# Patient Record
Sex: FEMALE | Race: WHITE | Employment: OTHER | URBAN - METROPOLITAN AREA
[De-identification: names, ages, dates, MRNs, and addresses within clinical notes are randomized per-mention and may not be internally consistent; named-entity substitution may affect disease eponyms.]

---

## 2022-02-08 ENCOUNTER — HOSPITAL ENCOUNTER (INPATIENT)
Age: 42
LOS: 18 days | Discharge: HOME HEALTH | DRG: 853 | End: 2022-02-26
Attending: EMERGENCY MEDICINE | Admitting: SURGERY
Payer: COMMERCIAL

## 2022-02-08 ENCOUNTER — APPOINTMENT (OUTPATIENT)
Dept: GENERAL RADIOLOGY | Age: 42
DRG: 853 | End: 2022-02-08
Payer: COMMERCIAL

## 2022-02-08 ENCOUNTER — ANESTHESIA (OUTPATIENT)
Dept: OPERATING ROOM | Age: 42
DRG: 853 | End: 2022-02-08
Payer: COMMERCIAL

## 2022-02-08 ENCOUNTER — ANESTHESIA EVENT (OUTPATIENT)
Dept: OPERATING ROOM | Age: 42
DRG: 853 | End: 2022-02-08
Payer: COMMERCIAL

## 2022-02-08 DIAGNOSIS — N17.9 AKI (ACUTE KIDNEY INJURY) (HCC): ICD-10-CM

## 2022-02-08 DIAGNOSIS — N49.3 FOURNIER GANGRENE: Primary | ICD-10-CM

## 2022-02-08 LAB
ABSOLUTE EOS #: 0.12 K/UL (ref 0–0.44)
ABSOLUTE IMMATURE GRANULOCYTE: 0.31 K/UL (ref 0–0.3)
ABSOLUTE LYMPH #: 1.18 K/UL (ref 1.1–3.7)
ABSOLUTE MONO #: 0.76 K/UL (ref 0.1–1.2)
ALBUMIN SERPL-MCNC: 1.9 G/DL (ref 3.5–5.2)
ALBUMIN/GLOBULIN RATIO: 0.4 (ref 1–2.5)
ALP BLD-CCNC: 192 U/L (ref 35–104)
ALT SERPL-CCNC: 27 U/L (ref 5–33)
ANION GAP SERPL CALCULATED.3IONS-SCNC: 15 MMOL/L (ref 9–17)
AST SERPL-CCNC: 73 U/L
BASOPHILS # BLD: 0 % (ref 0–2)
BASOPHILS ABSOLUTE: 0.06 K/UL (ref 0–0.2)
BILIRUB SERPL-MCNC: 0.4 MG/DL (ref 0.3–1.2)
BUN BLDV-MCNC: 38 MG/DL (ref 6–20)
BUN/CREAT BLD: ABNORMAL (ref 9–20)
CALCIUM SERPL-MCNC: 7.7 MG/DL (ref 8.6–10.4)
CHLORIDE BLD-SCNC: 83 MMOL/L (ref 98–107)
CO2: 25 MMOL/L (ref 20–31)
CREAT SERPL-MCNC: 3.28 MG/DL (ref 0.5–0.9)
DIFFERENTIAL TYPE: ABNORMAL
EOSINOPHILS RELATIVE PERCENT: 1 % (ref 1–4)
GFR AFRICAN AMERICAN: 19 ML/MIN
GFR NON-AFRICAN AMERICAN: 15 ML/MIN
GFR SERPL CREATININE-BSD FRML MDRD: ABNORMAL ML/MIN/{1.73_M2}
GFR SERPL CREATININE-BSD FRML MDRD: ABNORMAL ML/MIN/{1.73_M2}
GLUCOSE BLD-MCNC: 388 MG/DL (ref 70–99)
HCG(URINE) PREGNANCY TEST: NEGATIVE
HCT VFR BLD CALC: 30.2 % (ref 36.3–47.1)
HEMOGLOBIN: 10.4 G/DL (ref 11.9–15.1)
IMMATURE GRANULOCYTES: 2 %
LACTIC ACID, SEPSIS WHOLE BLOOD: 1.6 MMOL/L (ref 0.5–1.9)
LACTIC ACID, SEPSIS: NORMAL MMOL/L (ref 0.5–1.9)
LYMPHOCYTES # BLD: 6 % (ref 24–43)
MCH RBC QN AUTO: 28.7 PG (ref 25.2–33.5)
MCHC RBC AUTO-ENTMCNC: 34.4 G/DL (ref 28.4–34.8)
MCV RBC AUTO: 83.2 FL (ref 82.6–102.9)
MONOCYTES # BLD: 4 % (ref 3–12)
NRBC AUTOMATED: 0 PER 100 WBC
PDW BLD-RTO: 13.5 % (ref 11.8–14.4)
PLATELET # BLD: 323 K/UL (ref 138–453)
PLATELET ESTIMATE: ABNORMAL
PMV BLD AUTO: 10.8 FL (ref 8.1–13.5)
POTASSIUM SERPL-SCNC: 4.1 MMOL/L (ref 3.7–5.3)
RBC # BLD: 3.63 M/UL (ref 3.95–5.11)
RBC # BLD: ABNORMAL 10*6/UL
SEG NEUTROPHILS: 87 % (ref 36–65)
SEGMENTED NEUTROPHILS ABSOLUTE COUNT: 17.51 K/UL (ref 1.5–8.1)
SODIUM BLD-SCNC: 123 MMOL/L (ref 135–144)
TOTAL PROTEIN: 6.3 G/DL (ref 6.4–8.3)
WBC # BLD: 19.9 K/UL (ref 3.5–11.3)
WBC # BLD: ABNORMAL 10*3/UL

## 2022-02-08 PROCEDURE — 71045 X-RAY EXAM CHEST 1 VIEW: CPT

## 2022-02-08 PROCEDURE — 81025 URINE PREGNANCY TEST: CPT

## 2022-02-08 PROCEDURE — 6360000002 HC RX W HCPCS: Performed by: STUDENT IN AN ORGANIZED HEALTH CARE EDUCATION/TRAINING PROGRAM

## 2022-02-08 PROCEDURE — 87070 CULTURE OTHR SPECIMN AEROBIC: CPT

## 2022-02-08 PROCEDURE — 96374 THER/PROPH/DIAG INJ IV PUSH: CPT

## 2022-02-08 PROCEDURE — 99285 EMERGENCY DEPT VISIT HI MDM: CPT

## 2022-02-08 PROCEDURE — 87075 CULTR BACTERIA EXCEPT BLOOD: CPT

## 2022-02-08 PROCEDURE — 2000000000 HC ICU R&B

## 2022-02-08 PROCEDURE — 83605 ASSAY OF LACTIC ACID: CPT

## 2022-02-08 PROCEDURE — 87205 SMEAR GRAM STAIN: CPT

## 2022-02-08 PROCEDURE — 2580000003 HC RX 258: Performed by: STUDENT IN AN ORGANIZED HEALTH CARE EDUCATION/TRAINING PROGRAM

## 2022-02-08 PROCEDURE — 86403 PARTICLE AGGLUT ANTBDY SCRN: CPT

## 2022-02-08 PROCEDURE — 85025 COMPLETE CBC W/AUTO DIFF WBC: CPT

## 2022-02-08 PROCEDURE — 96375 TX/PRO/DX INJ NEW DRUG ADDON: CPT

## 2022-02-08 PROCEDURE — 80053 COMPREHEN METABOLIC PANEL: CPT

## 2022-02-08 RX ORDER — 0.9 % SODIUM CHLORIDE 0.9 %
1000 INTRAVENOUS SOLUTION INTRAVENOUS ONCE
Status: DISCONTINUED | OUTPATIENT
Start: 2022-02-08 | End: 2022-02-09

## 2022-02-08 RX ORDER — 0.9 % SODIUM CHLORIDE 0.9 %
1000 INTRAVENOUS SOLUTION INTRAVENOUS ONCE
Status: COMPLETED | OUTPATIENT
Start: 2022-02-08 | End: 2022-02-08

## 2022-02-08 RX ORDER — CLINDAMYCIN PHOSPHATE 900 MG/50ML
900 INJECTION INTRAVENOUS EVERY 8 HOURS
Status: DISCONTINUED | OUTPATIENT
Start: 2022-02-08 | End: 2022-02-15

## 2022-02-08 RX ORDER — FENTANYL CITRATE 50 UG/ML
50 INJECTION, SOLUTION INTRAMUSCULAR; INTRAVENOUS ONCE
Status: COMPLETED | OUTPATIENT
Start: 2022-02-08 | End: 2022-02-08

## 2022-02-08 RX ORDER — ONDANSETRON 2 MG/ML
4 INJECTION INTRAMUSCULAR; INTRAVENOUS ONCE
Status: COMPLETED | OUTPATIENT
Start: 2022-02-08 | End: 2022-02-08

## 2022-02-08 RX ORDER — VANCOMYCIN HYDROCHLORIDE 1 G/200ML
1000 INJECTION, SOLUTION INTRAVENOUS EVERY 12 HOURS
Status: DISCONTINUED | OUTPATIENT
Start: 2022-02-08 | End: 2022-02-08

## 2022-02-08 RX ADMIN — SODIUM CHLORIDE 1000 ML: 9 INJECTION, SOLUTION INTRAVENOUS at 22:32

## 2022-02-08 RX ADMIN — PIPERACILLIN AND TAZOBACTAM 3375 MG: 3; .375 INJECTION, POWDER, LYOPHILIZED, FOR SOLUTION INTRAVENOUS at 23:32

## 2022-02-08 RX ADMIN — ONDANSETRON 4 MG: 2 INJECTION INTRAMUSCULAR; INTRAVENOUS at 22:32

## 2022-02-08 RX ADMIN — FENTANYL CITRATE 50 MCG: 50 INJECTION INTRAMUSCULAR; INTRAVENOUS at 22:32

## 2022-02-08 ASSESSMENT — PAIN DESCRIPTION - PAIN TYPE: TYPE: ACUTE PAIN

## 2022-02-08 ASSESSMENT — PAIN DESCRIPTION - LOCATION: LOCATION: PERINEUM

## 2022-02-08 ASSESSMENT — PAIN SCALES - GENERAL
PAINLEVEL_OUTOF10: 8
PAINLEVEL_OUTOF10: 8

## 2022-02-08 ASSESSMENT — PAIN DESCRIPTION - FREQUENCY: FREQUENCY: CONTINUOUS

## 2022-02-08 ASSESSMENT — PAIN DESCRIPTION - DESCRIPTORS: DESCRIPTORS: BURNING

## 2022-02-08 ASSESSMENT — ENCOUNTER SYMPTOMS: TACHYPNEA: 1

## 2022-02-09 ENCOUNTER — ANESTHESIA (OUTPATIENT)
Dept: OPERATING ROOM | Age: 42
DRG: 853 | End: 2022-02-09
Payer: COMMERCIAL

## 2022-02-09 ENCOUNTER — APPOINTMENT (OUTPATIENT)
Dept: GENERAL RADIOLOGY | Age: 42
DRG: 853 | End: 2022-02-09
Payer: COMMERCIAL

## 2022-02-09 ENCOUNTER — ANESTHESIA EVENT (OUTPATIENT)
Dept: OPERATING ROOM | Age: 42
DRG: 853 | End: 2022-02-09
Payer: COMMERCIAL

## 2022-02-09 VITALS — DIASTOLIC BLOOD PRESSURE: 106 MMHG | OXYGEN SATURATION: 100 % | TEMPERATURE: 94.9 F | SYSTOLIC BLOOD PRESSURE: 140 MMHG

## 2022-02-09 VITALS
OXYGEN SATURATION: 97 % | RESPIRATION RATE: 16 BRPM | SYSTOLIC BLOOD PRESSURE: 98 MMHG | TEMPERATURE: 98.2 F | DIASTOLIC BLOOD PRESSURE: 58 MMHG

## 2022-02-09 PROBLEM — E87.6 HYPOKALEMIA: Status: ACTIVE | Noted: 2022-02-09

## 2022-02-09 PROBLEM — N18.9 ACUTE KIDNEY INJURY SUPERIMPOSED ON CKD (HCC): Status: ACTIVE | Noted: 2022-02-09

## 2022-02-09 PROBLEM — E66.01 MORBID OBESITY WITH BMI OF 45.0-49.9, ADULT (HCC): Status: ACTIVE | Noted: 2022-02-09

## 2022-02-09 PROBLEM — D64.9 ANEMIA: Status: ACTIVE | Noted: 2022-02-09

## 2022-02-09 PROBLEM — E87.1 HYPONATREMIA: Status: ACTIVE | Noted: 2022-02-09

## 2022-02-09 PROBLEM — N17.9 ACUTE KIDNEY INJURY SUPERIMPOSED ON CKD (HCC): Status: ACTIVE | Noted: 2022-02-09

## 2022-02-09 PROBLEM — E11.65 UNCONTROLLED TYPE 2 DIABETES MELLITUS WITH HYPERGLYCEMIA (HCC): Status: ACTIVE | Noted: 2022-02-09

## 2022-02-09 LAB
ABSOLUTE EOS #: 0.13 K/UL (ref 0–0.44)
ABSOLUTE EOS #: 0.25 K/UL (ref 0–0.44)
ABSOLUTE IMMATURE GRANULOCYTE: 0.18 K/UL (ref 0–0.3)
ABSOLUTE IMMATURE GRANULOCYTE: 0.27 K/UL (ref 0–0.3)
ABSOLUTE LYMPH #: 0.93 K/UL (ref 1.1–3.7)
ABSOLUTE LYMPH #: 1.53 K/UL (ref 1.1–3.7)
ABSOLUTE MONO #: 0.72 K/UL (ref 0.1–1.2)
ABSOLUTE MONO #: 1.14 K/UL (ref 0.1–1.2)
ALBUMIN SERPL-MCNC: 1.9 G/DL (ref 3.5–5.2)
ALBUMIN/GLOBULIN RATIO: 0.5 (ref 1–2.5)
ALLEN TEST: ABNORMAL
ALLEN TEST: ABNORMAL
ALP BLD-CCNC: 196 U/L (ref 35–104)
ALT SERPL-CCNC: 25 U/L (ref 5–33)
ANION GAP SERPL CALCULATED.3IONS-SCNC: 15 MMOL/L (ref 9–17)
ANION GAP SERPL CALCULATED.3IONS-SCNC: 16 MMOL/L (ref 9–17)
ANION GAP SERPL CALCULATED.3IONS-SCNC: 17 MMOL/L (ref 9–17)
ANION GAP SERPL CALCULATED.3IONS-SCNC: 17 MMOL/L (ref 9–17)
AST SERPL-CCNC: 48 U/L
BASOPHILS # BLD: 0 % (ref 0–2)
BASOPHILS # BLD: 0 % (ref 0–2)
BASOPHILS ABSOLUTE: 0.05 K/UL (ref 0–0.2)
BASOPHILS ABSOLUTE: 0.07 K/UL (ref 0–0.2)
BILIRUB SERPL-MCNC: 0.29 MG/DL (ref 0.3–1.2)
BUN BLDV-MCNC: 40 MG/DL (ref 6–20)
BUN BLDV-MCNC: 41 MG/DL (ref 6–20)
BUN BLDV-MCNC: 42 MG/DL (ref 6–20)
BUN BLDV-MCNC: 42 MG/DL (ref 6–20)
BUN/CREAT BLD: ABNORMAL (ref 9–20)
CALCIUM IONIZED: 0.95 MMOL/L (ref 1.13–1.33)
CALCIUM SERPL-MCNC: 6.9 MG/DL (ref 8.6–10.4)
CALCIUM SERPL-MCNC: 6.9 MG/DL (ref 8.6–10.4)
CALCIUM SERPL-MCNC: 7.1 MG/DL (ref 8.6–10.4)
CALCIUM SERPL-MCNC: 7.1 MG/DL (ref 8.6–10.4)
CHLORIDE BLD-SCNC: 86 MMOL/L (ref 98–107)
CHLORIDE BLD-SCNC: 88 MMOL/L (ref 98–107)
CHLORIDE BLD-SCNC: 88 MMOL/L (ref 98–107)
CHLORIDE BLD-SCNC: 92 MMOL/L (ref 98–107)
CO2: 18 MMOL/L (ref 20–31)
CO2: 19 MMOL/L (ref 20–31)
CO2: 19 MMOL/L (ref 20–31)
CO2: 22 MMOL/L (ref 20–31)
COMPLEMENT C3: 143 MG/DL (ref 90–180)
COMPLEMENT C4: 19 MG/DL (ref 10–40)
CREAT SERPL-MCNC: 4.01 MG/DL (ref 0.5–0.9)
CREAT SERPL-MCNC: 4.05 MG/DL (ref 0.5–0.9)
CREAT SERPL-MCNC: 4.11 MG/DL (ref 0.5–0.9)
CREAT SERPL-MCNC: 4.82 MG/DL (ref 0.5–0.9)
CREATININE URINE: 142.3 MG/DL (ref 28–217)
DIFFERENTIAL TYPE: ABNORMAL
DIFFERENTIAL TYPE: ABNORMAL
EOSINOPHILS RELATIVE PERCENT: 1 % (ref 1–4)
EOSINOPHILS RELATIVE PERCENT: 1 % (ref 1–4)
FIO2: 40
FIO2: 40
GFR AFRICAN AMERICAN: 12 ML/MIN
GFR AFRICAN AMERICAN: 14 ML/MIN
GFR AFRICAN AMERICAN: 15 ML/MIN
GFR AFRICAN AMERICAN: 15 ML/MIN
GFR NON-AFRICAN AMERICAN: 10 ML/MIN
GFR NON-AFRICAN AMERICAN: 12 ML/MIN
GFR SERPL CREATININE-BSD FRML MDRD: ABNORMAL ML/MIN/{1.73_M2}
GLUCOSE BLD-MCNC: 121 MG/DL (ref 70–99)
GLUCOSE BLD-MCNC: 212 MG/DL (ref 70–99)
GLUCOSE BLD-MCNC: 254 MG/DL (ref 65–105)
GLUCOSE BLD-MCNC: 375 MG/DL (ref 65–105)
GLUCOSE BLD-MCNC: 389 MG/DL (ref 70–99)
GLUCOSE BLD-MCNC: 393 MG/DL (ref 65–105)
GLUCOSE BLD-MCNC: 421 MG/DL (ref 65–105)
GLUCOSE BLD-MCNC: 460 MG/DL (ref 70–99)
GLUCOSE BLD-MCNC: 495 MG/DL (ref 65–105)
GLUCOSE BLD-MCNC: 505 MG/DL (ref 65–105)
HCT VFR BLD CALC: 28.4 % (ref 36.3–47.1)
HCT VFR BLD CALC: 28.6 % (ref 36.3–47.1)
HEMOGLOBIN: 9.6 G/DL (ref 11.9–15.1)
HEMOGLOBIN: 9.6 G/DL (ref 11.9–15.1)
IMMATURE GRANULOCYTES: 1 %
IMMATURE GRANULOCYTES: 1 %
INR BLD: 1.1
LACTIC ACID, WHOLE BLOOD: 1.4 MMOL/L (ref 0.7–2.1)
LACTIC ACID, WHOLE BLOOD: 2.3 MMOL/L (ref 0.7–2.1)
LYMPHOCYTES # BLD: 4 % (ref 24–43)
LYMPHOCYTES # BLD: 8 % (ref 24–43)
MAGNESIUM: 1.6 MG/DL (ref 1.6–2.6)
MAGNESIUM: 1.6 MG/DL (ref 1.6–2.6)
MCH RBC QN AUTO: 28.2 PG (ref 25.2–33.5)
MCH RBC QN AUTO: 28.6 PG (ref 25.2–33.5)
MCHC RBC AUTO-ENTMCNC: 33.6 G/DL (ref 28.4–34.8)
MCHC RBC AUTO-ENTMCNC: 33.8 G/DL (ref 28.4–34.8)
MCV RBC AUTO: 84.1 FL (ref 82.6–102.9)
MCV RBC AUTO: 84.5 FL (ref 82.6–102.9)
MODE: ABNORMAL
MODE: ABNORMAL
MONOCYTES # BLD: 4 % (ref 3–12)
MONOCYTES # BLD: 5 % (ref 3–12)
MRSA, DNA, NASAL: NEGATIVE
NEGATIVE BASE EXCESS, ART: 6 (ref 0–2)
NEGATIVE BASE EXCESS, ART: 6 (ref 0–2)
NRBC AUTOMATED: 0 PER 100 WBC
NRBC AUTOMATED: 0 PER 100 WBC
O2 DEVICE/FLOW/%: ABNORMAL
O2 DEVICE/FLOW/%: ABNORMAL
PATIENT TEMP: ABNORMAL
PATIENT TEMP: ABNORMAL
PDW BLD-RTO: 13.6 % (ref 11.8–14.4)
PDW BLD-RTO: 14 % (ref 11.8–14.4)
PHOSPHORUS: 5.3 MG/DL (ref 2.6–4.5)
PHOSPHORUS: 5.8 MG/DL (ref 2.6–4.5)
PLATELET # BLD: 325 K/UL (ref 138–453)
PLATELET # BLD: 357 K/UL (ref 138–453)
PLATELET ESTIMATE: ABNORMAL
PLATELET ESTIMATE: ABNORMAL
PMV BLD AUTO: 10.7 FL (ref 8.1–13.5)
PMV BLD AUTO: 10.7 FL (ref 8.1–13.5)
POC HCO3: 19.9 MMOL/L (ref 21–28)
POC HCO3: 21 MMOL/L (ref 21–28)
POC LACTIC ACID: 1.26 MMOL/L (ref 0.56–1.39)
POC O2 SATURATION: 95 % (ref 94–98)
POC O2 SATURATION: 97 % (ref 94–98)
POC PCO2 TEMP: ABNORMAL MM HG
POC PCO2 TEMP: ABNORMAL MM HG
POC PCO2: 39.1 MM HG (ref 35–48)
POC PCO2: 45.6 MM HG (ref 35–48)
POC PH TEMP: ABNORMAL
POC PH TEMP: ABNORMAL
POC PH: 7.27 (ref 7.35–7.45)
POC PH: 7.32 (ref 7.35–7.45)
POC PO2 TEMP: ABNORMAL MM HG
POC PO2 TEMP: ABNORMAL MM HG
POC PO2: 102.8 MM HG (ref 83–108)
POC PO2: 84 MM HG (ref 83–108)
POSITIVE BASE EXCESS, ART: ABNORMAL (ref 0–3)
POSITIVE BASE EXCESS, ART: ABNORMAL (ref 0–3)
POTASSIUM SERPL-SCNC: 3.4 MMOL/L (ref 3.7–5.3)
POTASSIUM SERPL-SCNC: 3.6 MMOL/L (ref 3.7–5.3)
POTASSIUM SERPL-SCNC: 3.9 MMOL/L (ref 3.7–5.3)
POTASSIUM SERPL-SCNC: 4.8 MMOL/L (ref 3.7–5.3)
PROTHROMBIN TIME: 11.3 SEC (ref 9.1–12.3)
RBC # BLD: 3.36 M/UL (ref 3.95–5.11)
RBC # BLD: 3.4 M/UL (ref 3.95–5.11)
RBC # BLD: ABNORMAL 10*6/UL
RBC # BLD: ABNORMAL 10*6/UL
SAMPLE SITE: ABNORMAL
SAMPLE SITE: ABNORMAL
SARS-COV-2, RAPID: NOT DETECTED
SEG NEUTROPHILS: 86 % (ref 36–65)
SEG NEUTROPHILS: 89 % (ref 36–65)
SEGMENTED NEUTROPHILS ABSOLUTE COUNT: 15.92 K/UL (ref 1.5–8.1)
SEGMENTED NEUTROPHILS ABSOLUTE COUNT: 18.95 K/UL (ref 1.5–8.1)
SODIUM BLD-SCNC: 123 MMOL/L (ref 135–144)
SODIUM BLD-SCNC: 123 MMOL/L (ref 135–144)
SODIUM BLD-SCNC: 125 MMOL/L (ref 135–144)
SODIUM BLD-SCNC: 126 MMOL/L (ref 135–144)
SODIUM,UR: 26 MMOL/L
SPECIMEN DESCRIPTION: NORMAL
SPECIMEN DESCRIPTION: NORMAL
TCO2 (CALC), ART: ABNORMAL MMOL/L (ref 22–29)
TCO2 (CALC), ART: ABNORMAL MMOL/L (ref 22–29)
TOTAL PROTEIN: 5.7 G/DL (ref 6.4–8.3)
WBC # BLD: 18.7 K/UL (ref 3.5–11.3)
WBC # BLD: 21.5 K/UL (ref 3.5–11.3)
WBC # BLD: ABNORMAL 10*3/UL
WBC # BLD: ABNORMAL 10*3/UL

## 2022-02-09 PROCEDURE — 2580000003 HC RX 258: Performed by: SURGERY

## 2022-02-09 PROCEDURE — 2500000003 HC RX 250 WO HCPCS: Performed by: SURGERY

## 2022-02-09 PROCEDURE — 2500000003 HC RX 250 WO HCPCS: Performed by: NURSE PRACTITIONER

## 2022-02-09 PROCEDURE — 0JBC0ZZ EXCISION OF PELVIC REGION SUBCUTANEOUS TISSUE AND FASCIA, OPEN APPROACH: ICD-10-PCS | Performed by: SURGERY

## 2022-02-09 PROCEDURE — 0JBB0ZZ EXCISION OF PERINEUM SUBCUTANEOUS TISSUE AND FASCIA, OPEN APPROACH: ICD-10-PCS | Performed by: SURGERY

## 2022-02-09 PROCEDURE — 87040 BLOOD CULTURE FOR BACTERIA: CPT

## 2022-02-09 PROCEDURE — 2580000003 HC RX 258: Performed by: ANESTHESIOLOGY

## 2022-02-09 PROCEDURE — 6370000000 HC RX 637 (ALT 250 FOR IP): Performed by: STUDENT IN AN ORGANIZED HEALTH CARE EDUCATION/TRAINING PROGRAM

## 2022-02-09 PROCEDURE — 80048 BASIC METABOLIC PNL TOTAL CA: CPT

## 2022-02-09 PROCEDURE — 2580000003 HC RX 258: Performed by: NURSE PRACTITIONER

## 2022-02-09 PROCEDURE — 2500000003 HC RX 250 WO HCPCS: Performed by: STUDENT IN AN ORGANIZED HEALTH CARE EDUCATION/TRAINING PROGRAM

## 2022-02-09 PROCEDURE — 7100000000 HC PACU RECOVERY - FIRST 15 MIN: Performed by: SURGERY

## 2022-02-09 PROCEDURE — 2580000003 HC RX 258: Performed by: NURSE ANESTHETIST, CERTIFIED REGISTERED

## 2022-02-09 PROCEDURE — A4216 STERILE WATER/SALINE, 10 ML: HCPCS | Performed by: NURSE ANESTHETIST, CERTIFIED REGISTERED

## 2022-02-09 PROCEDURE — 83735 ASSAY OF MAGNESIUM: CPT

## 2022-02-09 PROCEDURE — 6360000002 HC RX W HCPCS: Performed by: NURSE ANESTHETIST, CERTIFIED REGISTERED

## 2022-02-09 PROCEDURE — 6370000000 HC RX 637 (ALT 250 FOR IP): Performed by: NURSE PRACTITIONER

## 2022-02-09 PROCEDURE — 99291 CRITICAL CARE FIRST HOUR: CPT | Performed by: INTERNAL MEDICINE

## 2022-02-09 PROCEDURE — 7100000001 HC PACU RECOVERY - ADDTL 15 MIN: Performed by: SURGERY

## 2022-02-09 PROCEDURE — 80053 COMPREHEN METABOLIC PANEL: CPT

## 2022-02-09 PROCEDURE — 0J990ZZ DRAINAGE OF BUTTOCK SUBCUTANEOUS TISSUE AND FASCIA, OPEN APPROACH: ICD-10-PCS | Performed by: SURGERY

## 2022-02-09 PROCEDURE — 84100 ASSAY OF PHOSPHORUS: CPT

## 2022-02-09 PROCEDURE — 0JB90ZZ EXCISION OF BUTTOCK SUBCUTANEOUS TISSUE AND FASCIA, OPEN APPROACH: ICD-10-PCS | Performed by: SURGERY

## 2022-02-09 PROCEDURE — 82947 ASSAY GLUCOSE BLOOD QUANT: CPT

## 2022-02-09 PROCEDURE — 5A1955Z RESPIRATORY VENTILATION, GREATER THAN 96 CONSECUTIVE HOURS: ICD-10-PCS | Performed by: SURGERY

## 2022-02-09 PROCEDURE — 2709999900 HC NON-CHARGEABLE SUPPLY: Performed by: SURGERY

## 2022-02-09 PROCEDURE — 02HV33Z INSERTION OF INFUSION DEVICE INTO SUPERIOR VENA CAVA, PERCUTANEOUS APPROACH: ICD-10-PCS | Performed by: SURGERY

## 2022-02-09 PROCEDURE — 82570 ASSAY OF URINE CREATININE: CPT

## 2022-02-09 PROCEDURE — 85025 COMPLETE CBC W/AUTO DIFF WBC: CPT

## 2022-02-09 PROCEDURE — 37799 UNLISTED PX VASCULAR SURGERY: CPT

## 2022-02-09 PROCEDURE — 94761 N-INVAS EAR/PLS OXIMETRY MLT: CPT

## 2022-02-09 PROCEDURE — 2700000000 HC OXYGEN THERAPY PER DAY

## 2022-02-09 PROCEDURE — 2580000003 HC RX 258: Performed by: INTERNAL MEDICINE

## 2022-02-09 PROCEDURE — 87641 MR-STAPH DNA AMP PROBE: CPT

## 2022-02-09 PROCEDURE — 6360000002 HC RX W HCPCS: Performed by: NURSE PRACTITIONER

## 2022-02-09 PROCEDURE — 86160 COMPLEMENT ANTIGEN: CPT

## 2022-02-09 PROCEDURE — 3600000004 HC SURGERY LEVEL 4 BASE: Performed by: SURGERY

## 2022-02-09 PROCEDURE — 84300 ASSAY OF URINE SODIUM: CPT

## 2022-02-09 PROCEDURE — 83605 ASSAY OF LACTIC ACID: CPT

## 2022-02-09 PROCEDURE — 3700000001 HC ADD 15 MINUTES (ANESTHESIA): Performed by: SURGERY

## 2022-02-09 PROCEDURE — 6370000000 HC RX 637 (ALT 250 FOR IP): Performed by: ANESTHESIOLOGY

## 2022-02-09 PROCEDURE — 0J9C0ZZ DRAINAGE OF PELVIC REGION SUBCUTANEOUS TISSUE AND FASCIA, OPEN APPROACH: ICD-10-PCS | Performed by: SURGERY

## 2022-02-09 PROCEDURE — 6360000002 HC RX W HCPCS: Performed by: ANESTHESIOLOGY

## 2022-02-09 PROCEDURE — 3600000005 HC SURGERY LEVEL 5 BASE: Performed by: SURGERY

## 2022-02-09 PROCEDURE — 3700000000 HC ANESTHESIA ATTENDED CARE: Performed by: SURGERY

## 2022-02-09 PROCEDURE — 3600000015 HC SURGERY LEVEL 5 ADDTL 15MIN: Performed by: SURGERY

## 2022-02-09 PROCEDURE — 82803 BLOOD GASES ANY COMBINATION: CPT

## 2022-02-09 PROCEDURE — A4217 STERILE WATER/SALINE, 500 ML: HCPCS | Performed by: SURGERY

## 2022-02-09 PROCEDURE — 2580000003 HC RX 258: Performed by: STUDENT IN AN ORGANIZED HEALTH CARE EDUCATION/TRAINING PROGRAM

## 2022-02-09 PROCEDURE — 6360000002 HC RX W HCPCS: Performed by: STUDENT IN AN ORGANIZED HEALTH CARE EDUCATION/TRAINING PROGRAM

## 2022-02-09 PROCEDURE — 2500000003 HC RX 250 WO HCPCS: Performed by: NURSE ANESTHETIST, CERTIFIED REGISTERED

## 2022-02-09 PROCEDURE — 87635 SARS-COV-2 COVID-19 AMP PRB: CPT

## 2022-02-09 PROCEDURE — 36415 COLL VENOUS BLD VENIPUNCTURE: CPT

## 2022-02-09 PROCEDURE — 82330 ASSAY OF CALCIUM: CPT

## 2022-02-09 PROCEDURE — 88304 TISSUE EXAM BY PATHOLOGIST: CPT

## 2022-02-09 PROCEDURE — 71045 X-RAY EXAM CHEST 1 VIEW: CPT

## 2022-02-09 PROCEDURE — 2000000000 HC ICU R&B

## 2022-02-09 PROCEDURE — 99223 1ST HOSP IP/OBS HIGH 75: CPT | Performed by: INTERNAL MEDICINE

## 2022-02-09 PROCEDURE — 94002 VENT MGMT INPAT INIT DAY: CPT

## 2022-02-09 PROCEDURE — 3600000014 HC SURGERY LEVEL 4 ADDTL 15MIN: Performed by: SURGERY

## 2022-02-09 PROCEDURE — 85610 PROTHROMBIN TIME: CPT

## 2022-02-09 RX ORDER — BUDESONIDE AND FORMOTEROL FUMARATE DIHYDRATE 80; 4.5 UG/1; UG/1
2 AEROSOL RESPIRATORY (INHALATION) 2 TIMES DAILY
Status: DISCONTINUED | OUTPATIENT
Start: 2022-02-09 | End: 2022-02-26 | Stop reason: HOSPADM

## 2022-02-09 RX ORDER — INSULIN GLARGINE 100 [IU]/ML
35 INJECTION, SOLUTION SUBCUTANEOUS NIGHTLY
Status: DISCONTINUED | OUTPATIENT
Start: 2022-02-09 | End: 2022-02-09

## 2022-02-09 RX ORDER — ROPINIROLE 1 MG/1
4 TABLET, FILM COATED ORAL DAILY
Status: DISCONTINUED | OUTPATIENT
Start: 2022-02-09 | End: 2022-02-26 | Stop reason: HOSPADM

## 2022-02-09 RX ORDER — SODIUM CHLORIDE 9 MG/ML
INJECTION INTRAVENOUS PRN
Status: DISCONTINUED | OUTPATIENT
Start: 2022-02-09 | End: 2022-02-09 | Stop reason: SDUPTHER

## 2022-02-09 RX ORDER — INSULIN GLARGINE 100 [IU]/ML
20 INJECTION, SOLUTION SUBCUTANEOUS NIGHTLY
Status: DISCONTINUED | OUTPATIENT
Start: 2022-02-09 | End: 2022-02-09

## 2022-02-09 RX ORDER — SUCCINYLCHOLINE CHLORIDE 20 MG/ML
INJECTION INTRAMUSCULAR; INTRAVENOUS PRN
Status: DISCONTINUED | OUTPATIENT
Start: 2022-02-09 | End: 2022-02-09 | Stop reason: SDUPTHER

## 2022-02-09 RX ORDER — MIDAZOLAM HYDROCHLORIDE 1 MG/ML
INJECTION INTRAMUSCULAR; INTRAVENOUS PRN
Status: DISCONTINUED | OUTPATIENT
Start: 2022-02-09 | End: 2022-02-09 | Stop reason: SDUPTHER

## 2022-02-09 RX ORDER — FENTANYL CITRATE 50 UG/ML
INJECTION, SOLUTION INTRAMUSCULAR; INTRAVENOUS PRN
Status: DISCONTINUED | OUTPATIENT
Start: 2022-02-09 | End: 2022-02-09 | Stop reason: SDUPTHER

## 2022-02-09 RX ORDER — PROPOFOL 10 MG/ML
INJECTION, EMULSION INTRAVENOUS PRN
Status: DISCONTINUED | OUTPATIENT
Start: 2022-02-09 | End: 2022-02-09 | Stop reason: SDUPTHER

## 2022-02-09 RX ORDER — FENOFIBRATE 160 MG/1
160 TABLET ORAL DAILY
COMMUNITY

## 2022-02-09 RX ORDER — HEPARIN SODIUM 5000 [USP'U]/ML
5000 INJECTION, SOLUTION INTRAVENOUS; SUBCUTANEOUS EVERY 8 HOURS SCHEDULED
Status: DISCONTINUED | OUTPATIENT
Start: 2022-02-09 | End: 2022-02-09

## 2022-02-09 RX ORDER — INSULIN GLARGINE 100 [IU]/ML
30 INJECTION, SOLUTION SUBCUTANEOUS NIGHTLY
Status: DISCONTINUED | OUTPATIENT
Start: 2022-02-09 | End: 2022-02-09

## 2022-02-09 RX ORDER — OMEPRAZOLE 20 MG/1
20 CAPSULE, DELAYED RELEASE ORAL DAILY
COMMUNITY

## 2022-02-09 RX ORDER — LISINOPRIL 40 MG/1
40 TABLET ORAL DAILY
Status: ON HOLD | COMMUNITY
End: 2022-07-28

## 2022-02-09 RX ORDER — CYANOCOBALAMIN 1000 UG/ML
1000 INJECTION INTRAMUSCULAR; SUBCUTANEOUS
COMMUNITY
Start: 2021-10-18

## 2022-02-09 RX ORDER — OXYCODONE HYDROCHLORIDE 5 MG/1
5 TABLET ORAL EVERY 4 HOURS PRN
Status: DISCONTINUED | OUTPATIENT
Start: 2022-02-09 | End: 2022-02-15

## 2022-02-09 RX ORDER — FLUTICASONE PROPIONATE 50 MCG
1 SPRAY, SUSPENSION (ML) NASAL DAILY
Status: DISCONTINUED | OUTPATIENT
Start: 2022-02-09 | End: 2022-02-26 | Stop reason: HOSPADM

## 2022-02-09 RX ORDER — ONDANSETRON 2 MG/ML
INJECTION INTRAMUSCULAR; INTRAVENOUS PRN
Status: DISCONTINUED | OUTPATIENT
Start: 2022-02-09 | End: 2022-02-09 | Stop reason: SDUPTHER

## 2022-02-09 RX ORDER — VITAMIN B COMPLEX
5000 TABLET ORAL DAILY
Status: DISCONTINUED | OUTPATIENT
Start: 2022-02-09 | End: 2022-02-09

## 2022-02-09 RX ORDER — FLUTICASONE PROPIONATE 50 MCG
1 SPRAY, SUSPENSION (ML) NASAL
COMMUNITY

## 2022-02-09 RX ORDER — HEPARIN SODIUM 5000 [USP'U]/ML
5000 INJECTION, SOLUTION INTRAVENOUS; SUBCUTANEOUS EVERY 8 HOURS SCHEDULED
Status: DISCONTINUED | OUTPATIENT
Start: 2022-02-09 | End: 2022-02-26 | Stop reason: HOSPADM

## 2022-02-09 RX ORDER — NEOSTIGMINE METHYLSULFATE 5 MG/5 ML
SYRINGE (ML) INTRAVENOUS PRN
Status: DISCONTINUED | OUTPATIENT
Start: 2022-02-09 | End: 2022-02-09 | Stop reason: SDUPTHER

## 2022-02-09 RX ORDER — MONTELUKAST SODIUM 10 MG/1
10 TABLET ORAL DAILY
Status: DISCONTINUED | OUTPATIENT
Start: 2022-02-09 | End: 2022-02-26 | Stop reason: HOSPADM

## 2022-02-09 RX ORDER — DEXTROSE MONOHYDRATE 50 MG/ML
100 INJECTION, SOLUTION INTRAVENOUS PRN
Status: DISCONTINUED | OUTPATIENT
Start: 2022-02-09 | End: 2022-02-09

## 2022-02-09 RX ORDER — GABAPENTIN 300 MG/1
300 CAPSULE ORAL EVERY 8 HOURS
Status: DISCONTINUED | OUTPATIENT
Start: 2022-02-09 | End: 2022-02-09

## 2022-02-09 RX ORDER — GABAPENTIN 100 MG/1
100 CAPSULE ORAL EVERY 8 HOURS
Status: DISCONTINUED | OUTPATIENT
Start: 2022-02-09 | End: 2022-02-25

## 2022-02-09 RX ORDER — DEXMEDETOMIDINE HYDROCHLORIDE 4 UG/ML
0.2 INJECTION, SOLUTION INTRAVENOUS CONTINUOUS
Status: DISCONTINUED | OUTPATIENT
Start: 2022-02-09 | End: 2022-02-17

## 2022-02-09 RX ORDER — NOREPINEPHRINE BIT/0.9 % NACL 16MG/250ML
2-100 INFUSION BOTTLE (ML) INTRAVENOUS CONTINUOUS
Status: DISCONTINUED | OUTPATIENT
Start: 2022-02-09 | End: 2022-02-15

## 2022-02-09 RX ORDER — METHOCARBAMOL 750 MG/1
750 TABLET, FILM COATED ORAL EVERY 6 HOURS
Status: DISCONTINUED | OUTPATIENT
Start: 2022-02-09 | End: 2022-02-09

## 2022-02-09 RX ORDER — LIDOCAINE HYDROCHLORIDE 10 MG/ML
INJECTION, SOLUTION EPIDURAL; INFILTRATION; INTRACAUDAL; PERINEURAL PRN
Status: DISCONTINUED | OUTPATIENT
Start: 2022-02-09 | End: 2022-02-09 | Stop reason: SDUPTHER

## 2022-02-09 RX ORDER — ACETAMINOPHEN 325 MG/1
650 TABLET ORAL EVERY 6 HOURS PRN
Status: DISCONTINUED | OUTPATIENT
Start: 2022-02-09 | End: 2022-02-09

## 2022-02-09 RX ORDER — CARVEDILOL 25 MG/1
25 TABLET ORAL DAILY
Status: ON HOLD | COMMUNITY
End: 2022-02-25 | Stop reason: HOSPADM

## 2022-02-09 RX ORDER — POTASSIUM CHLORIDE 20 MEQ/1
40 TABLET, EXTENDED RELEASE ORAL ONCE
Status: COMPLETED | OUTPATIENT
Start: 2022-02-09 | End: 2022-02-09

## 2022-02-09 RX ORDER — MAGNESIUM HYDROXIDE 1200 MG/15ML
LIQUID ORAL CONTINUOUS PRN
Status: COMPLETED | OUTPATIENT
Start: 2022-02-09 | End: 2022-02-09

## 2022-02-09 RX ORDER — SODIUM CHLORIDE 9 MG/ML
INJECTION, SOLUTION INTRAVENOUS CONTINUOUS PRN
Status: DISCONTINUED | OUTPATIENT
Start: 2022-02-09 | End: 2022-02-09 | Stop reason: SDUPTHER

## 2022-02-09 RX ORDER — 0.9 % SODIUM CHLORIDE 0.9 %
500 INTRAVENOUS SOLUTION INTRAVENOUS ONCE
Status: DISCONTINUED | OUTPATIENT
Start: 2022-02-09 | End: 2022-02-09

## 2022-02-09 RX ORDER — SODIUM CHLORIDE 0.9 % (FLUSH) 0.9 %
5-40 SYRINGE (ML) INJECTION PRN
Status: DISCONTINUED | OUTPATIENT
Start: 2022-02-09 | End: 2022-02-09

## 2022-02-09 RX ORDER — ROPINIROLE 4 MG/1
4 TABLET, FILM COATED ORAL DAILY
COMMUNITY
End: 2022-07-21

## 2022-02-09 RX ORDER — ERGOCALCIFEROL 1.25 MG/1
50000 CAPSULE ORAL WEEKLY
COMMUNITY

## 2022-02-09 RX ORDER — SODIUM CHLORIDE, SODIUM LACTATE, POTASSIUM CHLORIDE, AND CALCIUM CHLORIDE .6; .31; .03; .02 G/100ML; G/100ML; G/100ML; G/100ML
1000 INJECTION, SOLUTION INTRAVENOUS ONCE
Status: DISCONTINUED | OUTPATIENT
Start: 2022-02-09 | End: 2022-02-09

## 2022-02-09 RX ORDER — ROCURONIUM BROMIDE 10 MG/ML
INJECTION, SOLUTION INTRAVENOUS PRN
Status: DISCONTINUED | OUTPATIENT
Start: 2022-02-09 | End: 2022-02-09 | Stop reason: SDUPTHER

## 2022-02-09 RX ORDER — NICOTINE POLACRILEX 4 MG
15 LOZENGE BUCCAL PRN
Status: DISCONTINUED | OUTPATIENT
Start: 2022-02-09 | End: 2022-02-09

## 2022-02-09 RX ORDER — ACETAMINOPHEN 650 MG/1
650 SUPPOSITORY RECTAL EVERY 6 HOURS PRN
Status: DISCONTINUED | OUTPATIENT
Start: 2022-02-09 | End: 2022-02-09

## 2022-02-09 RX ORDER — SODIUM CHLORIDE 0.9 % (FLUSH) 0.9 %
5-40 SYRINGE (ML) INJECTION EVERY 12 HOURS SCHEDULED
Status: DISCONTINUED | OUTPATIENT
Start: 2022-02-09 | End: 2022-02-09

## 2022-02-09 RX ORDER — FENOFIBRATE 160 MG/1
160 TABLET ORAL DAILY
Status: DISCONTINUED | OUTPATIENT
Start: 2022-02-09 | End: 2022-02-09

## 2022-02-09 RX ORDER — MAGNESIUM SULFATE HEPTAHYDRATE 40 MG/ML
4000 INJECTION, SOLUTION INTRAVENOUS ONCE
Status: COMPLETED | OUTPATIENT
Start: 2022-02-09 | End: 2022-02-09

## 2022-02-09 RX ORDER — ALBUTEROL SULFATE 90 UG/1
1 AEROSOL, METERED RESPIRATORY (INHALATION) EVERY 4 HOURS PRN
Status: DISCONTINUED | OUTPATIENT
Start: 2022-02-09 | End: 2022-02-26 | Stop reason: HOSPADM

## 2022-02-09 RX ORDER — SODIUM CHLORIDE 9 MG/ML
INJECTION, SOLUTION INTRAVENOUS CONTINUOUS
Status: DISCONTINUED | OUTPATIENT
Start: 2022-02-09 | End: 2022-02-09

## 2022-02-09 RX ORDER — MONTELUKAST SODIUM 10 MG/1
10 TABLET ORAL DAILY
COMMUNITY

## 2022-02-09 RX ORDER — DULOXETIN HYDROCHLORIDE 60 MG/1
60 CAPSULE, DELAYED RELEASE ORAL DAILY
COMMUNITY

## 2022-02-09 RX ORDER — DEXTROSE MONOHYDRATE 25 G/50ML
12.5 INJECTION, SOLUTION INTRAVENOUS PRN
Status: DISCONTINUED | OUTPATIENT
Start: 2022-02-09 | End: 2022-02-09

## 2022-02-09 RX ORDER — OMEPRAZOLE 20 MG/1
20 CAPSULE, DELAYED RELEASE ORAL DAILY
Status: DISCONTINUED | OUTPATIENT
Start: 2022-02-09 | End: 2022-02-09

## 2022-02-09 RX ORDER — ACETAMINOPHEN 500 MG
1000 TABLET ORAL EVERY 8 HOURS SCHEDULED
Status: DISCONTINUED | OUTPATIENT
Start: 2022-02-09 | End: 2022-02-26 | Stop reason: HOSPADM

## 2022-02-09 RX ORDER — SODIUM CHLORIDE 9 MG/ML
25 INJECTION, SOLUTION INTRAVENOUS PRN
Status: DISCONTINUED | OUTPATIENT
Start: 2022-02-09 | End: 2022-02-09

## 2022-02-09 RX ORDER — GLYCOPYRROLATE 1 MG/5 ML
SYRINGE (ML) INTRAVENOUS PRN
Status: DISCONTINUED | OUTPATIENT
Start: 2022-02-09 | End: 2022-02-09 | Stop reason: SDUPTHER

## 2022-02-09 RX ORDER — ALBUTEROL SULFATE 90 UG/1
1 AEROSOL, METERED RESPIRATORY (INHALATION) PRN
COMMUNITY

## 2022-02-09 RX ORDER — 0.9 % SODIUM CHLORIDE 0.9 %
1000 INTRAVENOUS SOLUTION INTRAVENOUS ONCE
Status: COMPLETED | OUTPATIENT
Start: 2022-02-09 | End: 2022-02-09

## 2022-02-09 RX ORDER — SODIUM CHLORIDE, SODIUM LACTATE, POTASSIUM CHLORIDE, CALCIUM CHLORIDE 600; 310; 30; 20 MG/100ML; MG/100ML; MG/100ML; MG/100ML
INJECTION, SOLUTION INTRAVENOUS CONTINUOUS
Status: DISCONTINUED | OUTPATIENT
Start: 2022-02-09 | End: 2022-02-11

## 2022-02-09 RX ADMIN — PHENYLEPHRINE HYDROCHLORIDE 200 MCG: 10 INJECTION INTRAVENOUS at 01:35

## 2022-02-09 RX ADMIN — Medication 4 MG: at 01:08

## 2022-02-09 RX ADMIN — PHENYLEPHRINE HYDROCHLORIDE 50 MCG/MIN: 10 INJECTION INTRAVENOUS at 01:33

## 2022-02-09 RX ADMIN — PHENYLEPHRINE HYDROCHLORIDE 200 MCG: 10 INJECTION INTRAVENOUS at 01:05

## 2022-02-09 RX ADMIN — PROPOFOL 200 MG: 10 INJECTION, EMULSION INTRAVENOUS at 00:14

## 2022-02-09 RX ADMIN — POTASSIUM CHLORIDE 40 MEQ: 20 TABLET, EXTENDED RELEASE ORAL at 05:59

## 2022-02-09 RX ADMIN — POTASSIUM BICARBONATE 40 MEQ: 782 TABLET, EFFERVESCENT ORAL at 17:04

## 2022-02-09 RX ADMIN — SODIUM CHLORIDE 1000 ML: 9 INJECTION, SOLUTION INTRAVENOUS at 10:45

## 2022-02-09 RX ADMIN — PHENYLEPHRINE HYDROCHLORIDE 200 MCG: 10 INJECTION INTRAVENOUS at 00:38

## 2022-02-09 RX ADMIN — PHENYLEPHRINE HYDROCHLORIDE 200 MCG: 10 INJECTION INTRAVENOUS at 00:26

## 2022-02-09 RX ADMIN — OMEPRAZOLE 20 MG: 20 CAPSULE, DELAYED RELEASE ORAL at 10:47

## 2022-02-09 RX ADMIN — FENTANYL CITRATE 100 MCG: 50 INJECTION, SOLUTION INTRAMUSCULAR; INTRAVENOUS at 13:28

## 2022-02-09 RX ADMIN — PHENYLEPHRINE HYDROCHLORIDE 200 MCG: 10 INJECTION INTRAVENOUS at 00:57

## 2022-02-09 RX ADMIN — LIDOCAINE HYDROCHLORIDE 50 MG: 10 INJECTION, SOLUTION EPIDURAL; INFILTRATION; INTRACAUDAL; PERINEURAL at 13:28

## 2022-02-09 RX ADMIN — DEXMEDETOMIDINE HYDROCHLORIDE 0.2 MCG/KG/HR: 4 INJECTION, SOLUTION INTRAVENOUS at 16:14

## 2022-02-09 RX ADMIN — MAGNESIUM SULFATE IN WATER 4000 MG: 40 INJECTION, SOLUTION INTRAVENOUS at 19:23

## 2022-02-09 RX ADMIN — PHENYLEPHRINE HYDROCHLORIDE 200 MCG: 10 INJECTION INTRAVENOUS at 00:47

## 2022-02-09 RX ADMIN — VASOPRESSIN 0.02 UNITS/MIN: 20 INJECTION INTRAVENOUS at 14:08

## 2022-02-09 RX ADMIN — FENTANYL CITRATE 50 MCG: 50 INJECTION, SOLUTION INTRAMUSCULAR; INTRAVENOUS at 01:00

## 2022-02-09 RX ADMIN — SODIUM CHLORIDE 9.9 UNITS/HR: 9 INJECTION, SOLUTION INTRAVENOUS at 11:29

## 2022-02-09 RX ADMIN — SODIUM CHLORIDE, PRESERVATIVE FREE 10 ML: 5 INJECTION INTRAVENOUS at 08:29

## 2022-02-09 RX ADMIN — SODIUM CHLORIDE: 9 INJECTION, SOLUTION INTRAVENOUS at 00:05

## 2022-02-09 RX ADMIN — CLINDAMYCIN IN 5 PERCENT DEXTROSE 900 MG: 18 INJECTION, SOLUTION INTRAVENOUS at 00:36

## 2022-02-09 RX ADMIN — KETAMINE HYDROCHLORIDE 0.2 MG/KG/HR: 50 INJECTION, SOLUTION INTRAMUSCULAR; INTRAVENOUS at 17:15

## 2022-02-09 RX ADMIN — OXYCODONE 5 MG: 5 TABLET ORAL at 04:20

## 2022-02-09 RX ADMIN — ROCURONIUM BROMIDE 10 MG: 10 INJECTION INTRAVENOUS at 13:28

## 2022-02-09 RX ADMIN — MONTELUKAST SODIUM 10 MG: 10 TABLET, FILM COATED ORAL at 10:47

## 2022-02-09 RX ADMIN — SODIUM CHLORIDE, POTASSIUM CHLORIDE, SODIUM LACTATE AND CALCIUM CHLORIDE: 600; 310; 30; 20 INJECTION, SOLUTION INTRAVENOUS at 23:55

## 2022-02-09 RX ADMIN — ROCURONIUM BROMIDE 50 MG: 10 INJECTION INTRAVENOUS at 00:14

## 2022-02-09 RX ADMIN — VASOPRESSIN 0.4 UNITS/MIN: 20 INJECTION INTRAVENOUS at 21:17

## 2022-02-09 RX ADMIN — PHENYLEPHRINE HYDROCHLORIDE 100 MCG: 10 INJECTION INTRAVENOUS at 14:01

## 2022-02-09 RX ADMIN — INSULIN GLARGINE 20 UNITS: 100 INJECTION, SOLUTION SUBCUTANEOUS at 05:59

## 2022-02-09 RX ADMIN — FENTANYL CITRATE 50 MCG: 50 INJECTION, SOLUTION INTRAMUSCULAR; INTRAVENOUS at 00:14

## 2022-02-09 RX ADMIN — METHOCARBAMOL TABLETS 750 MG: 750 TABLET, COATED ORAL at 10:49

## 2022-02-09 RX ADMIN — SODIUM CHLORIDE 10 ML: 9 INJECTION, SOLUTION INTRAMUSCULAR; INTRAVENOUS; SUBCUTANEOUS at 00:35

## 2022-02-09 RX ADMIN — HEPARIN SODIUM 5000 UNITS: 5000 INJECTION INTRAVENOUS; SUBCUTANEOUS at 05:59

## 2022-02-09 RX ADMIN — PROPOFOL 150 MG: 10 INJECTION, EMULSION INTRAVENOUS at 13:28

## 2022-02-09 RX ADMIN — ROPINIROLE HYDROCHLORIDE 4 MG: 1 TABLET, FILM COATED ORAL at 10:47

## 2022-02-09 RX ADMIN — FENTANYL CITRATE 100 MCG: 50 INJECTION, SOLUTION INTRAMUSCULAR; INTRAVENOUS at 14:25

## 2022-02-09 RX ADMIN — INSULIN LISPRO 18 UNITS: 100 INJECTION, SOLUTION INTRAVENOUS; SUBCUTANEOUS at 08:26

## 2022-02-09 RX ADMIN — ACETAMINOPHEN 1000 MG: 500 TABLET ORAL at 17:03

## 2022-02-09 RX ADMIN — PHENYLEPHRINE HYDROCHLORIDE 50 MCG/MIN: 10 INJECTION INTRAVENOUS at 01:35

## 2022-02-09 RX ADMIN — ONDANSETRON 4 MG: 2 INJECTION INTRAMUSCULAR; INTRAVENOUS at 00:58

## 2022-02-09 RX ADMIN — PHENYLEPHRINE HYDROCHLORIDE 300 MCG: 10 INJECTION INTRAVENOUS at 00:25

## 2022-02-09 RX ADMIN — CLINDAMYCIN IN 5 PERCENT DEXTROSE 900 MG: 18 INJECTION, SOLUTION INTRAVENOUS at 09:00

## 2022-02-09 RX ADMIN — Medication 50 MCG/HR: at 16:30

## 2022-02-09 RX ADMIN — SODIUM CHLORIDE 9.92 UNITS/HR: 9 INJECTION, SOLUTION INTRAVENOUS at 18:30

## 2022-02-09 RX ADMIN — CLINDAMYCIN IN 5 PERCENT DEXTROSE 900 MG: 18 INJECTION, SOLUTION INTRAVENOUS at 16:59

## 2022-02-09 RX ADMIN — FAMOTIDINE 20 MG: 10 INJECTION INTRAVENOUS at 17:09

## 2022-02-09 RX ADMIN — METHOCARBAMOL TABLETS 750 MG: 750 TABLET, COATED ORAL at 05:59

## 2022-02-09 RX ADMIN — VANCOMYCIN HYDROCHLORIDE 2500 MG: 1 INJECTION, POWDER, LYOPHILIZED, FOR SOLUTION INTRAVENOUS at 04:51

## 2022-02-09 RX ADMIN — ROCURONIUM BROMIDE 40 MG: 10 INJECTION INTRAVENOUS at 13:32

## 2022-02-09 RX ADMIN — LIDOCAINE HYDROCHLORIDE 50 MG: 10 INJECTION, SOLUTION EPIDURAL; INFILTRATION; INTRACAUDAL; PERINEURAL at 00:14

## 2022-02-09 RX ADMIN — HEPARIN SODIUM 5000 UNITS: 5000 INJECTION INTRAVENOUS; SUBCUTANEOUS at 16:59

## 2022-02-09 RX ADMIN — PHENYLEPHRINE HYDROCHLORIDE 100 MCG: 10 INJECTION INTRAVENOUS at 14:14

## 2022-02-09 RX ADMIN — Medication 0.6 MG: at 01:08

## 2022-02-09 RX ADMIN — FLUTICASONE PROPIONATE 1 SPRAY: 50 SPRAY, METERED NASAL at 08:30

## 2022-02-09 RX ADMIN — PHENYLEPHRINE HYDROCHLORIDE 100 MCG: 10 INJECTION INTRAVENOUS at 14:05

## 2022-02-09 RX ADMIN — MIDAZOLAM HYDROCHLORIDE 2 MG: 1 INJECTION, SOLUTION INTRAMUSCULAR; INTRAVENOUS at 14:25

## 2022-02-09 RX ADMIN — SODIUM CHLORIDE: 9 INJECTION, SOLUTION INTRAVENOUS at 01:17

## 2022-02-09 RX ADMIN — MIDAZOLAM HYDROCHLORIDE 2 MG: 1 INJECTION, SOLUTION INTRAMUSCULAR; INTRAVENOUS at 00:07

## 2022-02-09 RX ADMIN — HEPARIN SODIUM 5000 UNITS: 5000 INJECTION INTRAVENOUS; SUBCUTANEOUS at 22:35

## 2022-02-09 RX ADMIN — SODIUM CHLORIDE 10 ML: 9 INJECTION, SOLUTION INTRAMUSCULAR; INTRAVENOUS; SUBCUTANEOUS at 14:01

## 2022-02-09 RX ADMIN — GABAPENTIN 100 MG: 300 CAPSULE ORAL at 20:20

## 2022-02-09 RX ADMIN — ROCURONIUM BROMIDE 50 MG: 10 INJECTION INTRAVENOUS at 14:30

## 2022-02-09 RX ADMIN — GABAPENTIN 300 MG: 300 CAPSULE ORAL at 05:59

## 2022-02-09 RX ADMIN — INSULIN HUMAN 5 UNITS: 100 INJECTION, SOLUTION PARENTERAL at 02:17

## 2022-02-09 RX ADMIN — MIDAZOLAM HYDROCHLORIDE 2 MG: 1 INJECTION, SOLUTION INTRAMUSCULAR; INTRAVENOUS at 13:28

## 2022-02-09 RX ADMIN — Medication 5000 UNITS: at 10:47

## 2022-02-09 RX ADMIN — ACETAMINOPHEN 1000 MG: 500 TABLET ORAL at 22:35

## 2022-02-09 RX ADMIN — Medication 100 MG: at 13:28

## 2022-02-09 RX ADMIN — CLINDAMYCIN IN 5 PERCENT DEXTROSE 900 MG: 18 INJECTION, SOLUTION INTRAVENOUS at 22:35

## 2022-02-09 RX ADMIN — Medication 2 MCG/MIN: at 08:14

## 2022-02-09 RX ADMIN — SODIUM CHLORIDE, POTASSIUM CHLORIDE, SODIUM LACTATE AND CALCIUM CHLORIDE: 600; 310; 30; 20 INJECTION, SOLUTION INTRAVENOUS at 16:13

## 2022-02-09 ASSESSMENT — PULMONARY FUNCTION TESTS
PIF_VALUE: 36
PIF_VALUE: 28
PIF_VALUE: 27
PIF_VALUE: 26
PIF_VALUE: 27
PIF_VALUE: 1
PIF_VALUE: 27
PIF_VALUE: 15
PIF_VALUE: 34
PIF_VALUE: 36
PIF_VALUE: 27
PIF_VALUE: 33
PIF_VALUE: 36
PIF_VALUE: 21
PIF_VALUE: 26
PIF_VALUE: 2
PIF_VALUE: 18
PIF_VALUE: 27
PIF_VALUE: 24
PIF_VALUE: 36
PIF_VALUE: 37
PIF_VALUE: 36
PIF_VALUE: 1
PIF_VALUE: 37
PIF_VALUE: 1
PIF_VALUE: 36
PIF_VALUE: 27
PIF_VALUE: 0
PIF_VALUE: 17
PIF_VALUE: 37
PIF_VALUE: 27
PIF_VALUE: 28
PIF_VALUE: 16
PIF_VALUE: 27
PIF_VALUE: 35
PIF_VALUE: 27
PIF_VALUE: 0
PIF_VALUE: 34
PIF_VALUE: 36
PIF_VALUE: 35
PIF_VALUE: 25
PIF_VALUE: 27
PIF_VALUE: 35
PIF_VALUE: 4
PIF_VALUE: 28
PIF_VALUE: 22
PIF_VALUE: 27
PIF_VALUE: 34
PIF_VALUE: 29
PIF_VALUE: 35
PIF_VALUE: 24
PIF_VALUE: 1
PIF_VALUE: 36
PIF_VALUE: 35
PIF_VALUE: 27
PIF_VALUE: 36
PIF_VALUE: 0
PIF_VALUE: 27
PIF_VALUE: 34
PIF_VALUE: 29
PIF_VALUE: 0
PIF_VALUE: 35
PIF_VALUE: 1
PIF_VALUE: 34
PIF_VALUE: 33
PIF_VALUE: 23
PIF_VALUE: 40
PIF_VALUE: 35
PIF_VALUE: 28
PIF_VALUE: 27
PIF_VALUE: 19
PIF_VALUE: 27
PIF_VALUE: 0
PIF_VALUE: 34
PIF_VALUE: 28
PIF_VALUE: 27
PIF_VALUE: 1
PIF_VALUE: 4
PIF_VALUE: 45
PIF_VALUE: 1
PIF_VALUE: 21
PIF_VALUE: 36
PIF_VALUE: 34
PIF_VALUE: 1
PIF_VALUE: 27
PIF_VALUE: 36
PIF_VALUE: 34
PIF_VALUE: 27
PIF_VALUE: 27
PIF_VALUE: 26
PIF_VALUE: 8
PIF_VALUE: 27
PIF_VALUE: 27
PIF_VALUE: 26
PIF_VALUE: 25
PIF_VALUE: 36
PIF_VALUE: 36
PIF_VALUE: 26
PIF_VALUE: 14
PIF_VALUE: 27
PIF_VALUE: 14
PIF_VALUE: 24
PIF_VALUE: 27
PIF_VALUE: 29
PIF_VALUE: 36
PIF_VALUE: 27
PIF_VALUE: 2
PIF_VALUE: 27
PIF_VALUE: 0
PIF_VALUE: 26
PIF_VALUE: 27
PIF_VALUE: 28
PIF_VALUE: 24
PIF_VALUE: 13
PIF_VALUE: 0
PIF_VALUE: 26
PIF_VALUE: 37
PIF_VALUE: 28
PIF_VALUE: 26
PIF_VALUE: 35
PIF_VALUE: 27
PIF_VALUE: 26
PIF_VALUE: 27
PIF_VALUE: 1
PIF_VALUE: 0
PIF_VALUE: 27
PIF_VALUE: 35
PIF_VALUE: 27
PIF_VALUE: 25
PIF_VALUE: 26
PIF_VALUE: 27
PIF_VALUE: 34
PIF_VALUE: 27
PIF_VALUE: 34
PIF_VALUE: 27
PIF_VALUE: 26
PIF_VALUE: 26
PIF_VALUE: 25
PIF_VALUE: 27
PIF_VALUE: 35
PIF_VALUE: 10
PIF_VALUE: 27
PIF_VALUE: 33
PIF_VALUE: 35
PIF_VALUE: 27
PIF_VALUE: 1
PIF_VALUE: 0
PIF_VALUE: 28
PIF_VALUE: 13
PIF_VALUE: 0
PIF_VALUE: 0
PIF_VALUE: 35
PIF_VALUE: 27
PIF_VALUE: 35
PIF_VALUE: 26
PIF_VALUE: 6
PIF_VALUE: 35
PIF_VALUE: 27
PIF_VALUE: 34
PIF_VALUE: 22
PIF_VALUE: 26
PIF_VALUE: 1
PIF_VALUE: 36
PIF_VALUE: 30
PIF_VALUE: 27
PIF_VALUE: 21
PIF_VALUE: 27
PIF_VALUE: 35
PIF_VALUE: 28
PIF_VALUE: 37
PIF_VALUE: 35
PIF_VALUE: 28
PIF_VALUE: 36
PIF_VALUE: 27
PIF_VALUE: 27
PIF_VALUE: 33
PIF_VALUE: 29
PIF_VALUE: 0
PIF_VALUE: 27
PIF_VALUE: 28
PIF_VALUE: 3
PIF_VALUE: 35
PIF_VALUE: 25

## 2022-02-09 ASSESSMENT — ENCOUNTER SYMPTOMS
CONSTIPATION: 0
ABDOMINAL PAIN: 0
SORE THROAT: 0
CHOKING: 0
RHINORRHEA: 0
COUGH: 0
VOMITING: 0
CHEST TIGHTNESS: 0
NAUSEA: 1
ABDOMINAL DISTENTION: 0
SHORTNESS OF BREATH: 0
DIARRHEA: 0
BACK PAIN: 0
WHEEZING: 0

## 2022-02-09 ASSESSMENT — PAIN SCALES - GENERAL
PAINLEVEL_OUTOF10: 6
PAINLEVEL_OUTOF10: 0
PAINLEVEL_OUTOF10: 5
PAINLEVEL_OUTOF10: 6
PAINLEVEL_OUTOF10: 0

## 2022-02-09 ASSESSMENT — PAIN - FUNCTIONAL ASSESSMENT: PAIN_FUNCTIONAL_ASSESSMENT: 0-10

## 2022-02-09 ASSESSMENT — PAIN DESCRIPTION - FREQUENCY: FREQUENCY: CONTINUOUS

## 2022-02-09 ASSESSMENT — PAIN DESCRIPTION - DESCRIPTORS: DESCRIPTORS: BURNING

## 2022-02-09 ASSESSMENT — PAIN DESCRIPTION - LOCATION
LOCATION: PERINEUM
LOCATION: PERINEUM

## 2022-02-09 ASSESSMENT — PAIN DESCRIPTION - PAIN TYPE
TYPE: SURGICAL PAIN
TYPE: SURGICAL PAIN;ACUTE PAIN

## 2022-02-09 NOTE — OP NOTE
Operative Note      Patient: Naresh Lobato  YOB: 1980  MRN: [id number]    Date of Procedure: 2/9/2022    Pre-Op Diagnosis: NECROTIZING FASCITIS    Post-Op Diagnosis: Same       Procedure(s):  incision and drainage of bilateral necrotizing soft tissue infection of groin and buttocks     Surgeon(s):  Alyn Lesches, MD    Assistant:   Avel Atkins CNP    Anesthesia: General    Estimated Blood Loss (mL): less than 865     Complications: None    Specimens:   * No specimens in log *    Implants:  * No implants in log *      Drains:   NG/OG/NJ/NE Tube Orogastric 18 fr Center mouth (Active)       Urethral Catheter Temperature probe 16 fr (Active)   Catheter Indications Need for fluid volume management of the critically ill patient in a critical care setting 02/09/22 1200   Site Assessment Swelling 02/09/22 1200   Urine Color Yellow 02/09/22 1302   Urine Appearance Cloudy 02/09/22 1302   Urine Odor Malodorous 02/09/22 1200   Output (mL) 10 mL 02/09/22 0800       Findings: Bilateral groin and buttocks necrotizing infections    Detailed Description of Procedure:   Patient was brought to the Operating Room after consenting patient for anincision and drainage of bilateral necrotizing soft tissue infection of groin and buttocks for necrotizing soft tissue infection. Risks benefits and alteranatives were explained all questions answered and she agreed to proceed. Patient was brought to the OR placed in supine position on the OR table after general anesthesia administered salcedo, ngtube and judy were placed and she was placed in lithotomy. Groin buttocks and abdomen were prepped and draped in usual sterile procedure. At the previous site of incision on the L buttock there was significant amount of purulence. I continued an incision cephalad in to the groin apporx 10 cm to incorporate all of the loculations and areas of drainage.  All areas of necrosis were removed with a combination of sharp and blunt technique with scalpel, cautery and tonsil. Once L side was debrided, A 7 cm vertical incision was made from the buttock into the groin and  incorporate all of the loculations and areas of drainage. All areas of necrosis were removed with a combination of sharp and blunt technique with scalpel, cautery and tonsil. Wound was copiously irrigated out and kerlix tied together was placed in both groins skin was closed with staples over top with a plan for re-op in the next 12-24 hours. All counts correct times 2 and patient was transported intubated to TICU.   Electronically signed by Ab Abraham MD on 2/9/2022 at 2:44 PM

## 2022-02-09 NOTE — OP NOTE
Operative Note      Patient: Timothy Lopes  YOB: 1980  MRN: [id number]    Date of Procedure: 2/8/2022    Pre-Op Diagnosis: Leonel Gangrene    Post-Op Diagnosis: Same       Procedure(s):  EXAM UNDER ANESTHESIA, INCISION AND DEBRIDEMENT OF PERINEUM     Surgeon(s):  Nika Lewis MD    Assistant:   Susi Beck DO PGY3    Anesthesia: General    Estimated Blood Loss (mL): Minimal    Complications: None    Specimens:   ID Type Source Tests Collected by Time Destination   1 : PERINEUM ABCESS CULTURE  Swab Perineum CULTURE, ANAEROBIC AND AEROBIC Nika Lewis MD 2/9/2022 0036    A : RIGHT BUTTOCKS PERINEAL ABCESS  Tissue Perineum SURGICAL PATHOLOGY Nika Lewis MD 2/9/2022 0050        Implants:  * No implants in log *      Drains:   Urethral Catheter Temperature probe 16 fr (Active)   Catheter Indications Prolonged immobilization (e.g. unstable thoracic or lumbar spine, multiple traumatic injuries such as pelvic fractures) 02/08/22 2329   Site Assessment Swelling 02/08/22 2329   Urine Color Yellow 02/08/22 2329   Urine Appearance Cloudy 02/08/22 2329   Urine Odor Malodorous 02/08/22 2329   Output (mL) 100 mL 02/08/22 2329       Findings: NECROTIC FAT AND SOME PURULENT MATERIAL    Detailed Description of Procedure: Indication:  Patient is a 40-year-old female who has been being treated as an outpatient for skin infection for 10 days with p.o. antibiotics. Patient failed outpatient management presented to an outside facility who checked a CT scan which showed a likely necrotizing soft tissue infection with significant subcutaneous air. Patient was transferred to [facility] for further management. Decision was made to take patient to the OR for incision and debridement. Risks, benefits, and alternatives were discussed with the patient, patient wished to proceed, informed consent was obtained.     Procedure:  Patient was taken to the operative suite and the anesthesia team induced general anesthesia while patient was on the stretcher. The patient was then positioned in the prone position on the operative table. Patient was then prepped and draped in typical standard fashion. No antibiotics were given as patient had received antibiotics in the emergency department. Timeout was called to ensure proper patient, position, and procedure being performed. The procedure began by using a 10 blade scalpel to make a linear incision just lateral to the gluteal cleft on the left buttock. This incision was carried down through the skin, subcutaneous tissue, and muscle using Bovie electrocautery. Necrotic fat and purulence was encountered. The necrotic fat was debrided using combination of Bovie electrocautery and sharp debridement with Metzenbaum scissors. Cultures were obtained from the purulence pockets. The necrotic fat was sent to pathology. Final debridement area was 12 cm in length, 4 cm wide, 20 cm deep. Hemostasis was ensured using Bovie electrocautery. The wound was thoroughly irrigated with normal saline. Once satisfied the wound was packed with Betadine soaked Kerlix. Dressing was completed with fluffs and ABDs as well as mesh underwear. This concluded the procedure. The patient was extubated and taken to PACU in critical yet stable  Condition. Plan for acute care surgery team to take patient back to the OR on Thursday of this week for further debridement if needed. Dr. Amelia Shabazz was present and scrubbed for the duration of the case    Electronically signed by Lissa Toledo DO on 2/9/2022 at 1:24 AM  Present throughout case.

## 2022-02-09 NOTE — PROGRESS NOTES
PROGRESS NOTE    PATIENT NAME: Rachael Dallas  MEDICAL RECORD NO. [id number]  DATE: 2022  PRIMARY CARE PHYSICIAN: Ena Galvin MD    HD: # 1    ASSESSMENT    Patient Active Problem List   Diagnosis    Leonel gangrene    Uncontrolled type 2 diabetes mellitus with hyperglycemia ([facility] [address]    Acute kidney injury superimposed on CKD ([facility] [address]    Hypokalemia    Hyponatremia    Anemia    Morbid obesity with BMI of 45.0-49.9, adult [facility])       MEDICAL DECISION MAKING AND PLAN  Necrotizing soft tissue infection of left buttock/perineal region   -s/p incision and drainage with debridement in OR     -Continue IV abx: Zosyn, Vanco, Clindamycin    -Plan for dressing change by surgery team later today     -May require further debridement in OR 2/10 depending on how wound looks during dressing change    -Recommend levophed if pressor support is needed   -Strict glucose control <180 to allow for wound healing     SUBJECTIVE    Unknown Burt is seen and examined. Afebrile, regular rate, requiring 50 of char this morning. 225cc urine output overnight. Pain is controlled this morning. Plan for dressing change later today. OBJECTIVE  VITALS: Temp: Temp: 98.3 °F (36.8 °C)Temp  Av.5 °F (36.4 °C)  Min: 94.9 °F (34.9 °C)  Max: 99.7 °F (66.2 °C) BP Systolic (46SXL), KSQ:51 , Min:52 , XKM:871   Diastolic (95ELV), MATA:93, Min:25, Max:122   Pulse Pulse  Av.4  Min: 68  Max: 82 Resp Resp  Av.3  Min: 0  Max: 36 Pulse ox SpO2  Av.9 %  Min: 86 %  Max: 100 %  GENERAL: awake, alert, not distressed  NEURO: CNII-XII grossly intact, no focal neurological deficits    HEENT: atraumatic, normocephalic, sclera sclear, nose without deformity, trachea midline   LUNGS: normal effort on NC, no respiratory distress, no accessory muscle use   HEART: regular rate and rhythm   ABDOMEN: soft, nontender, nondistended, R buttock and perineal dressing intact   EXTREMITY: no cyanosis, clubbing or edema    I/O last 3 completed shifts:   In: 1196 [P.O.:240; I.V.:1514]  Out: 245 [Urine:225; Blood:20]    Drain/tube output: In: 5686 [P.O.:240; I.V.:1514]  Out: 245 [Urine:225]    LAB:  CBC:   Recent Labs     02/08/22 2228 02/09/22 0412   WBC 19.9* 21.5*   HGB 10.4* 9.6*   HCT 30.2* 28.6*   MCV 83.2 84.1    325     BMP:   Recent Labs     02/08/22 2228 02/09/22 0412   * 125*   K 4.1 3.6*   CL 83* 86*   CO2 25 22   BUN 38* 41*   CREATININE 3.28* 4.11*   GLUCOSE 388* 389*     COAGS:   Recent Labs     02/08/22 2228 02/09/22 0408 02/09/22 0412   PROT 6.3*  --  5.7*   INR  --  1.1  --        RADIOLOGY:  No new imaging       Abigail Chavez DO       Attending Note      I have reviewed the above [facility] note(s) and I either performed the key elements of the medical history and physical exam or was present with the surgery resident when the key elements of the medical history and physical exam were performed. I have discussed the findings, established the care plan and recommendations with the surgical team.  Tentative return to OR tomorrow, states feels better. Cultures pending. May require insulin drip to control glucose. Creat increase noted.     Leighann Saucedo MD  2/9/2022  8:24 AM

## 2022-02-09 NOTE — PLAN OF CARE
Problem: Skin Integrity:  Goal: Will show no infection signs and symptoms  Description: Will show no infection signs and symptoms  2/9/2022 0854 by Alpesh Oquendo RN  Outcome: Ongoing  2/9/2022 0523 by Milka Inman RN  Outcome: Ongoing  Goal: Absence of new skin breakdown  Description: Absence of new skin breakdown  2/9/2022 0854 by Alpesh Oquendo RN  Outcome: Ongoing  2/9/2022 0523 by Milka Inman RN  Outcome: Ongoing     Problem: Fluid Volume - Imbalance:  Goal: Absence of imbalanced fluid volume signs and symptoms  Description: Absence of imbalanced fluid volume signs and symptoms  2/9/2022 0854 by Alpesh Oquendo RN  Outcome: Ongoing  2/9/2022 0523 by Milka Inman RN  Outcome: Ongoing     Problem: Pain:  Goal: Pain level will decrease  Description: Pain level will decrease  2/9/2022 0523 by Milka Inman RN  Outcome: Ongoing  Goal: Recognizes and communicates pain  Description: Recognizes and communicates pain  2/9/2022 0523 by Milka Inman RN  Outcome: Ongoing  Goal: Control of acute pain  Description: Control of acute pain  2/9/2022 0523 by Milka Inman RN  Outcome: Ongoing  Goal: Control of chronic pain  Description: Control of chronic pain  2/9/2022 0854 by Alpesh Oquendo RN  Outcome: Ongoing  2/9/2022 0523 by Milka Inman RN  Outcome: Ongoing     Problem: Serum Glucose Level - Abnormal:  Goal: Ability to maintain appropriate glucose levels will improve to within specified parameters  Description: Ability to maintain appropriate glucose levels will improve to within specified parameters  2/9/2022 0523 by Milka Inman RN  Outcome: Ongoing     Problem: Skin Integrity - Impaired:  Goal: Will show no infection signs and symptoms  Description: Will show no infection signs and symptoms  2/9/2022 0523 by Milka Inman RN  Outcome: Ongoing  Goal: Absence of new skin breakdown  Description: Absence of new skin breakdown  2/9/2022 0523 by Ravi Inman, RN  Outcome: Ongoing

## 2022-02-09 NOTE — ED PROVIDER NOTES
[facility] ED  Emergency Department Encounter  Emergency Medicine Resident     Pt Name: Lissa De Guzman  MRN: [id number]  [place] 1980  Date of evaluation: 2/8/22  PCP:  Steven Moreno MD    CHIEF COMPLAINT       Chief Complaint   Patient presents with    Abscess       HISTORY Nir  (Location/Symptom, Timing/Onset, Context/Setting, Quality, Duration, Modifying Factors,Severity.)      Lissa De Guzman is a 42 y.o. 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 Patient reports that she has had 10 days of vaginal pain and abscess. She states symptoms initially started on her right labia but are now on the left labia and are worse. She was seen at [facility] 8 days ago and was diagnosed with an abscess. The area was apparently open and draining at that time. She was discharged home on doxycycline which patient states she has been taking. She states she has not missed any doses. Patient states that she returned to [facility] today because her symptoms worsened. She also states that she developed fever, chills, lightheadedness and nausea. Patient states the smell of the infection has also worsened. She states her blood sugars have been running high, in the 300s since this infection started. She has been taking her insulin as prescribed. Patient denies a prior history of labial abscess or pilonidal abscess. At [facility], patient had labs that showed WBC 17.5, glucose 560, sodium 124, chloride 83, anion gap 17, BUN 37, creatinine 3.26, CRP 39.1. Remainder of labs were unremarkable. Covid was negative. Lactic is 1.7.   She had a CT scan of the abdomen and pelvis which showed a fairly extensive regions of subcutaneous gas demonstrated in the left perianal and perineal lesions with anterior and superior continuation to the level of the left obturator muscle bundles. Posterior subcutaneous extension deep to the buttocks to the level of the sacrum. 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 6.5 x 5.2 cm fluid collection deep to the right rectus muscle bundles which is consistent with an atypical adnexal cyst but focal abscess formation cannot be excluded. She was treated with 1 L of normal saline, Tylenol, Zosyn, vancomycin, clindamycin, 10 units of subcutaneous insulin and 8 mg of morphine    PAST MEDICAL / SURGICAL / SOCIAL / FAMILY HISTORY     Patient has a past medical history of allergic rhinitis, atherosclerosis of aorta, obesity, depression, anxiety, genital warts, GERD, obstructive sleep apnea, osteoarthritis, restless leg syndrome, urinary incontinence, vitamin D deficiency    Patient has a past surgical history of  delivery, tubal ligation, cholecystectomy, shoulder surgery, appendectomy, bariatric surgery    Social:  Patient denies smoking    Family Hx: Heart attack, brain aneurysm, colitis    Allergies:  Aspirin and Ibuprofen    Home Medications:  Prior to Admission medications    Medication Sig Start Date End Date Taking?  Authorizing Provider   PRENATAL VIT-DOCUSATE-IRON-FA PO Take 1 tablet by mouth daily 22  Yes Historical Provider, MD   cyanocobalamin 1000 MCG/ML injection Inject 1,000 mcg into the muscle every 30 days 10/18/21  Yes Historical Provider, MD   fluticasone-salmeterol (ADVAIR HFA) 45-21 MCG/ACT inhaler Inhale 2 puffs into the lungs 2 times daily    Historical Provider, MD   Multiple Vitamins-Minerals (MULTIVITAMIN ADULT EXTRA C PO) Take 1 tablet by mouth daily    Historical Provider, MD   vitamin D (ERGOCALCIFEROL) 1.25 MG (23823 UT) CAPS capsule Take 50,000 Units by mouth once a week    Historical Provider, MD   albuterol sulfate HFA (PROAIR HFA) 108 (90 Base) MCG/ACT inhaler Inhale 1 puff into the lungs as needed    Historical Provider, MD   carvedilol (COREG) 25 MG tablet Take 25 mg by mouth daily Historical Provider, MD   vitamin D (CHOLECALCIFEROL) 125 MCG (5000 UT) CAPS capsule Take 5,000 Units by mouth daily    Historical Provider, MD   DULoxetine (CYMBALTA) 60 MG extended release capsule Take 60 mg by mouth daily    Historical Provider, MD   fenofibrate (TRIGLIDE) 160 MG tablet Take 160 mg by mouth daily    Historical Provider, MD   fluticasone (FLONASE) 50 MCG/ACT nasal spray 1 spray by Each Nostril route    Historical Provider, MD   glucagon 1 MG injection Inject 1 mg into the muscle as needed    Historical Provider, MD   lisinopril (PRINIVIL;ZESTRIL) 40 MG tablet Take 40 mg by mouth daily    Historical Provider, MD   montelukast (SINGULAIR) 10 MG tablet Take 10 mg by mouth daily    Historical Provider, MD   omeprazole (PRILOSEC) 20 MG delayed release capsule Take 20 mg by mouth daily    Historical Provider, MD   rOPINIRole (REQUIP) 4 MG tablet Take 4 mg by mouth daily    Historical Provider, MD       REVIEW OFSYSTEMS    (2-9 systems for level 4, 10 or more for level 5)      Review of Systems   Constitutional: Positive for fever. Negative for chills. HENT: Negative for congestion, rhinorrhea and sore throat. Eyes: Negative for visual disturbance. Respiratory: Negative for cough and shortness of breath. Cardiovascular: Negative for chest pain and leg swelling. Gastrointestinal: Positive for nausea. Negative for abdominal pain, constipation, diarrhea and vomiting. Genitourinary: Negative for dysuria, frequency and hematuria. Musculoskeletal: Negative for arthralgias, back pain and neck pain. Skin: Positive for wound. Negative for rash. Neurological: Negative for weakness, light-headedness, numbness and headaches. Psychiatric/Behavioral: Negative for confusion. All other systems reviewed and are negative.       PHYSICAL EXAM   (up to 7 for level 4, 8 or more forlevel 5)      INITIAL VITALS:   Vitals:    02/09/22 0600 02/09/22 0615 02/09/22 0630 02/09/22 0645   BP: (!) 95/44 (!) 94/36 (!) 110/55 (!) 116/57   Pulse: 78 76 77 74   Resp: 17 29 20 25   Temp:       TempSrc:       SpO2:       Weight:       Height:            Physical Exam  Vitals and nursing note reviewed. Constitutional:       General: She is not in acute distress. Appearance: She is obese. She is not toxic-appearing. Comments: Adult female sitting in stretcher no acute distress. Speaks in full sentences and answers questions appropriately   HENT:      Head: Normocephalic and atraumatic. Nose: Nose normal.      Mouth/Throat:      Mouth: Mucous membranes are moist.      Pharynx: Oropharynx is clear. Eyes:      Conjunctiva/sclera: Conjunctivae normal.      Pupils: Pupils are equal, round, and reactive to light. Cardiovascular:      Rate and Rhythm: Normal rate and regular rhythm. Pulses: Normal pulses. Heart sounds: No murmur heard. No friction rub. No gallop. Pulmonary:      Effort: Pulmonary effort is normal. No respiratory distress. Breath sounds: Normal breath sounds. No wheezing, rhonchi or rales. Abdominal:      General: There is no distension. Palpations: Abdomen is soft. Tenderness: There is no abdominal tenderness. Comments: No tenderness to palpation of abdomen diffusely   Genitourinary:     Comments: There is a large area of erythema and induration to the bilateral labia and perineum, worse on the left. The skin overlying the left labia and perineum  is mostly intact but tense. There is a small area that is draining foul-smelling purulent bloody discharge. There is crepitus over this area. There is moderate to severe tenderness to palpation. Musculoskeletal:      Cervical back: Neck supple. No rigidity. Right lower leg: No edema. Left lower leg: No edema. Skin:     General: Skin is warm and dry. Capillary Refill: Capillary refill takes less than 2 seconds. Findings: No rash. Neurological:      General: No focal deficit present. Mental Status: She is alert. Psychiatric:         Mood and Affect: Mood normal.         Behavior: Behavior normal.         DIFFERENTIAL  DIAGNOSIS       DDX: Leonel's gangrene, insulin-dependent diabetes    Initial MDM/Plan: 42 y.o. 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 On physical exam, patient is nontoxic-appearing. She is afebrile. Blood pressure is slightly on the soft side of 106/40. Patient has extensive erythema and edema to the bilateral labia and perineal region, worse on the left with some active drainage on the left. Patient had labs and CT scan at [facility]. She arrives with the disc of images from [facility] which we will upload into PACS. Will repeat lab work. Patient had blood cultures also drawn at [facility] so do not think we need to redraw these here. Patient is not meeting sepsis criteria at this time, however she was meeting sepsis criteria at [facility] and they have initiated sepsis labs and treated her with Tylenol, vancomycin, Zosyn and clindamycin. Patient will need continuation of these antibiotics. Will continue to treat her pain and nausea and give additional fluids. Will consult to general surgery will follow any additional recommendations by them. Patient will require admission. DIAGNOSTIC RESULTS / EMERGENCYDEPARTMENT COURSE / MDM     LABS:  Results for orders placed or performed during the hospital encounter of 02/08/22   COVID-19, Rapid    Specimen: Nasopharyngeal Swab   Result Value Ref Range    Specimen Description . NASOPHARYNGEAL SWAB     SARS-CoV-2, Rapid Not Detected Not Detected   Culture, Blood 1    Specimen: Blood   Result Value Ref Range    Specimen Description . BLOOD     Special Requests RT HAND 10ML     Culture NO GROWTH <24 HRS    Culture, Blood 1    Specimen: Blood   Result Value Ref Range    Specimen Description Marquis Subramanian BLOOD     Special Requests RT AC 20ML     Culture NO GROWTH <24 HRS    Comprehensive Metabolic Panel w/ Reflex to MG   Result Value Ref Range    Glucose 388 (H) 70 - 99 mg/dL    BUN 38 (H) 6 - 20 mg/dL    CREATININE 3.28 (H) 0.50 - 0.90 mg/dL    Bun/Cre Ratio NOT REPORTED 9 - 20    Calcium 7.7 (L) 8.6 - 10.4 mg/dL    Sodium 123 (L) 135 - 144 mmol/L    Potassium 4.1 3.7 - 5.3 mmol/L    Chloride 83 (L) 98 - 107 mmol/L    CO2 25 20 - 31 mmol/L    Anion Gap 15 9 - 17 mmol/L    Alkaline Phosphatase 192 (H) 35 - 104 U/L    ALT 27 5 - 33 U/L    AST 73 (H) <32 U/L    Total Bilirubin 0.40 0.3 - 1.2 mg/dL    Total Protein 6.3 (L) 6.4 - 8.3 g/dL    Albumin 1.9 (L) 3.5 - 5.2 g/dL    Albumin/Globulin Ratio 0.4 (L) 1.0 - 2.5    GFR Non-African American 15 (L) >60 mL/min    GFR  19 (L) >60 mL/min    GFR Comment          GFR Staging NOT REPORTED    CBC Auto Differential   Result Value Ref Range    WBC 19.9 (H) 3.5 - 11.3 k/uL    RBC 3.63 (L) 3.95 - 5.11 m/uL    Hemoglobin 10.4 (L) 11.9 - 15.1 g/dL    Hematocrit 30.2 (L) 36.3 - 47.1 %    MCV 83.2 82.6 - 102.9 fL    MCH 28.7 25.2 - 33.5 pg    MCHC 34.4 28.4 - 34.8 g/dL    RDW 13.5 11.8 - 14.4 %    Platelets 370 723 - 158 k/uL    MPV 10.8 8.1 - 13.5 fL    NRBC Automated 0.0 0.0 per 100 WBC    Differential Type NOT REPORTED     Seg Neutrophils 87 (H) 36 - 65 %    Lymphocytes 6 (L) 24 - 43 %    Monocytes 4 3 - 12 %    Eosinophils % 1 1 - 4 %    Basophils 0 0 - 2 %    Immature Granulocytes 2 (H) 0 %    Segs Absolute 17.51 (H) 1.50 - 8.10 k/uL    Absolute Lymph # 1.18 1.10 - 3.70 k/uL    Absolute Mono # 0.76 0.10 - 1.20 k/uL    Absolute Eos # 0.12 0.00 - 0.44 k/uL    Basophils Absolute 0.06 0.00 - 0.20 k/uL    Absolute Immature Granulocyte 0.31 (H) 0.00 - 0.30 k/uL    WBC Morphology NOT REPORTED     RBC Morphology NOT REPORTED     Platelet Estimate NOT REPORTED    Lactate, Sepsis   Result Value Ref Range    Lactic Acid, Sepsis NOT REPORTED 0.5 - 1.9 mmol/L    Lactic Acid, Sepsis, Whole Blood 1.6 0.5 - 1.9 mmol/L   Pregnancy, Urine   Result Value Ref Range    HCG(Urine) Pregnancy Test NEGATIVE NEGATIVE   Protime-INR   Result Value Ref Range    Protime 11.3 9.1 - 12.3 sec    INR 1.1    LACTIC ACID, WHOLE BLOOD   Result Value Ref Range    Lactic Acid, Whole Blood 1.4 0.7 - 2.1 mmol/L   CBC WITH AUTO DIFFERENTIAL   Result Value Ref Range    WBC 21.5 (H) 3.5 - 11.3 k/uL    RBC 3.40 (L) 3.95 - 5.11 m/uL    Hemoglobin 9.6 (L) 11.9 - 15.1 g/dL    Hematocrit 28.6 (L) 36.3 - 47.1 %    MCV 84.1 82.6 - 102.9 fL    MCH 28.2 25.2 - 33.5 pg    MCHC 33.6 28.4 - 34.8 g/dL    RDW 13.6 11.8 - 14.4 %    Platelets 962 090 - 721 k/uL    MPV 10.7 8.1 - 13.5 fL    NRBC Automated 0.0 0.0 per 100 WBC    Differential Type NOT REPORTED     Seg Neutrophils 89 (H) 36 - 65 %    Lymphocytes 4 (L) 24 - 43 %    Monocytes 5 3 - 12 %    Eosinophils % 1 1 - 4 %    Basophils 0 0 - 2 %    Immature Granulocytes 1 (H) 0 %    Segs Absolute 18.95 (H) 1.50 - 8.10 k/uL    Absolute Lymph # 0.93 (L) 1.10 - 3.70 k/uL    Absolute Mono # 1.14 0.10 - 1.20 k/uL    Absolute Eos # 0.13 0.00 - 0.44 k/uL    Basophils Absolute 0.05 0.00 - 0.20 k/uL    Absolute Immature Granulocyte 0.27 0.00 - 0.30 k/uL    WBC Morphology NOT REPORTED     RBC Morphology NOT REPORTED     Platelet Estimate NOT REPORTED    Comprehensive Metabolic Panel w/ Reflex to MG   Result Value Ref Range    Glucose 389 (H) 70 - 99 mg/dL    BUN 41 (H) 6 - 20 mg/dL    CREATININE 4.11 (H) 0.50 - 0.90 mg/dL    Bun/Cre Ratio NOT REPORTED 9 - 20    Calcium 7.1 (L) 8.6 - 10.4 mg/dL    Sodium 125 (L) 135 - 144 mmol/L    Potassium 3.6 (L) 3.7 - 5.3 mmol/L    Chloride 86 (L) 98 - 107 mmol/L    CO2 22 20 - 31 mmol/L    Anion Gap 17 9 - 17 mmol/L    Alkaline Phosphatase 196 (H) 35 - 104 U/L    ALT 25 5 - 33 U/L    AST 48 (H) <32 U/L    Total Bilirubin 0.29 (L) 0.3 - 1.2 mg/dL    Total Protein 5.7 (L) 6.4 - 8.3 g/dL    Albumin 1.9 (L) 3.5 - 5.2 g/dL    Albumin/Globulin Ratio 0.5 (L) 1.0 - 2.5    GFR Non- 12 (L) >60 mL/min    GFR  14 (L) >60 mL/min    GFR Comment          GFR Staging NOT REPORTED    POC Glucose Fingerstick   Result Value Ref Range    POC Glucose 375 (H) 65 - 105 mg/dL         RADIOLOGY:  XR CHEST PORTABLE    Result Date: 2/8/2022  EXAMINATION: ONE XRAY VIEW OF THE CHEST 2/8/2022 11:23 pm COMPARISON: None. HISTORY: ORDERING SYSTEM PROVIDED HISTORY: pre op, SOB TECHNOLOGIST PROVIDED HISTORY: pre op, SOB Reason for Exam: pre-op   upright port FINDINGS: Cardiomediastinal silhouette is normal in size. Suspected small bilateral pleural effusions. No pneumothorax. There is faint interstitial prominence bilaterally. No pulmonary consolidation. No acute osseous abnormality. 1.  Faint interstitial prominence, which could be seen with interstitial edema. 2.  Suspected small bilateral pleural effusions.          EKG  None      MEDICATIONS ORDERED:  Orders Placed This Encounter   Medications    0.9 % sodium chloride bolus    fentaNYL (SUBLIMAZE) injection 50 mcg    ondansetron (ZOFRAN) injection 4 mg    DISCONTD: piperacillin-tazobactam (ZOSYN) 4,500 mg in dextrose 5 % 100 mL IVPB (mini-bag)     Order Specific Question:   Antimicrobial Indications     Answer:   Skin and Soft Tissue Infection    clindamycin (CLEOCIN) 900 mg in dextrose 5 % 50 mL IVPB     Order Specific Question:   Antimicrobial Indications     Answer:   Skin and Soft Tissue Infection    DISCONTD: vancomycin (VANCOCIN) 1000 mg in dextrose 5% 200 mL IVPB     Order Specific Question:   Antimicrobial Indications     Answer:   Skin and Soft Tissue Infection    vancomycin (VANCOCIN) 2,500 mg in dextrose 5 % 500 mL IVPB     Order Specific Question:   Antimicrobial Indications     Answer:   Skin and Soft Tissue Infection    vancomycin (VANCOCIN) intermittent dosing (placeholder)     Order Specific Question:   Antimicrobial Indications     Answer:   Skin and Soft Tissue Infection PROCEDURES:  None      CONSULTS:  IP CONSULT TO GENERAL SURGERY  PHARMACY TO DOSE VANCOMYCIN  IP CONSULT TO HOSPITALIST  PHARMACY TO DOSE MEDICATION  IP CONSULT TO CRITICAL CARE      EMERGENCY DEPARTMENT COURSE:  ED Course as of 02/09/22 0714   Tue Feb 08, 2022 2249 WBC(!): 19.9  Increased from [address]     2250 Lactic Acid, Sepsis, Whole Blood: 1.6 [KA]     2250 BUN(!): 38 Creatinine(!): 3.28  Increased from labs at [facility]. Likely element of MEREDITH and CKD [KA]       2300 Discussed the patient with general surgery who has evaluated her in the emergency department and plans to take her to the OR for washout tonight. She has been informed of this and is NPO. Booker catheter was placed. Additional labs placed by general surgery. Patient had a  negative Covid test at [facility]. [KA]     2328 Discussed the patient with [facility] who accepts her for admission at this time. She is currently receiving additional IV antibiotics. [KA]      ED Course User Index  [KA] Larry Leon DO         FINAL IMPRESSION      1. Leonel gangrene         DISPOSITION / PLAN     DISPOSITION Admitted 02/08/2022 11:12:45 PM      PATIENT REFERRED TO:  No follow-up provider specified.     DISCHARGE MEDICATIONS:  Current Discharge Medication List          Larry Leon DO  Emergency Medicine Resident    (Please note that portions of this note were completed with a voice recognition program.Efforts were made to edit the dictations but occasionally words are mis-transcribed.)        Larry Leon DO  Resident  02/09/22 3393

## 2022-02-09 NOTE — ANESTHESIA POSTPROCEDURE EVALUATION
Department of Anesthesiology  Postprocedure Note    Patient: Brayan Meza  MRN: [id number]  YOB: 1980  Date of evaluation: 2/9/2022  Time:  12:26 PM     Procedure Summary     Date: 02/09/22 Room / Location: [address]    Anesthesia Start: 0005 Anesthesia Stop: 0140    Procedure: *ADD ON, ASAP* INCISION AND DRAINAGE OF PERINEUM, BRIAN KNIFE PRONE (N/A ) Diagnosis: (Leonel Gangrene)    Surgeons: Saúl Augustine MD Responsible Provider: Pedro Pickering MD    Anesthesia Type: general ASA Status: 4 - Emergent          Anesthesia Type: general    Cristhian Phase I: Cristhian Score: 9    Cristhian Phase II:      Last vitals: Reviewed and per EMR flowsheets.      POST-OP ANESTHESIA NOTE       /66   Pulse 71   Temp 98.3 °F (36.8 °C)   Resp 21   Ht 5' 3\" (1.6 m)   Wt 276 lb 6.4 oz (125.4 kg)   SpO2 96%   BMI 48.96 kg/m²    Pain Assessment: 0-10  Pain Level: 5       Anesthesia Post Evaluation    Patient location during evaluation: ICU  Patient participation: complete - patient participated  Level of consciousness: awake  Pain score: 5  Airway patency: patent  Nausea & Vomiting: no vomiting and no nausea  Complications: no  Cardiovascular status: hemodynamically stable  Respiratory status: acceptable  Hydration status: stable

## 2022-02-09 NOTE — ANESTHESIA PRE PROCEDURE
Department of Anesthesiology  Preprocedure Note       Name:  Faith Diaz   Age:  42 y.o.  :  1980                                          MRN:  [id number]         Date:  2022      Surgeon: Shira Hernandez):  Merrill Stokes MD    Procedure: Procedure(s):  GROIN  INCISION AND DRAINAGE, WASHOUT    Medications prior to admission:   Prior to Admission medications    Medication Sig Start Date End Date Taking?  Authorizing Provider   PRENATAL VIT-DOCUSATE-IRON-FA PO Take 1 tablet by mouth daily 22  Yes Historical Provider, MD   cyanocobalamin 1000 MCG/ML injection Inject 1,000 mcg into the muscle every 30 days 10/18/21  Yes Historical Provider, MD   fluticasone-salmeterol (ADVAIR HFA) 45-21 MCG/ACT inhaler Inhale 2 puffs into the lungs 2 times daily    Historical Provider, MD   Multiple Vitamins-Minerals (MULTIVITAMIN ADULT EXTRA C PO) Take 1 tablet by mouth daily    Historical Provider, MD   vitamin D (ERGOCALCIFEROL) 1.25 MG (66350 UT) CAPS capsule Take 50,000 Units by mouth once a week    Historical Provider, MD   albuterol sulfate HFA (PROAIR HFA) 108 (90 Base) MCG/ACT inhaler Inhale 1 puff into the lungs as needed    Historical Provider, MD   carvedilol (COREG) 25 MG tablet Take 25 mg by mouth daily    Historical Provider, MD   vitamin D (CHOLECALCIFEROL) 125 MCG (5000 UT) CAPS capsule Take 5,000 Units by mouth daily    Historical Provider, MD   DULoxetine (CYMBALTA) 60 MG extended release capsule Take 60 mg by mouth daily    Historical Provider, MD   fenofibrate (TRIGLIDE) 160 MG tablet Take 160 mg by mouth daily    Historical Provider, MD   fluticasone (FLONASE) 50 MCG/ACT nasal spray 1 spray by Each Nostril route    Historical Provider, MD   glucagon 1 MG injection Inject 1 mg into the muscle as needed    Historical Provider, MD   lisinopril (PRINIVIL;ZESTRIL) 40 MG tablet Take 40 mg by mouth daily    Historical Provider, MD   montelukast (SINGULAIR) 10 MG tablet Take 10 mg by mouth daily    Historical Provider, MD   omeprazole (PRILOSEC) 20 MG delayed release capsule Take 20 mg by mouth daily    Historical Provider, MD   rOPINIRole (REQUIP) 4 MG tablet Take 4 mg by mouth daily    Historical Provider, MD       Current medications:    Current Facility-Administered Medications   Medication Dose Route Frequency Provider Last Rate Last Admin    sodium chloride flush 0.9 % injection 5-40 mL  5-40 mL IntraVENous 2 times per day Darek Pen, APRN - CNP   10 mL at 02/09/22 0829    sodium chloride flush 0.9 % injection 5-40 mL  5-40 mL IntraVENous PRN Darek Pen, APRN - CNP        0.9 % sodium chloride infusion  25 mL IntraVENous PRN Darek Pen, APRN - CNP        acetaminophen (TYLENOL) tablet 650 mg  650 mg Oral Q6H PRN Darek Pen, APRN - CNP        Or    acetaminophen (TYLENOL) suppository 650 mg  650 mg Rectal Q6H PRN Darek Pen, APRN - CNP        heparin (porcine) injection 5,000 Units  5,000 Units SubCUTAneous 3 times per day Darek Pen, APRN - CNP   5,000 Units at 02/09/22 0559    oxyCODONE (ROXICODONE) immediate release tablet 5 mg  5 mg Oral Q4H PRN Juanda Tracy, DO   5 mg at 02/09/22 0420    methocarbamol (ROBAXIN) tablet 750 mg  750 mg Oral Q6H anda Tracy, DO   750 mg at 02/09/22 1049    glucose (GLUTOSE) 40 % oral gel 15 g  15 g Oral PRN Yanick Catherine MD        dextrose 50 % IV solution  12.5 g IntraVENous PRN Yanick Catherine MD        glucagon (rDNA) injection 1 mg  1 mg IntraMUSCular PRN Yanick Catherine MD        dextrose 5 % solution  100 mL/hr IntraVENous PRN Yanick Catherine MD        fluticasone (FLONASE) 50 MCG/ACT nasal spray 1 spray  1 spray Each Nostril Daily Yanick Catherine MD   1 spray at 02/09/22 0830    budesonide-formoterol (SYMBICORT) 80-4.5 MCG/ACT inhaler 2 puff  2 puff Inhalation BID Yanick Catherine MD        albuterol sulfate  (90 Base) MCG/ACT inhaler 1 puff  1 puff Inhalation Q4H PRN Yanick Catherine MD        0.9 % sodium chloride infusion IntraVENous Continuous Juwan Schumacher  mL/hr at 02/09/22 1045 Rate Change at 02/09/22 1045    norepinephrine (LEVOPHED) 16 mg in sodium chloride 0.9 % 250 mL infusion  2-100 mcg/min IntraVENous Continuous Lul Elise MD 5.6 mL/hr at 02/09/22 1056 6 mcg/min at 02/09/22 1056    montelukast (SINGULAIR) tablet 10 mg  10 mg Oral Daily La Jason MD   10 mg at 02/09/22 1047    omeprazole (PRILOSEC) delayed release capsule 20 mg  20 mg Oral Daily La Jason MD   20 mg at 02/09/22 1047    Vitamin D (CHOLECALCIFEROL) tablet 5,000 Units  5,000 Units Oral Daily La Jason MD   5,000 Units at 02/09/22 1047    rOPINIRole (REQUIP) tablet 4 mg  4 mg Oral Daily La Jason MD   4 mg at 02/09/22 1047    insulin regular (HUMULIN R;NOVOLIN R) 100 Units in sodium chloride 0.9 % 100 mL infusion  1-50 Units/hr IntraVENous Continuous Serg Hart MD 14.4 mL/hr at 02/09/22 1223 14.4 Units/hr at 02/09/22 1223    gabapentin (NEURONTIN) capsule 100 mg  100 mg Oral Q8H Lul Elise MD        lactated ringers bolus  1,000 mL IntraVENous Once Bere I Marzena, DO        clindamycin (CLEOCIN) 900 mg in dextrose 5 % 50 mL IVPB  900 mg IntraVENous Q8H Lissa Pilling, DO   Stopped at 02/09/22 1008    vancomycin (VANCOCIN) intermittent dosing (placeholder)   Other RX Placeholder Lissa Pilling, DO        piperacillin-tazobactam (ZOSYN) 3,375 mg in dextrose 5 % 50 mL IVPB (mini-bag)  3,375 mg IntraVENous Q12H Lissa Pilling, DO   Stopped at 02/09/22 1045       Allergies:     Allergies   Allergen Reactions    Aspirin      Other reaction(s): due to kidney function    Ibuprofen      CHRONIC KIDNEY DISEASE STAGE 3   Other reaction(s): due to kidney function         Problem List:    Patient Active Problem List   Diagnosis Code    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 Past Surgical History:  No past surgical history on file. Social History:    Social History     Tobacco Use    Smoking status: Not on file    Smokeless tobacco: Not on file   Substance Use Topics    Alcohol use: Not on file                                Counseling given: Not Answered      Vital Signs (Current):   Vitals:    02/09/22 1130 02/09/22 1145 02/09/22 1200 02/09/22 1215   BP: 108/64 127/75 (!) 180/74 123/66   Pulse: 71 72  71   Resp:    21   Temp:       TempSrc:       SpO2: 98% 98% 99% 96%   Weight:       Height:                                                  BP Readings from Last 3 Encounters:   02/09/22 123/66   02/09/22 (!) 140/106       NPO Status:                                                                                 BMI:   Wt Readings from Last 3 Encounters:   02/09/22 276 lb 6.4 oz (125.4 kg)     Body mass index is 48.96 kg/m².     CBC:   Lab Results   Component Value Date    WBC 21.5 02/09/2022    RBC 3.40 02/09/2022    HGB 9.6 02/09/2022    HCT 28.6 02/09/2022    MCV 84.1 02/09/2022    RDW 13.6 02/09/2022     02/09/2022       CMP:   Lab Results   Component Value Date     02/09/2022    K 3.9 02/09/2022    CL 88 02/09/2022    CO2 18 02/09/2022    BUN 42 02/09/2022    CREATININE 4.01 02/09/2022    GFRAA 15 02/09/2022    LABGLOM 12 02/09/2022    GLUCOSE 460 02/09/2022    PROT 5.7 02/09/2022    CALCIUM 6.9 02/09/2022    BILITOT 0.29 02/09/2022    ALKPHOS 196 02/09/2022    AST 48 02/09/2022    ALT 25 02/09/2022       POC Tests:   Recent Labs     02/09/22  1123   POCGLU 495*       Coags:   Lab Results   Component Value Date    PROTIME 11.3 02/09/2022    INR 1.1 02/09/2022       HCG (If Applicable): Lab Results   Component Value Date    PREGTESTUR NEGATIVE 02/08/2022        ABGs: No results found for: PHART, PO2ART, VCK1PXQ, IXE2WDU, BEART, R9TBKMJF     Type & Screen (If Applicable):  No results found for: LABABO, LABRH    Drug/Infectious Status (If Applicable):  No results found for: HIV, HEPCAB    COVID-19 Screening (If Applicable):   Lab Results   Component Value Date    COVID19 Not Detected 02/09/2022           Anesthesia Evaluation  Patient summary reviewed no history of anesthetic complications:   Airway: Mallampati: III        Dental:    (+) other      Pulmonary:Negative Pulmonary ROS and normal exam  breath sounds clear to auscultation                             Cardiovascular:  Exercise tolerance: good (>4 METS),   (+) hypertension:,         Rhythm: regular  Rate: normal                    Neuro/Psych:   Negative Neuro/Psych ROS              GI/Hepatic/Renal:   (+) renal disease:, morbid obesity          Endo/Other:    (+) DiabetesType II DM, , .                 Abdominal:   (+) obese,           Vascular: negative vascular ROS. Other Findings:               Anesthesia Plan      general     ASA 4 - emergent     (Patient had solid food at 0800 today. Per Dr. Felicita Greenfield emergent case, needs to proceed. Will do MRSI and endotracheal intubation. Also per Dr. Randall Chacon request will keep patient intubated and on mechanical ventilatory support.)  Induction: intravenous. MIPS: Postoperative opioids intended. Anesthetic plan and risks discussed with patient. Plan discussed with CRNA.         0894  Diastolic function is indeterminate due to patient's heartrate.      Left ventricle systolic function is normal.      The Ejection Fraction is 60-65%.      No significant valvulopathies.      No regional wall motion abnormalities noted.      2021 stress  Stress ECG Conclusion      No ischemic ECG changes      Normal perfusion scan        hcg neg    Karissa Garvin MD   2/9/2022

## 2022-02-09 NOTE — PROGRESS NOTES
Pharmacy Note     Renal Dose Adjustment    Unknown Burt is a 42 y.o. female. Pharmacist assessment of renally cleared medications. Recent Labs     02/08/22 2228   BUN 38*       Recent Labs     02/08/22 2228   CREATININE 3.28*       Estimated Creatinine Clearance: 29 mL/min (A) (based on SCr of 3.28 mg/dL (H)). Estimated CrCl using Ideal Body Weight: 18.5 mL/min (based on IBW 52.4 kg)    Height:   Ht Readings from Last 1 Encounters:   02/08/22 5' 3\" (1.6 m)     Weight:  Wt Readings from Last 1 Encounters:   02/08/22 275 lb (124.7 kg)       The following medication dose has been adjusted based upon renal function per P&T Guidelines:             Zosyn dose adjusted from 4.5 gm IV every 8 hours to 4.5 mg IV every 12 hours. Romina Kenny Pharm. D.    2/8/2022  11:27 PM

## 2022-02-09 NOTE — BRIEF OP NOTE
Brief Postoperative Note      Patient: Darvin Mobley  YOB: 1980  MRN: [id number]    Date of Procedure: 2/8/2022    Pre-Op Diagnosis: Leonel Gangrene    Post-Op Diagnosis: Same       Procedure(s):  EXAM UNDER ANESTHESIA, INCISION AND DEBRIDEMENT OF PERINEUM     Surgeon(s):  Winter Sylvester MD    Assistant:  León Schulz DO PGY3    Anesthesia: General    Estimated Blood Loss (mL): less than 50     Complications: None    Specimens:   ID Type Source Tests Collected by Time Destination   1 : PERINEUM ABCESS CULTURE  Swab Perineum CULTURE, ANAEROBIC AND AEROBIC Winter Sylvester MD 2/9/2022 0036    A : RIGHT BUTTOCKS PERINEAL ABCESS  Tissue Perineum SURGICAL PATHOLOGY Winter Sylvester MD 2/9/2022 0050        Implants:  * No implants in log *      Drains:   Urethral Catheter Temperature probe 16 fr (Active)   Catheter Indications Prolonged immobilization (e.g. unstable thoracic or lumbar spine, multiple traumatic injuries such as pelvic fractures) 02/08/22 2329   Site Assessment Swelling 02/08/22 2329   Urine Color Yellow 02/08/22 2329   Urine Appearance Cloudy 02/08/22 2329   Urine Odor Malodorous 02/08/22 2329   Output (mL) 100 mL 02/08/22 2329       Findings: NECROTIC FAT AND SOME PURULENT MATERIAL    Electronically signed by León Schulz DO on 2/9/2022 at 1:14 AM  Await cultures, present throughout case.

## 2022-02-09 NOTE — CONSULTS
Consult - General Surgery    PATIENT NAME: Hayden Brown  AGE: 42 y.o. MEDICAL RECORD NO. [id number]  DATE: 2/8/2022  SURGEON: Jaz Osorio  PRIMARY CARE PHYSICIAN: Will Del Cid MD    Patient evaluated at the request of  Dr. 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 History/Exam limitations: none. 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 female who presents as a transfer from an outside facility for necrotizing soft tissue infection. She states that about 10 days ago she presented to an outside ED due to pain in her right buttocks at that time they saw some inflammation and sent her home with a prescription for doxycycline. But over the next several days the infection seemed to spread to the left side and so she decided to represent to the ED since the infection was worsening instead of getting better. As medical history of diabetes for which she takes insulin, he states that she has been checking her blood sugars recently however they have been quite elevated since the infection started. She has been having fevers and chills. He has been having pain in her left buttock and perineum. Continues to have regular bowel movements, continues to urinate as she did prior. Past Medical History   has no past medical history on file.   Past Surgical History   has no past surgical history on file.  Medications  Prior to Admission medications    Not on File    Scheduled Meds:   sodium chloride  1,000 mL IntraVENous Once    piperacillin-tazobactam  4,500 mg IntraVENous Q8H    clindamycin (CLEOCIN) IV  900 mg IntraVENous Q8H    vancomycin  1,000 mg IntraVENous Q12H     Continuous Infusions:   PRN Meds:. Allergies  has No Known Allergies. Family History  family history is not on file. Social History   has no history on file for tobacco use.   has no history on file for alcohol use.   has no history on file for drug use. Review of Systems  General Positive for fever or chills  HEENT Denies any diplopia, tinnitus or vertigo  Resp complains of some shortness of breath, denies cough  Cardiac Denies any chest pain, palpitations, claudication or edema  GI positive for nausea and emesis, denies melena or hematochezia   denies dysuria or increase in frequency  Heme Denies bruising or bleeding easily  Endocrine positive for diabetes, requiring insulin  Neuro Denies any focal motor or sensory deficits    PHYSICAL:   VITALS:  height is 5' 3\" (1.6 m) and weight is 275 lb (124.7 kg). Her temperature is 98.5 °F (36.9 °C). Her blood pressure is 108/67 and her pulse is 79. Her respiration is 24 and oxygen saturation is 95%. CONSTITUTIONAL: Alert and oriented times 3, no acute distress and cooperative to examination. HEENT: Head is normocephalic, atraumatic. EOMI, PERRLA  NECK: Soft, trachea midline and straight  LUNGS: Chest expands equally bilaterally upon respiration, no accessory muscle used. CARDIOVASCULAR: Heart regular rate and rhythm  ABDOMEN: soft, nontender, nondistended, no masses or organomegaly  soft, nontender, nondistended, no masses or organomegaly, no hernias palpable, no guarding or peritoneal signs. PERINEUM: Large amount of indurated and erythematous tissue in the left buttock and perineum, subcutaneous crepitus felt  NEUROLOGIC: CN II-XII are grossly intact.  There are no focalizing motor or sensory deficits  EXTREMITIES: no cyanosis, clubbing or edema    LABS:     Recent Labs     02/08/22 2228   WBC 19.9*   HGB 10.4*   HCT 30.2*        No results for input(s): ALKPHOS, ALT, AST, BILITOT, BILIDIR, LABALBU, AMYLASE, LIPASE in the last 72 hours. CBC with Differential:    Lab Results   Component Value Date    WBC 19.9 02/08/2022    RBC 3.63 02/08/2022    HGB 10.4 02/08/2022    HCT 30.2 02/08/2022     02/08/2022    MCV 83.2 02/08/2022    MCH 28.7 02/08/2022    MCHC 34.4 02/08/2022    RDW 13.5 02/08/2022    LYMPHOPCT 6 02/08/2022    MONOPCT 4 02/08/2022    BASOPCT 0 02/08/2022    MONOSABS 0.76 02/08/2022    LYMPHSABS 1.18 02/08/2022    EOSABS 0.12 02/08/2022    BASOSABS 0.06 02/08/2022    DIFFTYPE NOT REPORTED 02/08/2022     CMP:    Lab Results   Component Value Date     02/08/2022    K 4.1 02/08/2022    CL 83 02/08/2022    CO2 25 02/08/2022    BUN 38 02/08/2022    CREATININE 3.28 02/08/2022    GFRAA 19 02/08/2022    LABGLOM 15 02/08/2022    GLUCOSE 388 02/08/2022    PROT 6.3 02/08/2022    LABALBU 1.9 02/08/2022    CALCIUM 7.7 02/08/2022    BILITOT 0.40 02/08/2022    ALKPHOS 192 02/08/2022    AST 73 02/08/2022    ALT 27 02/08/2022        RADIOLOGY:   CT scan abd/pelvis: CT scan at outside facility shows Leonel's gangrene worse on the left side of the perineum with subcutaneous gas tracking up to the low back    Thank you for the interesting evaluation. Further recommendations to follow. Flaca Chatman DO  2/8/22, 10:49 PM         Attending Note      I have reviewed the above [facility] note(s) and I either performed the key elements of the medical history and physical exam or was present with the surgery resident when the key elements of the medical history and physical exam were performed. I have discussed the findings, established the care plan and recommendations with the surgical team.  Chronic abscess, treated with atb past 10+ days. CT scan reviewed. Poor diabetic control. Advised to undergo operative debridement. Informed of probable recurrent need for debridements and possible colostomy.     Sky Darnell MD  2/9/2022  7:15 AM

## 2022-02-09 NOTE — H&P
Critical Care - History and Physical Examination    Patient's name:  Fer Patel  Medical Record Number: [id number]  Patient's account/billing number: [de-identified]  Patient's YOB: 1980  Age: 42 y.o. Date of Admission: 2/8/2022  9:45 PM  Date of History and Physical Examination: 2/9/2022      Primary Care Physician: Ivana Herrera MD  Attending Physician: Dr. Christel Dimas    Code Status: Full Code    Chief complaint:   Chief Complaint   Patient presents with    Abscess         HISTORY OF PRESENT ILLNESS:      50-year-old female past medical history of uncontrolled type 2 diabetes mellitus on insulin pump, CKD, asthma, hypertension presented to the ER with complaints of soft tissue infection. She states on 1/31/2022 she developed soft tissue infection on the right buttock which was associated with fevers and chills. Patient presented to outside facility ER and was prescribed doxycycline. Patient states that symptoms did not subside after he developed another infection on the left side presented to the ER today. General surgery was consulted from the ER the patient for incision and debridement of Leonel's gangrene. Post incision drainage patient's blood pressure was running low and needed pressor support with urine Rocephin and was transferred to the ICU for further management. Patient hemodynamically stable blood pressure 103/55 map of 70, HR 77 on phenylephrine for pressor support  Saturating well on 4 L nasal cannula    Labs showed  Sodium 123, potassium 4.1  BUN 39, creatinine 3.28  Glucose 388  WBC 19.9, hemoglobin 10.4  Lactic acid 2.0    PAST MEDICAL HISTORY:     No past medical history on file. PAST SURGICAL HISTORY:     No past surgical history on file.     ALLERGIES:      Allergies   Allergen Reactions    Aspirin      Other reaction(s): due to kidney function    Ibuprofen      CHRONIC KIDNEY DISEASE STAGE 3   Other reaction(s): due to kidney function           HOME MEDS: [facility] Prior to Admission medications    Medication Sig Start Date End Date Taking? Authorizing Provider   PRENATAL VIT-DOCUSATE-IRON-FA PO Take 1 tablet by mouth daily 1/17/22  Yes Historical Provider, MD   cyanocobalamin 1000 MCG/ML injection Inject 1,000 mcg into the muscle every 30 days 10/18/21  Yes Historical Provider, MD   fluticasone-salmeterol (ADVAIR HFA) 45-21 MCG/ACT inhaler Inhale 2 puffs into the lungs 2 times daily    Historical Provider, MD   Multiple Vitamins-Minerals (MULTIVITAMIN ADULT EXTRA C PO) Take 1 tablet by mouth daily    Historical Provider, MD   vitamin D (ERGOCALCIFEROL) 1.25 MG (45683 UT) CAPS capsule Take 50,000 Units by mouth once a week    Historical Provider, MD   albuterol sulfate HFA (PROAIR HFA) 108 (90 Base) MCG/ACT inhaler Inhale 1 puff into the lungs as needed    Historical Provider, MD   carvedilol (COREG) 25 MG tablet Take 25 mg by mouth daily    Historical Provider, MD   vitamin D (CHOLECALCIFEROL) 125 MCG (5000 UT) CAPS capsule Take 5,000 Units by mouth daily    Historical Provider, MD   DULoxetine (CYMBALTA) 60 MG extended release capsule Take 60 mg by mouth daily    Historical Provider, MD   fenofibrate (TRIGLIDE) 160 MG tablet Take 160 mg by mouth daily    Historical Provider, MD   fluticasone (FLONASE) 50 MCG/ACT nasal spray 1 spray by Each Nostril route    Historical Provider, MD   glucagon 1 MG injection Inject 1 mg into the muscle as needed    Historical Provider, MD   lisinopril (PRINIVIL;ZESTRIL) 40 MG tablet Take 40 mg by mouth daily    Historical Provider, MD   montelukast (SINGULAIR) 10 MG tablet Take 10 mg by mouth daily    Historical Provider, MD   omeprazole (PRILOSEC) 20 MG delayed release capsule Take 20 mg by mouth daily    Historical Provider, MD   rOPINIRole (REQUIP) 4 MG tablet Take 4 mg by mouth daily    Historical Provider, MD       SOCIAL HISTORY:       TOBACCO:   has no history on file for tobacco use.   ETOH:   has no history on file for alcohol use.  DRUGS:  has no history on file for drug use. FAMILY HISTORY:      No family history on file. REVIEW OF SYSTEMS (ROS):     Review of Systems   Constitutional: Positive for chills, fatigue and fever. Negative for activity change and appetite change. HENT: Negative for congestion. Respiratory: Negative for cough, choking, chest tightness, shortness of breath and wheezing. Cardiovascular: Negative for chest pain, palpitations and leg swelling. Gastrointestinal: Negative for abdominal distention, abdominal pain, constipation and diarrhea. Genitourinary: Negative for difficulty urinating, dysuria and frequency. Neurological: Positive for dizziness. Negative for light-headedness and headaches. Psychiatric/Behavioral: Negative for agitation, behavioral problems and confusion.          OBJECTIVE:     VITAL SIGNS:  BP (!) 92/48   Pulse 75   Temp 98.3 °F (36.8 °C)   Resp 29   Ht 5' 3\" (1.6 m)   Wt 276 lb 6.4 oz (125.4 kg)   SpO2 97%   BMI 48.96 kg/m²   Tmax over 24 hours:  Temp (24hrs), Av.5 °F (36.4 °C), Min:94.9 °F (34.9 °C), Max:99.7 °F (37.6 °C)      Patient Vitals for the past 8 hrs:   BP Temp Temp src Pulse Resp SpO2 Weight   22 0500 (!) 92/48 -- -- 75 29 97 % --   22 0400 (!) 107/53 -- -- 75 20 97 % 276 lb 6.4 oz (125.4 kg)   22 0330 (!) 103/57 -- -- 78 (!) 31 97 % --   22 0300 (!) 103/55 -- -- 77 28 97 % --   22 0245 (!) 107/56 -- -- 77 20 98 % --   22 0230 109/60 98.3 °F (36.8 °C) -- 80 25 98 % --   22 0220 (!) 102/45 -- -- 80 (!) 31 97 % --   22 0215 (!) 107/48 -- -- 82 18 95 % --   22 0210 102/64 -- -- 82 18 99 % --   22 0200 (!) 78/40 -- -- 80 (!) 36 97 % --   22 015 (!) 91/45 -- -- 82 (!) 33 97 % --   22 015 (!) 77/39 -- -- 81 27 100 % --   22 0145 (!) 105/53 -- -- 81 28 100 % --   22 0135 92/63 -- -- 81 -- 100 % --   22 013 (!) 81/45 -- -- 81 30 100 % --   22 012 (!) 84/43 -- -- 73 -- -- --   02/09/22 0125 (!) 85/38 97.2 °F (36.2 °C) Temporal 82 24 100 % --         Intake/Output Summary (Last 24 hours) at 2/9/2022 0552  Last data filed at 2/9/2022 0500  Gross per 24 hour   Intake 1754 ml   Output 235 ml   Net 1519 ml     Date 02/09/22 0000 - 02/09/22 2359   Shift 2150-6935 2417-3467 7561-3082 24 Hour Total   INTAKE   P.O.(mL/kg/hr) 240   240   I. V.(mL/kg) 1514(12.1)   6626(12.6)   Shift Total(mL/kg) 1754(14)   1754(14)   OUTPUT   Urine(mL/kg/hr) 115   115   Blood(mL/kg) 20(0.2)   20(0.2)   Shift Total(mL/kg) 135(1.1)   135(1.1)   Weight (kg) 125.4 125.4 125.4 125.4     Wt Readings from Last 3 Encounters:   02/09/22 276 lb 6.4 oz (125.4 kg)     Body mass index is 48.96 kg/m². PHYSICAL EXAM:  Physical Exam  Constitutional:       General: She is not in acute distress. Appearance: She is obese. She is not ill-appearing. HENT:      Head: Normocephalic and atraumatic. Cardiovascular:      Rate and Rhythm: Normal rate and regular rhythm. Pulses: Normal pulses. Heart sounds: Normal heart sounds. No murmur heard. Pulmonary:      Effort: Pulmonary effort is normal. No respiratory distress. Breath sounds: Normal breath sounds. No wheezing. Chest:      Chest wall: No tenderness. Abdominal:      General: Abdomen is flat. There is no distension. Palpations: Abdomen is soft. There is no mass. Tenderness: There is no abdominal tenderness. Musculoskeletal:      Right lower leg: No edema. Left lower leg: No edema. Neurological:      Mental Status: She is alert and oriented to person, place, and time.    Psychiatric:         Behavior: Behavior normal.           MEDICATIONS:  Scheduled Meds:   sodium chloride flush  5-40 mL IntraVENous 2 times per day    heparin (porcine)  5,000 Units SubCUTAneous 3 times per day    gabapentin  300 mg Oral Q8H    methocarbamol  750 mg Oral Q6H    fluticasone  1 spray Each Nostril Daily    budesonide-formoterol 2 puff Inhalation BID    insulin glargine  20 Units SubCUTAneous Nightly    insulin lispro  0-18 Units SubCUTAneous TID WC    insulin lispro  0-9 Units SubCUTAneous Nightly    potassium chloride  40 mEq Oral Once    clindamycin (CLEOCIN) IV  900 mg IntraVENous Q8H    vancomycin  2,500 mg IntraVENous Once    vancomycin (VANCOCIN) intermittent dosing (placeholder)   Other RX Placeholder    sodium chloride  1,000 mL IntraVENous Once    piperacillin-tazobactam  3,375 mg IntraVENous Q12H     Continuous Infusions:   sodium chloride      dextrose      phenylephrine (ERICH-SYNEPHRINE) 50mg/250mL infusion 50 mcg/min (02/09/22 0345)     PRN Meds:   sodium chloride flush, 5-40 mL, PRN  sodium chloride, 25 mL, PRN  acetaminophen, 650 mg, Q6H PRN   Or  acetaminophen, 650 mg, Q6H PRN  oxyCODONE, 5 mg, Q4H PRN  glucose, 15 g, PRN  dextrose, 12.5 g, PRN  glucagon (rDNA), 1 mg, PRN  dextrose, 100 mL/hr, PRN  albuterol sulfate HFA, 1 puff, Q4H PRN          ABGs:   No results found for: PHART, PH, JMY7ZAT, PCO2, PO2ART, PO2, YSA8TUY, HCO3, BEART, BE, THGBART, THB, TSC7EDO, I2HOBUDX, O2SAT, FIO2    DATA:  Complete Blood Count:   Recent Labs     02/08/22 2228 02/09/22 0412   WBC 19.9* 21.5*   RBC 3.63* 3.40*   HGB 10.4* 9.6*   HCT 30.2* 28.6*   MCV 83.2 84.1   MCH 28.7 28.2   MCHC 34.4 33.6   RDW 13.5 13.6    325   MPV 10.8 10.7        Last 3 Blood Glucose:   Recent Labs     02/08/22 2228 02/09/22 0412   GLUCOSE 388* 389*        PT/INR:    Lab Results   Component Value Date    PROTIME 11.3 02/09/2022    INR 1.1 02/09/2022     PTT:  No results found for: APTT, PTT    Comprehensive Metabolic Profile:   Recent Labs     02/08/22 2228 02/09/22 0412   * 125*   K 4.1 3.6*   CL 83* 86*   CO2 25 22   BUN 38* 41*   CREATININE 3.28* 4.11*   GLUCOSE 388* 389*   CALCIUM 7.7* 7.1*   PROT 6.3* 5.7*   LABALBU 1.9* 1.9*   BILITOT 0.40 0.29*   ALKPHOS 192* 196*   AST 73* 48*   ALT 27 25      Magnesium: No results found for: 2/8/2022  -Continued on vancomycin, clindamycin and Zosyn  -Plan for repeat OR later this week by general surgery    Uncontrolled type 2 diabetes mellitus  -Started on Lantus 20 units daily. Will titrate up as needed  -Started on high-dose insulin sliding scale  -Hypoglycemia protocol in place    MEREDITH on CKD  -Likely secondary from hypotension  -Continued on normal saline at 75 mL's per hour    Hypokalemia  -Potassium replaced    Asthma  -Resumed home inhalers    DVT prophylaxis: Heparin porcine 5000 units  GI prophylaxis: None    Nicolás Martinez MD  Internal Medicine Resident, PGY-2  [address], [place]  7/2/8218,0:42 AM      The critical care team assigned to the patient will be following up the patient in the intensive care unit. I have discussed the current plan with the critical care attending. The above mentioned assessment and plan will be reviewed again in detail by the critical care attending at bedside, and can be further changed or modified accordingly by the attending physician. Attending Physician Statement  I have discussed the care of Michelle Gill, including pertinent history and exam findings with the resident. I have reviewed the key elements of all parts of the encounter with the resident. I have seen and examined the patient with the resident. I agree with the assessment and plan and status of the problem list as documented. I seen the patient during my round today, chart reviewed, labs and medications reviewed overnight events noted.   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 She is MEREDITH with creatinine initially was 3.28 creatinine this morning was 4.11 bicarbonate is 22 her sodium was 123 this morning sodium is 125 corrected sodium is 130. She is hyper glycemic with blood sugars in 380s to 500. Chest x-ray was reviewed and was negative for infiltrate. She is maintaining saturation on 2 L nasal cannula. She is currently on Levophed drip. We will give her fluid bolus again now 1-1/2 to 2 L. We will follow-up urine output and renal function. We will repeat lactic acid in if elevated will repeat lactic acid after fluid bolus. Patient is on clindamycin vancomycin and Zosyn. ID has been consulted. Patient was seen by nephrology this morning. Discussed with Dr. Candelario Dave  Follow-up creatinine urine output renal function and electrolytes. Start insulin drip to keep blood sugar between 140 and 180. Will recheck BMP later today. Continue Levophed drip to keep map of 65 or systolic of 929. Follow-up cultures from tissue/I&D and adjust antibiotic      Discussed with nursing staff, treatment and plan discussed. Discussed with respiratory therapist.    Total critical care time caring for this patient with life threatening, unstable organ failure, including direct patient contact, management of life support systems, review of data including imaging and labs, discussions with other team members and physicians at least 50 min so far today, excluding procedures. Please note that this chart was generated using voice recognition Dragon dictation software. Although every effort was made to ensure the accuracy of this automated transcription, some errors in transcription may have occurred.      Kathren Brunner, MD  2/9/2022 9:56 AM

## 2022-02-09 NOTE — ED PROVIDER NOTES
Faculty Sign-Out Attestation  Handoff taken on the following patient from prior Attending Physician: Eliane Macario    I was available and discussed any additional care issues that arose and coordinated the management plans with the resident(s) caring for the patient during my duty period. Any areas of disagreement with residents documentation of care or procedures are noted on the chart. I was personally present for the key portions of any/all procedures during my duty period. I have documented in the chart those procedures where I was not present during the key portions.     Abscess / amaris's gangrene, had full antibiotics, gen surgery taking pt to OR,     Sheree Rea DO  Attending Physician     Sheree Rea DO  02/08/22 2162

## 2022-02-09 NOTE — ED NOTES
Bed: 22  Expected date:   Expected time:   Means of arrival:   Comments:  Kingsley Mota RN  02/08/22 4813

## 2022-02-09 NOTE — [facility] REMOTE MONITORING
Attempted to contact primary RN regarding the sepsis bundle.     Darwin Bishop MSN, RN, [address] [place] [address] RN   Call back # [phone]

## 2022-02-09 NOTE — BRIEF OP NOTE
Brief Postoperative Note      Patient: Alva García  YOB: 1980  MRN: [id number]    Date of Procedure: 2/9/2022    Pre-Op Diagnosis: NECROTIZING FASCITIS    Post-Op Diagnosis: Same       Procedure(s):  incision and drainage of bilateral necrotizing soft tissue infection of groin and buttocks     Surgeon(s):  Alexy Weaver MD    Assistant:  * No surgical staff found *    Anesthesia: General    Estimated Blood Loss (mL): less than 387     Complications: None    Specimens:   * No specimens in log *    Implants:  * No implants in log *      Drains:   NG/OG/NJ/NE Tube Orogastric 18 fr Center mouth (Active)       Urethral Catheter Temperature probe 16 fr (Active)   Catheter Indications Need for fluid volume management of the critically ill patient in a critical care setting 02/09/22 1200   Site Assessment Swelling 02/09/22 1200   Urine Color Yellow 02/09/22 1302   Urine Appearance Cloudy 02/09/22 1302   Urine Odor Malodorous 02/09/22 1200   Output (mL) 10 mL 02/09/22 0800       Findings:   Purulence noted superior and inferior to prior incision. Right sided lesion opened with purulence and evidence of infection. She will require return to OR.   Wound packed with kerlix and skin temporarily stapled      Electronically signed by ROMEO Hartman CNP on 2/9/2022 at 2:36 PM 5

## 2022-02-09 NOTE — ED NOTES
Report given to RAMONITA Ramon on Car 2. All Questions answered.       Zuleyka Carlos RN  02/09/22 9554

## 2022-02-09 NOTE — PROGRESS NOTES
Mom at bedside -will take clothes/piercings/purse home/patient has phone/phone   And glasses to SICU with patient

## 2022-02-09 NOTE — PROGRESS NOTES
Arrived [facility] with patient in pacu-obtained report from [address] [place] [address] crna and OR RN -Evelyn Rivas RN

## 2022-02-09 NOTE — ED PROVIDER NOTES
[facility] ED     Emergency Department     Faculty Attestation        I performed a history and physical examination of the patient and discussed management with the resident. I reviewed the residents note and agree with the documented findings and plan of care. Any areas of disagreement are noted on the chart. I was personally present for the key portions of any procedures. I have documented in the chart those procedures where I was not present during the key portions. I have reviewed the emergency nurses triage note. I agree with the chief complaint, past medical history, past surgical history, allergies, medications, social and family history as documented unless otherwise noted below. For mid-level providers such as nurse practitioners as well as physicians assistants:    I have personally seen and evaluated the patient. I find the patient's history and physical exam are consistent with NP/PA documentation. I agree with the care provided, treatment rendered, disposition, & follow-up plan. Additional findings are as noted. Vital Signs: /67   Pulse 79   Temp 98.5 °F (36.9 °C)   Resp 24   Ht 5' 3\" (1.6 m)   Wt 275 lb (124.7 kg)   SpO2 95%   BMI 48.71 kg/m²   PCP:  Guerda Maxwell MD    Pertinent Comments:     Patient presents from from outlying facility CT imaging shows evidence of foreign years gangrene she is presently stable and afebrile at this time. Case was discussed with surgery. Patient given antibiotics.   ICU versus stepdown admission      Critical Care  None          Marcia Seo MD    Attending Emergency Medicine Physician              Yaniv Michel MD  02/08/22 7420

## 2022-02-09 NOTE — PLAN OF CARE
Problem: Skin Integrity:  Goal: Will show no infection signs and symptoms  Description: Will show no infection signs and symptoms  Outcome: Ongoing  Goal: Absence of new skin breakdown  Description: Absence of new skin breakdown  Outcome: Ongoing     Problem: Fluid Volume - Imbalance:  Goal: Absence of imbalanced fluid volume signs and symptoms  Description: Absence of imbalanced fluid volume signs and symptoms  Outcome: Ongoing     Problem: Pain:  Goal: Pain level will decrease  Description: Pain level will decrease  Outcome: Ongoing  Goal: Recognizes and communicates pain  Description: Recognizes and communicates pain  Outcome: Ongoing  Goal: Control of acute pain  Description: Control of acute pain  Outcome: Ongoing  Goal: Control of chronic pain  Description: Control of chronic pain  Outcome: Ongoing     Problem: Serum Glucose Level - Abnormal:  Goal: Ability to maintain appropriate glucose levels will improve to within specified parameters  Description: Ability to maintain appropriate glucose levels will improve to within specified parameters  Outcome: Ongoing     Problem: Skin Integrity - Impaired:  Goal: Will show no infection signs and symptoms  Description: Will show no infection signs and symptoms  Outcome: Ongoing  Goal: Absence of new skin breakdown  Description: Absence of new skin breakdown  Outcome: Ongoing

## 2022-02-09 NOTE — CONSULTS
Nephrology Consult Note    Reason for Consult: Acute on chronic renal injury  Requesting Physician: Dr. Bailee Lindquist    Chief Complaint: Soft tissue infection    History Obtained From:  patient, electronic medical record    History of Present Illness: This is a 42 y.o. female who presented to the hospital for evaluation of soft tissue infection involving the perineal /gluteal area. Patient tells me that about a week or so ago she started to notice a little boil/cystic lesions involving the lower portion of her gluteal area and the right perineal area. She seek medical attention from urgent care and was started on oral antibiotics, she was taking the antibiotics and she started noticing that the infection was spreading down onto the left side as well. She finally came to the emergency room at [facility] and subsequently was transferred over to us. She was evaluated in the ER by general surgery and was taken to the ER for incision and Debridement of the perineal area yesterday. She was hypotensive requiring fluids as well as pressors. Her serum creatinine yesterday was noted to be 3.2, creatinine is up to 4.1 this morning. Patient has a longstanding history of diabetes type 1 diagnosed at the age of 5. She takes insulin. She does have history of chronic kidney disease stage III and is followed by Dr. Miguel Fields out of [facility]. She recalls her creatinine to be around 1.5 or 1.6 although I do not have any hard copy of her records at the time of my dictation. Over the last few days her blood sugars have been running quite high, she did notice weakness, fatigue and lightheadedness for a day or 2 prior to her hospitalization and it is pertinent to mention that she was taking thiazide diuretics ACE inhibitors and Coreg as her home meds. She currently has a Booker. She denies shortness of breath, still feels weak and tired, no fever this morning.   Was hypotensive in the 76s Continuous  norepinephrine (LEVOPHED) 16 mg in sodium chloride 0.9 % 250 mL infusion, Continuous  fenofibrate (TRIGLIDE) tablet 160 mg, Daily  montelukast (SINGULAIR) tablet 10 mg, Daily  omeprazole (PRILOSEC) delayed release capsule 20 mg, Daily  Vitamin D (CHOLECALCIFEROL) tablet 5,000 Units, Daily  rOPINIRole (REQUIP) tablet 4 mg, Daily  insulin glargine (LANTUS) injection vial 35 Units, Nightly  clindamycin (CLEOCIN) 900 mg in dextrose 5 % 50 mL IVPB, Q8H  vancomycin (VANCOCIN) intermittent dosing (placeholder), RX Placeholder  0.9 % sodium chloride bolus, Once  piperacillin-tazobactam (ZOSYN) 3,375 mg in dextrose 5 % 50 mL IVPB (mini-bag), Q12H        Allergies:  Aspirin and Ibuprofen    Social History:   Social History     Socioeconomic History    Marital status:      Spouse name: Not on file    Number of children: Not on file    Years of education: Not on file    Highest education level: Not on file   Occupational History    Not on file   Tobacco Use    Smoking status: Not on file    Smokeless tobacco: Not on file   Substance and Sexual Activity    Alcohol use: Not on file    Drug use: Not on file    Sexual activity: Not on file   Other Topics Concern    Not on file   Social History Narrative    Not on file     Social Determinants of Health     Financial Resource Strain:     Difficulty of Paying Living Expenses: Not on file   Food Insecurity:     Worried About Running Out of Food in the Last Year: Not on file    Kenzie of Food in the Last Year: Not on file   Transportation Needs:     Lack of Transportation (Medical): Not on file    Lack of Transportation (Non-Medical):  Not on file   Physical Activity:     Days of Exercise per Week: Not on file    Minutes of Exercise per Session: Not on file   Stress:     Feeling of Stress : Not on file   Social Connections:     Frequency of Communication with Friends and Family: Not on file    Frequency of Social Gatherings with Friends and Family: Not on file    Attends Gnosticist Services: Not on file    Active Member of Clubs or Organizations: Not on file    Attends Club or Organization Meetings: Not on file    Marital Status: Not on file   Intimate Partner Violence:     Fear of Current or Ex-Partner: Not on file    Emotionally Abused: Not on file    Physically Abused: Not on file    Sexually Abused: Not on file   Housing Stability:     Unable to Pay for Housing in the Last Year: Not on file    Number of [place] in the Last Year: Not on file    Unstable Housing in the Last Year: Not on file       Family History:   No family history on file. Review of Systems:    Constitutional: + fever, + chills, + weakness, fatigue, hyperglycemia  HEENT:  No headache, otalgia, itchy eyes, nasal discharge or sore throat. Cardiac:  No chest pain, dyspnea, orthopnea or PND. Chest:              No cough, phlegm or wheezing. Abdomen:  No abdominal pain, nausea or vomiting. Neuro:  No focal weakness, abnormal movements orseizure like activity. Skin:   No rashes, no itching. :   No hematuria, no pyuria, no dysuria, no flank pain. Extremities:  No swelling or joint pains.       Objective:  CURRENT TEMPERATURE:  Temp: 98.3 °F (36.8 °C)  MAXIMUM TEMPERATURE OVER 24HRS:  Temp (24hrs), Av.5 °F (36.4 °C), Min:94.9 °F (34.9 °C), Max:99.7 °F (37.6 °C)    CURRENT RESPIRATORY RATE:  Resp: 23  CURRENT PULSE:  Pulse: 81  CURRENT BLOOD PRESSURE:  BP: (!) 105/53  24HR BLOOD PRESSURE RANGE:  Systolic (58AEB), RMA:48 , Min:52 , OLX:407   ; Diastolic (73QMF), VCS:37, Min:25, Max:122    24HR INTAKE/OUTPUT:      Intake/Output Summary (Last 24 hours) at 2022 0951  Last data filed at 2022 0800  Gross per 24 hour   Intake 1754 ml   Output 255 ml   Net 1499 ml     Patient Vitals for the past 96 hrs (Last 3 readings):   Weight   22 0400 276 lb 6.4 oz (125.4 kg)   22 2148 275 lb (124.7 kg)     Physical Exam:  General appearance:Awake, alert, in no acute distress  Skin: warm and dry, no rash or erythema  Eyes: conjunctivae normal and sclera anicteric  ENT: :no thrush no pharyngeal congestion    Neck: no carotid bruit ,no  JVD,no carotid Lymphadenopathy, noThyromegaly Pulmonary: no wheezing or rhonchi. No rales heard. Cardiovascular: Normal S1 & S2,  No S3 or  S4, no Pericardial Rub no Murmur   Abdomen: soft nontender, bowel sounds present, no organomegaly,  no ascites  Extremities:no cyanosis, clubbing or edema    Labs:   CBC:  Recent Labs     02/08/22 2228 02/09/22  0412   WBC 19.9* 21.5*   RBC 3.63* 3.40*   HGB 10.4* 9.6*   HCT 30.2* 28.6*   MCV 83.2 84.1   MCH 28.7 28.2   MCHC 34.4 33.6   RDW 13.5 13.6    325   MPV 10.8 10.7      BMP:   Recent Labs     02/08/22 2228 02/09/22 0412   * 125*   K 4.1 3.6*   CL 83* 86*   CO2 25 22   BUN 38* 41*   CREATININE 3.28* 4.11*   GLUCOSE 388* 389*   CALCIUM 7.7* 7.1*        Albumin:   Recent Labs     02/08/22 2228 02/09/22 0412   LABALBU 1.9* 1.9*           Assessment:  1. MEREDITH chronic kidney disease stage IIIa secondary to ischemic ATN stemming from underlying infection, causing hypotension and shock. Will rule out occult obstruction although seems unlikely  2. Diabetes mellitus type 1  3. History of hypertension although currently blood pressures are only in the 80s. 4.  Hyponatremia secondary to significant hyperglycemia. 5.  Status post incision and debridement of the perineal wound       Plan:  1. Will Check Renal Ultrasound to r/o element of obstruction and to assess the kidney size/echotexture. 2. Urine testing including Urinalysis, Urine sodium, , Urine protein and creatinine . Will check urinary eosinophils as well. 3.  Normal saline 1 L bolus right now over 1 hour, increase normal saline baseline fluids to 125 an hour. 4. Will order Complement levels. 5.  I had a long discussion with patient at bedside.   She understands and knows that her serum creatinine level is pretty high but I am

## 2022-02-09 NOTE — ED NOTES
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 Pt states about 10 days ago she started to feel pain in her vagina and went to the ER where they prescribed her doxycycline. Pt states it continued to get worse even after finishing her full prescription. Pt received abx at [facility] and was transferred here to ED for surgery consult. Pt has hx of DM, CKD without need for dialysis and hx of gastric sleeve. Pt is alert and oriented x4, VSS. Pt has visible drainage coming from the site and rates it 8/10 pain.         Yarely Stallings, RN  02/08/22 3585

## 2022-02-09 NOTE — PROGRESS NOTES
Writer called mother to provide update. Mom pleased with update, informed her of plan for OR tomorrow.

## 2022-02-09 NOTE — VCC REMOTE MONITORING
Attempted to contact primary RN regarding the sepsis bundle.     Janny Green MSN, RN, [address] [place] [address] RN   Call back # [phone]

## 2022-02-09 NOTE — CARE COORDINATION
Case Management Initial Discharge Plan  Prudence Gravely,             Met with:patient to discuss discharge plans. Information verified: address, contacts, phone number, , insurance Yes  Insurance Provider: [facility]    Emergency Contact/Next of Kin name & number: GAYE [phone]  Who are involved in patient's support system? Patients mother    PCP: Dr. Calderon Giang  Date of last visit: 2021      Discharge Planning    Living Arrangements:    Lives with children, ages 15 and 15. Kids are with patients mother while she is in the hospital    Home has 1 stories  0 stairs to climb to get into front door, 0 stairs to climb to reach second floor  Location of bedroom/bathroom in home-main floor    Patient able to perform ADL's:Independent    Current Services (outpatient & in home) None  DME equipment: None  DME provider:     Is patient receiving oral anticoagulation therapy? No    If indicated:   Physician managing anticoagulation treatment:   Where does patient obtain lab work for ATC treatment? Potential Assistance Needed:    Possible home care for dressing changes  Patient agreeable to home care: Yes  Freedom of choice provided:  yes,  No preference of home care agency    Prior SNF/Rehab Placement and Facility: No  Agreeable to SNF/Rehab: No  [place] of choice provided: n/a     Evaluation: no    Expected Discharge date:       Patient expects to be discharged to:   Home independently vs. Home care    If home: is the family and/or caregiver wiling & able to provide support at home?yes  Who will be providing this support?  Patients mother    Follow Up Appointment: Best Day/ Time:      Transportation provider: Patients mother  Transportation arrangements needed for discharge: No    Readmission Risk              Risk of Unplanned Readmission:  18             Does patient have a readmission risk score greater than 14?: Yes  If yes, follow-up appointment must be made within 7 days of discharge.      Goals of Care:       Educated pt  on transitional options, provided freedom of choice and are agreeable with plan      Discharge Plan: Home independently at discharge, agreeable to home care if needed          Electronically signed by KIP Borrero on 2/9/22 at 8:59 AM EST

## 2022-02-09 NOTE — RESEARCH
Clinical Research Services  February 9, 2022  1:23 PM    Asked by Dr. Hedy Tucker to evaluate the patient for protocol [employer] [id number]: A Multicenter, Prospective, Ktypnywva-ipfmbk-dgzqktc Observational Study Evaluating Immunoassay Measurements of Pancreatic Stone Protein Performed on [employer]'s abioSCOPE Device with the PSP Assay on ICU Patients at Risk of Sepsis as an Aid in Identifying Sepsis. IRB #  [id number]                     NCT # [id number]  [x] Patient met inclusion/exclusion criteria. [x] Patient given consent for review. [x] Patient read study consent. Questions answered. Consent signed by patient. [x] A copy of the signed consents given to the patient. [x] Patient number: [id number]  [x] Baseline labs obtained  [x] Patient educated on purpose of the abioSCOPE device and the PSP assay for use as an aid in identifying sepsis in those patients at risk for sepsis. Patient verbalizes understanding.           Darcy Polo RN  Clinical Research Nurse  [phone]    Wendie Clinton MD

## 2022-02-09 NOTE — ANESTHESIA PRE PROCEDURE
Department of Anesthesiology  Preprocedure Note       Name:  Mina Pascual   Age:  42 y.o.  :  1980                                          MRN:  [id number]         Date:  2022      Surgeon: Corinthia Goodpasture):  Eren Amor MD    Procedure: Procedure(s):  *ADD ON, ASAP* INCISION AND DRAINAGE OF PERINEUM, BRIAN KNIFE PRONE    Medications prior to admission:   Prior to Admission medications    Not on File       Current medications:    Current Facility-Administered Medications   Medication Dose Route Frequency Provider Last Rate Last Admin    clindamycin (CLEOCIN) 900 mg in dextrose 5 % 50 mL IVPB  900 mg IntraVENous Q8H Stephani Schaefer MD        vancomycin (VANCOCIN) 2,500 mg in dextrose 5 % 500 mL IVPB  2,500 mg IntraVENous Once Stephani Schaefer MD        vancomycin (VANCOCIN) intermittent dosing (placeholder)   Other RX Placeholder Stephani Schaefer MD        0.9 % sodium chloride bolus  1,000 mL IntraVENous Once Akiko LIZA Jono, DO        piperacillin-tazobactam (ZOSYN) 3,375 mg in dextrose 5 % 50 mL IVPB (mini-bag)  3,375 mg IntraVENous Q12H Selam Prerna, DO 12.5 mL/hr at 22 2332 3,375 mg at 22 2332     No current outpatient medications on file. 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 Past Surgical History:  No past surgical history on file.     Social History:    Social History     Tobacco Use    Smoking status: Not on file    Smokeless tobacco: Not on file   Substance Use Topics    Alcohol use: Not on file                                Counseling given: Not Answered      Vital Signs (Current):   Vitals:    22 2148   BP: 108/67   Pulse: 79   Resp: 24   Temp: 98.5 °F (36.9 °C)   SpO2: 95%   Weight: 275 lb (124.7 kg)   Height: 5' 3\" (1.6 m)                                              BP Readings from Last 3 Encounters:   22 108/67       NPO Status:                                                                                 BMI:   Wt Readings from Last 3 Encounters:   02/08/22 275 lb (124.7 kg)     Body mass index is 48.71 kg/m². CBC:   Lab Results   Component Value Date    WBC 19.9 02/08/2022    RBC 3.63 02/08/2022    HGB 10.4 02/08/2022    HCT 30.2 02/08/2022    MCV 83.2 02/08/2022    RDW 13.5 02/08/2022     02/08/2022       CMP:   Lab Results   Component Value Date     02/08/2022    K 4.1 02/08/2022    CL 83 02/08/2022    CO2 25 02/08/2022    BUN 38 02/08/2022    CREATININE 3.28 02/08/2022    GFRAA 19 02/08/2022    LABGLOM 15 02/08/2022    GLUCOSE 388 02/08/2022    PROT 6.3 02/08/2022    CALCIUM 7.7 02/08/2022    BILITOT 0.40 02/08/2022    ALKPHOS 192 02/08/2022    AST 73 02/08/2022    ALT 27 02/08/2022       POC Tests: No results for input(s): POCGLU, POCNA, POCK, POCCL, POCBUN, POCHEMO, POCHCT in the last 72 hours.     Coags: No results found for: PROTIME, INR, APTT    HCG (If Applicable):   Lab Results   Component Value Date    PREGTESTUR NEGATIVE 02/08/2022        ABGs: No results found for: PHART, PO2ART, NIJ0ZAJ, DDD8GXJ, BEART, Q4SNBBDP     Type & Screen (If Applicable):  No results found for: LABABO, LABRH    Drug/Infectious Status (If Applicable):  No results found for: HIV, HEPCAB    COVID-19 Screening (If Applicable): No results found for: COVID19        Anesthesia Evaluation  Patient summary reviewed no history of anesthetic complications:   Airway: Mallampati: III        Dental:          Pulmonary:Negative Pulmonary ROS and normal exam  breath sounds clear to auscultation                             Cardiovascular:  Exercise tolerance: good (>4 METS),   (+) hypertension:,         Rhythm: regular  Rate: normal                    Neuro/Psych:   Negative Neuro/Psych ROS              GI/Hepatic/Renal:   (+) renal disease:, morbid obesity          Endo/Other:    (+) DiabetesType II DM, , . Abdominal:             Vascular: negative vascular ROS. Other Findings:             Anesthesia Plan      general     ASA 4 - emergent       Induction: intravenous. Anesthetic plan and risks discussed with patient. Plan discussed with CRNA.           7487  Diastolic function is indeterminate due to patient's heartrate.      Left ventricle systolic function is normal.      The Ejection Fraction is 60-65%.      No significant valvulopathies.      No regional wall motion abnormalities noted.      2021 stress  Stress ECG Conclusion      No ischemic ECG changes      Normal perfusion scan        hcg neg    Ethan Treviño MD   2/8/2022

## 2022-02-09 NOTE — PROCEDURES
PROCEDURE NOTE - CENTRAL VENOUS LINE PLACEMENT    PATIENT NAME: Estefani [facility] RECORD NO. [id number]  DATE: 2/9/2022  ATTENDING PHYSICIAN: Margarito Dean MD    PREOPERATIVE DIAGNOSIS:  centrally administered medications  POSTOPERATIVE DIAGNOSIS:  Same  PROCEDURE PERFORMED:  Right Internal Jugular Vein Central Line Insertion  PERFORMING PHYSICIAN: Porsha Prescott MD  ESTIMATED BLOOD LOSS:  Less than 25 ml  COMPLICATIONS:  None immediately appreciated. DISCUSSION:  Macrina Tipton is a 42y.o.-year-old female who requires central IV access centrally administered medications. The history and physical examination were reviewed and confirmed. CONSENT: Unable to be obtained due to patient's condition. PROCEDURE:  A timeout was initiated by the bedside nurse and was confirmed by those present. The patient was placed in a supine position. The skin overlying the Right Internal Jugular Vein was prepped with chlorhexadine and draped in sterile fashion. The vessel and surrounding anatomy was visualized using ultrasound. The introducer needle was inserted into the internal jugular vein returning dark red non pulsatile blood. A guidewire was placed through the center of the needle with no resistance. Ultrasound confirmed presence of wire in the vein. A small incision made in the skin with a #11 scalpel blade. The dilator was inserted into the skin and vein over guidewire using Seldinger technique. The dilator was then removed and the 7F 16cm catheter was placed in the vein over the guidewire using Seldinger technique. The guidewire was then removed and all ports aspirated and flushed appropriately. The catheter then secured using silk suture and a temporary sterile dressing was applied. No immediate complication was evident. All sponge, instrument and needle counts were correct at the completion of the procedure.     Postprocedural chest x-ray showed good position of the catheter with no evidence of hemothorax or pneumothorax. The patient tolerated the procedure well with no immediate complication evident.      Cornelius Sandoval MD  3:43 PM, 2/9/22

## 2022-02-09 NOTE — PROGRESS NOTES
Pt was admitted in ICU under critical care . Plan from General surgery to take over as primary for repeat intervention. Primary transferred to General Surgery and transfer out of ICU. Victorina Patricio MD  Internal Medicine Resident, PGY- [address], [place]  2/9/2022, 2:09 PM

## 2022-02-10 ENCOUNTER — ANESTHESIA (OUTPATIENT)
Dept: OPERATING ROOM | Age: 42
DRG: 853 | End: 2022-02-10
Payer: COMMERCIAL

## 2022-02-10 ENCOUNTER — APPOINTMENT (OUTPATIENT)
Dept: GENERAL RADIOLOGY | Age: 42
DRG: 853 | End: 2022-02-10
Payer: COMMERCIAL

## 2022-02-10 ENCOUNTER — ANESTHESIA EVENT (OUTPATIENT)
Dept: OPERATING ROOM | Age: 42
DRG: 853 | End: 2022-02-10
Payer: COMMERCIAL

## 2022-02-10 VITALS
OXYGEN SATURATION: 100 % | SYSTOLIC BLOOD PRESSURE: 124 MMHG | RESPIRATION RATE: 20 BRPM | TEMPERATURE: 99.1 F | DIASTOLIC BLOOD PRESSURE: 102 MMHG

## 2022-02-10 LAB
ABSOLUTE EOS #: 0.14 K/UL (ref 0–0.44)
ABSOLUTE IMMATURE GRANULOCYTE: 0.11 K/UL (ref 0–0.3)
ABSOLUTE LYMPH #: 1.19 K/UL (ref 1.1–3.7)
ABSOLUTE MONO #: 0.71 K/UL (ref 0.1–1.2)
ACTION: NORMAL
ALLEN TEST: ABNORMAL
ANION GAP SERPL CALCULATED.3IONS-SCNC: 16 MMOL/L (ref 9–17)
ANION GAP SERPL CALCULATED.3IONS-SCNC: 17 MMOL/L (ref 9–17)
BASOPHILS # BLD: 0 % (ref 0–2)
BASOPHILS ABSOLUTE: 0.03 K/UL (ref 0–0.2)
BUN BLDV-MCNC: 40 MG/DL (ref 6–20)
BUN/CREAT BLD: ABNORMAL (ref 9–20)
CALCIUM IONIZED: 0.98 MMOL/L (ref 1.13–1.33)
CALCIUM IONIZED: 0.98 MMOL/L (ref 1.13–1.33)
CALCIUM SERPL-MCNC: 7.2 MG/DL (ref 8.6–10.4)
CHLORIDE BLD-SCNC: 95 MMOL/L (ref 98–107)
CHLORIDE BLD-SCNC: 97 MMOL/L (ref 98–107)
CO2: 15 MMOL/L (ref 20–31)
CO2: 16 MMOL/L (ref 20–31)
CREAT SERPL-MCNC: 4.35 MG/DL (ref 0.5–0.9)
DATE AND TIME: NORMAL
DIFFERENTIAL TYPE: ABNORMAL
EOSINOPHILS RELATIVE PERCENT: 1 % (ref 1–4)
FIO2: 40
GFR AFRICAN AMERICAN: 14 ML/MIN
GFR NON-AFRICAN AMERICAN: 11 ML/MIN
GFR SERPL CREATININE-BSD FRML MDRD: ABNORMAL ML/MIN/{1.73_M2}
GFR SERPL CREATININE-BSD FRML MDRD: ABNORMAL ML/MIN/{1.73_M2}
GLUCOSE BLD-MCNC: 111 MG/DL (ref 65–105)
GLUCOSE BLD-MCNC: 135 MG/DL (ref 70–99)
GLUCOSE BLD-MCNC: 139 MG/DL (ref 65–105)
GLUCOSE BLD-MCNC: 144 MG/DL (ref 65–105)
GLUCOSE BLD-MCNC: 145 MG/DL (ref 65–105)
GLUCOSE BLD-MCNC: 156 MG/DL (ref 65–105)
GLUCOSE BLD-MCNC: 158 MG/DL (ref 65–105)
GLUCOSE BLD-MCNC: 168 MG/DL (ref 65–105)
GLUCOSE BLD-MCNC: 184 MG/DL (ref 65–105)
GLUCOSE BLD-MCNC: 207 MG/DL (ref 65–105)
HCT VFR BLD CALC: 25.8 % (ref 36.3–47.1)
HEMOGLOBIN: 8.5 G/DL (ref 11.9–15.1)
IMMATURE GRANULOCYTES: 1 %
LYMPHOCYTES # BLD: 11 % (ref 24–43)
MAGNESIUM: 2.2 MG/DL (ref 1.6–2.6)
MCH RBC QN AUTO: 28.3 PG (ref 25.2–33.5)
MCHC RBC AUTO-ENTMCNC: 32.9 G/DL (ref 28.4–34.8)
MCV RBC AUTO: 86 FL (ref 82.6–102.9)
MODE: ABNORMAL
MONOCYTES # BLD: 7 % (ref 3–12)
NEGATIVE BASE EXCESS, ART: 10 (ref 0–2)
NEGATIVE BASE EXCESS, ART: 6 (ref 0–2)
NEGATIVE BASE EXCESS, ART: 8 (ref 0–2)
NOTIFY: NORMAL
NRBC AUTOMATED: 0 PER 100 WBC
O2 DEVICE/FLOW/%: ABNORMAL
PATIENT TEMP: ABNORMAL
PDW BLD-RTO: 14.3 % (ref 11.8–14.4)
PHOSPHORUS: 5.5 MG/DL (ref 2.6–4.5)
PHOSPHORUS: 7 MG/DL (ref 2.6–4.5)
PLATELET # BLD: 251 K/UL (ref 138–453)
PLATELET ESTIMATE: ABNORMAL
PMV BLD AUTO: 10.9 FL (ref 8.1–13.5)
POC HCO3: 16.4 MMOL/L (ref 21–28)
POC HCO3: 18.9 MMOL/L (ref 21–28)
POC HCO3: 20.1 MMOL/L (ref 21–28)
POC LACTIC ACID: 0.63 MMOL/L (ref 0.56–1.39)
POC LACTIC ACID: 0.64 MMOL/L (ref 0.56–1.39)
POC LACTIC ACID: 0.82 MMOL/L (ref 0.56–1.39)
POC O2 SATURATION: 96 % (ref 94–98)
POC O2 SATURATION: 97 % (ref 94–98)
POC O2 SATURATION: 99 % (ref 94–98)
POC PCO2 TEMP: ABNORMAL MM HG
POC PCO2: 34.5 MM HG (ref 35–48)
POC PCO2: 35.5 MM HG (ref 35–48)
POC PCO2: 56 MM HG (ref 35–48)
POC PH TEMP: ABNORMAL
POC PH: 7.16 (ref 7.35–7.45)
POC PH: 7.27 (ref 7.35–7.45)
POC PH: 7.35 (ref 7.35–7.45)
POC PO2 TEMP: ABNORMAL MM HG
POC PO2: 103.1 MM HG (ref 83–108)
POC PO2: 104.6 MM HG (ref 83–108)
POC PO2: 144.5 MM HG (ref 83–108)
POSITIVE BASE EXCESS, ART: ABNORMAL (ref 0–3)
POTASSIUM SERPL-SCNC: 4.4 MMOL/L (ref 3.7–5.3)
POTASSIUM SERPL-SCNC: 4.6 MMOL/L (ref 3.7–5.3)
RBC # BLD: 3 M/UL (ref 3.95–5.11)
RBC # BLD: ABNORMAL 10*6/UL
READ BACK: YES
SAMPLE SITE: ABNORMAL
SEG NEUTROPHILS: 79 % (ref 36–65)
SEGMENTED NEUTROPHILS ABSOLUTE COUNT: 8.22 K/UL (ref 1.5–8.1)
SODIUM BLD-SCNC: 126 MMOL/L (ref 135–144)
SODIUM BLD-SCNC: 130 MMOL/L (ref 135–144)
SURGICAL PATHOLOGY REPORT: NORMAL
TCO2 (CALC), ART: ABNORMAL MMOL/L (ref 22–29)
VANCOMYCIN RANDOM DATE LAST DOSE: NORMAL
VANCOMYCIN RANDOM DOSE AMOUNT: NORMAL
VANCOMYCIN RANDOM TIME LAST DOSE: NORMAL
VANCOMYCIN RANDOM: 39.1 UG/ML
WBC # BLD: 10.4 K/UL (ref 3.5–11.3)
WBC # BLD: ABNORMAL 10*3/UL

## 2022-02-10 PROCEDURE — 99233 SBSQ HOSP IP/OBS HIGH 50: CPT | Performed by: INTERNAL MEDICINE

## 2022-02-10 PROCEDURE — 2500000003 HC RX 250 WO HCPCS: Performed by: STUDENT IN AN ORGANIZED HEALTH CARE EDUCATION/TRAINING PROGRAM

## 2022-02-10 PROCEDURE — 2580000003 HC RX 258: Performed by: STUDENT IN AN ORGANIZED HEALTH CARE EDUCATION/TRAINING PROGRAM

## 2022-02-10 PROCEDURE — 6360000002 HC RX W HCPCS: Performed by: STUDENT IN AN ORGANIZED HEALTH CARE EDUCATION/TRAINING PROGRAM

## 2022-02-10 PROCEDURE — 6360000002 HC RX W HCPCS: Performed by: INTERNAL MEDICINE

## 2022-02-10 PROCEDURE — 36620 INSERTION CATHETER ARTERY: CPT

## 2022-02-10 PROCEDURE — 80048 BASIC METABOLIC PNL TOTAL CA: CPT

## 2022-02-10 PROCEDURE — 84100 ASSAY OF PHOSPHORUS: CPT

## 2022-02-10 PROCEDURE — 2580000003 HC RX 258: Performed by: SURGERY

## 2022-02-10 PROCEDURE — 6360000002 HC RX W HCPCS: Performed by: NURSE PRACTITIONER

## 2022-02-10 PROCEDURE — 06HY33Z INSERTION OF INFUSION DEVICE INTO LOWER VEIN, PERCUTANEOUS APPROACH: ICD-10-PCS | Performed by: SURGERY

## 2022-02-10 PROCEDURE — 2500000003 HC RX 250 WO HCPCS: Performed by: NURSE PRACTITIONER

## 2022-02-10 PROCEDURE — 6370000000 HC RX 637 (ALT 250 FOR IP): Performed by: NURSE PRACTITIONER

## 2022-02-10 PROCEDURE — 3600000014 HC SURGERY LEVEL 4 ADDTL 15MIN: Performed by: SURGERY

## 2022-02-10 PROCEDURE — 94003 VENT MGMT INPAT SUBQ DAY: CPT

## 2022-02-10 PROCEDURE — 82803 BLOOD GASES ANY COMBINATION: CPT

## 2022-02-10 PROCEDURE — 3700000000 HC ANESTHESIA ATTENDED CARE: Performed by: SURGERY

## 2022-02-10 PROCEDURE — 83735 ASSAY OF MAGNESIUM: CPT

## 2022-02-10 PROCEDURE — 2580000003 HC RX 258: Performed by: NURSE PRACTITIONER

## 2022-02-10 PROCEDURE — 3700000001 HC ADD 15 MINUTES (ANESTHESIA): Performed by: SURGERY

## 2022-02-10 PROCEDURE — 2709999900 HC NON-CHARGEABLE SUPPLY: Performed by: SURGERY

## 2022-02-10 PROCEDURE — 82947 ASSAY GLUCOSE BLOOD QUANT: CPT

## 2022-02-10 PROCEDURE — 2000000000 HC ICU R&B

## 2022-02-10 PROCEDURE — 85025 COMPLETE CBC W/AUTO DIFF WBC: CPT

## 2022-02-10 PROCEDURE — 83605 ASSAY OF LACTIC ACID: CPT

## 2022-02-10 PROCEDURE — 3600000004 HC SURGERY LEVEL 4 BASE: Performed by: SURGERY

## 2022-02-10 PROCEDURE — 2580000003 HC RX 258: Performed by: INTERNAL MEDICINE

## 2022-02-10 PROCEDURE — 5A1D90Z PERFORMANCE OF URINARY FILTRATION, CONTINUOUS, GREATER THAN 18 HOURS PER DAY: ICD-10-PCS | Performed by: INTERNAL MEDICINE

## 2022-02-10 PROCEDURE — 94761 N-INVAS EAR/PLS OXIMETRY MLT: CPT

## 2022-02-10 PROCEDURE — A4217 STERILE WATER/SALINE, 500 ML: HCPCS | Performed by: SURGERY

## 2022-02-10 PROCEDURE — 71045 X-RAY EXAM CHEST 1 VIEW: CPT

## 2022-02-10 PROCEDURE — 80051 ELECTROLYTE PANEL: CPT

## 2022-02-10 PROCEDURE — 0JJT0ZZ INSPECTION OF TRUNK SUBCUTANEOUS TISSUE AND FASCIA, OPEN APPROACH: ICD-10-PCS | Performed by: SURGERY

## 2022-02-10 PROCEDURE — 2500000003 HC RX 250 WO HCPCS: Performed by: ANESTHESIOLOGY

## 2022-02-10 PROCEDURE — 37799 UNLISTED PX VASCULAR SURGERY: CPT

## 2022-02-10 PROCEDURE — 2500000003 HC RX 250 WO HCPCS: Performed by: INTERNAL MEDICINE

## 2022-02-10 PROCEDURE — 80202 ASSAY OF VANCOMYCIN: CPT

## 2022-02-10 PROCEDURE — 2700000000 HC OXYGEN THERAPY PER DAY

## 2022-02-10 PROCEDURE — 36556 INSERT NON-TUNNEL CV CATH: CPT

## 2022-02-10 PROCEDURE — 82330 ASSAY OF CALCIUM: CPT

## 2022-02-10 RX ORDER — HEPARIN SODIUM 1000 [USP'U]/ML
1000 INJECTION, SOLUTION INTRAVENOUS; SUBCUTANEOUS ONCE
Status: DISCONTINUED | OUTPATIENT
Start: 2022-02-10 | End: 2022-02-15

## 2022-02-10 RX ORDER — 0.9 % SODIUM CHLORIDE 0.9 %
2500 INTRAVENOUS SOLUTION INTRAVENOUS ONCE
Status: COMPLETED | OUTPATIENT
Start: 2022-02-10 | End: 2022-02-10

## 2022-02-10 RX ORDER — ROCURONIUM BROMIDE 10 MG/ML
INJECTION, SOLUTION INTRAVENOUS PRN
Status: DISCONTINUED | OUTPATIENT
Start: 2022-02-10 | End: 2022-02-10 | Stop reason: SDUPTHER

## 2022-02-10 RX ORDER — CALCIUM GLUCONATE 20 MG/ML
2000 INJECTION, SOLUTION INTRAVENOUS PRN
Status: DISCONTINUED | OUTPATIENT
Start: 2022-02-10 | End: 2022-02-15

## 2022-02-10 RX ORDER — CALCIUM GLUCONATE 20 MG/ML
2000 INJECTION, SOLUTION INTRAVENOUS ONCE
Status: COMPLETED | OUTPATIENT
Start: 2022-02-10 | End: 2022-02-10

## 2022-02-10 RX ORDER — HEPARIN SODIUM 1000 [USP'U]/ML
1600 INJECTION, SOLUTION INTRAVENOUS; SUBCUTANEOUS PRN
Status: DISCONTINUED | OUTPATIENT
Start: 2022-02-10 | End: 2022-02-22

## 2022-02-10 RX ORDER — SODIUM CHLORIDE, SODIUM LACTATE, POTASSIUM CHLORIDE, AND CALCIUM CHLORIDE .6; .31; .03; .02 G/100ML; G/100ML; G/100ML; G/100ML
1000 INJECTION, SOLUTION INTRAVENOUS ONCE
Status: DISCONTINUED | OUTPATIENT
Start: 2022-02-10 | End: 2022-02-15

## 2022-02-10 RX ORDER — MAGNESIUM SULFATE 1 G/100ML
1000 INJECTION INTRAVENOUS PRN
Status: DISCONTINUED | OUTPATIENT
Start: 2022-02-10 | End: 2022-02-15

## 2022-02-10 RX ORDER — 0.9 % SODIUM CHLORIDE 0.9 %
1000 INTRAVENOUS SOLUTION INTRAVENOUS ONCE
Status: COMPLETED | OUTPATIENT
Start: 2022-02-10 | End: 2022-02-10

## 2022-02-10 RX ORDER — HEPARIN SODIUM 1000 [USP'U]/ML
1500 INJECTION, SOLUTION INTRAVENOUS; SUBCUTANEOUS PRN
Status: DISCONTINUED | OUTPATIENT
Start: 2022-02-10 | End: 2022-02-11

## 2022-02-10 RX ORDER — POTASSIUM CHLORIDE 29.8 MG/ML
20 INJECTION INTRAVENOUS PRN
Status: DISCONTINUED | OUTPATIENT
Start: 2022-02-10 | End: 2022-02-16

## 2022-02-10 RX ORDER — CALCIUM GLUCONATE 20 MG/ML
1000 INJECTION, SOLUTION INTRAVENOUS PRN
Status: DISCONTINUED | OUTPATIENT
Start: 2022-02-10 | End: 2022-02-15

## 2022-02-10 RX ORDER — SODIUM CHLORIDE, SODIUM LACTATE, POTASSIUM CHLORIDE, AND CALCIUM CHLORIDE .6; .31; .03; .02 G/100ML; G/100ML; G/100ML; G/100ML
1000 INJECTION, SOLUTION INTRAVENOUS ONCE
Status: COMPLETED | OUTPATIENT
Start: 2022-02-10 | End: 2022-02-10

## 2022-02-10 RX ORDER — MAGNESIUM HYDROXIDE 1200 MG/15ML
LIQUID ORAL CONTINUOUS PRN
Status: COMPLETED | OUTPATIENT
Start: 2022-02-10 | End: 2022-02-10

## 2022-02-10 RX ADMIN — CALCIUM GLUCONATE 1000 MG: 20 INJECTION, SOLUTION INTRAVENOUS at 23:04

## 2022-02-10 RX ADMIN — GABAPENTIN 100 MG: 300 CAPSULE ORAL at 03:30

## 2022-02-10 RX ADMIN — ALTEPLASE 2 MG: 2.2 INJECTION, POWDER, LYOPHILIZED, FOR SOLUTION INTRAVENOUS at 18:38

## 2022-02-10 RX ADMIN — OXYCODONE 5 MG: 5 TABLET ORAL at 12:07

## 2022-02-10 RX ADMIN — HEPARIN SODIUM 5000 UNITS: 5000 INJECTION INTRAVENOUS; SUBCUTANEOUS at 22:24

## 2022-02-10 RX ADMIN — DEXMEDETOMIDINE HYDROCHLORIDE 0.8 MCG/KG/HR: 4 INJECTION, SOLUTION INTRAVENOUS at 23:00

## 2022-02-10 RX ADMIN — SODIUM CHLORIDE, POTASSIUM CHLORIDE, SODIUM LACTATE AND CALCIUM CHLORIDE: 600; 310; 30; 20 INJECTION, SOLUTION INTRAVENOUS at 09:04

## 2022-02-10 RX ADMIN — KETAMINE HYDROCHLORIDE 0.2 MG/KG/HR: 50 INJECTION, SOLUTION INTRAMUSCULAR; INTRAVENOUS at 11:58

## 2022-02-10 RX ADMIN — DEXMEDETOMIDINE HYDROCHLORIDE 0.6 MCG/KG/HR: 4 INJECTION, SOLUTION INTRAVENOUS at 13:53

## 2022-02-10 RX ADMIN — MONTELUKAST SODIUM 10 MG: 10 TABLET, FILM COATED ORAL at 09:08

## 2022-02-10 RX ADMIN — PIPERACILLIN SODIUM AND TAZOBACTAM SODIUM 2250 MG: 2; .25 INJECTION, POWDER, LYOPHILIZED, FOR SOLUTION INTRAVENOUS at 18:39

## 2022-02-10 RX ADMIN — SODIUM CHLORIDE, POTASSIUM CHLORIDE, SODIUM LACTATE AND CALCIUM CHLORIDE: 600; 310; 30; 20 INJECTION, SOLUTION INTRAVENOUS at 16:52

## 2022-02-10 RX ADMIN — VASOPRESSIN 0.04 UNITS/MIN: 20 INJECTION INTRAVENOUS at 12:54

## 2022-02-10 RX ADMIN — FLUTICASONE PROPIONATE 1 SPRAY: 50 SPRAY, METERED NASAL at 09:08

## 2022-02-10 RX ADMIN — HEPARIN SODIUM 1500 UNITS: 1000 INJECTION INTRAVENOUS; SUBCUTANEOUS at 16:35

## 2022-02-10 RX ADMIN — SODIUM CHLORIDE, POTASSIUM CHLORIDE, SODIUM LACTATE AND CALCIUM CHLORIDE 1000 ML: 600; 310; 30; 20 INJECTION, SOLUTION INTRAVENOUS at 11:55

## 2022-02-10 RX ADMIN — FAMOTIDINE 20 MG: 10 INJECTION INTRAVENOUS at 09:07

## 2022-02-10 RX ADMIN — ROCURONIUM BROMIDE 50 MG: 10 INJECTION INTRAVENOUS at 07:28

## 2022-02-10 RX ADMIN — Medication 100 MCG/HR: at 16:52

## 2022-02-10 RX ADMIN — CLINDAMYCIN IN 5 PERCENT DEXTROSE 900 MG: 18 INJECTION, SOLUTION INTRAVENOUS at 15:00

## 2022-02-10 RX ADMIN — ACETAMINOPHEN 1000 MG: 500 TABLET ORAL at 06:18

## 2022-02-10 RX ADMIN — Medication 2 MCG/MIN: at 06:00

## 2022-02-10 RX ADMIN — CLINDAMYCIN IN 5 PERCENT DEXTROSE 900 MG: 18 INJECTION, SOLUTION INTRAVENOUS at 23:10

## 2022-02-10 RX ADMIN — Medication 2000 ML/HR: at 22:59

## 2022-02-10 RX ADMIN — HEPARIN SODIUM 1600 UNITS: 1000 INJECTION INTRAVENOUS; SUBCUTANEOUS at 16:35

## 2022-02-10 RX ADMIN — PIPERACILLIN AND TAZOBACTAM 3375 MG: 3; .375 INJECTION, POWDER, LYOPHILIZED, FOR SOLUTION INTRAVENOUS at 01:40

## 2022-02-10 RX ADMIN — SODIUM CHLORIDE 1000 ML: 9 INJECTION, SOLUTION INTRAVENOUS at 01:17

## 2022-02-10 RX ADMIN — DEXMEDETOMIDINE HYDROCHLORIDE 0.4 MCG/KG/HR: 4 INJECTION, SOLUTION INTRAVENOUS at 01:30

## 2022-02-10 RX ADMIN — DEXMEDETOMIDINE HYDROCHLORIDE 0.7 MCG/KG/HR: 4 INJECTION, SOLUTION INTRAVENOUS at 18:42

## 2022-02-10 RX ADMIN — PIPERACILLIN AND TAZOBACTAM 3375 MG: 3; .375 INJECTION, POWDER, LYOPHILIZED, FOR SOLUTION INTRAVENOUS at 12:16

## 2022-02-10 RX ADMIN — ACETAMINOPHEN 1000 MG: 500 TABLET ORAL at 14:59

## 2022-02-10 RX ADMIN — GABAPENTIN 100 MG: 300 CAPSULE ORAL at 12:07

## 2022-02-10 RX ADMIN — SODIUM CHLORIDE 2500 ML: 9 INJECTION, SOLUTION INTRAVENOUS at 03:05

## 2022-02-10 RX ADMIN — DEXMEDETOMIDINE HYDROCHLORIDE 0.5 MCG/KG/HR: 4 INJECTION, SOLUTION INTRAVENOUS at 07:15

## 2022-02-10 RX ADMIN — CALCIUM GLUCONATE 2000 MG: 20 INJECTION, SOLUTION INTRAVENOUS at 03:35

## 2022-02-10 RX ADMIN — Medication 2000 ML/HR: at 22:57

## 2022-02-10 RX ADMIN — HEPARIN SODIUM 5000 UNITS: 5000 INJECTION INTRAVENOUS; SUBCUTANEOUS at 14:59

## 2022-02-10 RX ADMIN — GABAPENTIN 100 MG: 300 CAPSULE ORAL at 20:46

## 2022-02-10 RX ADMIN — CLINDAMYCIN IN 5 PERCENT DEXTROSE 900 MG: 18 INJECTION, SOLUTION INTRAVENOUS at 09:05

## 2022-02-10 RX ADMIN — ACETAMINOPHEN 1000 MG: 500 TABLET ORAL at 22:23

## 2022-02-10 RX ADMIN — Medication 2000 ML/HR: at 22:58

## 2022-02-10 ASSESSMENT — PULMONARY FUNCTION TESTS
PIF_VALUE: 25
PIF_VALUE: 27
PIF_VALUE: 18
PIF_VALUE: 28
PIF_VALUE: 14
PIF_VALUE: 34
PIF_VALUE: 14
PIF_VALUE: 28
PIF_VALUE: 22
PIF_VALUE: 27
PIF_VALUE: 28
PIF_VALUE: 30
PIF_VALUE: 26
PIF_VALUE: 28
PIF_VALUE: 25
PIF_VALUE: 28
PIF_VALUE: 28
PIF_VALUE: 27
PIF_VALUE: 28
PIF_VALUE: 24
PIF_VALUE: 27
PIF_VALUE: 25
PIF_VALUE: 25
PIF_VALUE: 29
PIF_VALUE: 27
PIF_VALUE: 27
PIF_VALUE: 1
PIF_VALUE: 29
PIF_VALUE: 28
PIF_VALUE: 25
PIF_VALUE: 26
PIF_VALUE: 13
PIF_VALUE: 27
PIF_VALUE: 25
PIF_VALUE: 25
PIF_VALUE: 27
PIF_VALUE: 27
PIF_VALUE: 26
PIF_VALUE: 28
PIF_VALUE: 25
PIF_VALUE: 29
PIF_VALUE: 27
PIF_VALUE: 4
PIF_VALUE: 28
PIF_VALUE: 27
PIF_VALUE: 2
PIF_VALUE: 27
PIF_VALUE: 27
PIF_VALUE: 28
PIF_VALUE: 27
PIF_VALUE: 27

## 2022-02-10 NOTE — PROGRESS NOTES
Renal Progress Note    Patient :  Abdon Sawyer; 42 y.o. MRN# [id number]  Location:  [facility]/3590-31  Attending:  Ulices Walker MD  Admit Date:  2/8/2022   Hospital Day: 2    Subjective:     Continues on ventilator support, FiO2 40%, also on 2 pressors including 30 mcg a minute of Levophed and 0.04 units an hour of vasopressin. Continues on ketamine fentanyl and Precedex for sedation. Urine output minimal 5 to 10 mL an hour. Clearances continue to decline creatinine now up to 4.4. Potassium stable at 4.4 bicarb 16 anion gap 17  Developing anion gap metabolic acidosis and respiratory acidosis. Chest x-ray shows worsening worsening infiltrates leading to alveolar underventilation. Antibiotics have been switched to clindamycin and Zosyn. Lactated Ringer continues for maintenance of circulating volume. History reviewed known history of chronic kidney disease stage III baseline 1.5-1.6 follows up with Dr. Zay Carlos, nonnephrotic proteinuria in the 1 to 2 g range from diabetic nephrosclerosis. Also has known history of morbid obesity. Presented to [facility] ER with discomfort in the gluteal area, over time progressed to significant discomfort and gangrene. 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 Has required multiple debridements in the OR. Was initially on vancomycin vancomycin level was 39.6. On presentation she also had acute kidney injury creatinine was 3.3 which is now up to 4.4 urine output has declined significantly as written above. Nephrology consulted for acute kidney injury.     Outpatient Medications:     Medications Prior to Admission: PRENATAL VIT-DOCUSATE-IRON-FA PO, Take 1 tablet by mouth daily  cyanocobalamin 1000 MCG/ML injection, Inject 1,000 mcg into the muscle every 30 days  fluticasone-salmeterol (ADVAIR HFA) 45-21 MCG/ACT inhaler, Inhale 2 puffs into the lungs 2 times daily  Multiple Vitamins-Minerals (MULTIVITAMIN ADULT EXTRA C PO), Take 1 tablet by mouth daily  vitamin D (ERGOCALCIFEROL) 1.25 MG (70006 UT) CAPS capsule, Take 50,000 Units by mouth once a week  albuterol sulfate HFA (PROAIR HFA) 108 (90 Base) MCG/ACT inhaler, Inhale 1 puff into the lungs as needed  carvedilol (COREG) 25 MG tablet, Take 25 mg by mouth daily  vitamin D (CHOLECALCIFEROL) 125 MCG (5000 UT) CAPS capsule, Take 5,000 Units by mouth daily  DULoxetine (CYMBALTA) 60 MG extended release capsule, Take 60 mg by mouth daily  fenofibrate (TRIGLIDE) 160 MG tablet, Take 160 mg by mouth daily  fluticasone (FLONASE) 50 MCG/ACT nasal spray, 1 spray by Each Nostril route  glucagon 1 MG injection, Inject 1 mg into the muscle as needed  lisinopril (PRINIVIL;ZESTRIL) 40 MG tablet, Take 40 mg by mouth daily  montelukast (SINGULAIR) 10 MG tablet, Take 10 mg by mouth daily  omeprazole (PRILOSEC) 20 MG delayed release capsule, Take 20 mg by mouth daily  rOPINIRole (REQUIP) 4 MG tablet, Take 4 mg by mouth daily    Current Medications:     Scheduled Meds:    lactated ringers bolus  1,000 mL IntraVENous Once    fluticasone  1 spray Each Nostril Daily    budesonide-formoterol  2 puff Inhalation BID    montelukast  10 mg Oral Daily    [Held by provider] rOPINIRole  4 mg Oral Daily    gabapentin  100 mg Oral Q8H    heparin (porcine)  5,000 Units SubCUTAneous 3 times per day    famotidine (PEPCID) injection  20 mg IntraVENous Daily    acetaminophen  1,000 mg Oral 3 times per day    clindamycin (CLEOCIN) IV  900 mg IntraVENous Q8H    piperacillin-tazobactam  3,375 mg IntraVENous Q12H     Continuous Infusions:    CRRT dialysis builder      norepinephrine 40 mcg/min (02/10/22 1125)    insulin (HUMAN R) non-weight based infusion 2.48 Units/hr (02/10/22 1156)    vasopressin (Septic Shock) infusion 0.04 Units/min (02/10/22 1254)    fentaNYL 25 mcg/hr (02/10/22 1130)    dexmedetomidine 0.6 mcg/kg/hr (02/10/22 1148)    ketamine (KETALAR) infusion for analgosedation 0.2 mg/kg/hr (02/10/22 1158)    lactated ringers 125 mL/hr at 02/10/22 0904     PRN Meds:  oxyCODONE, albuterol sulfate HFA    Input/Output:       I/O last 3 completed shifts: In: 6108 [P.O.:360; I.V.:5748]  Out: 292 [Urine:272; Blood:20]    Vital Signs:   Temperature:  Temp: 98.6 °F (37 °C)  TMax:   Temp (24hrs), Av.5 °F (36.9 °C), Min:93.7 °F (34.3 °C), Max:99.3 °F (37.4 °C)    Respirations:  Resp: 19  Pulse:   Pulse: 71  BP:    BP: (!) 106/48  BP Range: Systolic (23CTZ), PWL:456 , Min:83 , KLZ:715       Diastolic (88OYD), DGE:36, Min:46, Max:102      Physical Examination:     General:  Sedated on ventilator. FiO2 40% with a PEEP of 10  HEENT:  Atraumatic, normocephalic, no throat congestion, moist mucosa. Eyes:   Pupils equal, round and reactive to light, pallor, no icterus. Neck:   No JVD, no thyromegaly, no lymphadenopathy. Chest:  Air entry fair bilaterally, basal crackles hard to auscultate given body habitus  Cardiac: S1 S2 RR no murmurs  Abdomen:  Mild distention, BS sluggish. :   No suprapubic or flank tenderness. Neuro:  Sedated on ventilator. Skin:   No rashes, good skin turgor. Extremities:  2+ edema edema, palpable peripheral pulses, no cyanosis, no calf tenderness.       Labs:       Recent Labs     22  0412 22  1553 02/10/22  0606   WBC 21.5* 18.7* 10.4   RBC 3.40* 3.36* 3.00*   HGB 9.6* 9.6* 8.5*   HCT 28.6* 28.4* 25.8*   MCV 84.1 84.5 86.0   MCH 28.2 28.6 28.3   MCHC 33.6 33.8 32.9   RDW 13.6 14.0 14.3    357 251   MPV 10.7 10.7 10.9      BMP:   Recent Labs     22  1553 22  2259 02/10/22  0606   * 126* 130*   K 3.4* 4.8 4.4   CL 88* 92* 97*   CO2 19* 19* 16*   BUN 40* 42* 40*   CREATININE 4.05* 4.82* 4.35*   GLUCOSE 212* 121* 135*   CALCIUM 6.9* 7.1* 7.2*      Phosphorus:     Recent Labs     22  1553 22  2259 02/10/22  0606   PHOS 5.3* 7.0* 5.5*     Magnesium:    Recent Labs     22  1143 22  1553 02/10/22  0606   MG 1.6 1.6 2.2     Albumin:    Recent Labs     22 02/09/22  0412   LABALBU 1.9* 1.9*     BNP:    No results found for: BNP  MARIANA:    No results found for: MARIANA  SPEP:  Lab Results   Component Value Date    PROT 5.7 02/09/2022     UPEP:   No results found for: LABPE  C3:     Lab Results   Component Value Date    C3 143 02/09/2022     C4:     Lab Results   Component Value Date    C4 19 02/09/2022     MPO ANCA:   No results found for: MPO  PR3 ANCA:   No results found for: PR3  Anti-GBM:   No results found for: GBMABIGG  Hep BsAg:       No results found for: HEPBSAG  Hep C AB:        No results found for: HEPCAB    Urinalysis/Chemistries:    No results found for: Lonza Juan, PHUR, LABCAST, WBCUA, RBCUA, MUCUS, TRICHOMONAS, YEAST, BACTERIA, CLARITYU, SPECGRAV, LEUKOCYTESUR, UROBILINOGEN, BILIRUBINUR, BLOODU, GLUCOSEU, KETUA, AMORPHOUS  Urine Sodium:     Lab Results   Component Value Date    JORGE 26 02/09/2022     Urine Potassium:  No results found for: KUR  Urine Chloride:  No results found for: CLUR  Urine Osmolarity: No results found for: OSMOU  Urine Protein:   No results found for: TPU  Urine Creatinine:     Lab Results   Component Value Date    LABCREA 142.3 02/09/2022     Urine Eosinophils:  No components found for: UEOS    Radiology:     CXR:     Assessment:     1. Acute Kidney Injury: Secondary to ischemic ATN from shock sepsis systemic intermittent response syndrome possible vancomycin toxicity is Vanco level was 39.6. Oligoanuric creatinine up to 4.4 clearances continue to decline now showing signs of volume overload and respiratory acidosis  2. Chronic kidney disease stage III baseline 1.5-1.6 proteinuria about 1 to 2 g from diabetic nephrosclerosis follows up with Dr. Magdalen Krabbe  3. Type 2 diabetes with poor control hemoglobin A1c 7-9 range  4. Super morbid obesity  5. Leonel's gangrene requiring multiple debridements leading to systemic intermittent response syndrome on antibiotics including clindamycin and Zosyn    Plan:   1.   Given the fact renal functions continue to decline patient is oligoanuric significant volume overload and continued to need heavy doses of pressors and IV fluids to maintain circulating volume will initiate CRRT for clearances and for ultrafiltration to achieve an even fluid balance and avoid further worsening in respiratory status. 2.  Antibiotics per ID  3. We will keep her net fluid balance even  4. Temporary catheter to be placed by general surgery service  5. May need prefilter bicarb if blood flows become a problem  6. Family updated about above plan discussed with mother and consent obtained  7./We will follow with you. Nutrition   Please ensure that patient is on a renal diet/TF. Avoid nephrotoxic drugs/contrast exposure. We will continue to follow along with you.

## 2022-02-10 NOTE — PROGRESS NOTES
Physician Progress Note      Talia Dugan  CSN #:                  [id number]  :                       1980  ADMIT DATE:       2022 9:45 PM  DISCH DATE:  RESPONDING  PROVIDER #:        Lena Robertson CNP          QUERY TEXT:    Pt admitted with Leonel's gangrene. Pt noted to have hypotension. If   possible, please document in the progress notes and discharge summary if you   are evaluating and/or treating any of the following: The medical record reflects the following:  Risk Factors: Leonel's gangrene  Clinical Indicators: SBP 70's, per H&P \"BP was running low and needed pressor   support and was transferred to the ICU for further management\", WBC 21.5,   tachypneic RR 20's-33, lactic acid 1.6 but has hypotension and MEREDITH, BC   pending, per nephrology consultant \"MEREDITH secondary to ischemic ATN stemming   from underlying infection, causing hypotension and shock\"  Treatment: IV fluid bolus, Levophed and Darryl-synephrine gtts, vancomycin,   clindamycin and Zosyn, s/p OR for debridement of perineum, labs, cultures, ICU   monitoring    Thank you, Sheyla Arana, CDI  email - [email or web address]  cell- [phone]  office hours M-F-7A-3P  Options provided:  -- Severe sepsis with septic shock  -- Hypovolemic Shock  -- Hypovolemia without Shock  -- Hypotension without Shock  -- Other - I will add my own diagnosis  -- Disagree - Not applicable / Not valid  -- Disagree - Clinically unable to determine / Unknown  -- Refer to Clinical Documentation Reviewer    PROVIDER RESPONSE TEXT:    This patient has severe sepsis with septic shock.     Query created by: Shadia Beck on 2022 8:29 AM      Electronically signed by:  Lena Robertson CNP 2/10/2022 11:07 AM

## 2022-02-10 NOTE — PROCEDURES
PROCEDURE NOTE - TEMPORARY DIALYSIS CATHETER PLACEMENT     PATIENT NAME: Estefani [facility] RECORD NO. [id number]  DATE: 2/10/2022  SURGEON: Dr. Gunner Otero: Albina Tidwell MD    PREOPERATIVE DIAGNOSIS:  Need for CRRT   POSTOPERATIVE DIAGNOSIS:  Same  PROCEDURE PERFORMED:  Right Femoral Vein Temporary Dialysis Catheter placement   SURGEON: Dr. Torsten Mendoza DO PGY-2  ANESTHESIA:  Local utilizing 1% lidocaine  ESTIMATED BLOOD LOSS:  Less than 25 ml  COMPLICATIONS:  None immediately appreciated. OPERATIVE NOTE PREPARED BY: Bharat Castorena DO     DISCUSSION:  Karine Rubi is a 42y.o.-year-old female who requires CRRT for MEREDITH and anuria. The history and physical examination were reviewed and confirmed. The diagnoses, proposed procedure, risks, possible complications, benefits and alternatives were discussed with the patient or family. Family was given the opportunity to ask questions, and once answered, informed consent was obtained. The patient was then prepared for the procedure. PROCEDURE:  A timeout was initiated by the bedside nurse and was confirmed by those present. The patient was placed in a supine position. The skin overlying the Right Femoral Vein was prepped with chlorhexadine and draped in sterile fashion. The skin was infiltrated with local anesthetic. Under ultrasound guidance, through the anesthetized region, the introducer needle was inserted into the femoral vein returning dark red non pulsatile blood. A guidewire was placed through the center of the needle with no resistance. Confirmation of wire within the femoral vein was then visualized with ultrasound. A small incision made in the skin with a #11 scalpel blade. The dilator was inserted into the skin and vein over guidewire using Seldinger technique, the second, larger dilator was then inserted into the skin in the same fashion.  The dilator was then removed and a 14 Fr, 20 cm temporary dialysis catheter was placed in the vein over the guidewire using Seldinger technique. The guidewire was then removed and all ports aspirated and flushed appropriately. The lines were then primed with heparin. The catheter then secured using silk suture and a temporary sterile dressing was applied. No immediate complication was evident. All sponge, instrument and needle counts were correct at the completion of the procedure. The patient tolerated the procedure well with no immediate complication evident. Tootie Crouch DO  2/10/22, 2:42 PM              Trauma Attending Attestation      I have reviewed the above [facility]CS note(s) and confirmed the key elements of the medical history and physical exam. I have seen and examined the pt. I have discussed the findings, established the care plan and recommendations with Resident, GCS RN, bedside nurse.         Moe Russo DO  2/11/2022  6:06 PM

## 2022-02-10 NOTE — PROGRESS NOTES
Comprehensive Nutrition Assessment    Type and Reason for Visit:  Initial (vent)    Nutrition Recommendations/Plan: Start nutrition as able. If nutrition support needed, suggest Immune Enhancing formula with goal rate of 50 mL/hr (1800 kcal, 113 g pro/day). Will follow/monitor care plans. Nutrition Assessment:  Chart reviewed d/t vent. Pt admitted with necrotizing soft tissue infection of groin and buttocks. S/p I&D yesterday and this morning. Pt is currently intubated. No nutrition support at present. Labs reviewed/include: Na 130 mmol/L, BUN 40 mg/dL, CR 4.35 mg/dL, Phos 5.5 mg/dL, Glu 135 mg/dL. Meds reviewed/include: Insulin drip. Malnutrition Assessment:  Malnutrition Status:   At risk for malnutrition   Context:  Acute Illness     Findings of the 6 clinical characteristics of malnutrition:  Energy Intake:  Mild decrease in energy intake   Weight Loss:  Unable to assess     Body Fat Loss:  No significant body fat loss     Muscle Mass Loss:  No significant muscle mass loss    Fluid Accumulation:  1 - Mild Extremities,Generalized   Strength:  Not Performed    Estimated Daily Nutrient Needs:  Energy (kcal):  6660-2725 kcal/day; Weight Used for Energy Requirements:  Current     Protein (g):  105 g pro/day; Weight Used for Protein Requirements:  Ideal (2)        Fluid (ml/day):  per MD     Nutrition Related Findings:  Obesity      Wounds:  Surgical Incision (necrotizing soft tissue infection of groin and buttocks- s/p I&D)       Current Nutrition Therapies:    Diet NPO  Additional Calorie Sources:   None    Anthropometric Measures:  · Height: 5' 3\" (160 cm)  · Current Body Weight: 276 lb 6.4 oz (125.4 kg)     · Ideal Body Weight: 115 lbs; % Ideal Body Weight 240.3 %   · BMI: 49  · BMI Categories: Obese Class 3 (BMI 40.0 or greater)       Nutrition Diagnosis:   · Inadequate oral intake,Increased nutrient needs related to healing as evidenced by necrotizing soft tissue infection of groin and buttocks    · Inadequate oral intake related to impaired respiratory function as evidenced by NPO or clear liquid status due to medical condition    Nutrition Interventions:   Food and/or Nutrient Delivery: Start nutrition as able. If nutrition support needed, suggest Immune Enhancing formula with goal rate of 50 mL/hr (1800 kcal, 113 g pro/day)  Nutrition Education/Counseling:  No recommendation at this time   Coordination of Nutrition Care:  Continue to monitor while inpatient    Goals:  meet % of estimated nutrient needs       Nutrition Monitoring and Evaluation:   Behavioral-Environmental Outcomes:  None Identified   Food/Nutrient Intake Outcomes:  Diet Advancement/Tolerance  Physical Signs/Symptoms Outcomes:  Biochemical Data,Fluid Status or Edema,Nutrition Focused Physical Findings,Skin,Weight     Discharge Planning:     Too soon to determine     Electronically signed by Gerri Carlson RD, SOL on 2/10/22 at 11:23 AM EST    Contact: [phone]

## 2022-02-10 NOTE — OP NOTE
Operative Note      Patient: Sena Friedman  YOB: 1980  MRN: [id number]    Date of Procedure: 2/10/2022    Pre-Op Diagnosis: NECROTIZING SOFT TISSUE INFECTION    Post-Op Diagnosis: Same       Procedure(s):  INCISION AND DRAINAGE BUTTOCK, GROIN  (LITHOTOMY POSITION)    Surgeon(s):  Raymundo Keene MD    Assistant:   Resident: Sadaf Patten DO    Anesthesia: General    Estimated Blood Loss (mL): 5cc    Complications: None    Specimens:   * No specimens in log *    Implants:  * No implants in log *      Drains:   NG/OG/NJ/NE Tube Orogastric 18 fr Center mouth (Active)   Surrounding Skin Dry; Intact 02/10/22 0400   Securement device Yes 02/10/22 0400   Status Clamped 02/10/22 0400   Placement Verified by External Catheter Length 02/10/22 0400   NG/OG/NJ/NE External Measurement (cm) 55 cm 02/10/22 0400       Urethral Catheter Temperature probe 16 fr (Active)   Catheter Indications Need for fluid volume management of the critically ill patient in a critical care setting 02/10/22 0400   Site Assessment Swelling 02/10/22 0400   Urine Color Yellow 02/10/22 0400   Urine Appearance Clear 02/10/22 0400   Urine Odor Malodorous 02/09/22 1500   Output (mL) 5 mL 02/10/22 0600       Findings: clean wound bed from previous debridement    Detailed Description of Procedure:   Procedure, benefits, risks when to the patient/POA who understood and consent was obtained. Patient was taken to the operating room and placed in supine position. Preprocedure timeout was performed. Anesthesia was induced, ET tube was already placed prior to the procedure as well as Booker catheter. Patient was then placed in lithotomy position. Area was prepped and draped in usual sterile fashion. Patient was then placed in lithotomy position. Previous staples and packing was removed. The wound bed appeared clean and healthy appearing. There was no further purulence or necrotic tissue. No further tracking of the wound when it was probed.   It was irrigated thoroughly with sterile saline. The wounds were then repacked with Kerlix and a dressing was applied. Patient remained intubated postoperatively and was taken back to the ICU in stable condition. She tolerated the procedure well.     Electronically signed by Adam Connolly DO on 2/10/2022 at 8:19 AM

## 2022-02-10 NOTE — ANESTHESIA POSTPROCEDURE EVALUATION
Department of Anesthesiology  Postprocedure Note    Patient: Antoine Waters  MRN: [id number]  [place]: 1980  Date of evaluation: 2/10/2022  Time:  9:42 AM     Procedure Summary     Date: 02/09/22 Room / Location: [address]    Anesthesia Start: 7484 Anesthesia Stop: 1500    Procedure: incision and drainage of bilateral necrotizing soft tissue infection of groin and buttocks  (Bilateral Perineum) Diagnosis: (NECROTIZING FASCITIS)    Surgeons: Walden Harada, MD Responsible Provider: Cuca Peterson MD    Anesthesia Type: general ASA Status: 4 - Emergent          Anesthesia Type: general    Cristhian Phase I: Cristhian Score: 9    Cristhian Phase II:      Last vitals: Reviewed and per EMR flowsheets. POST-OP ANESTHESIA NOTE       BP (!) 117/54   Pulse 69   Temp 98.4 °F (36.9 °C) (Oral)   Resp 20   Ht 5' 3\" (1.6 m)   Wt 276 lb 6.4 oz (125.4 kg)   SpO2 95%   BMI 48.96 kg/m²    Pain Assessment: CPOT  Pain Level:  (scheduled)         Anesthesia Post Evaluation    Patient location during evaluation: ICU  Patient participation: complete - patient cannot participate  Level of consciousness: sedated and ventilated  Pain scale: CPOT.   Airway patency: patent  Nausea & Vomiting: no vomiting and no nausea  Complications: no  Cardiovascular status: hemodynamically stable  Respiratory status: ventilator and intubated  Hydration status: stable    Per surgeon's request patient was kept intubated in anticipation of repeat procedure within a few hours

## 2022-02-10 NOTE — PROGRESS NOTES
ICU PROGRESS NOTE          PATIENT NAME: Estefani [facility] RECORD NO. [id number]  DATE: 2/10/2022    PRIMARY CARE PHYSICIAN: Julia Martínez MD    HD: # 2    ASSESSMENT    Patient Active Problem List   Diagnosis    Leonel gangrene    Uncontrolled type 2 diabetes mellitus with hyperglycemia ([facility]    Acute kidney injury superimposed on CKD ([facility]    Hypokalemia    Hyponatremia    Anemia    Morbid obesity with BMI of 45.0-49.9, adult ([facility]    Type 1 diabetes mellitus with stage 3a chronic kidney disease ([facility]       MEDICAL DECISION MAKING AND PLAN    1. Neuro:  1. Fentanyl @ 25 mcg/hr   2. Precedex @ 0.5 mcg/kg/hr  3. Ketamine drip   4. MMPT  2. CV:  1. HR: 68 - 81  2. MAP: 51 - 117  3. Lactate: 0.63 (1.26)  4. Levophed @ 1 mcg/min  3. Heme:  1. Hgb: 8.5 (9.6)  2. Plt: 251 (357)  3. DVT ppx: heparin   4. Pulm:  1. PRVC: 320/16/10/40%  2. AB.34/34.5/144.5/18.9  1. Post op ABG pending   3. CXR: pending   5. Renal/lytes:  1. BUN/Cr: 40 (42) / 4.35 (4.8)  2. Na/K+: 130(126) / 4.4 (4.8)  3. UOP: < 0.5 cc/kg/hr overnight; several hours anuric   4. IVF: LR @ 125 cc/hr   5. Nephrology consulted. Will follow up their recommendations. 6. GI:  1. Diet: NPO  2. Pepcid 20 mg BID   7. ID:  1. Tmax: 98.9 (37.2)  2. WBC: 10.4 (18.7)  3. Clindamycin, zosyn, vancomycin (pharmacy to dose)  8. MSK:  1. Necrotizing soft tissue infection of the buttocks and groin   2. S/p incision and debridement , and further debridement later   3. S/p irrigation and debridement of wound 2/10  9. Endo:  1. B  2.  Insulin drip: received 112 units since started 2/9  10. Lines:  1. PIV, Booker, ETT, OGT, right radial arterial line, right IJ CVC     CHECKLIST    RASS: -2  RESTRAINTS: bilateral soft wrist   IVF: LR @ 125 cc/hr   NUTRITION: NPO  ANTIBIOTICS: clindamycin, zosyn, vancomycin   GI: pepcid   DVT: heparin   GLYCEMIC CONTROL: insulin gtt   HOB >45: yes   WOUND CARE: packing changes daily   SUBJECTIVE    Reji Carmona was seen and examined at bedside. Levophed requirements decreasing. Anuric for a few hours overnight. Received 3.5 L in boluses overnight. Plan to return to OR this morning for another look, irrigation, and debridement of perineal wound. OBJECTIVE  VITALS: Temp: Temp: 98.6 °F (37 °C)Temp  Av.1 °F (36.7 °C)  Min: 93.7 °F (34.3 °C)  Max: 98.9 °F (36.2 °C) BP Systolic (89UEZ), CFE:744 , Min:83 , DFQ:228   Diastolic (24KUW), JUO:67, Min:38, Max:89   Pulse Pulse  Av.5  Min: 68  Max: 81 Resp Resp  Avg: 15.1  Min: 0  Max: 27 Pulse ox SpO2  Av.2 %  Min: 88 %  Max: 100 %    GENERAL: intubated, sedated, no apparent distress   NEURO: intubated, sedated   HEENT: NCAT   : perineal dressing intact; take down in OR   LUNGS: no acute respiratory distress or accessory muscle use   MECHANICAL VENTILATION: tolerating mechanical ventilation   HEART: regular rate and rhythm   ABDOMEN: soft, non-distended   EXTERMITY: no cyanosis, clubbing or edema    LAB:  CBC:   Recent Labs     22  2228 22  0412 22  1553   WBC 19.9* 21.5* 18.7*   HGB 10.4* 9.6* 9.6*   HCT 30.2* 28.6* 28.4*   MCV 83.2 84.1 84.5    325 357     BMP:   Recent Labs     22  1143 22  1553 22  2259   * 123* 126*   K 3.9 3.4* 4.8   CL 88* 88* 92*   CO2 18* 19* 19*   BUN 42* 40* 42*   CREATININE 4.01* 4.05* 4.82*   GLUCOSE 460* 212* 121*         RADIOLOGY:  XR CHEST PORTABLE    Result Date: 2022  EXAMINATION: ONE XRAY VIEW OF THE CHEST 2022 4:06 pm COMPARISON: AP chest from 2022 HISTORY: ORDERING SYSTEM PROVIDED HISTORY: post-op TECHNOLOGIST PROVIDED HISTORY: post-op Reason for Exam: post op History of rectal surgery 2022 FINDINGS: Right IJ central line tip position stable in the upper SVC. Enteric tube tip and side hole project below the left hemidiaphragm. ETT tip position satisfactory the sternoclavicular joint level, 2 cm above the henrietta.  Mildly enlarged but stable appearing cardiac silhouette. Enlarged mediastinal shadow, likely due to supine hypoventilatory technique. Low lung volumes with possible mild central vascular congestion and bibasilar atelectasis. Developing infiltrate or aspiration also a consideration, but no consolidation or sizable pleural effusion. No pneumothorax. Bones stable. Support tubes right IJ line appears satisfactory. Low lung volumes accentuating findings; mild central vascular congestion with scattered mostly basilar atelectasis suspected. Minimal infiltrate/aspiration also a consideration. No pneumothorax. XR CHEST PORTABLE    Result Date: 2/8/2022  EXAMINATION: ONE XRAY VIEW OF THE CHEST 2/8/2022 11:23 pm COMPARISON: None. HISTORY: ORDERING SYSTEM PROVIDED HISTORY: pre op, SOB TECHNOLOGIST PROVIDED HISTORY: pre op, SOB Reason for Exam: pre-op   upright port FINDINGS: Cardiomediastinal silhouette is normal in size. Suspected small bilateral pleural effusions. No pneumothorax. There is faint interstitial prominence bilaterally. No pulmonary consolidation. No acute osseous abnormality. 1.  Faint interstitial prominence, which could be seen with interstitial edema. 2.  Suspected small bilateral pleural effusions. Raoul Montoya,   2/10/22, 6:43 AM           Trauma Attending Vasyl Jean-Baptiste      I have reviewed the above GCS note(s) and confirmed the key elements of the medical history and physical exam. I have seen and examined the pt. I have discussed the findings, established the care plan and recommendations with Resident, GCS RN, bedside nurse.   S/p debridement   Titrate sedation   Maintain MAP >65 on levo and vaso   ABGs- will change MV by increasing Vt to 7ml/kg   Nephrology following   Operative planning   Critical care min: Aurora Concepcion 171, DO  2/10/2022  11:04 AM

## 2022-02-10 NOTE — ANESTHESIA PRE PROCEDURE
Department of Anesthesiology  Preprocedure Note       Name:  Hayden Brown   Age:  42 y.o.  :  1980                                          MRN:  [id number]         Date:  2/10/2022      Surgeon: Vance Martin):  Melody Soto MD    Procedure: Procedure(s):  INCISION AND DRAINAGE BUTTOCK, GROIN  (LITHOTOMY POSITION)    Medications prior to admission:   Prior to Admission medications    Medication Sig Start Date End Date Taking?  Authorizing Provider   fluticasone-salmeterol (ADVAIR HFA) 21-03 MCG/ACT inhaler Inhale 2 puffs into the lungs 2 times daily    Historical Provider, MD   Multiple Vitamins-Minerals (MULTIVITAMIN ADULT EXTRA C PO) Take 1 tablet by mouth daily    Historical Provider, MD   PRENATAL VIT-DOCUSATE-IRON-FA PO Take 1 tablet by mouth daily 22   Historical Provider, MD   vitamin D (ERGOCALCIFEROL) 1.25 MG (25588 UT) CAPS capsule Take 50,000 Units by mouth once a week    Historical Provider, MD   albuterol sulfate HFA (PROAIR HFA) 108 (90 Base) MCG/ACT inhaler Inhale 1 puff into the lungs as needed    Historical Provider, MD   carvedilol (COREG) 25 MG tablet Take 25 mg by mouth daily    Historical Provider, MD   vitamin D (CHOLECALCIFEROL) 125 MCG (5000 UT) CAPS capsule Take 5,000 Units by mouth daily    Historical Provider, MD   DULoxetine (CYMBALTA) 60 MG extended release capsule Take 60 mg by mouth daily    Historical Provider, MD   cyanocobalamin 1000 MCG/ML injection Inject 1,000 mcg into the muscle every 30 days 10/18/21   Historical Provider, MD   fenofibrate (TRIGLIDE) 160 MG tablet Take 160 mg by mouth daily    Historical Provider, MD   fluticasone (FLONASE) 50 MCG/ACT nasal spray 1 spray by Each Nostril route    Historical Provider, MD   glucagon 1 MG injection Inject 1 mg into the muscle as needed    Historical Provider, MD   lisinopril (PRINIVIL;ZESTRIL) 40 MG tablet Take 40 mg by mouth daily    Historical Provider, MD   montelukast (SINGULAIR) 10 MG tablet Take 10 mg by mouth daily Historical Provider, MD   omeprazole (PRILOSEC) 20 MG delayed release capsule Take 20 mg by mouth daily    Historical Provider, MD   rOPINIRole (REQUIP) 4 MG tablet Take 4 mg by mouth daily    Historical Provider, MD       Current medications:    No current facility-administered medications for this visit. No current outpatient medications on file.      Facility-Administered Medications Ordered in Other Visits   Medication Dose Route Frequency Provider Last Rate Last Admin    oxyCODONE (ROXICODONE) immediate release tablet 5 mg  5 mg Oral Q4H PRN ROMEO Neil - CNP   5 mg at 02/09/22 0420    fluticasone (FLONASE) 50 MCG/ACT nasal spray 1 spray  1 spray Each Nostril Daily ROMEO Neil CNP   1 spray at 02/09/22 0830    budesonide-formoterol (SYMBICORT) 80-4.5 MCG/ACT inhaler 2 puff  2 puff Inhalation BID ROMEO Neil CNP        albuterol sulfate  (90 Base) MCG/ACT inhaler 1 puff  1 puff Inhalation Q4H PRN ROMEO Neil CNP        norepinephrine (LEVOPHED) 16 mg in sodium chloride 0.9 % 250 mL infusion  2-100 mcg/min IntraVENous Continuous ROMEO Neil CNP 0.9 mL/hr at 02/10/22 0655 1 mcg/min at 02/10/22 0655    montelukast (SINGULAIR) tablet 10 mg  10 mg Oral Daily ROMEO Neil - CNP   10 mg at 02/09/22 1047    [Held by provider] rOPINIRole (REQUIP) tablet 4 mg  4 mg Oral Daily ROMEO Neil - CNP   4 mg at 02/09/22 1047    insulin regular (HUMULIN R;NOVOLIN R) 100 Units in sodium chloride 0.9 % 100 mL infusion  1-50 Units/hr IntraVENous Continuous ROMEO Neil CNP 0.7 mL/hr at 02/10/22 0655 0.71 Units/hr at 02/10/22 0655    gabapentin (NEURONTIN) capsule 100 mg  100 mg Oral Q8H ROMEO Neil - CNP   100 mg at 02/10/22 0330    vasopressin 20 Units in dextrose 5 % 100 mL infusion  0.01-0.03 Units/min IntraVENous Continuous ROMEO Neil CNP   Stopped at 02/10/22 0300    fentaNYL 20 mcg/mL Infusion  25 mcg/hr IntraVENous Continuous Jodie Stands, APRN - CNP 1.3 mL/hr at 02/10/22 0655 25 mcg/hr at 02/10/22 0655    dexmedetomidine (PRECEDEX) 400 mcg in sodium chloride 0.9 % 100 mL infusion  0.2 mcg/kg/hr IntraVENous Continuous Jodie Stands, APRN - CNP 15.7 mL/hr at 02/10/22 0655 0.5 mcg/kg/hr at 02/10/22 0655    heparin (porcine) injection 5,000 Units  5,000 Units SubCUTAneous 3 times per day Jodie Stands, APRN - CNP   5,000 Units at 02/09/22 2235    ketamine (KETALAR) 500 mg in sodium chloride 0.9 % 250 mL infusion  0.2 mg/kg/hr (Ideal) IntraVENous Continuous Jodie Stands, APRN - CNP 5.2 mL/hr at 02/10/22 0655 0.2 mg/kg/hr at 02/10/22 0655    famotidine (PEPCID) injection 20 mg  20 mg IntraVENous Daily Jodie Stands, APRN - CNP   20 mg at 02/09/22 1709    acetaminophen (TYLENOL) tablet 1,000 mg  1,000 mg Oral 3 times per day Jodie Stands, APRN - CNP   1,000 mg at 02/10/22 3089    lactated ringers infusion   IntraVENous Continuous Jodie Stands, APRN -  mL/hr at 02/10/22 0655 NoRateChange at 02/10/22 0655    clindamycin (CLEOCIN) 900 mg in dextrose 5 % 50 mL IVPB  900 mg IntraVENous Q8H Jodie Stands, APRN - CNP   Stopped at 02/09/22 2335    piperacillin-tazobactam (ZOSYN) 3,375 mg in dextrose 5 % 50 mL IVPB (mini-bag)  3,375 mg IntraVENous Q12H Jodie Stands, APRN - CNP   Stopped at 02/10/22 0540       Allergies:     Allergies   Allergen Reactions    Aspirin      Other reaction(s): due to kidney function    Ibuprofen      CHRONIC KIDNEY DISEASE STAGE 3   Other reaction(s): due to kidney function         Problem List:    Patient Active Problem List   Diagnosis Code    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    Uncontrolled type 2 diabetes mellitus with hyperglycemia ([facility] E11.65    Acute kidney injury superimposed on CKD ([facility] N17.9, N18.9    Hypokalemia E87.6    Hyponatremia E87.1    Anemia D64.9    Morbid obesity with BMI of 45.0-49.9, adult ([facility] E66.01, Z68.42    Type 1 diabetes mellitus with stage 3a chronic kidney disease ([facility] E10.21, N18.31       Past Medical History:        Diagnosis Date    Clinical trial participant 02/09/2022    Protocol [id number] Anticipated study completion 11/FEB/2022       Past Surgical History:        Procedure Laterality Date    RECTAL SURGERY N/A 2/8/2022    *ADD ON, ASAP* INCISION AND DRAINAGE OF PERINEUM, BRIAN KNIFE PRONE performed by Lupe Mclaughlin MD at [address] History:    Social History     Tobacco Use    Smoking status: Not on file    Smokeless tobacco: Not on file   Substance Use Topics    Alcohol use: Not on file                                Counseling given: Not Answered      Vital Signs (Current): There were no vitals filed for this visit.                                            BP Readings from Last 3 Encounters:   02/10/22 (!) 113/51   02/09/22 (!) 98/58   02/09/22 (!) 140/106       NPO Status:                                                                                 BMI:   Wt Readings from Last 3 Encounters:   02/09/22 276 lb 6.4 oz (125.4 kg)     There is no height or weight on file to calculate BMI.    CBC:   Lab Results   Component Value Date    WBC 18.7 02/09/2022    RBC 3.36 02/09/2022    HGB 9.6 02/09/2022    HCT 28.4 02/09/2022    MCV 84.5 02/09/2022    RDW 14.0 02/09/2022     02/09/2022       CMP:   Lab Results   Component Value Date     02/10/2022    K 4.4 02/10/2022    CL 97 02/10/2022    CO2 16 02/10/2022    BUN 40 02/10/2022    CREATININE 4.35 02/10/2022    GFRAA 14 02/10/2022    LABGLOM 11 02/10/2022    GLUCOSE 135 02/10/2022    PROT 5.7 02/09/2022    CALCIUM 7.2 02/10/2022    BILITOT 0.29 02/09/2022    ALKPHOS 196 02/09/2022    AST 48 02/09/2022    ALT 25 02/09/2022       POC Tests:   Recent Labs     02/09/22  1502   POCGLU 254*       Coags:   Lab Results   Component Value Date    PROTIME 11.3 02/09/2022    INR 1.1 02/09/2022       HCG (If Applicable):   Lab Results   Component Value Date    PREGTESTUR NEGATIVE 02/08/2022 ABGs: No results found for: PHART, PO2ART, ZQQ1HZK, JQM9DHW, BEART, A5ZZIOVC     Type & Screen (If Applicable):  No results found for: LABABO, LABRH    Drug/Infectious Status (If Applicable):  No results found for: HIV, HEPCAB    COVID-19 Screening (If Applicable):   Lab Results   Component Value Date    COVID19 Not Detected 02/09/2022           Anesthesia Evaluation  Patient summary reviewed no history of anesthetic complications:   Airway: Mallampati: Unable to assess / NA        Dental:    (+) other      Pulmonary:Negative Pulmonary ROS and normal exam  breath sounds clear to auscultation                             Cardiovascular:  Exercise tolerance: good (>4 METS),   (+) hypertension:,         Rhythm: regular  Rate: normal                    Neuro/Psych:   Negative Neuro/Psych ROS              GI/Hepatic/Renal:   (+) renal disease:, morbid obesity          Endo/Other:    (+) DiabetesType II DM, , .                 Abdominal:   (+) obese,           Vascular: negative vascular ROS. Other Findings:               Anesthesia Plan      general     ASA 4       Induction: intravenous and inhalational.  arterial line  MIPS: Postoperative opioids intended. Anesthetic plan and risks discussed with patient. Plan discussed with CRNA.           3414  Diastolic function is indeterminate due to patient's heartrate.      Left ventricle systolic function is normal.      The Ejection Fraction is 60-65%.      No significant valvulopathies.      No regional wall motion abnormalities noted.      2021 stress  Stress ECG Conclusion      No ischemic ECG changes      Normal perfusion scan        hcg neg    Irene Corona MD   2/10/2022

## 2022-02-10 NOTE — ANESTHESIA POSTPROCEDURE EVALUATION
Department of Anesthesiology  Postprocedure Note    Patient: Abdon Sawyer  MRN: [id number]  [place]: 1980  Date of evaluation: 2/10/2022  Time:  11:43 AM     Procedure Summary     Date: 02/10/22 Room / Location: [address]    Anesthesia Start: [place] Anesthesia Stop: 4183    Procedure: INCISION AND DRAINAGE BUTTOCK, GROIN  (LITHOTOMY POSITION) (Bilateral ) Diagnosis: (NECROTIZING SOFT TISSUE INFECTION)    Surgeons: Ulices Walker MD Responsible Provider: Raulito Ross MD    Anesthesia Type: general ASA Status: 4          Anesthesia Type: general    Cristhian Phase I: Cristhian Score: 9    Cristhian Phase II:      Last vitals: Reviewed and per EMR flowsheets.        Anesthesia Post Evaluation    Patient location during evaluation: ICU  Patient participation: complete - patient cannot participate  Level of consciousness: sedated and ventilated  Pain score: 1  Airway patency: patent  Nausea & Vomiting: no nausea and no vomiting  Complications: no  Cardiovascular status: hemodynamically stable  Respiratory status: acceptable  Hydration status: euvolemic

## 2022-02-10 NOTE — PLAN OF CARE
Nutrition Problem #1: Inadequate oral intake,Increased nutrient needs  Intervention: Food and/or Nutrient Delivery:  (Start nutrition as able.  If nutrition support needed, suggest Immune Enhancing formula with goal rate of 50 mL/hr (1800 kcal, 113 g pro/day))  Nutritional Goals: meet % of estimated nutrient needs

## 2022-02-10 NOTE — PLAN OF CARE
Problem: Skin Integrity:  Goal: Will show no infection signs and symptoms  Description: Will show no infection signs and symptoms  Outcome: Ongoing  Goal: Absence of new skin breakdown  Description: Absence of new skin breakdown  Outcome: Ongoing     Problem: Fluid Volume - Imbalance:  Goal: Absence of imbalanced fluid volume signs and symptoms  Description: Absence of imbalanced fluid volume signs and symptoms  Outcome: Ongoing     Problem: Pain:  Goal: Pain level will decrease  Description: Pain level will decrease  Outcome: Ongoing  Goal: Recognizes and communicates pain  Description: Recognizes and communicates pain  Outcome: Ongoing  Goal: Control of acute pain  Description: Control of acute pain  Outcome: Ongoing  Goal: Control of chronic pain  Description: Control of chronic pain  Outcome: Ongoing     Problem: Serum Glucose Level - Abnormal:  Goal: Ability to maintain appropriate glucose levels will improve to within specified parameters  Description: Ability to maintain appropriate glucose levels will improve to within specified parameters  Outcome: Ongoing     Problem: Skin Integrity - Impaired:  Goal: Will show no infection signs and symptoms  Description: Will show no infection signs and symptoms  Outcome: Ongoing  Goal: Absence of new skin breakdown  Description: Absence of new skin breakdown  Outcome: Ongoing     Problem: Non-Violent Restraints  Goal: Removal from restraints as soon as assessed to be safe  Outcome: Ongoing  Goal: No harm/injury to patient while restraints in use  Outcome: Ongoing  Goal: Patient's dignity will be maintained  Outcome: Ongoing

## 2022-02-10 NOTE — PROCEDURES
PROCEDURE NOTE - ARTERIAL LINE INSERTION    PATIENT NAME: Estefani [facility] RECORD #: [id number]  DATE: 2/10/2022  SURGEON: Dr. Mónica Eckert / Camilo Duane, MD  PREOPERATIVE DIAGNOSIS:  Need for invasive BP monitoring  POSTOPERATIVE DIAGNOSIS:  Same  PROCEDURE PERFORMED: Right radial arterial line placement  ANESTHESIA:  Local utilizing 1% lidocaine  ESTIMATED BLOOD LOSS:  Less than 10 ml  COMPLICATIONS:  None immediately appreciated. OPERATIVE NOTE PREPARED BY: Camilo Duane, MD    DISCUSSION:  Ramya Ladd, who requires  invasive arterial BP monitoring. The history and physical examination were reviewed and confirmed. Consent was implied as the patient required the procedure emergently. The patient was then prepared for the procedure. PROCEDURE:  A timeout was initiated by the bedside nurse and was confirmed by those present. The patient was placed in a supine position. An Rozena Muniz test was performed and adequate collateral circulation was noted. The skin overlying the right radial artery was prepped with chlorhexadine and draped in sterile fashion. The skin was infiltrated with local anesthetic. Through the anesthetized region, the introducer needle was inserted into the right radial artery returning pulsatile blood. A guidewire was placed through the center of the needle with no resistance. The catheter was inserted over the guide wire. The guidewire was then removed. The line was connected to the transducer. The catheter then secured using silk suture and a temporary sterile dressing was applied. No immediate complication was evident. All sponge, instrument and needle counts were correct at the completion of the procedure.      Camilo Duane, MD  2/10/2022  4:45 AM

## 2022-02-11 ENCOUNTER — APPOINTMENT (OUTPATIENT)
Dept: GENERAL RADIOLOGY | Age: 42
DRG: 853 | End: 2022-02-11
Payer: COMMERCIAL

## 2022-02-11 ENCOUNTER — ANESTHESIA (OUTPATIENT)
Dept: OPERATING ROOM | Age: 42
DRG: 853 | End: 2022-02-11
Payer: COMMERCIAL

## 2022-02-11 VITALS — RESPIRATION RATE: 18 BRPM | TEMPERATURE: 97.4 F | OXYGEN SATURATION: 100 %

## 2022-02-11 LAB
ABSOLUTE EOS #: 0.12 K/UL (ref 0–0.44)
ABSOLUTE IMMATURE GRANULOCYTE: 0.1 K/UL (ref 0–0.3)
ABSOLUTE LYMPH #: 1.21 K/UL (ref 1.1–3.7)
ABSOLUTE MONO #: 0.74 K/UL (ref 0.1–1.2)
ALLEN TEST: ABNORMAL
ALLEN TEST: ABNORMAL
ANION GAP SERPL CALCULATED.3IONS-SCNC: 13 MMOL/L (ref 9–17)
ANION GAP SERPL CALCULATED.3IONS-SCNC: 15 MMOL/L (ref 9–17)
ANION GAP SERPL CALCULATED.3IONS-SCNC: 15 MMOL/L (ref 9–17)
ANION GAP SERPL CALCULATED.3IONS-SCNC: 16 MMOL/L (ref 9–17)
BASOPHILS # BLD: 0 % (ref 0–2)
BASOPHILS ABSOLUTE: 0.03 K/UL (ref 0–0.2)
BUN BLDV-MCNC: 35 MG/DL (ref 6–20)
BUN/CREAT BLD: ABNORMAL (ref 9–20)
CALCIUM IONIZED: 1.05 MMOL/L (ref 1.13–1.33)
CALCIUM IONIZED: 1.08 MMOL/L (ref 1.13–1.33)
CALCIUM IONIZED: 1.1 MMOL/L (ref 1.13–1.33)
CALCIUM SERPL-MCNC: 7.3 MG/DL (ref 8.6–10.4)
CHLORIDE BLD-SCNC: 100 MMOL/L (ref 98–107)
CHLORIDE BLD-SCNC: 100 MMOL/L (ref 98–107)
CHLORIDE BLD-SCNC: 95 MMOL/L (ref 98–107)
CHLORIDE BLD-SCNC: 96 MMOL/L (ref 98–107)
CO2: 14 MMOL/L (ref 20–31)
CO2: 16 MMOL/L (ref 20–31)
CO2: 17 MMOL/L (ref 20–31)
CO2: 17 MMOL/L (ref 20–31)
CREAT SERPL-MCNC: 3.98 MG/DL (ref 0.5–0.9)
DIFFERENTIAL TYPE: ABNORMAL
EOSINOPHILS RELATIVE PERCENT: 1 % (ref 1–4)
FIO2: 40
FIO2: 40
GFR AFRICAN AMERICAN: 15 ML/MIN
GFR NON-AFRICAN AMERICAN: 12 ML/MIN
GFR SERPL CREATININE-BSD FRML MDRD: ABNORMAL ML/MIN/{1.73_M2}
GFR SERPL CREATININE-BSD FRML MDRD: ABNORMAL ML/MIN/{1.73_M2}
GLUCOSE BLD-MCNC: 105 MG/DL (ref 65–105)
GLUCOSE BLD-MCNC: 106 MG/DL (ref 65–105)
GLUCOSE BLD-MCNC: 107 MG/DL (ref 65–105)
GLUCOSE BLD-MCNC: 109 MG/DL (ref 65–105)
GLUCOSE BLD-MCNC: 111 MG/DL (ref 65–105)
GLUCOSE BLD-MCNC: 114 MG/DL (ref 65–105)
GLUCOSE BLD-MCNC: 116 MG/DL (ref 65–105)
GLUCOSE BLD-MCNC: 119 MG/DL (ref 65–105)
GLUCOSE BLD-MCNC: 120 MG/DL (ref 65–105)
GLUCOSE BLD-MCNC: 121 MG/DL (ref 65–105)
GLUCOSE BLD-MCNC: 122 MG/DL (ref 70–99)
GLUCOSE BLD-MCNC: 123 MG/DL (ref 65–105)
GLUCOSE BLD-MCNC: 125 MG/DL (ref 65–105)
GLUCOSE BLD-MCNC: 127 MG/DL (ref 65–105)
GLUCOSE BLD-MCNC: 129 MG/DL (ref 65–105)
GLUCOSE BLD-MCNC: 129 MG/DL (ref 65–105)
GLUCOSE BLD-MCNC: 130 MG/DL (ref 65–105)
GLUCOSE BLD-MCNC: 131 MG/DL (ref 65–105)
GLUCOSE BLD-MCNC: 137 MG/DL (ref 65–105)
GLUCOSE BLD-MCNC: 140 MG/DL (ref 65–105)
GLUCOSE BLD-MCNC: 145 MG/DL (ref 74–100)
GLUCOSE BLD-MCNC: 155 MG/DL (ref 65–105)
GLUCOSE BLD-MCNC: 160 MG/DL (ref 65–105)
GLUCOSE BLD-MCNC: 184 MG/DL (ref 65–105)
HCT VFR BLD CALC: 22.8 % (ref 36.3–47.1)
HEMOGLOBIN: 7.4 G/DL (ref 11.9–15.1)
IMMATURE GRANULOCYTES: 1 %
LYMPHOCYTES # BLD: 12 % (ref 24–43)
MAGNESIUM: 2.1 MG/DL (ref 1.6–2.6)
MCH RBC QN AUTO: 27.9 PG (ref 25.2–33.5)
MCHC RBC AUTO-ENTMCNC: 32.5 G/DL (ref 28.4–34.8)
MCV RBC AUTO: 86 FL (ref 82.6–102.9)
MODE: ABNORMAL
MODE: ABNORMAL
MONOCYTES # BLD: 7 % (ref 3–12)
NEGATIVE BASE EXCESS, ART: 5 (ref 0–2)
NEGATIVE BASE EXCESS, ART: 9 (ref 0–2)
NRBC AUTOMATED: 0 PER 100 WBC
O2 DEVICE/FLOW/%: ABNORMAL
O2 DEVICE/FLOW/%: ABNORMAL
PATIENT TEMP: ABNORMAL
PATIENT TEMP: ABNORMAL
PDW BLD-RTO: 14.5 % (ref 11.8–14.4)
PHOSPHORUS: 5.6 MG/DL (ref 2.6–4.5)
PLATELET # BLD: 237 K/UL (ref 138–453)
PLATELET ESTIMATE: ABNORMAL
PMV BLD AUTO: 10.8 FL (ref 8.1–13.5)
POC HCO3: 18 MMOL/L (ref 21–28)
POC HCO3: 21.4 MMOL/L (ref 21–28)
POC LACTIC ACID: 0.54 MMOL/L (ref 0.56–1.39)
POC LACTIC ACID: 0.64 MMOL/L (ref 0.56–1.39)
POC O2 SATURATION: 95 % (ref 94–98)
POC O2 SATURATION: 99 % (ref 94–98)
POC PCO2 TEMP: ABNORMAL MM HG
POC PCO2 TEMP: ABNORMAL MM HG
POC PCO2: 45 MM HG (ref 35–48)
POC PCO2: 46 MM HG (ref 35–48)
POC PH TEMP: ABNORMAL
POC PH TEMP: ABNORMAL
POC PH: 7.2 (ref 7.35–7.45)
POC PH: 7.29 (ref 7.35–7.45)
POC PO2 TEMP: ABNORMAL MM HG
POC PO2 TEMP: ABNORMAL MM HG
POC PO2: 166.9 MM HG (ref 83–108)
POC PO2: 93.3 MM HG (ref 83–108)
POSITIVE BASE EXCESS, ART: ABNORMAL (ref 0–3)
POSITIVE BASE EXCESS, ART: ABNORMAL (ref 0–3)
POTASSIUM SERPL-SCNC: 3.7 MMOL/L (ref 3.7–5.3)
POTASSIUM SERPL-SCNC: 3.9 MMOL/L (ref 3.7–5.3)
RBC # BLD: 2.65 M/UL (ref 3.95–5.11)
RBC # BLD: ABNORMAL 10*6/UL
SAMPLE SITE: ABNORMAL
SAMPLE SITE: ABNORMAL
SEG NEUTROPHILS: 79 % (ref 36–65)
SEGMENTED NEUTROPHILS ABSOLUTE COUNT: 8.07 K/UL (ref 1.5–8.1)
SODIUM BLD-SCNC: 122 MMOL/L (ref 135–144)
SODIUM BLD-SCNC: 128 MMOL/L (ref 135–144)
SODIUM BLD-SCNC: 132 MMOL/L (ref 135–144)
SODIUM BLD-SCNC: 132 MMOL/L (ref 135–144)
TCO2 (CALC), ART: ABNORMAL MMOL/L (ref 22–29)
TCO2 (CALC), ART: ABNORMAL MMOL/L (ref 22–29)
WBC # BLD: 10.3 K/UL (ref 3.5–11.3)
WBC # BLD: ABNORMAL 10*3/UL

## 2022-02-11 PROCEDURE — 83735 ASSAY OF MAGNESIUM: CPT

## 2022-02-11 PROCEDURE — 2500000003 HC RX 250 WO HCPCS: Performed by: INTERNAL MEDICINE

## 2022-02-11 PROCEDURE — 94761 N-INVAS EAR/PLS OXIMETRY MLT: CPT

## 2022-02-11 PROCEDURE — 82947 ASSAY GLUCOSE BLOOD QUANT: CPT

## 2022-02-11 PROCEDURE — 3600000014 HC SURGERY LEVEL 4 ADDTL 15MIN: Performed by: SURGERY

## 2022-02-11 PROCEDURE — 85025 COMPLETE CBC W/AUTO DIFF WBC: CPT

## 2022-02-11 PROCEDURE — 2500000003 HC RX 250 WO HCPCS: Performed by: STUDENT IN AN ORGANIZED HEALTH CARE EDUCATION/TRAINING PROGRAM

## 2022-02-11 PROCEDURE — 84100 ASSAY OF PHOSPHORUS: CPT

## 2022-02-11 PROCEDURE — A4217 STERILE WATER/SALINE, 500 ML: HCPCS | Performed by: SURGERY

## 2022-02-11 PROCEDURE — 6370000000 HC RX 637 (ALT 250 FOR IP): Performed by: NURSE PRACTITIONER

## 2022-02-11 PROCEDURE — 2709999900 HC NON-CHARGEABLE SUPPLY: Performed by: SURGERY

## 2022-02-11 PROCEDURE — 2580000003 HC RX 258: Performed by: NURSE PRACTITIONER

## 2022-02-11 PROCEDURE — 6360000002 HC RX W HCPCS: Performed by: NURSE ANESTHETIST, CERTIFIED REGISTERED

## 2022-02-11 PROCEDURE — 2W16X6Z COMPRESSION OF RIGHT INGUINAL REGION USING PRESSURE DRESSING: ICD-10-PCS | Performed by: SURGERY

## 2022-02-11 PROCEDURE — 83605 ASSAY OF LACTIC ACID: CPT

## 2022-02-11 PROCEDURE — 3600000004 HC SURGERY LEVEL 4 BASE: Performed by: SURGERY

## 2022-02-11 PROCEDURE — 6360000002 HC RX W HCPCS: Performed by: STUDENT IN AN ORGANIZED HEALTH CARE EDUCATION/TRAINING PROGRAM

## 2022-02-11 PROCEDURE — 2700000000 HC OXYGEN THERAPY PER DAY

## 2022-02-11 PROCEDURE — 2580000003 HC RX 258: Performed by: STUDENT IN AN ORGANIZED HEALTH CARE EDUCATION/TRAINING PROGRAM

## 2022-02-11 PROCEDURE — 2580000003 HC RX 258: Performed by: SURGERY

## 2022-02-11 PROCEDURE — 3700000001 HC ADD 15 MINUTES (ANESTHESIA): Performed by: SURGERY

## 2022-02-11 PROCEDURE — 2500000003 HC RX 250 WO HCPCS: Performed by: NURSE ANESTHETIST, CERTIFIED REGISTERED

## 2022-02-11 PROCEDURE — 82803 BLOOD GASES ANY COMBINATION: CPT

## 2022-02-11 PROCEDURE — 71045 X-RAY EXAM CHEST 1 VIEW: CPT

## 2022-02-11 PROCEDURE — 90945 DIALYSIS ONE EVALUATION: CPT | Performed by: INTERNAL MEDICINE

## 2022-02-11 PROCEDURE — 37799 UNLISTED PX VASCULAR SURGERY: CPT

## 2022-02-11 PROCEDURE — 6360000002 HC RX W HCPCS: Performed by: NURSE PRACTITIONER

## 2022-02-11 PROCEDURE — 0JD90ZZ EXTRACTION OF BUTTOCK SUBCUTANEOUS TISSUE AND FASCIA, OPEN APPROACH: ICD-10-PCS | Performed by: SURGERY

## 2022-02-11 PROCEDURE — 80048 BASIC METABOLIC PNL TOTAL CA: CPT

## 2022-02-11 PROCEDURE — 82330 ASSAY OF CALCIUM: CPT

## 2022-02-11 PROCEDURE — 6370000000 HC RX 637 (ALT 250 FOR IP): Performed by: STUDENT IN AN ORGANIZED HEALTH CARE EDUCATION/TRAINING PROGRAM

## 2022-02-11 PROCEDURE — 94003 VENT MGMT INPAT SUBQ DAY: CPT

## 2022-02-11 PROCEDURE — 2000000000 HC ICU R&B

## 2022-02-11 PROCEDURE — 2500000003 HC RX 250 WO HCPCS: Performed by: NURSE PRACTITIONER

## 2022-02-11 PROCEDURE — 80051 ELECTROLYTE PANEL: CPT

## 2022-02-11 PROCEDURE — 6360000002 HC RX W HCPCS: Performed by: INTERNAL MEDICINE

## 2022-02-11 PROCEDURE — 3700000000 HC ANESTHESIA ATTENDED CARE: Performed by: SURGERY

## 2022-02-11 RX ORDER — MIDAZOLAM HYDROCHLORIDE 1 MG/ML
INJECTION INTRAMUSCULAR; INTRAVENOUS PRN
Status: DISCONTINUED | OUTPATIENT
Start: 2022-02-11 | End: 2022-02-11 | Stop reason: SDUPTHER

## 2022-02-11 RX ORDER — FAMOTIDINE 40 MG/5ML
20 POWDER, FOR SUSPENSION ORAL DAILY
Status: DISCONTINUED | OUTPATIENT
Start: 2022-02-12 | End: 2022-02-24

## 2022-02-11 RX ORDER — ROCURONIUM BROMIDE 10 MG/ML
INJECTION, SOLUTION INTRAVENOUS PRN
Status: DISCONTINUED | OUTPATIENT
Start: 2022-02-11 | End: 2022-02-11 | Stop reason: SDUPTHER

## 2022-02-11 RX ORDER — HEPARIN SODIUM 1000 [USP'U]/ML
1900 INJECTION, SOLUTION INTRAVENOUS; SUBCUTANEOUS PRN
Status: DISCONTINUED | OUTPATIENT
Start: 2022-02-11 | End: 2022-02-22

## 2022-02-11 RX ORDER — MAGNESIUM HYDROXIDE 1200 MG/15ML
LIQUID ORAL CONTINUOUS PRN
Status: COMPLETED | OUTPATIENT
Start: 2022-02-11 | End: 2022-02-11

## 2022-02-11 RX ADMIN — PIPERACILLIN SODIUM AND TAZOBACTAM SODIUM 2250 MG: 2; .25 INJECTION, POWDER, LYOPHILIZED, FOR SOLUTION INTRAVENOUS at 18:21

## 2022-02-11 RX ADMIN — OXYCODONE 5 MG: 5 TABLET ORAL at 08:09

## 2022-02-11 RX ADMIN — PIPERACILLIN SODIUM AND TAZOBACTAM SODIUM 2250 MG: 2; .25 INJECTION, POWDER, LYOPHILIZED, FOR SOLUTION INTRAVENOUS at 01:11

## 2022-02-11 RX ADMIN — HEPARIN SODIUM 1600 UNITS: 1000 INJECTION INTRAVENOUS; SUBCUTANEOUS at 23:17

## 2022-02-11 RX ADMIN — ROCURONIUM BROMIDE 50 MG: 10 INJECTION INTRAVENOUS at 15:05

## 2022-02-11 RX ADMIN — DEXMEDETOMIDINE HYDROCHLORIDE 0.8 MCG/KG/HR: 4 INJECTION, SOLUTION INTRAVENOUS at 17:05

## 2022-02-11 RX ADMIN — FLUTICASONE PROPIONATE 1 SPRAY: 50 SPRAY, METERED NASAL at 09:15

## 2022-02-11 RX ADMIN — GABAPENTIN 100 MG: 300 CAPSULE ORAL at 20:21

## 2022-02-11 RX ADMIN — HEPARIN SODIUM 1900 UNITS: 1000 INJECTION INTRAVENOUS; SUBCUTANEOUS at 23:17

## 2022-02-11 RX ADMIN — SODIUM CHLORIDE, POTASSIUM CHLORIDE, SODIUM LACTATE AND CALCIUM CHLORIDE: 600; 310; 30; 20 INJECTION, SOLUTION INTRAVENOUS at 17:06

## 2022-02-11 RX ADMIN — OXYCODONE 5 MG: 5 TABLET ORAL at 03:48

## 2022-02-11 RX ADMIN — GABAPENTIN 100 MG: 300 CAPSULE ORAL at 03:49

## 2022-02-11 RX ADMIN — DEXMEDETOMIDINE HYDROCHLORIDE 0.8 MCG/KG/HR: 4 INJECTION, SOLUTION INTRAVENOUS at 11:16

## 2022-02-11 RX ADMIN — Medication 125 MCG/HR: at 10:45

## 2022-02-11 RX ADMIN — Medication 2000 ML/HR: at 19:33

## 2022-02-11 RX ADMIN — DEXMEDETOMIDINE HYDROCHLORIDE 0.8 MCG/KG/HR: 4 INJECTION, SOLUTION INTRAVENOUS at 03:00

## 2022-02-11 RX ADMIN — DEXMEDETOMIDINE HYDROCHLORIDE 0.8 MCG/KG/HR: 4 INJECTION, SOLUTION INTRAVENOUS at 07:02

## 2022-02-11 RX ADMIN — ACETAMINOPHEN 1000 MG: 500 TABLET ORAL at 23:53

## 2022-02-11 RX ADMIN — Medication 8 MCG/MIN: at 18:38

## 2022-02-11 RX ADMIN — MIDAZOLAM HYDROCHLORIDE 2 MG: 1 INJECTION, SOLUTION INTRAMUSCULAR; INTRAVENOUS at 16:08

## 2022-02-11 RX ADMIN — OXYCODONE 5 MG: 5 TABLET ORAL at 20:33

## 2022-02-11 RX ADMIN — HEPARIN SODIUM 1600 UNITS: 1000 INJECTION INTRAVENOUS; SUBCUTANEOUS at 07:21

## 2022-02-11 RX ADMIN — Medication 100 MCG/HR: at 02:59

## 2022-02-11 RX ADMIN — KETAMINE HYDROCHLORIDE 0.2 MG/KG/HR: 50 INJECTION, SOLUTION INTRAMUSCULAR; INTRAVENOUS at 11:26

## 2022-02-11 RX ADMIN — SODIUM CHLORIDE, POTASSIUM CHLORIDE, SODIUM LACTATE AND CALCIUM CHLORIDE: 600; 310; 30; 20 INJECTION, SOLUTION INTRAVENOUS at 02:07

## 2022-02-11 RX ADMIN — Medication 125 MCG/HR: at 18:45

## 2022-02-11 RX ADMIN — HEPARIN SODIUM 1500 UNITS: 1000 INJECTION INTRAVENOUS; SUBCUTANEOUS at 07:21

## 2022-02-11 RX ADMIN — CALCIUM GLUCONATE 1000 MG: 20 INJECTION, SOLUTION INTRAVENOUS at 03:38

## 2022-02-11 RX ADMIN — FAMOTIDINE 20 MG: 10 INJECTION INTRAVENOUS at 08:09

## 2022-02-11 RX ADMIN — PIPERACILLIN SODIUM AND TAZOBACTAM SODIUM 2250 MG: 2; .25 INJECTION, POWDER, LYOPHILIZED, FOR SOLUTION INTRAVENOUS at 10:22

## 2022-02-11 RX ADMIN — DEXMEDETOMIDINE HYDROCHLORIDE 0.8 MCG/KG/HR: 4 INJECTION, SOLUTION INTRAVENOUS at 20:21

## 2022-02-11 RX ADMIN — CALCIUM GLUCONATE 1000 MG: 20 INJECTION, SOLUTION INTRAVENOUS at 20:27

## 2022-02-11 RX ADMIN — Medication 2000 ML/HR: at 19:32

## 2022-02-11 RX ADMIN — HEPARIN SODIUM 5000 UNITS: 5000 INJECTION INTRAVENOUS; SUBCUTANEOUS at 22:45

## 2022-02-11 RX ADMIN — CLINDAMYCIN IN 5 PERCENT DEXTROSE 900 MG: 18 INJECTION, SOLUTION INTRAVENOUS at 09:10

## 2022-02-11 RX ADMIN — MONTELUKAST SODIUM 10 MG: 10 TABLET, FILM COATED ORAL at 08:09

## 2022-02-11 RX ADMIN — ROCURONIUM BROMIDE 50 MG: 10 INJECTION INTRAVENOUS at 13:37

## 2022-02-11 RX ADMIN — Medication 2000 ML/HR: at 19:31

## 2022-02-11 RX ADMIN — CLINDAMYCIN IN 5 PERCENT DEXTROSE 900 MG: 18 INJECTION, SOLUTION INTRAVENOUS at 17:07

## 2022-02-11 ASSESSMENT — PULMONARY FUNCTION TESTS
PIF_VALUE: 27
PIF_VALUE: 30
PIF_VALUE: 27
PIF_VALUE: 25
PIF_VALUE: 30
PIF_VALUE: 29
PIF_VALUE: 26
PIF_VALUE: 27
PIF_VALUE: 30
PIF_VALUE: 27
PIF_VALUE: 28
PIF_VALUE: 27
PIF_VALUE: 27
PIF_VALUE: 30
PIF_VALUE: 28
PIF_VALUE: 30
PIF_VALUE: 27
PIF_VALUE: 27
PIF_VALUE: 26
PIF_VALUE: 27
PIF_VALUE: 30
PIF_VALUE: 28
PIF_VALUE: 28
PIF_VALUE: 29
PIF_VALUE: 28
PIF_VALUE: 29
PIF_VALUE: 29
PIF_VALUE: 30
PIF_VALUE: 29
PIF_VALUE: 28
PIF_VALUE: 30
PIF_VALUE: 25
PIF_VALUE: 31
PIF_VALUE: 30
PIF_VALUE: 27
PIF_VALUE: 29
PIF_VALUE: 30
PIF_VALUE: 27
PIF_VALUE: 31
PIF_VALUE: 29
PIF_VALUE: 28
PIF_VALUE: 29
PIF_VALUE: 30
PIF_VALUE: 29
PIF_VALUE: 30
PIF_VALUE: 27
PIF_VALUE: 30
PIF_VALUE: 4
PIF_VALUE: 29
PIF_VALUE: 27
PIF_VALUE: 30
PIF_VALUE: 12
PIF_VALUE: 27
PIF_VALUE: 30
PIF_VALUE: 28
PIF_VALUE: 27
PIF_VALUE: 11
PIF_VALUE: 32
PIF_VALUE: 27
PIF_VALUE: 20
PIF_VALUE: 30
PIF_VALUE: 29
PIF_VALUE: 28
PIF_VALUE: 31
PIF_VALUE: 32
PIF_VALUE: 30
PIF_VALUE: 30
PIF_VALUE: 27
PIF_VALUE: 29
PIF_VALUE: 20
PIF_VALUE: 30
PIF_VALUE: 28
PIF_VALUE: 30
PIF_VALUE: 28
PIF_VALUE: 30
PIF_VALUE: 20
PIF_VALUE: 30
PIF_VALUE: 27
PIF_VALUE: 31
PIF_VALUE: 27
PIF_VALUE: 30
PIF_VALUE: 28
PIF_VALUE: 29
PIF_VALUE: 30
PIF_VALUE: 30
PIF_VALUE: 27
PIF_VALUE: 31
PIF_VALUE: 30
PIF_VALUE: 30
PIF_VALUE: 28
PIF_VALUE: 30
PIF_VALUE: 26
PIF_VALUE: 30
PIF_VALUE: 27
PIF_VALUE: 28
PIF_VALUE: 26
PIF_VALUE: 29
PIF_VALUE: 30
PIF_VALUE: 31
PIF_VALUE: 27
PIF_VALUE: 28
PIF_VALUE: 30
PIF_VALUE: 30
PIF_VALUE: 25
PIF_VALUE: 27
PIF_VALUE: 30
PIF_VALUE: 30
PIF_VALUE: 27
PIF_VALUE: 29
PIF_VALUE: 27
PIF_VALUE: 28
PIF_VALUE: 30
PIF_VALUE: 27
PIF_VALUE: 27
PIF_VALUE: 30
PIF_VALUE: 19
PIF_VALUE: 28
PIF_VALUE: 27
PIF_VALUE: 29
PIF_VALUE: 20
PIF_VALUE: 32
PIF_VALUE: 28
PIF_VALUE: 27
PIF_VALUE: 29
PIF_VALUE: 27
PIF_VALUE: 31
PIF_VALUE: 28
PIF_VALUE: 29
PIF_VALUE: 31
PIF_VALUE: 31
PIF_VALUE: 30
PIF_VALUE: 20
PIF_VALUE: 28
PIF_VALUE: 31
PIF_VALUE: 17
PIF_VALUE: 30
PIF_VALUE: 27
PIF_VALUE: 28
PIF_VALUE: 30
PIF_VALUE: 28
PIF_VALUE: 30
PIF_VALUE: 1
PIF_VALUE: 30
PIF_VALUE: 33
PIF_VALUE: 27
PIF_VALUE: 30
PIF_VALUE: 27
PIF_VALUE: 30
PIF_VALUE: 30
PIF_VALUE: 26
PIF_VALUE: 27
PIF_VALUE: 30
PIF_VALUE: 30
PIF_VALUE: 27
PIF_VALUE: 29
PIF_VALUE: 30
PIF_VALUE: 32
PIF_VALUE: 30
PIF_VALUE: 27
PIF_VALUE: 28
PIF_VALUE: 29
PIF_VALUE: 30
PIF_VALUE: 31
PIF_VALUE: 27
PIF_VALUE: 29
PIF_VALUE: 28

## 2022-02-11 NOTE — PROGRESS NOTES
ICU PROGRESS NOTE          PATIENT NAME: [employer] RECORD NO. [id number]  DATE: 2022    PRIMARY CARE PHYSICIAN: Jenaro Majano MD    HD: # 3    ASSESSMENT    Patient Active Problem List   Diagnosis    Leonel gangrene    Uncontrolled type 2 diabetes mellitus with hyperglycemia ([facility]    Acute kidney injury superimposed on CKD ([facility]    Hypokalemia    Hyponatremia    Anemia    Morbid obesity with BMI of 45.0-49.9, adult ([facility]    Type 1 diabetes mellitus with stage 3a chronic kidney disease ([facility]       MEDICAL DECISION MAKING AND PLAN    1. Neuro:  1. Fentanyl @ 125 mcg/hr   2. Precedex @ 0.8 mcg/kg/hr  3. Ketamine drip   4. Tylenol 1000 mg q8H, gabapentin 100 mg q8, roxicodone 5mg q 4H PRN   2. CV:  1. HR: 59 - 73  2. MAP: 52 - 89  3. Lactate: 0.54 (0.64)  3. Heme:  1. Hgb: 7.4 (8.5)  2. Plt: 237 (251)  3. DVT ppx: heparin   4. Pulm:  1. PRVC: TV: 7 cc/kg/10/15/40%  2. AB.28/45/166.9/21.4  1. PF: 417  3. CXR: appears improved - awaiting official read   5. Renal/lytes:  1. BUN/Cr: 35 (40) / 3.98(4.35)  2. Na/K+: 132 (126) / 3.9 (4.6)  3. UOP: 0.2-0.4 cc/kg/hr overnight   4. IVF: LR @ 125 cc/hr   5. Nephrology following: Managing CRRT and electrolytes   6. GI:  1. Diet: NPO  2. Pepcid 20 mg BID   7. ID:  1. Tmax: 99.2 (37.3)  2. WBC: 10.3 (10.4)  3. Antibiotics: clindamycin, zosyn   8. MSK:  1. Necrotizing soft tissue infection of the buttocks and groin   2. S/p incision and debridement , and further debridement later   3. S/p irrigation and debridement of wound 2/10  4. Plan for return to OR today for further debridement and possible vac placement   9. Endo:  1. B  2. Insulin drip: 13.9 units over past 24 hours   10.  Lines:  1. PIV, Booker, ETT, OGT, right radial arterial line, right IJ CVC, right femoral polina catheter    CHECKLIST    RASS: -2  RESTRAINTS: bilateral soft wrist   IVF: LR @ 125 cc/hr   NUTRITION: NPO  ANTIBIOTICS: clindamycin, zosyn   GI: pepcid   DVT: heparin GLYCEMIC CONTROL: insulin gtt   HOB >45: yes   WOUND CARE: packing intact - OR debridement today     SUBJECTIVE    Reji Carmoan was seen and examined at bedside. No acute events overnight. Hemodynamically stable and afebrile. No longer requiring pressors. Tolerating CRRT with improvement in urine output. OBJECTIVE  VITALS: Temp: Temp: 98.6 °F (37 °C)Temp  Av.1 °F (37.3 °C)  Min: 98.4 °F (36.9 °C)  Max: 99.3 °F (66.2 °C) BP Systolic (94FXH), IIS:864 , Min:95 , ZUJ:706   Diastolic (77GPX), IEI:66, Min:46, Max:102   Pulse Pulse  Av.9  Min: 59  Max: 78 Resp Resp  Av.7  Min: 0  Max: 43 Pulse ox SpO2  Av.2 %  Min: 95 %  Max: 100 %    GENERAL: intubated, sedated, no apparent distress   NEURO: intubated, sedated   HEENT: NCAT   : perineal dressing intact; take down in OR today   LUNGS: no acute respiratory distress or accessory muscle use   MECHANICAL VENTILATION: tolerating mechanical ventilation   HEART: regular rate and rhythm   ABDOMEN: soft, non-distended   EXTERMITY: no cyanosis, clubbing or edema    LAB:  CBC:   Recent Labs     22  1553 02/10/22  0606 22  0542   WBC 18.7* 10.4 10.3   HGB 9.6* 8.5* 7.4*   HCT 28.4* 25.8* 22.8*   MCV 84.5 86.0 86.0    251 237     BMP:   Recent Labs     22  2259 22  2259 02/10/22  0606 02/10/22  0606 02/10/22  2012 02/11/22  0207 22  0542   *   < > 130*   < > 126* 128* 132*   K 4.8   < > 4.4   < > 4.6 3.9 3.9   CL 92*   < > 97*   < > 95* 96* 100   CO2 19*   < > 16*   < > 15* 17* 17*   BUN 42*  --  40*  --   --   --  35*   CREATININE 4.82*  --  4.35*  --   --   --  3.98*   GLUCOSE 121*  --  135*  --   --   --  122*    < > = values in this interval not displayed.          RADIOLOGY:  XR CHEST PORTABLE    Result Date: 2/10/2022  EXAMINATION: ONE XRAY VIEW OF THE CHEST 2/10/2022 10:41 am COMPARISON: 2022 HISTORY: ORDERING SYSTEM PROVIDED HISTORY: post op, remained intubated TECHNOLOGIST PROVIDED HISTORY: post op, remained intubated FINDINGS: Portable chest reveals heart to be borderline enlarged. Patchy airspace disease seen diffusely throughout the lung fields. The lines and tubes are in satisfactory position. Findings are stable. No new focal parenchymal opacification present. Possible small bilateral pleural effusions stable and unchanged. Stable chest as above. Increased markings again seen diffusely throughout the lung fields bilaterally. Probable tiny bilateral pleural effusions. XR CHEST PORTABLE    Result Date: 2/9/2022  EXAMINATION: ONE XRAY VIEW OF THE CHEST 2/9/2022 4:06 pm COMPARISON: AP chest from 02/08/2022 HISTORY: ORDERING SYSTEM PROVIDED HISTORY: post-op TECHNOLOGIST PROVIDED HISTORY: post-op Reason for Exam: post op History of rectal surgery 02/08/2022 FINDINGS: Right IJ central line tip position stable in the upper SVC. Enteric tube tip and side hole project below the left hemidiaphragm. ETT tip position satisfactory the sternoclavicular joint level, 2 cm above the henrietta. Mildly enlarged but stable appearing cardiac silhouette. Enlarged mediastinal shadow, likely due to supine hypoventilatory technique. Low lung volumes with possible mild central vascular congestion and bibasilar atelectasis. Developing infiltrate or aspiration also a consideration, but no consolidation or sizable pleural effusion. No pneumothorax. Bones stable. Support tubes right IJ line appears satisfactory. Low lung volumes accentuating findings; mild central vascular congestion with scattered mostly basilar atelectasis suspected. Minimal infiltrate/aspiration also a consideration. No pneumothorax. Tootie Crouch DO  2/11/2022  7:09 AM              Trauma Attending Attestation      I have reviewed the above GCS note(s) and confirmed the key elements of the medical history and physical exam. I have seen and examined the pt.   I have discussed the findings, established the care plan and recommendations with Resident, GCS RN, bedside nurse.   Will get repeat gas after OR today   Septic shock improving   MEREDITH secondary to sepsis- improving -   Start nutrition post op   Will try to wean vent as tolerated   Blood glucose controlled   Critical care min: [place] [address], DO  2/11/2022  10:26 AM

## 2022-02-11 NOTE — PROGRESS NOTES
Physician Progress Note      Flaquito Sutton  CSN #:                  [id number]  :                       1980  ADMIT DATE:       2022 9:45 PM  DISCH DATE:  RESPONDING  PROVIDER #:        Vessie Dakin APRN - CNP          QUERY TEXT:    Patient admitted with necrotizing fasciitis. Per Op note dated    documentation of debridement. To accurately reflect the procedure performed   please document if debridement was excisional or nonexcisional and the deepest   depth of tissue removed as down to and including: The medical record reflects the following:  Risk Factors: necrotizing fasciitis  Clinical Indicators: per op note  \"At the previous site of incision on the   L buttock there was significant amount of purulence. I continued an incision   cephalad in to the groin apporx 10 cm to incorporate all of the loculations   and areas of drainage. All areas of necrosis were removed with a combination   of sharp and blunt technique with scalpel, cautery and tonsil. Once L side   was debrided, A 7 cm vertical incision was made from the buttock into the   groin and  incorporate all of the loculations and areas of drainage. All areas   of necrosis were removed with a combination   Treatment: OR I&D and debridement, vancomycin, clindamycin and Zosyn, IV   fluids and pressors, mechanical ventilation, cultures    Thank you, Brody Zheng, ANSHUL  email - [email or web address]  cell- [phone]  office hours [id number]  Options provided:  -- Nonexcisional debridement of skin  -- Excisional debridement of skin  -- Nonexcisional debridement of subcutaneous tissue  -- Excisional debridement of subcutaneous tissue  -- Nonexcisional debridement of fascia  -- Excisional debridement of fascia  -- Nonexcisional debridement of muscle  -- Excisional debridement of muscle  -- Other - I will add my own diagnosis  -- Disagree - Not applicable / Not valid  -- Disagree - Clinically unable to determine /

## 2022-02-11 NOTE — OP NOTE
Operative Note    Patient: Rosalinda Lundberg  YOB: 1980  MRN: [id number]    Date of Procedure: 2/11/2022    Pre-Op Diagnosis: NECROTIZING FASCIITIS    Post-Op Diagnosis: Same       Procedure(s):  INCISION AND DRAINAGE AND DEBRIDEMENT BUTTOCK AND LABIAL ABSCESS , PLACEMENT OF WOUND VAC    Surgeon(s):  Little Orozco DO    Assistant:   Resident: Randall Greene DO; Serene Trejo DO    Anesthesia: General    Estimated Blood Loss (mL): 52RF    Complications: None    Specimens:   * No specimens in log *    Implants:  * No implants in log *      Drains:   Negative Pressure Wound Therapy Hip Anterior;Right (Active)       Negative Pressure Wound Therapy Hip Anterior; Left (Active)       NG/OG/NJ/NE Tube Orogastric 18 fr Center mouth (Active)   Surrounding Skin Dry; Intact 02/11/22 1200   Securement device Yes 02/11/22 1200   Status Clamped 02/11/22 1200   Placement Verified by External Catheter Length 02/11/22 1200   NG/OG/NJ/NE External Measurement (cm) 55 cm 02/11/22 1200   Free Water Flush (mL) 30 mL 02/10/22 2000       Urethral Catheter Temperature probe 16 fr (Active)   Catheter Indications Need for fluid volume management of the critically ill patient in a critical care setting 02/11/22 1200   Site Assessment Swelling 02/11/22 1200   Urine Color Yellow 02/11/22 1200   Urine Appearance Clear 02/11/22 1200   Urine Odor Malodorous 02/09/22 1500   Output (mL) 55 mL 02/11/22 1200     Findings: Debridement of tissue left posterior buttock. Placement of black sponge left buttock tunneled anteriorly toward groin and left groin tunneled posteriorly toward buttock. Placement of black sponge right groin tunneled posteriorly toward buttock. Detailed Description of Procedure:     HISTORY: The patient is a 42 y.o. female with history of necrotizing soft tissue infection involving the left buttock and bilateral groins. She required multiple prior operating room debridements.  The decision was made to bring the patient back to the operating room for additional debridement and possible wound vac placement. Risks, benefits, expected outcome, and alternatives to the procedure were explained to the patient's POA and all questions answered. Informed consent was obtained. PROCEDURE: The patient was brought to the operating room, general anesthesia administered, and placed in the prone position. A time out was performed to confirm patient, procedure, location, and allergies. She had been receiving appropriate antibiotic therapy in the ICU. The dressings and skin staples were removed from the patient's wounds. The patient was then prepped in sterile fashion and draped. Attention was first turned to the left buttock incision. The wound bed had healthy granulation tissue. This measured 12cm in length by 5cm in width. Several small areas of exudative tissue was debrided and tissue removed with electrocautery down to healthy bleeding subcutaneous tissue. The remaining tissue was healthy in appearance and hemostasis was confirmed. The wound was then examined coursing anteriorly toward the groin. The tissues in the area of the groin appeared healthy and there was no need for debridement. The decision was made to begin to close the incision in the area of the groin to allow for wound vac placement. 2-0 prolene was used to approximate the tissues in the area of the groin. 2-0 prolene sutures were also used to approximate a small portion of the posterior aspect of the right groin incision. The right groin incision tissues all appeared healthy and did not require debridement. Once the tissues in the area of the left groin were approximated we placed a black foam vac on the buttock portion of the incision. A piece of black sponge was tunneled anteriorly toward the groin under our closed tissue with prolene suture. A second black foam was tunneled superiorly and a third black foam placed in the buttock wound.  Duoderm was placed medially to assist with achieving a seal and the foam was secured with wound vac tape. The suction pad was placed and connected to the wound vac system. Good seal was achieved. This concluded the first portion of the case. All sponge, instrument, lap counts were reported as correct. The patient was then placed in supine position for the second part of the case. The patient was re-prepped and draped in sterile fashion. Everyone involved rescrubbed. The left buttock incision extended to the left groin measured approximately 22cm in length. The tissue on the anterior aspect of the wound was healthy and did not require debridement. It was irrigated with normal saline. Our black sponge that was tunneled anteriorly with the patient proned could been seen. An additional sponge was placed in the anterior groin incision and tunneled posteriorly toward the buttock. Wound vac tape was applied over th black sponge, the suction pad applied, and connected to the wound vac system using a Y connector with the posteriorly placed vac. Good seal was achieved. Attention was then turned to the right groin with the previous incision measuring 8cm in length x 2cm in width. These tissues were healthy. A black sponge was placed into the wound bed. Coloplast and duoderm were used around the wound bed to assist with creating a seal. Vac tape was used to secure the black foam and the suction pad attached. This was connected to its own wound vac system and adequate seal achieved. This concluded the procedure. All sponge, needle, and instrument counts were reported as correct. The patient was taken back to the ICU in critical, but stable condition. Dr. Isha Hollis was present for all critical portions of the case.       Yamil Godinez,   General Surgery PGY-3                 Trauma Attending Attestation      I have reviewed the above GCS note(s) and confirmed the key elements of the medical history and physical exam. I have seen and examined the pt. I have discussed the findings, established the care plan and recommendations with Resident, GCS RN, bedside nurse.   I was present for the entire case       Jamel Duffy DO  2/13/2022  10:20 AM

## 2022-02-11 NOTE — ANESTHESIA POSTPROCEDURE EVALUATION
Department of Anesthesiology  Postprocedure Note    Patient: Max Jordan  MRN: [id number]  YOB: 1980  Date of evaluation: 2/11/2022  Time:  5:02 PM     Procedure Summary     Date: 02/11/22 Room / Location: [address]    Anesthesia Start: 1330 Anesthesia Stop: 2268    Procedure: INCISION AND DRAINAGE AND DEBRIDEMENT BUTTOCK AND LABIAL ABSCESS , PLACEMENT OF WOUND VAC (N/A ) Diagnosis: (NECROTIZING FASCIITIS)    Surgeons: Kendell Sanabria DO Responsible Provider: Carlos Eduardo Mcdonald MD    Anesthesia Type: general ASA Status: 4          Anesthesia Type: general    Cristhian Phase I: Cristhian Score: 9    Cristhian Phase II:      Last vitals: Reviewed and per EMR flowsheets.        Anesthesia Post Evaluation    Patient location during evaluation: ICU  Patient participation: complete - patient cannot participate  Level of consciousness: sedated and ventilated  Pain score: 2  Airway patency: patent  Nausea & Vomiting: no nausea and no vomiting  Complications: no  Cardiovascular status: hemodynamically stable  Respiratory status: acceptable  Hydration status: euvolemic

## 2022-02-11 NOTE — PROGRESS NOTES
Nephrology Progress Note      SUBJECTIVE       Pt was seen and examined. No acute issues overnite. Stable hemodynamics . Her Levophed requirements have decreased. She is scheduled to go to the OR for additional debridement and possible VAC placement. Urine output is marginal, patient continues on CVVHD. Machine went down briefly earlier this morning. I had a long detailed discussion with her mother at bedside. Patient continues to be intubated. She continues to receive broad-spectrum antibiotics. OBJECTIVE      CURRENT TEMPERATURE:  Temp: 98.3 °F (36.8 °C)  MAXIMUM TEMPERATURE OVER 24HRS:  Temp (24hrs), Av.3 °F (36.8 °C), Min:97.7 °F (36.5 °C), Max:98.6 °F (37 °C)    CURRENT RESPIRATORY RATE:  Resp: 19  CURRENT PULSE:  Pulse: 66  CURRENT BLOOD PRESSURE:  BP: (!) 99/55  24HR BLOOD PRESSURE RANGE:  Systolic (10NCC), JUS:631 , Min:97 , NZD:686   ; Diastolic (97HLS), VUV:51, Min:47, Max:70    24HR INTAKE/OUTPUT:      Intake/Output Summary (Last 24 hours) at 2022 1125  Last data filed at 2022 1100  Gross per 24 hour   Intake 5740.86 ml   Output 2975 ml   Net 2765.86 ml     WEIGHT :  Patient Vitals for the past 96 hrs (Last 3 readings):   Weight   22 0600 299 lb 4.8 oz (135.8 kg)   22 0400 276 lb 6.4 oz (125.4 kg)   22 2148 275 lb (124.7 kg)     PHYSICAL EXAM      GENERAL APPEARANCE: Intubated and sedated  SKIN: warm and dry, no rash or erythema  EYES: conjunctivae pale and sclera anicteric  ENT: ETT in place  NECK:   No carotid bruits and no carotid lymphadenopathy . PULMONARY: lungs are clear to auscultation. No Wheezing, no ronchi . CADRDIOVASCULAR: S1 and S2 normal NO S3 and NO S4 . No rubs , no murmur. ABDOMEN: soft nontender, bowel sounds present, no organomegaly, no ascites.      EXTREMITIES: + edema     CURRENT MEDICATIONS      [START ON 2022] famotidine (PEPCID) 40 MG/5ML suspension 20 mg, Daily  lactated ringers bolus, Once  prismaSATE BK 0/3.5 5,000 mL with potassium chloride 10 mEq solution, Continuous  heparin (porcine) injection 1,000 Units, Once  heparin (porcine) injection 1,600 Units, PRN  heparin (porcine) injection 1,500 Units, PRN  piperacillin-tazobactam (ZOSYN) 2,250 mg in dextrose 5 % 50 mL IVPB (mini-bag), Q6H  potassium chloride 20 mEq/50 mL IVPB (Central Line), PRN  magnesium sulfate 1000 mg in dextrose 5% 100 mL IVPB, PRN  calcium gluconate 1000 mg in sodium chloride 50 mL, PRN   Or  calcium gluconate 2000 mg in sodium chloride 100 mL, PRN   Or  calcium gluconate 3,000 mg in dextrose 5 % 100 mL IVPB, PRN   Or  calcium gluconate 4,000 mg in dextrose 5 % 100 mL IVPB, PRN  sodium phosphate 6 mmol in dextrose 5 % 250 mL IVPB, PRN   Or  sodium phosphate 12 mmol in dextrose 5 % 250 mL IVPB, PRN   Or  sodium phosphate 18 mmol in dextrose 5 % 500 mL IVPB, PRN   Or  sodium phosphate 24 mmol in dextrose 5 % 500 mL IVPB, PRN  oxyCODONE (ROXICODONE) immediate release tablet 5 mg, Q4H PRN  fluticasone (FLONASE) 50 MCG/ACT nasal spray 1 spray, Daily  budesonide-formoterol (SYMBICORT) 80-4.5 MCG/ACT inhaler 2 puff, BID  albuterol sulfate  (90 Base) MCG/ACT inhaler 1 puff, Q4H PRN  norepinephrine (LEVOPHED) 16 mg in sodium chloride 0.9 % 250 mL infusion, Continuous  montelukast (SINGULAIR) tablet 10 mg, Daily  [Held by provider] rOPINIRole (REQUIP) tablet 4 mg, Daily  insulin regular (HUMULIN R;NOVOLIN R) 100 Units in sodium chloride 0.9 % 100 mL infusion, Continuous  gabapentin (NEURONTIN) capsule 100 mg, Q8H  vasopressin 20 Units in dextrose 5 % 100 mL infusion, Continuous  fentaNYL 20 mcg/mL Infusion, Continuous  dexmedetomidine (PRECEDEX) 400 mcg in sodium chloride 0.9 % 100 mL infusion, Continuous  heparin (porcine) injection 5,000 Units, 3 times per day  ketamine (KETALAR) 500 mg in sodium chloride 0.9 % 250 mL infusion, Continuous  acetaminophen (TYLENOL) tablet 1,000 mg, 3 times per day  lactated ringers infusion, Continuous  clindamycin (CLEOCIN) 900 mg in dextrose 5 % 50 mL IVPB, Q8H          LABS      CBC:   Recent Labs     02/09/22  1553 02/10/22  0606 02/11/22  0542   WBC 18.7* 10.4 10.3   RBC 3.36* 3.00* 2.65*   HGB 9.6* 8.5* 7.4*   HCT 28.4* 25.8* 22.8*   MCV 84.5 86.0 86.0   MCH 28.6 28.3 27.9   MCHC 33.8 32.9 32.5   RDW 14.0 14.3 14.5*    251 237   MPV 10.7 10.9 10.8      BMP:   Recent Labs     02/09/22 2259 02/09/22  2259 02/10/22  0606 02/10/22  2012 02/11/22  0207 02/11/22  0542 02/11/22  0826   *   < > 130*   < > 128* 132* 132*   K 4.8   < > 4.4   < > 3.9 3.9 3.9   CL 92*   < > 97*   < > 96* 100 100   CO2 19*   < > 16*   < > 17* 17* 16*   BUN 42*  --  40*  --   --  35*  --    CREATININE 4.82*  --  4.35*  --   --  3.98*  --    GLUCOSE 121*  --  135*  --   --  122*  --    CALCIUM 7.1*  --  7.2*  --   --  7.3*  --     < > = values in this interval not displayed. PHOSPHORUS:    Recent Labs     02/09/22  2259 02/10/22  0606 02/11/22  0542   PHOS 7.0* 5.5* 5.6*     MAGNESIUM:   Recent Labs     02/09/22  1553 02/10/22  0606 02/11/22  0542   MG 1.6 2.2 2.1     ALBUMIN:   Recent Labs     02/08/22 2228 02/09/22  0412   LABALBU 1.9* 1.9*       SPEP:   Lab Results   Component Value Date    PROT 5.7 02/09/2022     C3:   Lab Results   Component Value Date    C3 143 02/09/2022     C4:   Lab Results   Component Value Date    C4 19 02/09/2022     URINE SODIUM:    Lab Results   Component Value Date    JORGE 26 02/09/2022      URINE CREATININE:    Lab Results   Component Value Date    LABCREA 142.3 02/09/2022         ASSESSMENT      1. Acute Kidney Injury: Secondary to ischemic ATN from shock/ sepsis systemic intermittent response syndrome possible vancomycin toxicity is Vanco level was 39.6. Oligoanuric creatinine had jumped up to 4.9, CRRT started. 2.  Chronic kidney disease stage III baseline 1.5-1.6 proteinuria about 1 to 2 g from diabetic nephrosclerosis follows up with Dr. Nereyda Marcano  3.   Type 2 diabetes with poor control hemoglobin A1c 7-9 range  4. Super morbid obesity  5. 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 Patient is scheduled to go to the OR today for additional debridement and possible VAC placement. PLAN      1. Resume CVVHD as ordered previously once she is back from the OR. Now that her blood pressures are more stable, we might be able to safely remove -50 mL/h fluid. 2.  Antibiotics to continue. 3. Follow up labs ordered. 4. Following along       Please do not hesitate to call with questions.     This note is created with the assistance of a speech-recognition program. While intending to generate a document that actually reflects the content of the visit, no guarantees can be provided that every mistake has been identified and corrected by editing    Electronically signed by Ganesh Kim MD on 2/11/2022 at 11:25 AM

## 2022-02-11 NOTE — PLAN OF CARE
Problem: Non-Violent Restraints  Goal: Removal from restraints as soon as assessed to be safe  Outcome: Not Met This Shift  Goal: No harm/injury to patient while restraints in use  Outcome: Met This Shift  Goal: Patient's dignity will be maintained  Outcome: Met This Shift     Problem: Skin Integrity:  Goal: Will show no infection signs and symptoms  Description: Will show no infection signs and symptoms  2/11/2022 0544 by Guillermo Trevino RN  Outcome: Ongoing  2/10/2022 2132 by Maria Dolores Moreau RCP  Outcome: Ongoing  Goal: Absence of new skin breakdown  Description: Absence of new skin breakdown  2/11/2022 0544 by Guillermo Trevino RN  Outcome: Ongoing  2/10/2022 2132 by Maria Dolores Moreau RCP  Outcome: Ongoing     Problem: Pain:  Goal: Pain level will decrease  Description: Pain level will decrease  Outcome: Ongoing  Goal: Recognizes and communicates pain  Description: Recognizes and communicates pain  Outcome: Ongoing  Goal: Control of acute pain  Description: Control of acute pain  Outcome: Ongoing  Goal: Control of chronic pain  Description: Control of chronic pain  Outcome: Ongoing

## 2022-02-11 NOTE — ANESTHESIA PRE PROCEDURE
Department of Anesthesiology  Preprocedure Note       Name:  Hayden Brown   Age:  42 y.o.  :  1980                                          MRN:  [id number]         Date:  2022      Surgeon: Vance Martin):  Feliciano Tovar MD    Procedure: Procedure(s):  INCISION AND DRAINAGE BUTTOCK AND LABIA  (PRONE POSITION)    Medications prior to admission:   Prior to Admission medications    Medication Sig Start Date End Date Taking?  Authorizing Provider   PRENATAL VIT-DOCUSATE-IRON-FA PO Take 1 tablet by mouth daily 22  Yes Historical Provider, MD   cyanocobalamin 1000 MCG/ML injection Inject 1,000 mcg into the muscle every 30 days 10/18/21  Yes Historical Provider, MD   fluticasone-salmeterol (ADVAIR HFA) 45-21 MCG/ACT inhaler Inhale 2 puffs into the lungs 2 times daily    Historical Provider, MD   Multiple Vitamins-Minerals (MULTIVITAMIN ADULT EXTRA C PO) Take 1 tablet by mouth daily    Historical Provider, MD   vitamin D (ERGOCALCIFEROL) 1.25 MG (98884 UT) CAPS capsule Take 50,000 Units by mouth once a week    Historical Provider, MD   albuterol sulfate HFA (PROAIR HFA) 108 (90 Base) MCG/ACT inhaler Inhale 1 puff into the lungs as needed    Historical Provider, MD   carvedilol (COREG) 25 MG tablet Take 25 mg by mouth daily    Historical Provider, MD   vitamin D (CHOLECALCIFEROL) 125 MCG (5000 UT) CAPS capsule Take 5,000 Units by mouth daily    Historical Provider, MD   DULoxetine (CYMBALTA) 60 MG extended release capsule Take 60 mg by mouth daily    Historical Provider, MD   fenofibrate (TRIGLIDE) 160 MG tablet Take 160 mg by mouth daily    Historical Provider, MD   fluticasone (FLONASE) 50 MCG/ACT nasal spray 1 spray by Each Nostril route    Historical Provider, MD   glucagon 1 MG injection Inject 1 mg into the muscle as needed    Historical Provider, MD   lisinopril (PRINIVIL;ZESTRIL) 40 MG tablet Take 40 mg by mouth daily    Historical Provider, MD   montelukast (SINGULAIR) 10 MG tablet Take 10 mg by mouth daily    Historical Provider, MD   omeprazole (PRILOSEC) 20 MG delayed release capsule Take 20 mg by mouth daily    Historical Provider, MD   rOPINIRole (REQUIP) 4 MG tablet Take 4 mg by mouth daily    Historical Provider, MD       Current medications:    Current Facility-Administered Medications   Medication Dose Route Frequency Provider Last Rate Last Admin    lactated ringers bolus  1,000 mL IntraVENous Once Bere I Marzena, DO        prismaSATE BK 0/3.5 5,000 mL with potassium chloride 10 mEq solution  2,000 mL/hr Dialysis Continuous Travis Arreaga MD 2,000 mL/hr at 02/10/22 2259 2,000 mL/hr at 02/10/22 2259    heparin (porcine) injection 1,000 Units  1,000 Units IntraCATHeter Once Bere I Marzena, DO        heparin (porcine) injection 1,600 Units  1,600 Units IntraCATHeter PRN Babara Sainz, DO   1,600 Units at 02/11/22 0721    heparin (porcine) injection 1,500 Units  1,500 Units IntraCATHeter PRN Babara Sainz, DO   1,500 Units at 02/11/22 0721    piperacillin-tazobactam (ZOSYN) 2,250 mg in dextrose 5 % 50 mL IVPB (mini-bag)  2,250 mg IntraVENous Q6H ROMEO Galvez - CNP   Stopped at 02/11/22 0534    potassium chloride 20 mEq/50 mL IVPB (Central Line)  20 mEq IntraVENous PRN Ronak Vaughn MD        magnesium sulfate 1000 mg in dextrose 5% 100 mL IVPB  1,000 mg IntraVENous PRN Ronak Vaughn MD        calcium gluconate 1000 mg in sodium chloride 50 mL  1,000 mg IntraVENous PRN Ronak Vaughn MD   Stopped at 02/11/22 0440    Or    calcium gluconate 2000 mg in sodium chloride 100 mL  2,000 mg IntraVENous PRN Ronak Vaughn MD        Or    calcium gluconate 3,000 mg in dextrose 5 % 100 mL IVPB  3,000 mg IntraVENous PRN Ronak Vaughn MD        Or    calcium gluconate 4,000 mg in dextrose 5 % 100 mL IVPB  4,000 mg IntraVENous PRN Ronak Vaughn MD        sodium phosphate 6 mmol in dextrose 5 % 250 mL IVPB  6 mmol IntraVENous PRN Ronak Vaughn MD        Or    sodium phosphate 12 mmol in dextrose 5 % 250 mL IVPB  12 mmol IntraVENous PRN Alhaji Clark MD        Or    sodium phosphate 18 mmol in dextrose 5 % 500 mL IVPB  18 mmol IntraVENous PRN Alhaji Clark MD        Or    sodium phosphate 24 mmol in dextrose 5 % 500 mL IVPB  24 mmol IntraVENous PRN Alhaji Clark MD        oxyCODONE (ROXICODONE) immediate release tablet 5 mg  5 mg Oral Q4H PRN Jordis Aldo, APRN - CNP   5 mg at 02/11/22 0809    fluticasone (FLONASE) 50 MCG/ACT nasal spray 1 spray  1 spray Each Nostril Daily Jordis Aldo, APRN - CNP   1 spray at 02/11/22 0915    budesonide-formoterol (SYMBICORT) 80-4.5 MCG/ACT inhaler 2 puff  2 puff Inhalation BID Jordis Aldo, APRN - CNP        albuterol sulfate  (90 Base) MCG/ACT inhaler 1 puff  1 puff Inhalation Q4H PRN Jordis Aldo, APRN - CNP        norepinephrine (LEVOPHED) 16 mg in sodium chloride 0.9 % 250 mL infusion  2-100 mcg/min IntraVENous Continuous Jordis Aldo, APRN - CNP 1.9 mL/hr at 02/11/22 0845 2 mcg/min at 02/11/22 0845    montelukast (SINGULAIR) tablet 10 mg  10 mg Oral Daily Jordis Aldo, APRN - CNP   10 mg at 02/11/22 0809    [Held by provider] rOPINIRole (REQUIP) tablet 4 mg  4 mg Oral Daily Jordis Aldo, APRN - CNP   4 mg at 02/09/22 1047    insulin regular (HUMULIN R;NOVOLIN R) 100 Units in sodium chloride 0.9 % 100 mL infusion  1-50 Units/hr IntraVENous Continuous Jordis Aldo, APRN - CNP 0.49 mL/hr at 02/11/22 0822 0.49 Units/hr at 02/11/22 8603    gabapentin (NEURONTIN) capsule 100 mg  100 mg Oral Q8H Jordis Aldo, APRN - CNP   100 mg at 02/11/22 0349    vasopressin 20 Units in dextrose 5 % 100 mL infusion  0.04 Units/min IntraVENous Continuous Bere Ivan DO   Stopped at 02/10/22 1505    fentaNYL 20 mcg/mL Infusion  25 mcg/hr IntraVENous Continuous Jordis Aldo, APRN - CNP 6.3 mL/hr at 02/11/22 0332 125 mcg/hr at 02/11/22 0332    dexmedetomidine (PRECEDEX) 400 mcg in sodium chloride 0.9 % 100 mL infusion  0.2 mcg/kg/hr IntraVENous tissue infection of groin and buttocks  performed by Celine Hernandez MD at [address] [facility] N/A 2/8/2022    *ADD ON, ASAP* INCISION AND DRAINAGE OF PERINEUM, BRIAN KNIFE PRONE performed by Michelle Case MD at [address] [facility] [facility] History:    Social History     Tobacco Use    Smoking status: Not on file    Smokeless tobacco: Not on file   Substance Use Topics    Alcohol use: Not on file                                Counseling given: Not Answered      Vital Signs (Current):   Vitals:    02/11/22 0500 02/11/22 0600 02/11/22 0800 02/11/22 0903   BP:       Pulse: 60 59 61 62   Resp: 12 12 15 21   Temp:   97.7 °F (36.5 °C)    TempSrc:   Oral    SpO2: 100% 100% 100% 100%   Weight:  299 lb 4.8 oz (135.8 kg)     Height:                                                  BP Readings from Last 3 Encounters:   02/11/22 (!) 99/55   02/10/22 (!) 124/102   02/09/22 (!) 98/58       NPO Status: Time of last liquid consumption: 0800                        Time of last solid consumption: 0800                        Date of last liquid consumption: 02/09/22                        Date of last solid food consumption: 02/09/22    BMI:   Wt Readings from Last 3 Encounters:   02/11/22 299 lb 4.8 oz (135.8 kg)     Body mass index is 53.02 kg/m².     CBC:   Lab Results   Component Value Date    WBC 10.3 02/11/2022    RBC 2.65 02/11/2022    HGB 7.4 02/11/2022    HCT 22.8 02/11/2022    MCV 86.0 02/11/2022    RDW 14.5 02/11/2022     02/11/2022       CMP:   Lab Results   Component Value Date     02/11/2022    K 3.9 02/11/2022     02/11/2022    CO2 16 02/11/2022    BUN 35 02/11/2022    CREATININE 3.98 02/11/2022    GFRAA 15 02/11/2022    LABGLOM 12 02/11/2022    GLUCOSE 122 02/11/2022    PROT 5.7 02/09/2022    CALCIUM 7.3 02/11/2022    BILITOT 0.29 02/09/2022    ALKPHOS 196 02/09/2022    AST 48 02/09/2022    ALT 25 02/09/2022       POC Tests:   Recent Labs     02/11/22  0602   POCGLU 119*       Coags:   Lab Results   Component Value Date    PROTIME 11.3 02/09/2022    INR 1.1 02/09/2022       HCG (If Applicable):   Lab Results   Component Value Date    PREGTESTUR NEGATIVE 02/08/2022        ABGs: No results found for: PHART, PO2ART, OXA8RAP, VUY7OAO, BEART, T5WKHEAS     Type & Screen (If Applicable):  No results found for: LABABO, LABRH    Drug/Infectious Status (If Applicable):  No results found for: HIV, HEPCAB    COVID-19 Screening (If Applicable):   Lab Results   Component Value Date    COVID19 Not Detected 02/09/2022           Anesthesia Evaluation  Patient summary reviewed no history of anesthetic complications:   Airway: Mallampati: Unable to assess / NA       Comment: Patient is orally intubated   Dental:    (+) other  Comment: Did not assess, patient is orally intubated    Pulmonary:Negative Pulmonary ROS and normal exam  breath sounds clear to auscultation                             Cardiovascular:  Exercise tolerance: good (>4 METS),   (+) hypertension:,         Rhythm: regular  Rate: normal                    Neuro/Psych:   Negative Neuro/Psych ROS              GI/Hepatic/Renal:   (+) renal disease:, morbid obesity          Endo/Other:    (+) DiabetesType II DM, , .                 Abdominal:   (+) obese,           Vascular: negative vascular ROS. Other Findings:               Anesthesia Plan      general     ASA 4       Induction: intravenous and inhalational.  arterial line  MIPS: Postoperative opioids intended and Postoperative ventilation. Anesthetic plan and risks discussed with patient. Plan discussed with CRNA.           4711  Diastolic function is indeterminate due to patient's heartrate.      Left ventricle systolic function is normal.      The Ejection Fraction is 60-65%.      No significant valvulopathies.      No regional wall motion abnormalities noted.      2021 stress  Stress ECG Conclusion      No ischemic ECG changes      Normal perfusion scan        hcg neg    Vincent Antiporda, MD   2/11/2022

## 2022-02-11 NOTE — BRIEF OP NOTE
Brief Postoperative Note      Patient: Ave Martinez  YOB: 1980  MRN: [id number]    Date of Procedure: 2/11/2022    Pre-Op Diagnosis: NECROTIZING FASCIITIS    Post-Op Diagnosis: Same       Procedure(s):  INCISION AND DRAINAGE AND DEBRIDEMENT BUTTOCK AND LABIAL ABSCESS , PLACEMENT OF WOUND VAC    Surgeon(s):  Trae Cordon DO    Assistant:  Resident: Comfort Jarvis DO; Griselda Shadow, DO    Anesthesia: General    Estimated Blood Loss (mL): 73DR    Complications: None    Specimens:   * No specimens in log *    Implants:  * No implants in log *      Drains:   Negative Pressure Wound Therapy Hip Anterior;Right (Active)       Negative Pressure Wound Therapy Hip Anterior; Left (Active)       NG/OG/NJ/NE Tube Orogastric 18 fr Center mouth (Active)   Surrounding Skin Dry; Intact 02/11/22 1200   Securement device Yes 02/11/22 1200   Status Clamped 02/11/22 1200   Placement Verified by External Catheter Length 02/11/22 1200   NG/OG/NJ/NE External Measurement (cm) 55 cm 02/11/22 1200   Free Water Flush (mL) 30 mL 02/10/22 2000       Urethral Catheter Temperature probe 16 fr (Active)   Catheter Indications Need for fluid volume management of the critically ill patient in a critical care setting 02/11/22 1200   Site Assessment Swelling 02/11/22 1200   Urine Color Yellow 02/11/22 1200   Urine Appearance Clear 02/11/22 1200   Urine Odor Malodorous 02/09/22 1500   Output (mL) 55 mL 02/11/22 1200       Findings: Debridement of tissue left posterior buttock. Placement of black sponge left buttock tunneled anteriorly toward groin and left groin tunneled posteriorly toward buttock. Placement of black sponge right groin tunneled posteriorly toward buttock. Electronically signed by Comfort Jarvis DO on 2/11/2022                  Trauma Attending Attestation      I have reviewed the above GCS note(s) and confirmed the key elements of the medical history and physical exam. I have seen and examined the pt.   I have discussed the findings, established the care plan and recommendations with Resident, GCS RN, bedside nurse.   I was physically present during the critical portions of the case        Seb Lorenzo DO  2/11/2022  6:04 PM

## 2022-02-12 ENCOUNTER — APPOINTMENT (OUTPATIENT)
Dept: GENERAL RADIOLOGY | Age: 42
DRG: 853 | End: 2022-02-12
Payer: COMMERCIAL

## 2022-02-12 LAB
ABSOLUTE EOS #: 0.17 K/UL (ref 0–0.44)
ABSOLUTE IMMATURE GRANULOCYTE: 0.12 K/UL (ref 0–0.3)
ABSOLUTE LYMPH #: 1.44 K/UL (ref 1.1–3.7)
ABSOLUTE MONO #: 0.8 K/UL (ref 0.1–1.2)
ALLEN TEST: ABNORMAL
ANION GAP SERPL CALCULATED.3IONS-SCNC: 10 MMOL/L (ref 9–17)
ANION GAP SERPL CALCULATED.3IONS-SCNC: 11 MMOL/L (ref 9–17)
ANION GAP SERPL CALCULATED.3IONS-SCNC: 12 MMOL/L (ref 9–17)
ANION GAP SERPL CALCULATED.3IONS-SCNC: 13 MMOL/L (ref 9–17)
ANION GAP SERPL CALCULATED.3IONS-SCNC: 14 MMOL/L (ref 9–17)
BASOPHILS # BLD: 0 % (ref 0–2)
BASOPHILS ABSOLUTE: 0.03 K/UL (ref 0–0.2)
BUN BLDV-MCNC: 30 MG/DL (ref 6–20)
BUN/CREAT BLD: ABNORMAL (ref 9–20)
CALCIUM IONIZED: 1.04 MMOL/L (ref 1.13–1.33)
CALCIUM IONIZED: 1.12 MMOL/L (ref 1.13–1.33)
CALCIUM IONIZED: 1.13 MMOL/L (ref 1.13–1.33)
CALCIUM IONIZED: 1.14 MMOL/L (ref 1.13–1.33)
CALCIUM SERPL-MCNC: 7.6 MG/DL (ref 8.6–10.4)
CHLORIDE BLD-SCNC: 100 MMOL/L (ref 98–107)
CHLORIDE BLD-SCNC: 98 MMOL/L (ref 98–107)
CHLORIDE BLD-SCNC: 99 MMOL/L (ref 98–107)
CO2: 19 MMOL/L (ref 20–31)
CO2: 19 MMOL/L (ref 20–31)
CO2: 21 MMOL/L (ref 20–31)
CREAT SERPL-MCNC: 3.13 MG/DL (ref 0.5–0.9)
DIFFERENTIAL TYPE: ABNORMAL
EOSINOPHILS RELATIVE PERCENT: 2 % (ref 1–4)
FIO2: 40
GFR AFRICAN AMERICAN: 20 ML/MIN
GFR NON-AFRICAN AMERICAN: 16 ML/MIN
GFR SERPL CREATININE-BSD FRML MDRD: ABNORMAL ML/MIN/{1.73_M2}
GFR SERPL CREATININE-BSD FRML MDRD: ABNORMAL ML/MIN/{1.73_M2}
GLUCOSE BLD-MCNC: 117 MG/DL (ref 65–105)
GLUCOSE BLD-MCNC: 122 MG/DL (ref 65–105)
GLUCOSE BLD-MCNC: 123 MG/DL (ref 65–105)
GLUCOSE BLD-MCNC: 127 MG/DL (ref 65–105)
GLUCOSE BLD-MCNC: 133 MG/DL (ref 65–105)
GLUCOSE BLD-MCNC: 135 MG/DL (ref 65–105)
GLUCOSE BLD-MCNC: 151 MG/DL (ref 70–99)
GLUCOSE BLD-MCNC: 152 MG/DL (ref 65–105)
GLUCOSE BLD-MCNC: 153 MG/DL (ref 74–100)
GLUCOSE BLD-MCNC: 155 MG/DL (ref 65–105)
GLUCOSE BLD-MCNC: 160 MG/DL (ref 65–105)
HCT VFR BLD CALC: 20.5 % (ref 36.3–47.1)
HCT VFR BLD CALC: 23 % (ref 36.3–47.1)
HEMOGLOBIN: 6.7 G/DL (ref 11.9–15.1)
HEMOGLOBIN: 7.7 G/DL (ref 11.9–15.1)
IMMATURE GRANULOCYTES: 1 %
LYMPHOCYTES # BLD: 14 % (ref 24–43)
MAGNESIUM: 1.9 MG/DL (ref 1.6–2.6)
MCH RBC QN AUTO: 28.6 PG (ref 25.2–33.5)
MCHC RBC AUTO-ENTMCNC: 32.7 G/DL (ref 28.4–34.8)
MCV RBC AUTO: 87.6 FL (ref 82.6–102.9)
MODE: ABNORMAL
MONOCYTES # BLD: 8 % (ref 3–12)
NEGATIVE BASE EXCESS, ART: 2 (ref 0–2)
NRBC AUTOMATED: 0 PER 100 WBC
O2 DEVICE/FLOW/%: ABNORMAL
PATIENT TEMP: ABNORMAL
PDW BLD-RTO: 14.8 % (ref 11.8–14.4)
PHOSPHORUS: 5.2 MG/DL (ref 2.6–4.5)
PLATELET # BLD: 245 K/UL (ref 138–453)
PLATELET ESTIMATE: ABNORMAL
PMV BLD AUTO: 10.9 FL (ref 8.1–13.5)
POC HCO3: 23.4 MMOL/L (ref 21–28)
POC LACTIC ACID: 0.5 MMOL/L (ref 0.56–1.39)
POC O2 SATURATION: 99 % (ref 94–98)
POC PCO2 TEMP: ABNORMAL MM HG
POC PCO2: 45 MM HG (ref 35–48)
POC PH TEMP: ABNORMAL
POC PH: 7.32 (ref 7.35–7.45)
POC PO2 TEMP: ABNORMAL MM HG
POC PO2: 174.1 MM HG (ref 83–108)
POSITIVE BASE EXCESS, ART: ABNORMAL (ref 0–3)
POTASSIUM SERPL-SCNC: 3.5 MMOL/L (ref 3.7–5.3)
POTASSIUM SERPL-SCNC: 3.8 MMOL/L (ref 3.7–5.3)
POTASSIUM SERPL-SCNC: 3.8 MMOL/L (ref 3.7–5.3)
POTASSIUM SERPL-SCNC: 3.9 MMOL/L (ref 3.7–5.3)
POTASSIUM SERPL-SCNC: 4 MMOL/L (ref 3.7–5.3)
RBC # BLD: 2.34 M/UL (ref 3.95–5.11)
RBC # BLD: ABNORMAL 10*6/UL
SAMPLE SITE: ABNORMAL
SEG NEUTROPHILS: 75 % (ref 36–65)
SEGMENTED NEUTROPHILS ABSOLUTE COUNT: 7.73 K/UL (ref 1.5–8.1)
SODIUM BLD-SCNC: 129 MMOL/L (ref 135–144)
SODIUM BLD-SCNC: 132 MMOL/L (ref 135–144)
SODIUM BLD-SCNC: 133 MMOL/L (ref 135–144)
TCO2 (CALC), ART: ABNORMAL MMOL/L (ref 22–29)
WBC # BLD: 10.3 K/UL (ref 3.5–11.3)
WBC # BLD: ABNORMAL 10*3/UL

## 2022-02-12 PROCEDURE — 6360000002 HC RX W HCPCS: Performed by: STUDENT IN AN ORGANIZED HEALTH CARE EDUCATION/TRAINING PROGRAM

## 2022-02-12 PROCEDURE — 82330 ASSAY OF CALCIUM: CPT

## 2022-02-12 PROCEDURE — 2580000003 HC RX 258: Performed by: INTERNAL MEDICINE

## 2022-02-12 PROCEDURE — 80048 BASIC METABOLIC PNL TOTAL CA: CPT

## 2022-02-12 PROCEDURE — 6370000000 HC RX 637 (ALT 250 FOR IP): Performed by: STUDENT IN AN ORGANIZED HEALTH CARE EDUCATION/TRAINING PROGRAM

## 2022-02-12 PROCEDURE — 83735 ASSAY OF MAGNESIUM: CPT

## 2022-02-12 PROCEDURE — 2000000000 HC ICU R&B

## 2022-02-12 PROCEDURE — 86900 BLOOD TYPING SEROLOGIC ABO: CPT

## 2022-02-12 PROCEDURE — 6360000002 HC RX W HCPCS: Performed by: INTERNAL MEDICINE

## 2022-02-12 PROCEDURE — 85018 HEMOGLOBIN: CPT

## 2022-02-12 PROCEDURE — 80051 ELECTROLYTE PANEL: CPT

## 2022-02-12 PROCEDURE — 85014 HEMATOCRIT: CPT

## 2022-02-12 PROCEDURE — 2500000003 HC RX 250 WO HCPCS: Performed by: INTERNAL MEDICINE

## 2022-02-12 PROCEDURE — 82947 ASSAY GLUCOSE BLOOD QUANT: CPT

## 2022-02-12 PROCEDURE — 90945 DIALYSIS ONE EVALUATION: CPT | Performed by: INTERNAL MEDICINE

## 2022-02-12 PROCEDURE — 36430 TRANSFUSION BLD/BLD COMPNT: CPT

## 2022-02-12 PROCEDURE — 94003 VENT MGMT INPAT SUBQ DAY: CPT

## 2022-02-12 PROCEDURE — 37799 UNLISTED PX VASCULAR SURGERY: CPT

## 2022-02-12 PROCEDURE — 86920 COMPATIBILITY TEST SPIN: CPT

## 2022-02-12 PROCEDURE — 71045 X-RAY EXAM CHEST 1 VIEW: CPT

## 2022-02-12 PROCEDURE — 2500000003 HC RX 250 WO HCPCS: Performed by: STUDENT IN AN ORGANIZED HEALTH CARE EDUCATION/TRAINING PROGRAM

## 2022-02-12 PROCEDURE — 86850 RBC ANTIBODY SCREEN: CPT

## 2022-02-12 PROCEDURE — 84100 ASSAY OF PHOSPHORUS: CPT

## 2022-02-12 PROCEDURE — 83605 ASSAY OF LACTIC ACID: CPT

## 2022-02-12 PROCEDURE — 2580000003 HC RX 258: Performed by: STUDENT IN AN ORGANIZED HEALTH CARE EDUCATION/TRAINING PROGRAM

## 2022-02-12 PROCEDURE — 82803 BLOOD GASES ANY COMBINATION: CPT

## 2022-02-12 PROCEDURE — P9040 RBC LEUKOREDUCED IRRADIATED: HCPCS

## 2022-02-12 PROCEDURE — 86901 BLOOD TYPING SEROLOGIC RH(D): CPT

## 2022-02-12 PROCEDURE — 6360000002 HC RX W HCPCS

## 2022-02-12 PROCEDURE — 85025 COMPLETE CBC W/AUTO DIFF WBC: CPT

## 2022-02-12 RX ORDER — FUROSEMIDE 10 MG/ML
INJECTION INTRAMUSCULAR; INTRAVENOUS
Status: COMPLETED
Start: 2022-02-12 | End: 2022-02-12

## 2022-02-12 RX ORDER — FUROSEMIDE 10 MG/ML
80 INJECTION INTRAMUSCULAR; INTRAVENOUS 2 TIMES DAILY
Status: DISCONTINUED | OUTPATIENT
Start: 2022-02-12 | End: 2022-02-14

## 2022-02-12 RX ORDER — SODIUM CHLORIDE 9 MG/ML
INJECTION, SOLUTION INTRAVENOUS PRN
Status: COMPLETED | OUTPATIENT
Start: 2022-02-12 | End: 2022-02-18

## 2022-02-12 RX ADMIN — Medication: at 10:20

## 2022-02-12 RX ADMIN — MONTELUKAST SODIUM 10 MG: 10 TABLET, FILM COATED ORAL at 09:15

## 2022-02-12 RX ADMIN — CALCIUM GLUCONATE 1000 MG: 20 INJECTION, SOLUTION INTRAVENOUS at 22:43

## 2022-02-12 RX ADMIN — Medication 2000 ML/HR: at 23:22

## 2022-02-12 RX ADMIN — DEXMEDETOMIDINE HYDROCHLORIDE 0.9 MCG/KG/HR: 4 INJECTION, SOLUTION INTRAVENOUS at 04:09

## 2022-02-12 RX ADMIN — Medication 2000 ML/HR: at 16:33

## 2022-02-12 RX ADMIN — GABAPENTIN 100 MG: 300 CAPSULE ORAL at 05:09

## 2022-02-12 RX ADMIN — DEXMEDETOMIDINE HYDROCHLORIDE 0.9 MCG/KG/HR: 4 INJECTION, SOLUTION INTRAVENOUS at 22:27

## 2022-02-12 RX ADMIN — PIPERACILLIN SODIUM AND TAZOBACTAM SODIUM 2250 MG: 2; .25 INJECTION, POWDER, LYOPHILIZED, FOR SOLUTION INTRAVENOUS at 09:54

## 2022-02-12 RX ADMIN — POTASSIUM CHLORIDE 20 MEQ: 29.8 INJECTION, SOLUTION INTRAVENOUS at 16:10

## 2022-02-12 RX ADMIN — HEPARIN SODIUM 1600 UNITS: 1000 INJECTION INTRAVENOUS; SUBCUTANEOUS at 03:41

## 2022-02-12 RX ADMIN — FAMOTIDINE 20 MG: 40 POWDER, FOR SUSPENSION ORAL at 09:15

## 2022-02-12 RX ADMIN — HEPARIN SODIUM 1900 UNITS: 1000 INJECTION INTRAVENOUS; SUBCUTANEOUS at 03:41

## 2022-02-12 RX ADMIN — HEPARIN SODIUM 5000 UNITS: 5000 INJECTION INTRAVENOUS; SUBCUTANEOUS at 13:31

## 2022-02-12 RX ADMIN — PIPERACILLIN SODIUM AND TAZOBACTAM SODIUM 2250 MG: 2; .25 INJECTION, POWDER, LYOPHILIZED, FOR SOLUTION INTRAVENOUS at 18:31

## 2022-02-12 RX ADMIN — SODIUM CHLORIDE 7.2 UNITS/HR: 9 INJECTION, SOLUTION INTRAVENOUS at 22:30

## 2022-02-12 RX ADMIN — Medication 75 MCG/HR: at 12:43

## 2022-02-12 RX ADMIN — DEXMEDETOMIDINE HYDROCHLORIDE 0.9 MCG/KG/HR: 4 INJECTION, SOLUTION INTRAVENOUS at 11:31

## 2022-02-12 RX ADMIN — HEPARIN SODIUM 5000 UNITS: 5000 INJECTION INTRAVENOUS; SUBCUTANEOUS at 06:39

## 2022-02-12 RX ADMIN — PIPERACILLIN SODIUM AND TAZOBACTAM SODIUM 2250 MG: 2; .25 INJECTION, POWDER, LYOPHILIZED, FOR SOLUTION INTRAVENOUS at 13:57

## 2022-02-12 RX ADMIN — ACETAMINOPHEN 1000 MG: 500 TABLET ORAL at 13:31

## 2022-02-12 RX ADMIN — FUROSEMIDE 80 MG: 10 INJECTION, SOLUTION INTRAMUSCULAR; INTRAVENOUS at 11:06

## 2022-02-12 RX ADMIN — DEXMEDETOMIDINE HYDROCHLORIDE 0.9 MCG/KG/HR: 4 INJECTION, SOLUTION INTRAVENOUS at 18:40

## 2022-02-12 RX ADMIN — Medication 2000 ML/HR: at 09:27

## 2022-02-12 RX ADMIN — Medication 2000 ML/HR: at 23:21

## 2022-02-12 RX ADMIN — CLINDAMYCIN IN 5 PERCENT DEXTROSE 900 MG: 18 INJECTION, SOLUTION INTRAVENOUS at 07:34

## 2022-02-12 RX ADMIN — Medication 2000 ML/HR: at 16:30

## 2022-02-12 RX ADMIN — CLINDAMYCIN IN 5 PERCENT DEXTROSE 900 MG: 18 INJECTION, SOLUTION INTRAVENOUS at 15:16

## 2022-02-12 RX ADMIN — CLINDAMYCIN IN 5 PERCENT DEXTROSE 900 MG: 18 INJECTION, SOLUTION INTRAVENOUS at 01:09

## 2022-02-12 RX ADMIN — Medication 2000 ML/HR: at 00:42

## 2022-02-12 RX ADMIN — ACETAMINOPHEN 1000 MG: 500 TABLET ORAL at 06:40

## 2022-02-12 RX ADMIN — HEPARIN SODIUM 5000 UNITS: 5000 INJECTION INTRAVENOUS; SUBCUTANEOUS at 22:01

## 2022-02-12 RX ADMIN — Medication 2000 ML/HR: at 16:31

## 2022-02-12 RX ADMIN — GABAPENTIN 100 MG: 300 CAPSULE ORAL at 12:06

## 2022-02-12 RX ADMIN — FUROSEMIDE 80 MG: 10 INJECTION, SOLUTION INTRAMUSCULAR; INTRAVENOUS at 18:07

## 2022-02-12 RX ADMIN — FLUTICASONE PROPIONATE 1 SPRAY: 50 SPRAY, METERED NASAL at 11:06

## 2022-02-12 RX ADMIN — CALCIUM GLUCONATE 1000 MG: 20 INJECTION, SOLUTION INTRAVENOUS at 15:32

## 2022-02-12 RX ADMIN — OXYCODONE 5 MG: 5 TABLET ORAL at 22:44

## 2022-02-12 RX ADMIN — DEXMEDETOMIDINE HYDROCHLORIDE 0.9 MCG/KG/HR: 4 INJECTION, SOLUTION INTRAVENOUS at 15:10

## 2022-02-12 RX ADMIN — Medication: at 19:11

## 2022-02-12 RX ADMIN — POTASSIUM CHLORIDE 20 MEQ: 29.8 INJECTION, SOLUTION INTRAVENOUS at 17:39

## 2022-02-12 RX ADMIN — KETAMINE HYDROCHLORIDE 0.2 MG/KG/HR: 50 INJECTION, SOLUTION INTRAMUSCULAR; INTRAVENOUS at 10:03

## 2022-02-12 RX ADMIN — DEXMEDETOMIDINE HYDROCHLORIDE 1 MCG/KG/HR: 4 INJECTION, SOLUTION INTRAVENOUS at 00:44

## 2022-02-12 RX ADMIN — Medication 2000 ML/HR: at 09:25

## 2022-02-12 RX ADMIN — Medication 125 MCG/HR: at 02:51

## 2022-02-12 RX ADMIN — Medication: at 00:43

## 2022-02-12 RX ADMIN — Medication 2000 ML/HR: at 09:26

## 2022-02-12 RX ADMIN — Medication 2000 ML/HR: at 00:41

## 2022-02-12 RX ADMIN — ACETAMINOPHEN 1000 MG: 500 TABLET ORAL at 22:02

## 2022-02-12 RX ADMIN — Medication 2000 ML/HR: at 00:40

## 2022-02-12 RX ADMIN — DEXMEDETOMIDINE HYDROCHLORIDE 0.9 MCG/KG/HR: 4 INJECTION, SOLUTION INTRAVENOUS at 07:44

## 2022-02-12 RX ADMIN — PIPERACILLIN SODIUM AND TAZOBACTAM SODIUM 2250 MG: 2; .25 INJECTION, POWDER, LYOPHILIZED, FOR SOLUTION INTRAVENOUS at 02:14

## 2022-02-12 RX ADMIN — Medication 2000 ML/HR: at 23:20

## 2022-02-12 RX ADMIN — GABAPENTIN 100 MG: 300 CAPSULE ORAL at 20:23

## 2022-02-12 ASSESSMENT — PULMONARY FUNCTION TESTS
PIF_VALUE: 27
PIF_VALUE: 23
PIF_VALUE: 20
PIF_VALUE: 20
PIF_VALUE: 21

## 2022-02-12 ASSESSMENT — PAIN SCALES - GENERAL
PAINLEVEL_OUTOF10: 0
PAINLEVEL_OUTOF10: 0

## 2022-02-12 NOTE — PLAN OF CARE
Problem: Skin Integrity:  Goal: Will show no infection signs and symptoms  Description: Will show no infection signs and symptoms  Outcome: Ongoing  Goal: Absence of new skin breakdown  Description: Absence of new skin breakdown  Outcome: Ongoing     Problem: Fluid Volume - Imbalance:  Goal: Absence of imbalanced fluid volume signs and symptoms  Description: Absence of imbalanced fluid volume signs and symptoms  Outcome: Ongoing     Problem: Pain:  Goal: Pain level will decrease  Description: Pain level will decrease  Outcome: Ongoing  Goal: Recognizes and communicates pain  Description: Recognizes and communicates pain  Outcome: Ongoing  Goal: Control of acute pain  Description: Control of acute pain  Outcome: Ongoing  Goal: Control of chronic pain  Description: Control of chronic pain  Outcome: Ongoing     Problem: Serum Glucose Level - Abnormal:  Goal: Ability to maintain appropriate glucose levels will improve to within specified parameters  Description: Ability to maintain appropriate glucose levels will improve to within specified parameters  Outcome: Ongoing     Problem: Skin Integrity - Impaired:  Goal: Will show no infection signs and symptoms  Description: Will show no infection signs and symptoms  Outcome: Ongoing  Goal: Absence of new skin breakdown  Description: Absence of new skin breakdown  Outcome: Ongoing     Problem: Non-Violent Restraints  Goal: Removal from restraints as soon as assessed to be safe  Outcome: Ongoing  Goal: No harm/injury to patient while restraints in use  Outcome: Ongoing  Goal: Patient's dignity will be maintained  Outcome: Ongoing     Problem: Nutrition  Goal: Optimal nutrition therapy  Outcome: Ongoing     Problem: OXYGENATION/RESPIRATORY FUNCTION  Goal: Patient will maintain patent airway  2/12/2022 1035 by Marcie Calderon RN  Outcome: Ongoing  2/11/2022 2338 by Katey Steiner RCP  Outcome: Ongoing  Goal: Patient will achieve/maintain normal respiratory rate/effort  Description: Respiratory rate and effort will be within normal limits for the patient  2/12/2022 1035 by Marya Silverio RN  Outcome: Ongoing  2/11/2022 2338 by Krystina Parrish RCP  Outcome: Ongoing     Problem: MECHANICAL VENTILATION  Goal: Patient will maintain patent airway  2/12/2022 1035 by Marya Silverio RN  Outcome: Ongoing  2/11/2022 2338 by Krystina Parrish RCP  Outcome: Ongoing  Goal: Oral health is maintained or improved  2/12/2022 1035 by Marya Silverio RN  Outcome: Ongoing  2/11/2022 2338 by Krystina Parrish RCP  Outcome: Ongoing  Goal: ET tube will be managed safely  2/12/2022 1035 by Marya Silverio RN  Outcome: Ongoing  2/11/2022 2338 by Krystina Parrish RCP  Outcome: Ongoing  Goal: Ability to express needs and understand communication  2/12/2022 1035 by Marya Silverio RN  Outcome: Ongoing  2/11/2022 2338 by Krystina Parrish RCP  Outcome: Ongoing  Goal: Mobility/activity is maintained at optimum level for patient  2/12/2022 1035 by Marya Silverio RN  Outcome: Ongoing  2/11/2022 2338 by Krystina Parrish RCP  Outcome: Ongoing     Problem: SKIN INTEGRITY  Goal: Skin integrity is maintained or improved  2/12/2022 1035 by Marya Silverio RN  Outcome: Ongoing  2/11/2022 2338 by Krystina Parrish RCP  Outcome: Ongoing

## 2022-02-12 NOTE — PLAN OF CARE
Problem: OXYGENATION/RESPIRATORY FUNCTION  Goal: Patient will maintain patent airway  2/11/2022 2338 by Steven Salvador RCP  Outcome: Ongoing  2/11/2022 1015 by Lorenzo Stallings RCP  Outcome: Ongoing     Problem: OXYGENATION/RESPIRATORY FUNCTION  Goal: Patient will achieve/maintain normal respiratory rate/effort  Description: Respiratory rate and effort will be within normal limits for the patient  2/11/2022 2338 by Steven Salvador RCP  Outcome: Ongoing  2/11/2022 1015 by Lorenzo Stallings RCP  Outcome: Ongoing     Problem: MECHANICAL VENTILATION  Goal: Patient will maintain patent airway  2/11/2022 2338 by Steven Salvador RCP  Outcome: Ongoing  2/11/2022 1015 by Lorenzo Stallings RCP  Outcome: Ongoing     Problem: MECHANICAL VENTILATION  Goal: Oral health is maintained or improved  2/11/2022 2338 by Steven Salvador RCP  Outcome: Ongoing  2/11/2022 1015 by Lorenzo Stallings RCP  Outcome: Ongoing     Problem: MECHANICAL VENTILATION  Goal: ET tube will be managed safely  2/11/2022 2338 by Steven Salvador RCP  Outcome: Ongoing  2/11/2022 1015 by Lorenzo Stallings RCP  Outcome: Ongoing     Problem: MECHANICAL VENTILATION  Goal: Ability to express needs and understand communication  2/11/2022 2338 by Steven Salvador RCP  Outcome: Ongoing  2/11/2022 1015 by Lorenzo Stallings RCP  Outcome: Ongoing     Problem: MECHANICAL VENTILATION  Goal: Mobility/activity is maintained at optimum level for patient  2/11/2022 2338 by Steven Salvador RCP  Outcome: Ongoing  2/11/2022 1015 by Lorenzo Stallings RCP  Outcome: Ongoing     Problem: SKIN INTEGRITY  Goal: Skin integrity is maintained or improved  2/11/2022 2338 by Steven Salvador RCP  Outcome: Ongoing  2/11/2022 1015 by Lorenzo Stallings RCP  Outcome: Ongoing

## 2022-02-12 NOTE — PROGRESS NOTES
ICU PROGRESS NOTE          PATIENT NAME: Sena Friedman  MEDICAL RECORD NO. [id number]  DATE: 2022    PRIMARY CARE PHYSICIAN: Pinky Ordonez MD    HD: # 4    ASSESSMENT    Patient Active Problem List   Diagnosis    Leonel gangrene    Uncontrolled type 2 diabetes mellitus with hyperglycemia ([facility]    Acute kidney injury superimposed on CKD ([facility]    Hypokalemia    Hyponatremia    Anemia    Morbid obesity with BMI of 45.0-49.9, adult ([facility]    Type 1 diabetes mellitus with stage 3a chronic kidney disease ([facility]       MEDICAL DECISION MAKING AND PLAN    Neuro:  Fentanyl @ 125 mcg/hr   Precedex @ 0.9 mcg/kg/hr  Ketamine drip   Tylenol 1000 mg q8H, gabapentin 100 mg q8, roxicodone 5mg q 4H PRN   CV:  HR: 61 - 78  MAP: 62 - 84  Lactate: 0.50 (0.64)  Intermittent levophed to maintain MAP > 65 mmHg, currently @ 2 mcg/min  Heme:  Hgb: 6.7 (7.4)  Will transfuse 1 u PRBC's   Plt: 245 (237)  DVT ppx: heparin   Pulm:  PRVC: TV: 7 cc/kg/10/15/40%  AB.32/45/174.1/23.4  PF: 435  CXR: pending  Possible SBT today   Renal/lytes:  BUN/Cr: 30 (35) / 3.13 (3.98)  Na/K+: 132 (132) / 3.8 (3.9)  UOP: 0.1 cc/kg/hr overnight   Nephrology following: Managing CRRT and electrolytes. Bicarb drip started @ 150 cc/hr.    GI:  Tube feeds started post operatively, advancing to goal of 50 cc/hr   Pepcid 20 mg BID   ID:  Tmax: 98.8 (37.1)  WBC: 10.3 (10.3)  Antibiotics: clindamycin, zosyn   MSK:  Necrotizing soft tissue infection of the buttocks and groin   S/p incision and debridement , and further debridement later   S/p irrigation and debridement of wound 2/10  S/p irrigation and debridement, partial closure, and bilateral wound vac placement   Endo:  B  Insulin drip: 13.6 units over past 24 hours   Lines:  PIV, Booker, ETT, OGT, right radial arterial line, right IJ CVC, right femoral polina catheter    CHECKLIST    RASS: -2  RESTRAINTS: bilateral soft wrist   IVF: bicarb drip @ 150 cc/hr   NUTRITION: NPO  ANTIBIOTICS: clindamycin, zosyn   GI: pepcid   DVT: heparin   GLYCEMIC CONTROL: insulin gtt   HOB >45: yes   WOUND CARE: wound vacs intact     SUBJECTIVE    Joanna Salter was seen and examined at bedside. No acute events overnight. Requiring levophed at 2 mcg/min to maintain MAPs. Hemoglobin 6.7 this morning, will transfuse 1 u PRBCs. OBJECTIVE  VITALS: Temp: Temp: 98.5 °F (36.9 °C)Temp  Av °F (36.7 °C)  Min: 97.4 °F (36.3 °C)  Max: 98.7 °F (37.1 °C) BP No data recorded. No data recorded. Pulse Pulse  Av.6  Min: 61  Max: 78 Resp Resp  Av.2  Min: 0  Max: 24 Pulse ox SpO2  Av.7 %  Min: 90 %  Max: 100 %    GENERAL: intubated, sedated, no apparent distress   NEURO: intubated, sedated   HEENT: NCAT   : perineal and wound vac dressings intact with good seal   LUNGS: no acute respiratory distress or accessory muscle use   MECHANICAL VENTILATION: tolerating mechanical ventilation   HEART: regular rate and rhythm   ABDOMEN: soft, non-distended   EXTERMITY: no cyanosis, clubbing or edema    LAB:  CBC:   Recent Labs     02/10/22  0606 22  0542 22  0551   WBC 10.4 10.3 10.3   HGB 8.5* 7.4* 6.7*   HCT 25.8* 22.8* 20.5*   MCV 86.0 86.0 87.6    237 245     BMP:   Recent Labs     22  2259 22  2259 02/10/22  0606 02/10/22  2012 02/11/22  0542 22  0542 22  0826 22  0230   *   < > 130*   < > 132*   < > 132* 122* 132*   K 4.8   < > 4.4   < > 3.9   < > 3.9 3.7 3.9   CL 92*   < > 97*   < > 100   < > 100 95* 100   CO2 19*   < > 16*   < > 17*   < > 16* 14* 19*   BUN 42*  --  40*  --  35*  --   --   --   --    CREATININE 4.82*  --  4.35*  --  3.98*  --   --   --   --    GLUCOSE 121*  --  135*  --  122*  --   --   --   --     < > = values in this interval not displayed.          RADIOLOGY:  XR CHEST PORTABLE    Result Date: 2022  EXAMINATION: ONE XRAY VIEW OF THE CHEST 2022 6:26 am COMPARISON: 02/10/2022 HISTORY: ORDERING SYSTEM PROVIDED HISTORY: intubated TECHNOLOGIST PROVIDED HISTORY: intubated FINDINGS: Right jugular central venous catheter tip is in the superior vena cava. Endotracheal tube tip is approximately 5 cm above the henrietta. Nasogastric tube tip is below the diaphragm but not included on this study. Stable borderline cardiomegaly. Improving patchy perihilar interstitial and airspace opacities, left greater than right. This may be due to edema, atelectasis, infection, or a combination. No new focal lung consolidation is seen. No large pleural effusions. Improving perihilar opacities. XR CHEST PORTABLE    Result Date: 2/10/2022  EXAMINATION: ONE XRAY VIEW OF THE CHEST 2/10/2022 10:41 am COMPARISON: 02/09/2022 HISTORY: ORDERING SYSTEM PROVIDED HISTORY: post op, remained intubated TECHNOLOGIST PROVIDED HISTORY: post op, remained intubated FINDINGS: Portable chest reveals heart to be borderline enlarged. Patchy airspace disease seen diffusely throughout the lung fields. The lines and tubes are in satisfactory position. Findings are stable. No new focal parenchymal opacification present. Possible small bilateral pleural effusions stable and unchanged. Stable chest as above. Increased markings again seen diffusely throughout the lung fields bilaterally. Probable tiny bilateral pleural effusions. Cee Raya,   2/12/2022  6:40 AM            Trauma Attending Raji Garcia      I have reviewed the above GCS note(s) and confirmed the key elements of the medical history and physical exam. I have seen and examined the pt. I have discussed the findings, established the care plan and recommendations with Resident, GCS RN, bedside nurse.   Anemia transfuse 1 unit   P/f ratio 435 wean fio2 peep as tolerated   On CRT - tolerating well   Lactic acid 0.5  WBC improved   0-2 of levo - septic shock resolving   Critical care min: [facility],   2/13/2022  6:32 PM

## 2022-02-12 NOTE — PROGRESS NOTES
Nephrology Progress Note      SUBJECTIVE       Pt was seen and examined. No acute issues overnite. Her Levophed requirements have decreased. Patient went to the OR for debridement of her wounds and is got a wound VAC in place now. Urine output is marginal, patient continues on CVVHD, currently desired fluid balance is set at -50 mL an hour  Hemoglobin was a little low, got a unit of blood. .  I had a long detailed discussion with her mother at bedside. Patient continues to be intubated. She continues to receive broad-spectrum antibiotics. OBJECTIVE      CURRENT TEMPERATURE:  Temp: 98.5 °F (36.9 °C)  MAXIMUM TEMPERATURE OVER 24HRS:  Temp (24hrs), Av °F (36.7 °C), Min:97.4 °F (36.3 °C), Max:98.7 °F (37.1 °C)    CURRENT RESPIRATORY RATE:  Resp: 15  CURRENT PULSE:  Pulse: 62  CURRENT BLOOD PRESSURE:  BP: (!) 99/55  24HR BLOOD PRESSURE RANGE:  No data recorded.  ; No data recorded. 24HR INTAKE/OUTPUT:      Intake/Output Summary (Last 24 hours) at 2022 1010  Last data filed at 2022 0800  Gross per 24 hour   Intake 3960.58 ml   Output 2266 ml   Net 1694.58 ml     WEIGHT :  Patient Vitals for the past 96 hrs (Last 3 readings):   Weight   22 0600 299 lb 4.8 oz (135.8 kg)   22 0400 276 lb 6.4 oz (125.4 kg)   22 2148 275 lb (124.7 kg)     PHYSICAL EXAM      GENERAL APPEARANCE: Intubated and sedated  SKIN: warm and dry, no rash or erythema  EYES: conjunctivae pale and sclera anicteric  ENT: ETT in place  NECK:   No carotid bruits and no carotid lymphadenopathy . PULMONARY: lungs are clear to auscultation. No Wheezing, no ronchi . CADRDIOVASCULAR: S1 and S2 normal NO S3 and NO S4 . No rubs , no murmur. ABDOMEN: soft nontender, bowel sounds present, no organomegaly, no ascites.        EXTREMITIES: + edema     CURRENT MEDICATIONS      0.9 % sodium chloride infusion, PRN  famotidine (PEPCID) 40 MG/5ML suspension 20 mg, Daily  prismaSATE BK 0/3.5 5,000 mL with potassium chloride 15 mEq solution, Continuous  sodium bicarbonate 150 mEq in dextrose 5 % 1,000 mL infusion, Continuous  heparin (porcine) injection 1,900 Units, PRN  lactated ringers bolus, Once  heparin (porcine) injection 1,000 Units, Once  heparin (porcine) injection 1,600 Units, PRN  piperacillin-tazobactam (ZOSYN) 2,250 mg in dextrose 5 % 50 mL IVPB (mini-bag), Q6H  potassium chloride 20 mEq/50 mL IVPB (Central Line), PRN  magnesium sulfate 1000 mg in dextrose 5% 100 mL IVPB, PRN  calcium gluconate 1000 mg in sodium chloride 50 mL, PRN   Or  calcium gluconate 2000 mg in sodium chloride 100 mL, PRN   Or  calcium gluconate 3,000 mg in dextrose 5 % 100 mL IVPB, PRN   Or  calcium gluconate 4,000 mg in dextrose 5 % 100 mL IVPB, PRN  sodium phosphate 6 mmol in dextrose 5 % 250 mL IVPB, PRN   Or  sodium phosphate 12 mmol in dextrose 5 % 250 mL IVPB, PRN   Or  sodium phosphate 18 mmol in dextrose 5 % 500 mL IVPB, PRN   Or  sodium phosphate 24 mmol in dextrose 5 % 500 mL IVPB, PRN  oxyCODONE (ROXICODONE) immediate release tablet 5 mg, Q4H PRN  fluticasone (FLONASE) 50 MCG/ACT nasal spray 1 spray, Daily  budesonide-formoterol (SYMBICORT) 80-4.5 MCG/ACT inhaler 2 puff, BID  albuterol sulfate  (90 Base) MCG/ACT inhaler 1 puff, Q4H PRN  norepinephrine (LEVOPHED) 16 mg in sodium chloride 0.9 % 250 mL infusion, Continuous  montelukast (SINGULAIR) tablet 10 mg, Daily  [Held by provider] rOPINIRole (REQUIP) tablet 4 mg, Daily  insulin regular (HUMULIN R;NOVOLIN R) 100 Units in sodium chloride 0.9 % 100 mL infusion, Continuous  gabapentin (NEURONTIN) capsule 100 mg, Q8H  vasopressin 20 Units in dextrose 5 % 100 mL infusion, Continuous  fentaNYL 20 mcg/mL Infusion, Continuous  dexmedetomidine (PRECEDEX) 400 mcg in sodium chloride 0.9 % 100 mL infusion, Continuous  heparin (porcine) injection 5,000 Units, 3 times per day  ketamine (KETALAR) 500 mg in sodium chloride 0.9 % 250 mL infusion, Continuous  acetaminophen (TYLENOL) tablet 1,000 mg, 3 times per day  clindamycin (CLEOCIN) 900 mg in dextrose 5 % 50 mL IVPB, Q8H          LABS      CBC:   Recent Labs     02/10/22  0606 02/11/22  0542 02/12/22  0551   WBC 10.4 10.3 10.3   RBC 3.00* 2.65* 2.34*   HGB 8.5* 7.4* 6.7*   HCT 25.8* 22.8* 20.5*   MCV 86.0 86.0 87.6   MCH 28.3 27.9 28.6   MCHC 32.9 32.5 32.7   RDW 14.3 14.5* 14.8*    237 245   MPV 10.9 10.8 10.9      BMP:   Recent Labs     02/10/22  0606 02/10/22  2012 02/11/22  0542 02/11/22  0826 02/12/22  0230 02/12/22  0551 02/12/22  0800   *   < > 132*   < > 132* 132* 132*   K 4.4   < > 3.9   < > 3.9 3.8 3.8   CL 97*   < > 100   < > 100 99 100   CO2 16*   < > 17*   < > 19* 19* 21   BUN 40*  --  35*  --   --  30*  --    CREATININE 4.35*  --  3.98*  --   --  3.13*  --    GLUCOSE 135*  --  122*  --   --  151*  --    CALCIUM 7.2*  --  7.3*  --   --  7.6*  --     < > = values in this interval not displayed. PHOSPHORUS:    Recent Labs     02/10/22  0606 02/11/22  0542 02/12/22  0551   PHOS 5.5* 5.6* 5.2*     MAGNESIUM:   Recent Labs     02/10/22  0606 02/11/22  0542 02/12/22  0551   MG 2.2 2.1 1.9       SPEP:   Lab Results   Component Value Date    PROT 5.7 02/09/2022     C3:   Lab Results   Component Value Date    C3 143 02/09/2022     C4:   Lab Results   Component Value Date    C4 19 02/09/2022     URINE SODIUM:    Lab Results   Component Value Date    JORGE 26 02/09/2022      URINE CREATININE:    Lab Results   Component Value Date    LABCREA 142.3 02/09/2022         ASSESSMENT      1. Acute Kidney Injury: Secondary to ischemic ATN from shock/ sepsis systemic intermittent response syndrome . He did have high vancomycin levels as well earlier During this admission . Andreia Del Real 2. Chronic kidney disease stage III baseline 1.5-1.6 proteinuria about 1 to 2 g from diabetic nephrosclerosis follows up with Dr. Awa Werner  3. Type 2 diabetes with poor control hemoglobin A1c 7-9 range  4. Super morbid obesity  5.   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 Patient is s/p  debridement of her labial and gluteal wound with wound VAC placement. PLAN      1. Continue CVVHD as ordered and maintain -50 mL/h fluid removal.  2.  We will give her 1 dose of Lasix to see if urine output picks up. 3. Follow up labs ordered. 4. Following along       Please do not hesitate to call with questions.     This note is created with the assistance of a speech-recognition program. While intending to generate a document that actually reflects the content of the visit, no guarantees can be provided that every mistake has been identified and corrected by editing    Electronically signed by Aarti Eddy MD on 2/12/2022 at 10:10 AM

## 2022-02-13 ENCOUNTER — APPOINTMENT (OUTPATIENT)
Dept: GENERAL RADIOLOGY | Age: 42
DRG: 853 | End: 2022-02-13
Payer: COMMERCIAL

## 2022-02-13 LAB
ABSOLUTE EOS #: 0.17 K/UL (ref 0–0.44)
ABSOLUTE IMMATURE GRANULOCYTE: 0.13 K/UL (ref 0–0.3)
ABSOLUTE LYMPH #: 1.5 K/UL (ref 1.1–3.7)
ABSOLUTE MONO #: 1.01 K/UL (ref 0.1–1.2)
ALLEN TEST: ABNORMAL
ANION GAP SERPL CALCULATED.3IONS-SCNC: 11 MMOL/L (ref 9–17)
ANION GAP SERPL CALCULATED.3IONS-SCNC: 12 MMOL/L (ref 9–17)
ANION GAP SERPL CALCULATED.3IONS-SCNC: 8 MMOL/L (ref 9–17)
ANION GAP SERPL CALCULATED.3IONS-SCNC: 9 MMOL/L (ref 9–17)
BASOPHILS # BLD: 0 % (ref 0–2)
BASOPHILS ABSOLUTE: 0.04 K/UL (ref 0–0.2)
BUN BLDV-MCNC: 26 MG/DL (ref 6–20)
BUN/CREAT BLD: ABNORMAL (ref 9–20)
CALCIUM IONIZED: 1.13 MMOL/L (ref 1.13–1.33)
CALCIUM IONIZED: 1.17 MMOL/L (ref 1.13–1.33)
CALCIUM IONIZED: 1.18 MMOL/L (ref 1.13–1.33)
CALCIUM SERPL-MCNC: 8.4 MG/DL (ref 8.6–10.4)
CHLORIDE BLD-SCNC: 100 MMOL/L (ref 98–107)
CHLORIDE BLD-SCNC: 98 MMOL/L (ref 98–107)
CHLORIDE BLD-SCNC: 98 MMOL/L (ref 98–107)
CHLORIDE BLD-SCNC: 99 MMOL/L (ref 98–107)
CO2: 21 MMOL/L (ref 20–31)
CO2: 21 MMOL/L (ref 20–31)
CO2: 26 MMOL/L (ref 20–31)
CO2: 27 MMOL/L (ref 20–31)
CREAT SERPL-MCNC: 2.44 MG/DL (ref 0.5–0.9)
CULTURE: ABNORMAL
DIFFERENTIAL TYPE: ABNORMAL
DIRECT EXAM: ABNORMAL
EOSINOPHILS RELATIVE PERCENT: 2 % (ref 1–4)
FIO2: 40
GFR AFRICAN AMERICAN: 26 ML/MIN
GFR NON-AFRICAN AMERICAN: 22 ML/MIN
GFR SERPL CREATININE-BSD FRML MDRD: ABNORMAL ML/MIN/{1.73_M2}
GFR SERPL CREATININE-BSD FRML MDRD: ABNORMAL ML/MIN/{1.73_M2}
GLUCOSE BLD-MCNC: 120 MG/DL (ref 65–105)
GLUCOSE BLD-MCNC: 125 MG/DL (ref 65–105)
GLUCOSE BLD-MCNC: 126 MG/DL (ref 65–105)
GLUCOSE BLD-MCNC: 131 MG/DL (ref 65–105)
GLUCOSE BLD-MCNC: 132 MG/DL (ref 65–105)
GLUCOSE BLD-MCNC: 134 MG/DL (ref 65–105)
GLUCOSE BLD-MCNC: 136 MG/DL (ref 65–105)
GLUCOSE BLD-MCNC: 139 MG/DL (ref 74–100)
GLUCOSE BLD-MCNC: 140 MG/DL (ref 65–105)
GLUCOSE BLD-MCNC: 143 MG/DL (ref 65–105)
GLUCOSE BLD-MCNC: 147 MG/DL (ref 65–105)
GLUCOSE BLD-MCNC: 152 MG/DL (ref 70–99)
GLUCOSE BLD-MCNC: 155 MG/DL (ref 65–105)
GLUCOSE BLD-MCNC: 156 MG/DL (ref 65–105)
GLUCOSE BLD-MCNC: 157 MG/DL (ref 65–105)
GLUCOSE BLD-MCNC: 158 MG/DL (ref 65–105)
GLUCOSE BLD-MCNC: 160 MG/DL (ref 65–105)
GLUCOSE BLD-MCNC: 160 MG/DL (ref 65–105)
GLUCOSE BLD-MCNC: 163 MG/DL (ref 65–105)
GLUCOSE BLD-MCNC: 166 MG/DL (ref 65–105)
GLUCOSE BLD-MCNC: 167 MG/DL (ref 65–105)
GLUCOSE BLD-MCNC: 171 MG/DL (ref 65–105)
GLUCOSE BLD-MCNC: 171 MG/DL (ref 65–105)
GLUCOSE BLD-MCNC: 177 MG/DL (ref 65–105)
GLUCOSE BLD-MCNC: 185 MG/DL (ref 65–105)
GLUCOSE BLD-MCNC: 185 MG/DL (ref 65–105)
GLUCOSE BLD-MCNC: 189 MG/DL (ref 65–105)
GLUCOSE BLD-MCNC: 190 MG/DL (ref 65–105)
GLUCOSE BLD-MCNC: 192 MG/DL (ref 65–105)
GLUCOSE BLD-MCNC: 194 MG/DL (ref 65–105)
GLUCOSE BLD-MCNC: 199 MG/DL (ref 65–105)
GLUCOSE BLD-MCNC: 213 MG/DL (ref 65–105)
HCT VFR BLD CALC: 22.9 % (ref 36.3–47.1)
HEMOGLOBIN: 7.4 G/DL (ref 11.9–15.1)
IMMATURE GRANULOCYTES: 1 %
LYMPHOCYTES # BLD: 16 % (ref 24–43)
Lab: ABNORMAL
MAGNESIUM: 1.8 MG/DL (ref 1.6–2.6)
MCH RBC QN AUTO: 28.4 PG (ref 25.2–33.5)
MCHC RBC AUTO-ENTMCNC: 32.3 G/DL (ref 28.4–34.8)
MCV RBC AUTO: 87.7 FL (ref 82.6–102.9)
MODE: ABNORMAL
MONOCYTES # BLD: 11 % (ref 3–12)
NEGATIVE BASE EXCESS, ART: ABNORMAL (ref 0–2)
NRBC AUTOMATED: 0 PER 100 WBC
O2 DEVICE/FLOW/%: ABNORMAL
PATIENT TEMP: ABNORMAL
PDW BLD-RTO: 14.8 % (ref 11.8–14.4)
PHOSPHORUS: 4.6 MG/DL (ref 2.6–4.5)
PLATELET # BLD: 239 K/UL (ref 138–453)
PLATELET ESTIMATE: ABNORMAL
PMV BLD AUTO: 11.1 FL (ref 8.1–13.5)
POC HCO3: 28 MMOL/L (ref 21–28)
POC LACTIC ACID: 1.02 MMOL/L (ref 0.56–1.39)
POC O2 SATURATION: 99 % (ref 94–98)
POC PCO2 TEMP: ABNORMAL MM HG
POC PCO2: 47 MM HG (ref 35–48)
POC PH TEMP: ABNORMAL
POC PH: 7.38 (ref 7.35–7.45)
POC PO2 TEMP: ABNORMAL MM HG
POC PO2: 141.7 MM HG (ref 83–108)
POSITIVE BASE EXCESS, ART: 3 (ref 0–3)
POTASSIUM SERPL-SCNC: 3.7 MMOL/L (ref 3.7–5.3)
POTASSIUM SERPL-SCNC: 3.8 MMOL/L (ref 3.7–5.3)
RBC # BLD: 2.61 M/UL (ref 3.95–5.11)
RBC # BLD: ABNORMAL 10*6/UL
SAMPLE SITE: ABNORMAL
SEG NEUTROPHILS: 70 % (ref 36–65)
SEGMENTED NEUTROPHILS ABSOLUTE COUNT: 6.46 K/UL (ref 1.5–8.1)
SODIUM BLD-SCNC: 130 MMOL/L (ref 135–144)
SODIUM BLD-SCNC: 131 MMOL/L (ref 135–144)
SODIUM BLD-SCNC: 134 MMOL/L (ref 135–144)
SODIUM BLD-SCNC: 135 MMOL/L (ref 135–144)
SPECIMEN DESCRIPTION: ABNORMAL
TCO2 (CALC), ART: ABNORMAL MMOL/L (ref 22–29)
WBC # BLD: 9.3 K/UL (ref 3.5–11.3)
WBC # BLD: ABNORMAL 10*3/UL

## 2022-02-13 PROCEDURE — 2500000003 HC RX 250 WO HCPCS: Performed by: INTERNAL MEDICINE

## 2022-02-13 PROCEDURE — 6360000002 HC RX W HCPCS: Performed by: INTERNAL MEDICINE

## 2022-02-13 PROCEDURE — 6360000002 HC RX W HCPCS: Performed by: STUDENT IN AN ORGANIZED HEALTH CARE EDUCATION/TRAINING PROGRAM

## 2022-02-13 PROCEDURE — 2700000000 HC OXYGEN THERAPY PER DAY

## 2022-02-13 PROCEDURE — 2580000003 HC RX 258: Performed by: STUDENT IN AN ORGANIZED HEALTH CARE EDUCATION/TRAINING PROGRAM

## 2022-02-13 PROCEDURE — 83735 ASSAY OF MAGNESIUM: CPT

## 2022-02-13 PROCEDURE — 2500000003 HC RX 250 WO HCPCS: Performed by: STUDENT IN AN ORGANIZED HEALTH CARE EDUCATION/TRAINING PROGRAM

## 2022-02-13 PROCEDURE — 2000000000 HC ICU R&B

## 2022-02-13 PROCEDURE — 6370000000 HC RX 637 (ALT 250 FOR IP): Performed by: STUDENT IN AN ORGANIZED HEALTH CARE EDUCATION/TRAINING PROGRAM

## 2022-02-13 PROCEDURE — 94003 VENT MGMT INPAT SUBQ DAY: CPT

## 2022-02-13 PROCEDURE — 80048 BASIC METABOLIC PNL TOTAL CA: CPT

## 2022-02-13 PROCEDURE — 84100 ASSAY OF PHOSPHORUS: CPT

## 2022-02-13 PROCEDURE — 90945 DIALYSIS ONE EVALUATION: CPT | Performed by: INTERNAL MEDICINE

## 2022-02-13 PROCEDURE — 82330 ASSAY OF CALCIUM: CPT

## 2022-02-13 PROCEDURE — 85025 COMPLETE CBC W/AUTO DIFF WBC: CPT

## 2022-02-13 PROCEDURE — 82803 BLOOD GASES ANY COMBINATION: CPT

## 2022-02-13 PROCEDURE — 37799 UNLISTED PX VASCULAR SURGERY: CPT

## 2022-02-13 PROCEDURE — 71045 X-RAY EXAM CHEST 1 VIEW: CPT

## 2022-02-13 PROCEDURE — 2580000003 HC RX 258: Performed by: INTERNAL MEDICINE

## 2022-02-13 PROCEDURE — 83605 ASSAY OF LACTIC ACID: CPT

## 2022-02-13 PROCEDURE — 80051 ELECTROLYTE PANEL: CPT

## 2022-02-13 PROCEDURE — 6360000002 HC RX W HCPCS

## 2022-02-13 PROCEDURE — 94761 N-INVAS EAR/PLS OXIMETRY MLT: CPT

## 2022-02-13 RX ORDER — FUROSEMIDE 10 MG/ML
INJECTION INTRAMUSCULAR; INTRAVENOUS
Status: COMPLETED
Start: 2022-02-13 | End: 2022-02-13

## 2022-02-13 RX ORDER — MIDODRINE HYDROCHLORIDE 5 MG/1
5 TABLET ORAL
Status: DISCONTINUED | OUTPATIENT
Start: 2022-02-13 | End: 2022-02-18

## 2022-02-13 RX ORDER — SODIUM CHLORIDE, SODIUM LACTATE, POTASSIUM CHLORIDE, CALCIUM CHLORIDE 600; 310; 30; 20 MG/100ML; MG/100ML; MG/100ML; MG/100ML
INJECTION, SOLUTION INTRAVENOUS CONTINUOUS
Status: DISCONTINUED | OUTPATIENT
Start: 2022-02-14 | End: 2022-02-15

## 2022-02-13 RX ADMIN — PIPERACILLIN SODIUM AND TAZOBACTAM SODIUM 2250 MG: 2; .25 INJECTION, POWDER, LYOPHILIZED, FOR SOLUTION INTRAVENOUS at 08:42

## 2022-02-13 RX ADMIN — PIPERACILLIN SODIUM AND TAZOBACTAM SODIUM 2250 MG: 2; .25 INJECTION, POWDER, LYOPHILIZED, FOR SOLUTION INTRAVENOUS at 02:54

## 2022-02-13 RX ADMIN — Medication 2000 ML/HR: at 17:00

## 2022-02-13 RX ADMIN — PIPERACILLIN SODIUM AND TAZOBACTAM SODIUM 2250 MG: 2; .25 INJECTION, POWDER, LYOPHILIZED, FOR SOLUTION INTRAVENOUS at 15:38

## 2022-02-13 RX ADMIN — MIDODRINE HYDROCHLORIDE 5 MG: 5 TABLET ORAL at 18:38

## 2022-02-13 RX ADMIN — Medication 2000 ML/HR: at 09:05

## 2022-02-13 RX ADMIN — DEXMEDETOMIDINE HYDROCHLORIDE 0.9 MCG/KG/HR: 4 INJECTION, SOLUTION INTRAVENOUS at 16:57

## 2022-02-13 RX ADMIN — Medication: at 03:38

## 2022-02-13 RX ADMIN — Medication 75 MCG/HR: at 15:36

## 2022-02-13 RX ADMIN — Medication 2000 ML/HR: at 17:02

## 2022-02-13 RX ADMIN — HEPARIN SODIUM 5000 UNITS: 5000 INJECTION INTRAVENOUS; SUBCUTANEOUS at 06:23

## 2022-02-13 RX ADMIN — Medication: at 14:11

## 2022-02-13 RX ADMIN — PIPERACILLIN SODIUM AND TAZOBACTAM SODIUM 2250 MG: 2; .25 INJECTION, POWDER, LYOPHILIZED, FOR SOLUTION INTRAVENOUS at 21:03

## 2022-02-13 RX ADMIN — DEXMEDETOMIDINE HYDROCHLORIDE 0.9 MCG/KG/HR: 4 INJECTION, SOLUTION INTRAVENOUS at 13:11

## 2022-02-13 RX ADMIN — ACETAMINOPHEN 1000 MG: 500 TABLET ORAL at 14:11

## 2022-02-13 RX ADMIN — FAMOTIDINE 20 MG: 40 POWDER, FOR SUSPENSION ORAL at 08:42

## 2022-02-13 RX ADMIN — DEXMEDETOMIDINE HYDROCHLORIDE 1 MCG/KG/HR: 4 INJECTION, SOLUTION INTRAVENOUS at 20:48

## 2022-02-13 RX ADMIN — Medication 2000 ML/HR: at 09:04

## 2022-02-13 RX ADMIN — DEXMEDETOMIDINE HYDROCHLORIDE 1 MCG/KG/HR: 4 INJECTION, SOLUTION INTRAVENOUS at 23:28

## 2022-02-13 RX ADMIN — HEPARIN SODIUM 1900 UNITS: 1000 INJECTION INTRAVENOUS; SUBCUTANEOUS at 00:36

## 2022-02-13 RX ADMIN — KETAMINE HYDROCHLORIDE 0.2 MG/KG/HR: 50 INJECTION, SOLUTION INTRAMUSCULAR; INTRAVENOUS at 08:42

## 2022-02-13 RX ADMIN — MIDODRINE HYDROCHLORIDE 5 MG: 5 TABLET ORAL at 15:36

## 2022-02-13 RX ADMIN — DEXMEDETOMIDINE HYDROCHLORIDE 0.9 MCG/KG/HR: 4 INJECTION, SOLUTION INTRAVENOUS at 09:51

## 2022-02-13 RX ADMIN — HEPARIN SODIUM 5000 UNITS: 5000 INJECTION INTRAVENOUS; SUBCUTANEOUS at 14:11

## 2022-02-13 RX ADMIN — HEPARIN SODIUM 5000 UNITS: 5000 INJECTION INTRAVENOUS; SUBCUTANEOUS at 22:04

## 2022-02-13 RX ADMIN — FLUTICASONE PROPIONATE 1 SPRAY: 50 SPRAY, METERED NASAL at 08:43

## 2022-02-13 RX ADMIN — ACETAMINOPHEN 1000 MG: 500 TABLET ORAL at 22:03

## 2022-02-13 RX ADMIN — GABAPENTIN 100 MG: 300 CAPSULE ORAL at 03:56

## 2022-02-13 RX ADMIN — Medication 2000 ML/HR: at 09:07

## 2022-02-13 RX ADMIN — Medication 75 MCG/HR: at 01:58

## 2022-02-13 RX ADMIN — FUROSEMIDE 80 MG: 10 INJECTION, SOLUTION INTRAMUSCULAR; INTRAVENOUS at 18:37

## 2022-02-13 RX ADMIN — FUROSEMIDE 80 MG: 10 INJECTION, SOLUTION INTRAMUSCULAR; INTRAVENOUS at 08:42

## 2022-02-13 RX ADMIN — DEXMEDETOMIDINE HYDROCHLORIDE 0.9 MCG/KG/HR: 4 INJECTION, SOLUTION INTRAVENOUS at 05:51

## 2022-02-13 RX ADMIN — MONTELUKAST SODIUM 10 MG: 10 TABLET, FILM COATED ORAL at 08:42

## 2022-02-13 RX ADMIN — Medication 2000 ML/HR: at 17:01

## 2022-02-13 RX ADMIN — CLINDAMYCIN IN 5 PERCENT DEXTROSE 900 MG: 18 INJECTION, SOLUTION INTRAVENOUS at 20:11

## 2022-02-13 RX ADMIN — HEPARIN SODIUM 1600 UNITS: 1000 INJECTION INTRAVENOUS; SUBCUTANEOUS at 00:33

## 2022-02-13 RX ADMIN — ACETAMINOPHEN 1000 MG: 500 TABLET ORAL at 06:23

## 2022-02-13 RX ADMIN — GABAPENTIN 100 MG: 300 CAPSULE ORAL at 12:18

## 2022-02-13 RX ADMIN — CLINDAMYCIN IN 5 PERCENT DEXTROSE 900 MG: 18 INJECTION, SOLUTION INTRAVENOUS at 01:16

## 2022-02-13 RX ADMIN — CLINDAMYCIN IN 5 PERCENT DEXTROSE 900 MG: 18 INJECTION, SOLUTION INTRAVENOUS at 08:43

## 2022-02-13 RX ADMIN — DEXMEDETOMIDINE HYDROCHLORIDE 0.9 MCG/KG/HR: 4 INJECTION, SOLUTION INTRAVENOUS at 02:11

## 2022-02-13 RX ADMIN — GABAPENTIN 100 MG: 300 CAPSULE ORAL at 20:33

## 2022-02-13 ASSESSMENT — PULMONARY FUNCTION TESTS
PIF_VALUE: 19
PIF_VALUE: 22
PIF_VALUE: 12
PIF_VALUE: 21
PIF_VALUE: 24
PIF_VALUE: 23
PIF_VALUE: 22
PIF_VALUE: 27
PIF_VALUE: 17
PIF_VALUE: 13

## 2022-02-13 NOTE — PROGRESS NOTES
ICU PROGRESS NOTE          PATIENT NAME: [facility] RECORD NO. [id number]  DATE: 2022    PRIMARY CARE PHYSICIAN: Kim Trujillo MD    HD: # 5    ASSESSMENT    Patient Active Problem List   Diagnosis    Leonel gangrene    Uncontrolled type 2 diabetes mellitus with hyperglycemia ([facility]    Acute kidney injury superimposed on CKD ([facility]    Hypokalemia    Hyponatremia    Anemia    Morbid obesity with BMI of 45.0-49.9, adult ([facility]    Type 1 diabetes mellitus with stage 3a chronic kidney disease ([facility]       MEDICAL DECISION MAKING AND PLAN    1. Neuro:  1. Fentanyl @ 75 mcg/hr   2. Precedex @ 0.9 mcg/kg/hr  3. Ketamine drip   4. Tylenol 1000 mg q8H, gabapentin 100 mg q8, roxicodone 5mg q 4H PRN     2. CV:  1. HR: 58-62  2. MAP: 64-73  3. Lactate: 1.02 (0.50, 0.64)  4. Intermittent levophed to maintain MAP > 65 mmHg, currently @ 1 mcg/min    3. Heme:  1. Hgb: 7.4  2. Plt: 239  3. Sp 1 U PRBC yesterday  4. DVT ppx: heparin     4. Pulm:  1. PRVC: TV: 7 cc/kg/10/16/40%  2. AB.38/47/141.7/28  1. PF: 353  3. CXR: stable right lung infiltrates, mild cardiomegaly  4. Possible SBT today     5. Renal/lytes:  1. BUN/Cr: 26, 2.44  2. Na/K+: 130, 3.8  3. UOP: 0.2 cc/kg/hr overnight   4. Nephrology following: Managing CRRT and electrolytes. Bicarb drip started @ 150 cc/hr. 6. GI:  1. Tube feeds started post operatively, advancing to goal of 50 cc/hr   2. Pepcid 20 mg BID     7. ID:  1. Tmax: 98.4 (36.9)  2. WBC: 9.3 (10.3)  3. Antibiotics: clindamycin, zosyn     8. MSK:  1. Necrotizing soft tissue infection of the buttocks and groin   2. S/p incision and debridement , and further debridement later 2/9  3. S/p irrigation and debridement of wound 2/10  4. S/p irrigation and debridement, partial closure, and bilateral wound vac placement     9. Endo:  1. B  2. Insulin drip    10.  Lines:  1. PIV, Booker, ETT, OGT, right radial arterial line, right IJ CVC, right femoral polina catheter    CHECKLIST    RASS: -1  RESTRAINTS: bilateral soft wrist   IVF: bicarb drip @ 150 cc/hr   NUTRITION: NPO  ANTIBIOTICS: clindamycin, zosyn   GI: pepcid   DVT: heparin   GLYCEMIC CONTROL: insulin gtt   HOB >45: yes   WOUND CARE: wound vacs intact     SUBJECTIVE    Lissa Stella was seen and examined at bedside. No acute events overnight. Requiring levophed at 1 mcg/min to maintain MAPs. Awakes to verbal and tactile stimuli. Following commands. Nods head yes or no. Denies pain or needs. Wound vac beeping that its clogged. Will evaluate. OBJECTIVE  VITALS: Temp: Temp: 98.1 °F (36.7 °C)Temp  Av.1 °F (36.7 °C)  Min: 97.7 °F (36.5 °C)  Max: 98.4 °F (36.9 °C) BP No data recorded. No data recorded. Pulse Pulse  Av.3  Min: 58  Max: 64 Resp Resp  Av.8  Min: 11  Max: 20 Pulse ox SpO2  Av %  Min: 100 %  Max: 100 %    GENERAL: intubated, sedated, no apparent distress   NEURO: intubated, sedated, following commands   HEENT: NCAT   : perineal and wound vac dressings intact with good seal   LUNGS: clear bilaterally  MECHANICAL VENTILATION: tolerating mechanical ventilation   HEART: regular rate and rhythm   ABDOMEN: soft, non-distended   EXTERMITY: no cyanosis, clubbing or edema    LAB:  CBC:   Recent Labs     22  0542 22  0542 22  0551 22  1158 22  0421   WBC 10.3  --  10.3  --  9.3   HGB 7.4*   < > 6.7* 7.7* 7.4*   HCT 22.8*   < > 20.5* 23.0* 22.9*   MCV 86.0  --  87.6  --  87.7     --  245  --  239    < > = values in this interval not displayed.      BMP:   Recent Labs     22  0542 22  0826 22  0551 22  0800 222 22  0255 22  0421   *   < > 132*   < > 129* 131* 130*   K 3.9   < > 3.8   < > 4.0 3.8 3.8      < > 99   < > 98 98 98   CO2 17*   < > 19*   < > 21 21 21   BUN 35*  --  30*  --   --   --  26*   CREATININE 3.98*  --  3.13*  --   --   --  2.44*   GLUCOSE 122*  --  151*  --   --   --  152* < > = values in this interval not displayed. RADIOLOGY:  XR CHEST PORTABLE    Result Date: 2/13/2022  EXAMINATION: ONE X-RAY VIEW OF THE CHEST 2/13/2022 5:40 am COMPARISON: 02/12/2022 HISTORY: ORDERING SYSTEM PROVIDED HISTORY: intubated TECHNOLOGIST PROVIDED HISTORY: intubated Reason for Exam: port Supine FINDINGS: Stable vague right lung infiltrates are identified. Endotracheal tube and nasogastric tube are stable. There appears to be an additional esophageal catheter. No pneumothorax is identified. The heart is mildly enlarged. No acute osseous abnormality is detected. The lines and tubes are stable. Stable right lung infiltrates. Mild cardiomegaly. XR CHEST PORTABLE    Result Date: 2/12/2022  EXAMINATION: ONE XRAY VIEW OF THE CHEST 2/12/2022 5:04 am COMPARISON: 2/11/2022 HISTORY: ORDERING SYSTEM PROVIDED HISTORY: intubated TECHNOLOGIST PROVIDED HISTORY: Intubated FINDINGS: Endotracheal and nasogastric tubes, as well as right-sided central line appear stable. Perihilar congestion and interstitial thickening. Cardiomegaly. No evidence of pleural effusion or pneumothorax. Stable life support system and central line. Slightly more pronounced perihilar congestion and interstitial thickening. Cardiomegaly. Selam Graff,   2/13/2022  8:37 AM            Trauma Attending Attestation      I have reviewed the above [facility] note(s) and confirmed the key elements of the medical history and physical exam. I have seen and examined the pt. I have discussed the findings, established the care plan and recommendations with Resident, [facility]CS RN, bedside nurse.   Neuro follows commands   MAP >65 with 1-2 levo will start proamatine   Creatinine down   Would wean vent after OR tomorrow   P/f ratio 353  Wound vac change in AM   On TF   Critical care min: Gallo Chung 49, DO  2/13/2022  12:37 PM

## 2022-02-13 NOTE — PROGRESS NOTES
The tube was secured with an endotracheal tube aquino. The endotracheal tube was moved and the skin assessed. Skin assessment revealed no problems. Endotracheal tube was taped at the  23 cm glenis. Oral gastric tube was retaped to the endotracheal tube. Endotracheal tube aquino was applied on February 13.  Action steps for any skin breakdown: 0    ANASTACIO HUGHES RCP  4:57 PM

## 2022-02-13 NOTE — PROGRESS NOTES
Nephrology Progress Note      SUBJECTIVE       Pt was seen and examined. No acute issues overnite. Stable hemodynamics . She is on minimal Levophed at this time. She went to the OR additional debridement and wound VAC placement. Urine output is marginal, patient continues on CVVHD. She is currently on -50 mL an hour desired fluid balance. Family updated. Patient continues to be intubated. She continues to receive broad-spectrum antibiotics. OBJECTIVE      CURRENT TEMPERATURE:  Temp: 98.1 °F (36.7 °C)  MAXIMUM TEMPERATURE OVER 24HRS:  Temp (24hrs), Av.1 °F (36.7 °C), Min:97.7 °F (36.5 °C), Max:98.4 °F (36.9 °C)    CURRENT RESPIRATORY RATE:  Resp: 16  CURRENT PULSE:  Pulse: 62  CURRENT BLOOD PRESSURE:  BP: (!) 99/55  24HR BLOOD PRESSURE RANGE:  No data recorded.  ; No data recorded. 24HR INTAKE/OUTPUT:      Intake/Output Summary (Last 24 hours) at 2022 6656  Last data filed at 2022 0800  Gross per 24 hour   Intake 5463.92 ml   Output 6427 ml   Net -963.08 ml     WEIGHT :  Patient Vitals for the past 96 hrs (Last 3 readings):   Weight   22 0600 299 lb 4.8 oz (135.8 kg)     PHYSICAL EXAM      GENERAL APPEARANCE: Intubated and sedated  SKIN: warm and dry, no rash or erythema  EYES: conjunctivae pale and sclera anicteric  ENT: ETT in place  NECK:   No carotid bruits and no carotid lymphadenopathy . PULMONARY: lungs are clear to auscultation. No Wheezing, no ronchi . CADRDIOVASCULAR: S1 and S2 normal NO S3 and NO S4 . No rubs , no murmur. ABDOMEN: soft nontender, bowel sounds present, no organomegaly, no ascites.      EXTREMITIES: + edema     CURRENT MEDICATIONS      0.9 % sodium chloride infusion, PRN  furosemide (LASIX) injection 80 mg, BID  famotidine (PEPCID) 40 MG/5ML suspension 20 mg, Daily  prismaSATE BK 0/3.5 5,000 mL with potassium chloride 15 mEq solution, Continuous  sodium bicarbonate 150 mEq in dextrose 5 % 1,000 mL infusion, Continuous  heparin (porcine) injection 1,900 Units, PRN  lactated ringers bolus, Once  heparin (porcine) injection 1,000 Units, Once  heparin (porcine) injection 1,600 Units, PRN  piperacillin-tazobactam (ZOSYN) 2,250 mg in dextrose 5 % 50 mL IVPB (mini-bag), Q6H  potassium chloride 20 mEq/50 mL IVPB (Central Line), PRN  magnesium sulfate 1000 mg in dextrose 5% 100 mL IVPB, PRN  calcium gluconate 1000 mg in sodium chloride 50 mL, PRN   Or  calcium gluconate 2000 mg in sodium chloride 100 mL, PRN   Or  calcium gluconate 3,000 mg in dextrose 5 % 100 mL IVPB, PRN   Or  calcium gluconate 4,000 mg in dextrose 5 % 100 mL IVPB, PRN  sodium phosphate 6 mmol in dextrose 5 % 250 mL IVPB, PRN   Or  sodium phosphate 12 mmol in dextrose 5 % 250 mL IVPB, PRN   Or  sodium phosphate 18 mmol in dextrose 5 % 500 mL IVPB, PRN   Or  sodium phosphate 24 mmol in dextrose 5 % 500 mL IVPB, PRN  oxyCODONE (ROXICODONE) immediate release tablet 5 mg, Q4H PRN  fluticasone (FLONASE) 50 MCG/ACT nasal spray 1 spray, Daily  budesonide-formoterol (SYMBICORT) 80-4.5 MCG/ACT inhaler 2 puff, BID  albuterol sulfate  (90 Base) MCG/ACT inhaler 1 puff, Q4H PRN  norepinephrine (LEVOPHED) 16 mg in sodium chloride 0.9 % 250 mL infusion, Continuous  montelukast (SINGULAIR) tablet 10 mg, Daily  [Held by provider] rOPINIRole (REQUIP) tablet 4 mg, Daily  insulin regular (HUMULIN R;NOVOLIN R) 100 Units in sodium chloride 0.9 % 100 mL infusion, Continuous  gabapentin (NEURONTIN) capsule 100 mg, Q8H  vasopressin 20 Units in dextrose 5 % 100 mL infusion, Continuous  fentaNYL 20 mcg/mL Infusion, Continuous  dexmedetomidine (PRECEDEX) 400 mcg in sodium chloride 0.9 % 100 mL infusion, Continuous  heparin (porcine) injection 5,000 Units, 3 times per day  ketamine (KETALAR) 500 mg in sodium chloride 0.9 % 250 mL infusion, Continuous  acetaminophen (TYLENOL) tablet 1,000 mg, 3 times per day  clindamycin (CLEOCIN) 900 mg in dextrose 5 % 50 mL IVPB, Q8H          LABS      CBC:   Recent Labs 02/11/22  0542 02/11/22  0542 02/12/22  0551 02/12/22  1158 02/13/22 0421   WBC 10.3  --  10.3  --  9.3   RBC 2.65*  --  2.34*  --  2.61*   HGB 7.4*   < > 6.7* 7.7* 7.4*   HCT 22.8*   < > 20.5* 23.0* 22.9*   MCV 86.0  --  87.6  --  87.7   MCH 27.9  --  28.6  --  28.4   MCHC 32.5  --  32.7  --  32.3   RDW 14.5*  --  14.8*  --  14.8*     --  245  --  239   MPV 10.8  --  10.9  --  11.1    < > = values in this interval not displayed. BMP:   Recent Labs     02/11/22  0542 02/11/22  0826 02/12/22  0551 02/12/22  0800 02/13/22  0255 02/13/22 0421 02/13/22  0800   *   < > 132*   < > 131* 130* 134*   K 3.9   < > 3.8   < > 3.8 3.8 3.8      < > 99   < > 98 98 99   CO2 17*   < > 19*   < > 21 21 26   BUN 35*  --  30*  --   --  26*  --    CREATININE 3.98*  --  3.13*  --   --  2.44*  --    GLUCOSE 122*  --  151*  --   --  152*  --    CALCIUM 7.3*  --  7.6*  --   --  8.4*  --     < > = values in this interval not displayed. PHOSPHORUS:    Recent Labs     02/11/22  0542 02/12/22 0551 02/13/22 0421   PHOS 5.6* 5.2* 4.6*     MAGNESIUM:   Recent Labs     02/11/22 0542 02/12/22 0551 02/13/22 0421   MG 2.1 1.9 1.8     SPEP:   Lab Results   Component Value Date    PROT 5.7 02/09/2022     C3:   Lab Results   Component Value Date    C3 143 02/09/2022     C4:   Lab Results   Component Value Date    C4 19 02/09/2022     URINE SODIUM:    Lab Results   Component Value Date    JORGE 26 02/09/2022      URINE CREATININE:    Lab Results   Component Value Date    LABCREA 142.3 02/09/2022         ASSESSMENT      1. Acute Kidney Injury: Secondary to ischemic ATN from shock/ sepsis systemic intermittent response syndrome possible vancomycin toxicity is Vanco level was 39.6. Oligoanuric creatinine had jumped up to 4.9, CRRT started. 2.  Chronic kidney disease stage III baseline 1.5-1.6 proteinuria about 1 to 2 g from diabetic nephrosclerosis follows up with Dr. Usha Clark  3.   Type 2 diabetes with poor control hemoglobin A1c 7-9 range  4.   morbid obesity  5. Leonel's gangrene requiring debridements, patient on antibiotics including clindamycin and Zosyn. 6.  Still on low-dose pressors    PLAN      1.  CVVHD to continue at -50 mL an hour of desired fluid  balance   2. Antibiotics to continue. 3.  Start patient on ProAmatine 5 mg 3 times daily if okay with primary team this may help with weaning off of Levophed. 4. Following along       Please do not hesitate to call with questions.     This note is created with the assistance of a speech-recognition program. While intending to generate a document that actually reflects the content of the visit, no guarantees can be provided that every mistake has been identified and corrected by editing    Electronically signed by Jeffery Abbott MD on 2/13/2022 at 9:24 AM

## 2022-02-13 NOTE — PLAN OF CARE
Problem: Skin Integrity:  Goal: Will show no infection signs and symptoms  Description: Will show no infection signs and symptoms  2/13/2022 1855 by Sarah Carl RN  Outcome: Ongoing  2/13/2022 0654 by Nusrat Basilio  Outcome: Ongoing  Goal: Absence of new skin breakdown  Description: Absence of new skin breakdown  2/13/2022 1855 by Sarah Carl RN  Outcome: Ongoing  2/13/2022 0654 by Nusrat Basilio  Outcome: Ongoing     Problem: Fluid Volume - Imbalance:  Goal: Absence of imbalanced fluid volume signs and symptoms  Description: Absence of imbalanced fluid volume signs and symptoms  2/13/2022 1855 by Sarah Carl RN  Outcome: Ongoing  2/13/2022 0654 by Nusrat Basilio  Outcome: Ongoing     Problem: Pain:  Goal: Pain level will decrease  Description: Pain level will decrease  2/13/2022 1855 by Sarah Carl RN  Outcome: Ongoing  2/13/2022 0654 by Nusrat Basilio  Outcome: Ongoing  Goal: Recognizes and communicates pain  Description: Recognizes and communicates pain  2/13/2022 1855 by Sarah Carl RN  Outcome: Ongoing  2/13/2022 0654 by Nusrat Basilio  Outcome: Ongoing  Goal: Control of acute pain  Description: Control of acute pain  2/13/2022 1855 by Sarah Carl RN  Outcome: Ongoing  2/13/2022 0654 by Nusrat Basilio  Outcome: Ongoing  Goal: Control of chronic pain  Description: Control of chronic pain  2/13/2022 1855 by Sarah Carl RN  Outcome: Ongoing  2/13/2022 0654 by Nusrat Basilio  Outcome: Ongoing     Problem: Serum Glucose Level - Abnormal:  Goal: Ability to maintain appropriate glucose levels will improve to within specified parameters  Description: Ability to maintain appropriate glucose levels will improve to within specified parameters  2/13/2022 1855 by Sarah Carl RN  Outcome: Ongoing  2/13/2022 0654 by Nusrat Basilio  Outcome: Ongoing     Problem: Skin Integrity - Impaired:  Goal: Will show no infection signs and symptoms  Description: Will show no infection signs and symptoms  2/13/2022 1855 by Ching Stark RN  Outcome: Ongoing  2/13/2022 0654 by Nan Cordon  Outcome: Ongoing  Goal: Absence of new skin breakdown  Description: Absence of new skin breakdown  2/13/2022 1855 by Ching Stark RN  Outcome: Ongoing  2/13/2022 0654 by Nan Cordon  Outcome: Ongoing     Problem: Non-Violent Restraints  Goal: Removal from restraints as soon as assessed to be safe  2/13/2022 1855 by Ching Stark RN  Outcome: Not Met This Shift  2/13/2022 0654 by Nan Cordon  Outcome: Ongoing  Goal: No harm/injury to patient while restraints in use  2/13/2022 1855 by Ching Stark RN  Outcome: Ongoing  2/13/2022 0654 by Nan Cordon  Outcome: Ongoing  Goal: Patient's dignity will be maintained  2/13/2022 1855 by Ching Stark RN  Outcome: Ongoing  2/13/2022 0654 by Nan Cordon  Outcome: Ongoing     Problem: Nutrition  Goal: Optimal nutrition therapy  2/13/2022 1855 by Ching Stark RN  Outcome: Ongoing  2/13/2022 0654 by Nan Cordon  Outcome: Ongoing     Problem: OXYGENATION/RESPIRATORY FUNCTION  Goal: Patient will maintain patent airway  2/13/2022 1855 by Ching Stark RN  Outcome: Ongoing  2/13/2022 1658 by Vivian Mercer RCP  Outcome: Ongoing  2/13/2022 0654 by Nan Cordon  Outcome: Ongoing  Goal: Patient will achieve/maintain normal respiratory rate/effort  Description: Respiratory rate and effort will be within normal limits for the patient  2/13/2022 1855 by Ching Stark RN  Outcome: Ongoing  2/13/2022 1658 by Vivian Mercer RCP  Outcome: Ongoing  2/13/2022 0654 by Nan Cordon  Outcome: Ongoing     Problem: MECHANICAL VENTILATION  Goal: Patient will maintain patent airway  2/13/2022 1855 by Ching Stark RN  Outcome: Ongoing  2/13/2022 1658 by Vivian Mercer RCP  Outcome: Ongoing  2/13/2022 0654 by Nan Cordon  Outcome: Ongoing  Goal: Oral health is maintained or improved  2/13/2022 1855 by Ching Stark RN  Outcome: Ongoing  2/13/2022 1658 by Vivian Mercer RCP  Outcome: Ongoing  2/13/2022 4973 by Todd Barthel  Outcome: Ongoing  Goal: ET tube will be managed safely  2/13/2022 1855 by Luz Carrizales RN  Outcome: Ongoing  2/13/2022 1658 by Cecil Mitchell RCP  Outcome: Ongoing  2/13/2022 0654 by Todd Barthel  Outcome: Ongoing  Goal: Ability to express needs and understand communication  2/13/2022 1855 by Luz Carrizales RN  Outcome: Ongoing  2/13/2022 1658 by Cecil Mitchell RCP  Outcome: Ongoing  2/13/2022 0654 by Todd Barthel  Outcome: Ongoing  Goal: Mobility/activity is maintained at optimum level for patient  2/13/2022 1855 by Luz Carrizales RN  Outcome: Ongoing  2/13/2022 1658 by Cecil Mitchell RCP  Outcome: Ongoing  2/13/2022 0654 by Todd Barthel  Outcome: Ongoing     Problem: SKIN INTEGRITY  Goal: Skin integrity is maintained or improved  2/13/2022 1855 by Luz Carrizales RN  Outcome: Ongoing  2/13/2022 1658 by Cecil Mitchell RCP  Outcome: Ongoing  2/13/2022 0654 by Todd Barthel  Outcome: Ongoing

## 2022-02-13 NOTE — CARE COORDINATION
TRansitional Planning    Spoke to patient's Mom about plan for discharge. Patient is currently intubated, CVVHD, salcedo, NG and multiple wounds. Discussed LTACH. SHe plans to come in tomorrow. LTACH list left at bedside. Mom lives in [facility] and is taking care of Julia's 2 boys who are special needs. The oldest child is deaf and non verbal.  Dano Verduzco is not  and Mom does not have any support. Patient's RN relates that trauma anticipates patient will need 3-5 more debridement surgeries.

## 2022-02-13 NOTE — PLAN OF CARE
Problem: OXYGENATION/RESPIRATORY FUNCTION  Goal: Patient will maintain patent airway  2/12/2022 2005 by Chayo Joiner RCP  Outcome: Ongoing     Problem: OXYGENATION/RESPIRATORY FUNCTION  Goal: Patient will achieve/maintain normal respiratory rate/effort  Description: Respiratory rate and effort will be within normal limits for the patient  2/12/2022 2005 by Chayo Joiner RCP  Outcome: Ongoing     Problem: MECHANICAL VENTILATION  Goal: Patient will maintain patent airway  2/12/2022 2005 by Chayo Joiner RCP  Outcome: Ongoing     Problem: MECHANICAL VENTILATION  Goal: Oral health is maintained or improved  2/12/2022 2005 by Chayo Joiner RCP  Outcome: Ongoing     Problem: MECHANICAL VENTILATION  Goal: ET tube will be managed safely  2/12/2022 2005 by Chayo Joiner RCP  Outcome: Ongoing     Problem: MECHANICAL VENTILATION  Goal: Ability to express needs and understand communication  2/12/2022 2005 by Chayo Joiner RCP  Outcome: Ongoing     Problem: MECHANICAL VENTILATION  Goal: Mobility/activity is maintained at optimum level for patient  2/12/2022 2005 by Chayo Joiner RCP  Outcome: Ongoing     Problem: SKIN INTEGRITY  Goal: Skin integrity is maintained or improved  2/12/2022 2005 by Chayo Joiner RCP  Outcome: Ongoing

## 2022-02-14 ENCOUNTER — APPOINTMENT (OUTPATIENT)
Dept: GENERAL RADIOLOGY | Age: 42
DRG: 853 | End: 2022-02-14
Payer: COMMERCIAL

## 2022-02-14 ENCOUNTER — ANESTHESIA (OUTPATIENT)
Dept: OPERATING ROOM | Age: 42
DRG: 853 | End: 2022-02-14
Payer: COMMERCIAL

## 2022-02-14 ENCOUNTER — ANESTHESIA EVENT (OUTPATIENT)
Dept: OPERATING ROOM | Age: 42
DRG: 853 | End: 2022-02-14
Payer: COMMERCIAL

## 2022-02-14 VITALS — OXYGEN SATURATION: 97 % | TEMPERATURE: 98.1 F | RESPIRATION RATE: 16 BRPM

## 2022-02-14 LAB
ABSOLUTE EOS #: 0.13 K/UL (ref 0–0.44)
ABSOLUTE EOS #: 0.16 K/UL (ref 0–0.44)
ABSOLUTE IMMATURE GRANULOCYTE: 0.13 K/UL (ref 0–0.3)
ABSOLUTE IMMATURE GRANULOCYTE: 0.25 K/UL (ref 0–0.3)
ABSOLUTE LYMPH #: 1.02 K/UL (ref 1.1–3.7)
ABSOLUTE LYMPH #: 1.41 K/UL (ref 1.1–3.7)
ABSOLUTE MONO #: 0.72 K/UL (ref 0.1–1.2)
ABSOLUTE MONO #: 0.73 K/UL (ref 0.1–1.2)
ALLEN TEST: ABNORMAL
ANION GAP SERPL CALCULATED.3IONS-SCNC: 10 MMOL/L (ref 9–17)
ANION GAP SERPL CALCULATED.3IONS-SCNC: 7 MMOL/L (ref 9–17)
ANION GAP: 10 MMOL/L (ref 7–16)
BASOPHILS # BLD: 1 % (ref 0–2)
BASOPHILS # BLD: 1 % (ref 0–2)
BASOPHILS ABSOLUTE: 0.04 K/UL (ref 0–0.2)
BASOPHILS ABSOLUTE: 0.05 K/UL (ref 0–0.2)
BUN BLDV-MCNC: 19 MG/DL (ref 6–20)
BUN BLDV-MCNC: 19 MG/DL (ref 6–20)
BUN/CREAT BLD: ABNORMAL (ref 9–20)
BUN/CREAT BLD: ABNORMAL (ref 9–20)
CALCIUM SERPL-MCNC: 8.4 MG/DL (ref 8.6–10.4)
CALCIUM SERPL-MCNC: 8.4 MG/DL (ref 8.6–10.4)
CHLORIDE BLD-SCNC: 101 MMOL/L (ref 98–107)
CHLORIDE BLD-SCNC: 93 MMOL/L (ref 98–107)
CO2: 25 MMOL/L (ref 20–31)
CO2: 26 MMOL/L (ref 20–31)
CREAT SERPL-MCNC: 1.65 MG/DL (ref 0.5–0.9)
CREAT SERPL-MCNC: 1.82 MG/DL (ref 0.5–0.9)
CULTURE: NORMAL
CULTURE: NORMAL
DIFFERENTIAL TYPE: ABNORMAL
DIFFERENTIAL TYPE: ABNORMAL
EOSINOPHILS RELATIVE PERCENT: 2 % (ref 1–4)
EOSINOPHILS RELATIVE PERCENT: 2 % (ref 1–4)
FIO2: 40
FIO2: 60
FIO2: ABNORMAL
GFR AFRICAN AMERICAN: 37 ML/MIN
GFR AFRICAN AMERICAN: 41 ML/MIN
GFR NON-AFRICAN AMERICAN: 26 ML/MIN
GFR NON-AFRICAN AMERICAN: 30 ML/MIN
GFR NON-AFRICAN AMERICAN: 34 ML/MIN
GFR SERPL CREATININE-BSD FRML MDRD: 31 ML/MIN
GFR SERPL CREATININE-BSD FRML MDRD: ABNORMAL ML/MIN/{1.73_M2}
GLUCOSE BLD-MCNC: 121 MG/DL (ref 65–105)
GLUCOSE BLD-MCNC: 125 MG/DL (ref 65–105)
GLUCOSE BLD-MCNC: 130 MG/DL (ref 65–105)
GLUCOSE BLD-MCNC: 130 MG/DL (ref 65–105)
GLUCOSE BLD-MCNC: 132 MG/DL (ref 65–105)
GLUCOSE BLD-MCNC: 133 MG/DL (ref 65–105)
GLUCOSE BLD-MCNC: 135 MG/DL (ref 65–105)
GLUCOSE BLD-MCNC: 136 MG/DL (ref 65–105)
GLUCOSE BLD-MCNC: 137 MG/DL (ref 65–105)
GLUCOSE BLD-MCNC: 137 MG/DL (ref 65–105)
GLUCOSE BLD-MCNC: 138 MG/DL (ref 65–105)
GLUCOSE BLD-MCNC: 138 MG/DL (ref 65–105)
GLUCOSE BLD-MCNC: 140 MG/DL (ref 65–105)
GLUCOSE BLD-MCNC: 143 MG/DL (ref 65–105)
GLUCOSE BLD-MCNC: 143 MG/DL (ref 65–105)
GLUCOSE BLD-MCNC: 144 MG/DL (ref 65–105)
GLUCOSE BLD-MCNC: 144 MG/DL (ref 70–99)
GLUCOSE BLD-MCNC: 146 MG/DL (ref 74–100)
GLUCOSE BLD-MCNC: 151 MG/DL (ref 65–105)
GLUCOSE BLD-MCNC: 159 MG/DL (ref 65–105)
GLUCOSE BLD-MCNC: 161 MG/DL (ref 65–105)
GLUCOSE BLD-MCNC: 163 MG/DL (ref 65–105)
GLUCOSE BLD-MCNC: 172 MG/DL (ref 65–105)
GLUCOSE BLD-MCNC: 174 MG/DL (ref 65–105)
GLUCOSE BLD-MCNC: 174 MG/DL (ref 74–100)
GLUCOSE BLD-MCNC: 176 MG/DL (ref 70–99)
GLUCOSE BLD-MCNC: 176 MG/DL (ref 74–100)
GLUCOSE BLD-MCNC: 184 MG/DL (ref 74–100)
GLUCOSE BLD-MCNC: 185 MG/DL (ref 65–105)
GLUCOSE BLD-MCNC: 190 MG/DL (ref 65–105)
HCT VFR BLD CALC: 21.6 % (ref 36.3–47.1)
HCT VFR BLD CALC: 24.6 % (ref 36.3–47.1)
HEMOGLOBIN: 6.9 G/DL (ref 11.9–15.1)
HEMOGLOBIN: 7.8 G/DL (ref 11.9–15.1)
IMMATURE GRANULOCYTES: 2 %
IMMATURE GRANULOCYTES: 3 %
LYMPHOCYTES # BLD: 16 % (ref 24–43)
LYMPHOCYTES # BLD: 18 % (ref 24–43)
Lab: NORMAL
Lab: NORMAL
MAGNESIUM: 1.6 MG/DL (ref 1.6–2.6)
MCH RBC QN AUTO: 28.3 PG (ref 25.2–33.5)
MCH RBC QN AUTO: 28.3 PG (ref 25.2–33.5)
MCHC RBC AUTO-ENTMCNC: 31.7 G/DL (ref 28.4–34.8)
MCHC RBC AUTO-ENTMCNC: 31.9 G/DL (ref 28.4–34.8)
MCV RBC AUTO: 88.5 FL (ref 82.6–102.9)
MCV RBC AUTO: 89.1 FL (ref 82.6–102.9)
MODE: ABNORMAL
MONOCYTES # BLD: 10 % (ref 3–12)
MONOCYTES # BLD: 12 % (ref 3–12)
NEGATIVE BASE EXCESS, ART: ABNORMAL (ref 0–2)
NRBC AUTOMATED: 0 PER 100 WBC
NRBC AUTOMATED: 0 PER 100 WBC
O2 DEVICE/FLOW/%: ABNORMAL
PATIENT TEMP: ABNORMAL
PDW BLD-RTO: 14.8 % (ref 11.8–14.4)
PDW BLD-RTO: 15.1 % (ref 11.8–14.4)
PHOSPHORUS: 4 MG/DL (ref 2.6–4.5)
PLATELET # BLD: 202 K/UL (ref 138–453)
PLATELET # BLD: 236 K/UL (ref 138–453)
PLATELET ESTIMATE: ABNORMAL
PLATELET ESTIMATE: ABNORMAL
PMV BLD AUTO: 11.4 FL (ref 8.1–13.5)
PMV BLD AUTO: 11.7 FL (ref 8.1–13.5)
POC BUN: 19 MG/DL (ref 8–26)
POC CHLORIDE: 98 MMOL/L (ref 98–107)
POC CREATININE: 2.1 MG/DL (ref 0.51–1.19)
POC HCO3: 27 MMOL/L (ref 21–28)
POC HCO3: 28 MMOL/L (ref 21–28)
POC HCO3: 28.1 MMOL/L (ref 21–28)
POC HCO3: 28.3 MMOL/L (ref 21–28)
POC HCO3: 30.4 MMOL/L (ref 21–28)
POC HEMATOCRIT: 27 % (ref 36–46)
POC HEMOGLOBIN: 9.1 G/DL (ref 12–16)
POC IONIZED CALCIUM: 1.18 MMOL/L (ref 1.15–1.33)
POC LACTIC ACID: 0.4 MMOL/L (ref 0.56–1.39)
POC LACTIC ACID: 0.47 MMOL/L (ref 0.56–1.39)
POC LACTIC ACID: 0.48 MMOL/L (ref 0.56–1.39)
POC LACTIC ACID: 0.62 MMOL/L (ref 0.56–1.39)
POC LACTIC ACID: 0.64 MMOL/L (ref 0.56–1.39)
POC O2 SATURATION: 100 % (ref 94–98)
POC O2 SATURATION: 90 % (ref 94–98)
POC O2 SATURATION: 99 % (ref 94–98)
POC PCO2 TEMP: ABNORMAL MM HG
POC PCO2: 45.2 MM HG (ref 35–48)
POC PCO2: 48.2 MM HG (ref 35–48)
POC PCO2: 48.8 MM HG (ref 35–48)
POC PCO2: 50.9 MM HG (ref 35–48)
POC PCO2: 52.7 MM HG (ref 35–48)
POC PH TEMP: ABNORMAL
POC PH: 7.35 (ref 7.35–7.45)
POC PH: 7.35 (ref 7.35–7.45)
POC PH: 7.37 (ref 7.35–7.45)
POC PH: 7.38 (ref 7.35–7.45)
POC PH: 7.4 (ref 7.35–7.45)
POC PO2 TEMP: ABNORMAL MM HG
POC PO2: 129.7 MM HG (ref 83–108)
POC PO2: 152.3 MM HG (ref 83–108)
POC PO2: 153.2 MM HG (ref 83–108)
POC PO2: 197.1 MM HG (ref 83–108)
POC PO2: 61.4 MM HG (ref 83–108)
POC POTASSIUM: 3.9 MMOL/L (ref 3.5–4.5)
POC SODIUM: 135 MMOL/L (ref 138–146)
POC TCO2: 28 MMOL/L (ref 22–30)
POSITIVE BASE EXCESS, ART: 1 (ref 0–3)
POSITIVE BASE EXCESS, ART: 2 (ref 0–3)
POSITIVE BASE EXCESS, ART: 3 (ref 0–3)
POSITIVE BASE EXCESS, ART: 3 (ref 0–3)
POSITIVE BASE EXCESS, ART: 5 (ref 0–3)
POTASSIUM SERPL-SCNC: 3.7 MMOL/L (ref 3.7–5.3)
POTASSIUM SERPL-SCNC: 4 MMOL/L (ref 3.7–5.3)
RBC # BLD: 2.44 M/UL (ref 3.95–5.11)
RBC # BLD: 2.76 M/UL (ref 3.95–5.11)
RBC # BLD: ABNORMAL 10*6/UL
RBC # BLD: ABNORMAL 10*6/UL
SAMPLE SITE: ABNORMAL
SEG NEUTROPHILS: 66 % (ref 36–65)
SEG NEUTROPHILS: 67 % (ref 36–65)
SEGMENTED NEUTROPHILS ABSOLUTE COUNT: 4.22 K/UL (ref 1.5–8.1)
SEGMENTED NEUTROPHILS ABSOLUTE COUNT: 5.09 K/UL (ref 1.5–8.1)
SODIUM BLD-SCNC: 129 MMOL/L (ref 135–144)
SODIUM BLD-SCNC: 133 MMOL/L (ref 135–144)
SPECIMEN DESCRIPTION: NORMAL
SPECIMEN DESCRIPTION: NORMAL
TCO2 (CALC), ART: ABNORMAL MMOL/L (ref 22–29)
WBC # BLD: 6.3 K/UL (ref 3.5–11.3)
WBC # BLD: 7.7 K/UL (ref 3.5–11.3)
WBC # BLD: ABNORMAL 10*3/UL
WBC # BLD: ABNORMAL 10*3/UL

## 2022-02-14 PROCEDURE — 99232 SBSQ HOSP IP/OBS MODERATE 35: CPT | Performed by: INTERNAL MEDICINE

## 2022-02-14 PROCEDURE — 2580000003 HC RX 258: Performed by: INTERNAL MEDICINE

## 2022-02-14 PROCEDURE — 2580000003 HC RX 258: Performed by: STUDENT IN AN ORGANIZED HEALTH CARE EDUCATION/TRAINING PROGRAM

## 2022-02-14 PROCEDURE — 37799 UNLISTED PX VASCULAR SURGERY: CPT

## 2022-02-14 PROCEDURE — P9016 RBC LEUKOCYTES REDUCED: HCPCS

## 2022-02-14 PROCEDURE — 2580000003 HC RX 258: Performed by: SPECIALIST

## 2022-02-14 PROCEDURE — 80051 ELECTROLYTE PANEL: CPT

## 2022-02-14 PROCEDURE — 6370000000 HC RX 637 (ALT 250 FOR IP): Performed by: STUDENT IN AN ORGANIZED HEALTH CARE EDUCATION/TRAINING PROGRAM

## 2022-02-14 PROCEDURE — 2700000000 HC OXYGEN THERAPY PER DAY

## 2022-02-14 PROCEDURE — 83735 ASSAY OF MAGNESIUM: CPT

## 2022-02-14 PROCEDURE — 82565 ASSAY OF CREATININE: CPT

## 2022-02-14 PROCEDURE — 2500000003 HC RX 250 WO HCPCS: Performed by: INTERNAL MEDICINE

## 2022-02-14 PROCEDURE — 3700000001 HC ADD 15 MINUTES (ANESTHESIA): Performed by: SURGERY

## 2022-02-14 PROCEDURE — 6360000002 HC RX W HCPCS: Performed by: INTERNAL MEDICINE

## 2022-02-14 PROCEDURE — A4217 STERILE WATER/SALINE, 500 ML: HCPCS | Performed by: SURGERY

## 2022-02-14 PROCEDURE — 3600000003 HC SURGERY LEVEL 3 BASE: Performed by: SURGERY

## 2022-02-14 PROCEDURE — 6360000002 HC RX W HCPCS: Performed by: STUDENT IN AN ORGANIZED HEALTH CARE EDUCATION/TRAINING PROGRAM

## 2022-02-14 PROCEDURE — 84100 ASSAY OF PHOSPHORUS: CPT

## 2022-02-14 PROCEDURE — 2000000000 HC ICU R&B

## 2022-02-14 PROCEDURE — 85014 HEMATOCRIT: CPT

## 2022-02-14 PROCEDURE — 85025 COMPLETE CBC W/AUTO DIFF WBC: CPT

## 2022-02-14 PROCEDURE — 71045 X-RAY EXAM CHEST 1 VIEW: CPT

## 2022-02-14 PROCEDURE — 2500000003 HC RX 250 WO HCPCS: Performed by: STUDENT IN AN ORGANIZED HEALTH CARE EDUCATION/TRAINING PROGRAM

## 2022-02-14 PROCEDURE — 6360000002 HC RX W HCPCS: Performed by: SPECIALIST

## 2022-02-14 PROCEDURE — 86900 BLOOD TYPING SEROLOGIC ABO: CPT

## 2022-02-14 PROCEDURE — 82803 BLOOD GASES ANY COMBINATION: CPT

## 2022-02-14 PROCEDURE — 2580000003 HC RX 258: Performed by: SURGERY

## 2022-02-14 PROCEDURE — 84520 ASSAY OF UREA NITROGEN: CPT

## 2022-02-14 PROCEDURE — 94761 N-INVAS EAR/PLS OXIMETRY MLT: CPT

## 2022-02-14 PROCEDURE — 82947 ASSAY GLUCOSE BLOOD QUANT: CPT

## 2022-02-14 PROCEDURE — 80048 BASIC METABOLIC PNL TOTAL CA: CPT

## 2022-02-14 PROCEDURE — 94003 VENT MGMT INPAT SUBQ DAY: CPT

## 2022-02-14 PROCEDURE — 2500000003 HC RX 250 WO HCPCS: Performed by: SPECIALIST

## 2022-02-14 PROCEDURE — 83605 ASSAY OF LACTIC ACID: CPT

## 2022-02-14 PROCEDURE — 94640 AIRWAY INHALATION TREATMENT: CPT

## 2022-02-14 PROCEDURE — 82330 ASSAY OF CALCIUM: CPT

## 2022-02-14 PROCEDURE — 2709999900 HC NON-CHARGEABLE SUPPLY: Performed by: SURGERY

## 2022-02-14 PROCEDURE — 3700000000 HC ANESTHESIA ATTENDED CARE: Performed by: SURGERY

## 2022-02-14 PROCEDURE — 0JB90ZZ EXCISION OF BUTTOCK SUBCUTANEOUS TISSUE AND FASCIA, OPEN APPROACH: ICD-10-PCS | Performed by: SURGERY

## 2022-02-14 PROCEDURE — 3600000013 HC SURGERY LEVEL 3 ADDTL 15MIN: Performed by: SURGERY

## 2022-02-14 RX ORDER — ROCURONIUM BROMIDE 10 MG/ML
INJECTION, SOLUTION INTRAVENOUS PRN
Status: DISCONTINUED | OUTPATIENT
Start: 2022-02-14 | End: 2022-02-14 | Stop reason: SDUPTHER

## 2022-02-14 RX ORDER — SODIUM CHLORIDE 9 MG/ML
INJECTION, SOLUTION INTRAVENOUS CONTINUOUS
Status: DISCONTINUED | OUTPATIENT
Start: 2022-02-14 | End: 2022-02-15

## 2022-02-14 RX ORDER — MAGNESIUM HYDROXIDE 1200 MG/15ML
LIQUID ORAL CONTINUOUS PRN
Status: COMPLETED | OUTPATIENT
Start: 2022-02-14 | End: 2022-02-14

## 2022-02-14 RX ORDER — MIDAZOLAM HYDROCHLORIDE 1 MG/ML
INJECTION INTRAMUSCULAR; INTRAVENOUS PRN
Status: DISCONTINUED | OUTPATIENT
Start: 2022-02-14 | End: 2022-02-14 | Stop reason: SDUPTHER

## 2022-02-14 RX ORDER — SODIUM CHLORIDE 9 MG/ML
INJECTION, SOLUTION INTRAVENOUS CONTINUOUS
Status: DISCONTINUED | OUTPATIENT
Start: 2022-02-14 | End: 2022-02-24

## 2022-02-14 RX ORDER — SODIUM CHLORIDE 9 MG/ML
INJECTION, SOLUTION INTRAVENOUS CONTINUOUS PRN
Status: DISCONTINUED | OUTPATIENT
Start: 2022-02-14 | End: 2022-02-14 | Stop reason: SDUPTHER

## 2022-02-14 RX ORDER — FENTANYL CITRATE 50 UG/ML
INJECTION, SOLUTION INTRAMUSCULAR; INTRAVENOUS PRN
Status: DISCONTINUED | OUTPATIENT
Start: 2022-02-14 | End: 2022-02-14 | Stop reason: SDUPTHER

## 2022-02-14 RX ADMIN — FAMOTIDINE 20 MG: 40 POWDER, FOR SUSPENSION ORAL at 09:53

## 2022-02-14 RX ADMIN — SODIUM CHLORIDE: 0.9 INJECTION, SOLUTION INTRAVENOUS at 12:10

## 2022-02-14 RX ADMIN — CLINDAMYCIN IN 5 PERCENT DEXTROSE 900 MG: 18 INJECTION, SOLUTION INTRAVENOUS at 09:58

## 2022-02-14 RX ADMIN — FUROSEMIDE 80 MG: 10 INJECTION, SOLUTION INTRAMUSCULAR; INTRAVENOUS at 09:53

## 2022-02-14 RX ADMIN — MONTELUKAST SODIUM 10 MG: 10 TABLET, FILM COATED ORAL at 09:53

## 2022-02-14 RX ADMIN — MAGNESIUM SULFATE HEPTAHYDRATE 1000 MG: 1 INJECTION, SOLUTION INTRAVENOUS at 06:38

## 2022-02-14 RX ADMIN — DEXMEDETOMIDINE HYDROCHLORIDE 1 MCG/KG/HR: 4 INJECTION, SOLUTION INTRAVENOUS at 23:58

## 2022-02-14 RX ADMIN — DEXMEDETOMIDINE HYDROCHLORIDE 1.1 MCG/KG/HR: 4 INJECTION, SOLUTION INTRAVENOUS at 08:31

## 2022-02-14 RX ADMIN — Medication: at 08:25

## 2022-02-14 RX ADMIN — ACETAMINOPHEN 1000 MG: 500 TABLET ORAL at 22:20

## 2022-02-14 RX ADMIN — KETAMINE HYDROCHLORIDE 0.2 MG/KG/HR: 50 INJECTION, SOLUTION INTRAMUSCULAR; INTRAVENOUS at 17:08

## 2022-02-14 RX ADMIN — ACETAMINOPHEN 1000 MG: 500 TABLET ORAL at 05:39

## 2022-02-14 RX ADMIN — ACETAMINOPHEN 1000 MG: 500 TABLET ORAL at 16:42

## 2022-02-14 RX ADMIN — DEXMEDETOMIDINE HYDROCHLORIDE 1.1 MCG/KG/HR: 4 INJECTION, SOLUTION INTRAVENOUS at 02:40

## 2022-02-14 RX ADMIN — DEXMEDETOMIDINE HYDROCHLORIDE 1.1 MCG/KG/HR: 4 INJECTION, SOLUTION INTRAVENOUS at 15:38

## 2022-02-14 RX ADMIN — PIPERACILLIN SODIUM AND TAZOBACTAM SODIUM 2250 MG: 2; .25 INJECTION, POWDER, LYOPHILIZED, FOR SOLUTION INTRAVENOUS at 16:41

## 2022-02-14 RX ADMIN — KETAMINE HYDROCHLORIDE 0.2 MG/KG/HR: 50 INJECTION, SOLUTION INTRAMUSCULAR; INTRAVENOUS at 07:30

## 2022-02-14 RX ADMIN — CLINDAMYCIN IN 5 PERCENT DEXTROSE 900 MG: 18 INJECTION, SOLUTION INTRAVENOUS at 17:38

## 2022-02-14 RX ADMIN — SODIUM CHLORIDE: 9 INJECTION, SOLUTION INTRAVENOUS at 10:14

## 2022-02-14 RX ADMIN — HEPARIN SODIUM 5000 UNITS: 5000 INJECTION INTRAVENOUS; SUBCUTANEOUS at 22:20

## 2022-02-14 RX ADMIN — ROCURONIUM BROMIDE 50 MG: 10 INJECTION INTRAVENOUS at 13:05

## 2022-02-14 RX ADMIN — OXYCODONE 5 MG: 5 TABLET ORAL at 20:05

## 2022-02-14 RX ADMIN — Medication 100 MCG/HR: at 15:38

## 2022-02-14 RX ADMIN — KETAMINE HYDROCHLORIDE 2 MCG/KG/MIN: 100 INJECTION, SOLUTION, CONCENTRATE INTRAMUSCULAR; INTRAVENOUS at 10:00

## 2022-02-14 RX ADMIN — CLINDAMYCIN IN 5 PERCENT DEXTROSE 900 MG: 18 INJECTION, SOLUTION INTRAVENOUS at 01:16

## 2022-02-14 RX ADMIN — PIPERACILLIN SODIUM AND TAZOBACTAM SODIUM 2250 MG: 2; .25 INJECTION, POWDER, LYOPHILIZED, FOR SOLUTION INTRAVENOUS at 09:54

## 2022-02-14 RX ADMIN — FENTANYL CITRATE 100 MCG: 50 INJECTION, SOLUTION INTRAMUSCULAR; INTRAVENOUS at 12:15

## 2022-02-14 RX ADMIN — DEXMEDETOMIDINE HYDROCHLORIDE 1.1 MCG/KG/HR: 4 INJECTION, SOLUTION INTRAVENOUS at 11:28

## 2022-02-14 RX ADMIN — BUDESONIDE AND FORMOTEROL FUMARATE DIHYDRATE 2 PUFF: 80; 4.5 AEROSOL RESPIRATORY (INHALATION) at 07:52

## 2022-02-14 RX ADMIN — FLUTICASONE PROPIONATE 1 SPRAY: 50 SPRAY, METERED NASAL at 09:53

## 2022-02-14 RX ADMIN — SODIUM CHLORIDE: 9 INJECTION, SOLUTION INTRAVENOUS at 10:13

## 2022-02-14 RX ADMIN — MIDODRINE HYDROCHLORIDE 5 MG: 5 TABLET ORAL at 09:53

## 2022-02-14 RX ADMIN — GABAPENTIN 100 MG: 300 CAPSULE ORAL at 19:57

## 2022-02-14 RX ADMIN — OXYCODONE 5 MG: 5 TABLET ORAL at 00:34

## 2022-02-14 RX ADMIN — OXYCODONE 5 MG: 5 TABLET ORAL at 10:02

## 2022-02-14 RX ADMIN — GABAPENTIN 100 MG: 300 CAPSULE ORAL at 04:32

## 2022-02-14 RX ADMIN — DEXMEDETOMIDINE HYDROCHLORIDE 1 MCG/KG/HR: 4 INJECTION, SOLUTION INTRAVENOUS at 20:44

## 2022-02-14 RX ADMIN — ROCURONIUM BROMIDE 50 MG: 10 INJECTION INTRAVENOUS at 11:58

## 2022-02-14 RX ADMIN — PIPERACILLIN SODIUM AND TAZOBACTAM SODIUM 2250 MG: 2; .25 INJECTION, POWDER, LYOPHILIZED, FOR SOLUTION INTRAVENOUS at 03:02

## 2022-02-14 RX ADMIN — HEPARIN SODIUM 5000 UNITS: 5000 INJECTION INTRAVENOUS; SUBCUTANEOUS at 16:42

## 2022-02-14 RX ADMIN — HEPARIN SODIUM 5000 UNITS: 5000 INJECTION INTRAVENOUS; SUBCUTANEOUS at 05:48

## 2022-02-14 RX ADMIN — MIDAZOLAM HYDROCHLORIDE 2 MG: 1 INJECTION, SOLUTION INTRAMUSCULAR; INTRAVENOUS at 11:52

## 2022-02-14 RX ADMIN — DEXMEDETOMIDINE HYDROCHLORIDE 1 MCG/KG/HR: 4 INJECTION, SOLUTION INTRAVENOUS at 18:01

## 2022-02-14 RX ADMIN — Medication 75 MCG/HR: at 04:21

## 2022-02-14 RX ADMIN — DEXMEDETOMIDINE HYDROCHLORIDE 1.1 MCG/KG/HR: 4 INJECTION, SOLUTION INTRAVENOUS at 05:39

## 2022-02-14 RX ADMIN — MAGNESIUM SULFATE HEPTAHYDRATE 1000 MG: 1 INJECTION, SOLUTION INTRAVENOUS at 08:21

## 2022-02-14 RX ADMIN — PIPERACILLIN SODIUM AND TAZOBACTAM SODIUM 2250 MG: 2; .25 INJECTION, POWDER, LYOPHILIZED, FOR SOLUTION INTRAVENOUS at 22:12

## 2022-02-14 RX ADMIN — Medication: at 20:25

## 2022-02-14 ASSESSMENT — PULMONARY FUNCTION TESTS
PIF_VALUE: 37
PIF_VALUE: 35
PIF_VALUE: 29
PIF_VALUE: 28
PIF_VALUE: 36
PIF_VALUE: 38
PIF_VALUE: 30
PIF_VALUE: 31
PIF_VALUE: 38
PIF_VALUE: 37
PIF_VALUE: 35
PIF_VALUE: 20
PIF_VALUE: 38
PIF_VALUE: 28
PIF_VALUE: 29
PIF_VALUE: 21
PIF_VALUE: 36
PIF_VALUE: 37
PIF_VALUE: 26
PIF_VALUE: 38
PIF_VALUE: 31
PIF_VALUE: 30
PIF_VALUE: 38
PIF_VALUE: 35
PIF_VALUE: 37
PIF_VALUE: 37
PIF_VALUE: 29
PIF_VALUE: 35
PIF_VALUE: 38
PIF_VALUE: 31
PIF_VALUE: 23
PIF_VALUE: 38
PIF_VALUE: 38
PIF_VALUE: 29
PIF_VALUE: 38
PIF_VALUE: 30
PIF_VALUE: 30
PIF_VALUE: 38
PIF_VALUE: 37
PIF_VALUE: 38
PIF_VALUE: 38
PIF_VALUE: 21
PIF_VALUE: 37
PIF_VALUE: 31
PIF_VALUE: 26
PIF_VALUE: 38
PIF_VALUE: 38
PIF_VALUE: 37
PIF_VALUE: 30
PIF_VALUE: 35
PIF_VALUE: 29
PIF_VALUE: 30
PIF_VALUE: 31
PIF_VALUE: 30
PIF_VALUE: 31
PIF_VALUE: 36
PIF_VALUE: 29
PIF_VALUE: 26
PIF_VALUE: 24
PIF_VALUE: 26
PIF_VALUE: 31
PIF_VALUE: 38
PIF_VALUE: 38
PIF_VALUE: 37
PIF_VALUE: 24
PIF_VALUE: 30
PIF_VALUE: 15
PIF_VALUE: 30
PIF_VALUE: 30
PIF_VALUE: 38
PIF_VALUE: 21
PIF_VALUE: 37
PIF_VALUE: 30
PIF_VALUE: 25
PIF_VALUE: 29
PIF_VALUE: 38
PIF_VALUE: 22
PIF_VALUE: 21
PIF_VALUE: 23
PIF_VALUE: 30
PIF_VALUE: 38
PIF_VALUE: 29
PIF_VALUE: 38
PIF_VALUE: 29
PIF_VALUE: 37
PIF_VALUE: 22
PIF_VALUE: 22
PIF_VALUE: 31
PIF_VALUE: 37
PIF_VALUE: 30
PIF_VALUE: 31
PIF_VALUE: 38
PIF_VALUE: 32
PIF_VALUE: 38
PIF_VALUE: 38
PIF_VALUE: 31
PIF_VALUE: 28
PIF_VALUE: 30
PIF_VALUE: 37
PIF_VALUE: 37
PIF_VALUE: 25
PIF_VALUE: 19
PIF_VALUE: 37
PIF_VALUE: 30
PIF_VALUE: 30
PIF_VALUE: 28
PIF_VALUE: 38
PIF_VALUE: 30
PIF_VALUE: 29
PIF_VALUE: 37
PIF_VALUE: 22
PIF_VALUE: 31
PIF_VALUE: 38
PIF_VALUE: 36
PIF_VALUE: 14
PIF_VALUE: 30
PIF_VALUE: 36
PIF_VALUE: 37
PIF_VALUE: 30
PIF_VALUE: 35
PIF_VALUE: 30
PIF_VALUE: 30
PIF_VALUE: 29
PIF_VALUE: 30
PIF_VALUE: 37
PIF_VALUE: 31
PIF_VALUE: 28
PIF_VALUE: 38
PIF_VALUE: 35
PIF_VALUE: 36
PIF_VALUE: 21
PIF_VALUE: 38
PIF_VALUE: 30
PIF_VALUE: 24
PIF_VALUE: 30
PIF_VALUE: 30
PIF_VALUE: 39
PIF_VALUE: 35
PIF_VALUE: 38
PIF_VALUE: 35
PIF_VALUE: 37
PIF_VALUE: 30
PIF_VALUE: 19
PIF_VALUE: 37
PIF_VALUE: 34
PIF_VALUE: 35
PIF_VALUE: 38
PIF_VALUE: 31
PIF_VALUE: 32
PIF_VALUE: 31
PIF_VALUE: 30
PIF_VALUE: 30

## 2022-02-14 NOTE — ANESTHESIA PRE PROCEDURE
Historical Provider, MD   omeprazole (PRILOSEC) 20 MG delayed release capsule Take 20 mg by mouth daily    Historical Provider, MD   rOPINIRole (REQUIP) 4 MG tablet Take 4 mg by mouth daily    Historical Provider, MD       Current medications:    No current facility-administered medications for this visit. No current outpatient medications on file.      Facility-Administered Medications Ordered in Other Visits   Medication Dose Route Frequency Provider Last Rate Last Admin    0.9 % sodium chloride infusion   IntraVENous Continuous Bere I Marzena, DO 10 mL/hr at 02/14/22 1013 New Bag at 02/14/22 1013    0.9 % sodium chloride infusion   IntraVENous Continuous Bere I Marzena, DO 10 mL/hr at 02/14/22 1014 New Bag at 02/14/22 1014    midodrine (PROAMATINE) tablet 5 mg  5 mg Per NG tube TID WC Ritta Baldwin, DO   5 mg at 02/14/22 6580    lactated ringers infusion   IntraVENous Continuous Abigail [place], DO        0.9 % sodium chloride infusion   IntraVENous PRN Bere I Marzena, DO        furosemide (LASIX) injection 80 mg  80 mg IntraVENous BID Lisseth Duffy MD   80 mg at 02/14/22 0953    famotidine (PEPCID) 40 MG/5ML suspension 20 mg  20 mg Oral Daily Bere I Marzena, DO   20 mg at 02/14/22 0953    prismaSATE BK 0/3.5 5,000 mL with potassium chloride 15 mEq solution  2,000 mL/hr Dialysis Continuous Sugey Finn MD 2,000 mL/hr at 02/13/22 1702 2,000 mL/hr at 02/13/22 1702    sodium bicarbonate 150 mEq in dextrose 5 % 1,000 mL infusion   IntraVENous Continuous Sugey Finn  mL/hr at 02/14/22 0825 New Bag at 02/14/22 0825    heparin (porcine) injection 1,900 Units  1,900 Units IntraCATHeter PRN Sugey Finn MD   1,900 Units at 02/13/22 0036    lactated ringers bolus  1,000 mL IntraVENous Once Bere I Marzena, DO        heparin (porcine) injection 1,000 Units  1,000 Units IntraCATHeter Once Bere I Marzena, DO        heparin (porcine) injection 1,600 Units  1,600 Units IntraCATHeter PRN Eleuterio Cj Paul MD   1,600 Units at 02/13/22 0033    piperacillin-tazobactam (ZOSYN) 2,250 mg in dextrose 5 % 50 mL IVPB (mini-bag)  2,250 mg IntraVENous Q6H Bere I Amrzena, DO 12.5 mL/hr at 02/14/22 0954 2,250 mg at 02/14/22 0954    potassium chloride 20 mEq/50 mL IVPB (Central Line)  20 mEq IntraVENous PRN Beverely Dubs, DO 50 mL/hr at 02/12/22 1739 20 mEq at 02/12/22 1739    magnesium sulfate 1000 mg in dextrose 5% 100 mL IVPB  1,000 mg IntraVENous PRN Beverely Dubs, DO   Stopped at 02/14/22 1013    calcium gluconate 1000 mg in sodium chloride 50 mL  1,000 mg IntraVENous PRN Beverely Dubs, DO   Stopped at 02/13/22 0017    Or    calcium gluconate 2000 mg in sodium chloride 100 mL  2,000 mg IntraVENous PRN Beverely Dubs, DO        Or    calcium gluconate 3,000 mg in dextrose 5 % 100 mL IVPB  3,000 mg IntraVENous PRN Bere I Marzena, DO        Or    calcium gluconate 4,000 mg in dextrose 5 % 100 mL IVPB  4,000 mg IntraVENous PRN Bere I Marzena, DO        sodium phosphate 6 mmol in dextrose 5 % 250 mL IVPB  6 mmol IntraVENous PRN Bere I Marzena, DO        Or    sodium phosphate 12 mmol in dextrose 5 % 250 mL IVPB  12 mmol IntraVENous PRN Bere I Marzena, DO        Or    sodium phosphate 18 mmol in dextrose 5 % 500 mL IVPB  18 mmol IntraVENous PRN Bere I Marzena, DO        Or    sodium phosphate 24 mmol in dextrose 5 % 500 mL IVPB  24 mmol IntraVENous PRN Bere I Marzena, DO        oxyCODONE (ROXICODONE) immediate release tablet 5 mg  5 mg Oral Q4H PRN Bere I Marzena, DO   5 mg at 02/14/22 1002    fluticasone (FLONASE) 50 MCG/ACT nasal spray 1 spray  1 spray Each Nostril Daily Bere I Marzena, DO   1 spray at 02/14/22 0953    budesonide-formoterol (SYMBICORT) 80-4.5 MCG/ACT inhaler 2 puff  2 puff Inhalation BID Bere I Marzena, DO   2 puff at 02/14/22 0752    albuterol sulfate  (90 Base) MCG/ACT inhaler 1 puff  1 puff Inhalation Q4H PRN Bere I Marzena, DO        norepinephrine (LEVOPHED) 16 mg in sodium chloride 0.9 % 250 mL infusion  2-100 mcg/min IntraVENous Continuous Bere I Marzena, DO   Stopped at 02/12/22 2214    montelukast (SINGULAIR) tablet 10 mg  10 mg Oral Daily Bere I Marzena, DO   10 mg at 02/14/22 0953    [Held by provider] rOPINIRole (REQUIP) tablet 4 mg  4 mg Oral Daily Bere I Marzena, DO   4 mg at 02/09/22 1047    insulin regular (HUMULIN R;NOVOLIN R) 100 Units in sodium chloride 0.9 % 100 mL infusion  1-50 Units/hr IntraVENous Continuous Bere I Marzena, DO 0.8 mL/hr at 02/14/22 1012 0.78 Units/hr at 02/14/22 1012    gabapentin (NEURONTIN) capsule 100 mg  100 mg Oral Q8H Bere I Marzena, DO   100 mg at 02/14/22 0432    vasopressin 20 Units in dextrose 5 % 100 mL infusion  0.04 Units/min IntraVENous Continuous Bere I Marzena, DO   Stopped at 02/10/22 1505    fentaNYL 20 mcg/mL Infusion  25 mcg/hr IntraVENous Continuous Bere I Marzena, DO 3.8 mL/hr at 02/14/22 0421 75 mcg/hr at 02/14/22 0421    dexmedetomidine (PRECEDEX) 400 mcg in sodium chloride 0.9 % 100 mL infusion  0.2 mcg/kg/hr IntraVENous Continuous Bere I Marzena, DO 34.5 mL/hr at 02/14/22 0831 1.1 mcg/kg/hr at 02/14/22 0831    heparin (porcine) injection 5,000 Units  5,000 Units SubCUTAneous 3 times per day Raoul Montoya, DO   5,000 Units at 02/14/22 0548    ketamine (KETALAR) 500 mg in sodium chloride 0.9 % 250 mL infusion  0.2 mg/kg/hr (Ideal) IntraVENous Continuous Bere I Marzena, DO 5.2 mL/hr at 02/14/22 0730 0.2 mg/kg/hr at 02/14/22 0730    acetaminophen (TYLENOL) tablet 1,000 mg  1,000 mg Oral 3 times per day Raoul Montoya, DO   1,000 mg at 02/14/22 0539    clindamycin (CLEOCIN) 900 mg in dextrose 5 % 50 mL IVPB  900 mg IntraVENous Q8H Bere I Marzena, DO 50 mL/hr at 02/14/22 0958 900 mg at 02/14/22 0958       Allergies:     Allergies   Allergen Reactions    Aspirin      Other reaction(s): due to kidney function    Ibuprofen      CHRONIC KIDNEY DISEASE STAGE 3   Other reaction(s): due to kidney function         Problem List:    Patient Active Problem List   Diagnosis Code    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    Uncontrolled type 2 diabetes mellitus with hyperglycemia ([facility] E11.65    Acute kidney injury superimposed on CKD ([facility] N17.9, N18.9    Hypokalemia E87.6    Hyponatremia E87.1    Anemia D64.9    Morbid obesity with BMI of 45.0-49.9, adult ([facility] E66.01, Z68.42    Type 1 diabetes mellitus with stage 3a chronic kidney disease ([facility] E10.21, N18.31       Past Medical History:        Diagnosis Date    Clinical trial participant 02/09/2022    Protocol [id number]  Study completion 11/FEB/2022       Past Surgical History:        Procedure Laterality Date    ABDOMEN SURGERY Bilateral 2/9/2022    incision and drainage of bilateral necrotizing soft tissue infection of groin and buttocks  performed by Devora Carmona MD at [address] [place] [address] N/A 2/8/2022    *ADD ON, ASAP* INCISION AND DRAINAGE OF PERINEUM, BRIAN KNIFE PRONE performed by Estella Greene MD at [address] [place] [address] Bilateral 2/10/2022    INCISION AND DRAINAGE BUTTOCK, GROIN  (LITHOTOMY POSITION) performed by Devora Carmona MD at [address] [place] [address] History:    Social History     Tobacco Use    Smoking status: Not on file    Smokeless tobacco: Not on file   Substance Use Topics    Alcohol use: Not on file                                Counseling given: Not Answered      Vital Signs (Current): There were no vitals filed for this visit.                                            BP Readings from Last 3 Encounters:   02/11/22 (!) 99/55   02/10/22 (!) 124/102   02/09/22 (!) 98/58       NPO Status:                                                                                 BMI:   Wt Readings from Last 3 Encounters:   02/11/22 299 lb 4.8 oz (135.8 kg)     There is no height or weight on file to calculate BMI.    CBC:   Lab Results   Component Value Date    WBC 6.3 02/14/2022    RBC 2.44 02/14/2022    HGB 6.9 02/14/2022    HCT 21.6 02/14/2022    MCV 88.5 02/14/2022    RDW 14.8 02/14/2022     02/14/2022       CMP:   Lab Results   Component Value Date     02/14/2022    K 4.0 02/14/2022     02/14/2022    CO2 25 02/14/2022    BUN 19 02/14/2022    CREATININE 1.65 02/14/2022    GFRAA 41 02/14/2022    LABGLOM 34 02/14/2022    GLUCOSE 144 02/14/2022    PROT 5.7 02/09/2022    CALCIUM 8.4 02/14/2022    BILITOT 0.29 02/09/2022    ALKPHOS 196 02/09/2022    AST 48 02/09/2022    ALT 25 02/09/2022       POC Tests:   Recent Labs     02/14/22  0817   POCGLU 133*       Coags:   Lab Results   Component Value Date    PROTIME 11.3 02/09/2022    INR 1.1 02/09/2022       HCG (If Applicable):   Lab Results   Component Value Date    PREGTESTUR NEGATIVE 02/08/2022        ABGs: No results found for: PHART, PO2ART, CXO2UNX, EKK0OIF, BEART, S6VLUGQJ     Type & Screen (If Applicable):  No results found for: LABABO, LABRH    Drug/Infectious Status (If Applicable):  No results found for: HIV, HEPCAB    COVID-19 Screening (If Applicable):   Lab Results   Component Value Date    COVID19 Not Detected 02/09/2022           Anesthesia Evaluation  Patient summary reviewed no history of anesthetic complications:   Airway: Mallampati: Unable to assess / NA       Comment: Patient is orally intubated   Dental:    (+) other  Comment: Did not assess, patient is orally intubated    Pulmonary:Negative Pulmonary ROS and normal exam  breath sounds clear to auscultation                             Cardiovascular:  Exercise tolerance: good (>4 METS),   (+) hypertension:,         Rhythm: regular  Rate: normal                    Neuro/Psych:   Negative Neuro/Psych ROS              GI/Hepatic/Renal:   (+) renal disease:, morbid obesity          Endo/Other:    (+) DiabetesType II DM, , .                 Abdominal:   (+) obese,           Vascular: negative vascular ROS.          Other Findings:               Anesthesia Plan      general     ASA 4       Induction: intravenous and inhalational.  arterial line  MIPS: Postoperative opioids intended and Postoperative ventilation. Anesthetic plan and risks discussed with mother. Plan discussed with CRNA.           6401  Diastolic function is indeterminate due to patient's heartrate.      Left ventricle systolic function is normal.      The Ejection Fraction is 60-65%.      No significant valvulopathies.      No regional wall motion abnormalities noted.      2021 stress  Stress ECG Conclusion      No ischemic ECG changes      Normal perfusion scan        hcg neg    Allison Mohan MD   2/14/2022

## 2022-02-14 NOTE — PLAN OF CARE
Problem: OXYGENATION/RESPIRATORY FUNCTION  Goal: Patient will maintain patent airway  2/14/2022 0849 by Genet Castellano RCP  Outcome: Ongoing  2/14/2022 0527 by Pollo Villavicencio  Outcome: Ongoing  2/13/2022 1855 by Shilpa Silverio RN  Outcome: Ongoing  Goal: Patient will achieve/maintain normal respiratory rate/effort  Description: Respiratory rate and effort will be within normal limits for the patient  2/14/2022 0849 by Genet Castellano RCP  Outcome: Ongoing  2/14/2022 0527 by Pollo Villavicencio  Outcome: Ongoing  2/13/2022 1855 by Shilpa Silverio RN  Outcome: Ongoing     Problem: MECHANICAL VENTILATION  Goal: Patient will maintain patent airway  2/14/2022 0849 by Genet Castellano RCP  Outcome: Ongoing  2/14/2022 0527 by Pollo Villavicencio  Outcome: Ongoing  2/13/2022 1855 by Shilpa Silverio RN  Outcome: Ongoing  Goal: Oral health is maintained or improved  2/14/2022 0849 by Genet Castellano RCP  Outcome: Ongoing  2/14/2022 0527 by Pollo Villavicencio  Outcome: Ongoing  2/13/2022 1855 by Shilpa Silverio RN  Outcome: Ongoing  Goal: ET tube will be managed safely  2/14/2022 0849 by Genet Castellano RCP  Outcome: Ongoing  2/14/2022 0527 by Pollo Villavicencio  Outcome: Ongoing  2/13/2022 1855 by Shilpa Silverio RN  Outcome: Ongoing  Goal: Ability to express needs and understand communication  2/14/2022 0849 by Genet Castellano RCP  Outcome: Ongoing  2/14/2022 0527 by Pollo Villavicencio  Outcome: Ongoing  2/13/2022 1855 by Shilpa Silverio RN  Outcome: Ongoing  Goal: Mobility/activity is maintained at optimum level for patient  2/14/2022 0849 by Genet Castellano RCP  Outcome: Ongoing  2/14/2022 0527 by Pollo Villavicencio  Outcome: Ongoing  2/13/2022 1855 by Shilpa Silverio RN  Outcome: Ongoing

## 2022-02-14 NOTE — ANESTHESIA POSTPROCEDURE EVALUATION
Department of Anesthesiology  Postprocedure Note    Patient: Macrina Tipton  MRN: [id number]  YOB: 1980  Date of evaluation: 2/14/2022  Time:  4:54 PM     Procedure Summary     Date: 02/14/22 Room / Location: [facility] OR 08 / [address] [facility]    Anesthesia Start: 8275 Anesthesia Stop: 0013    Procedure: DEBREIDMENT OF BILATERAL GROINS AND LEFT BUTTOCK WOUND WITH PARTIAL CLOSURE AND WOUND VAC PLACEMENT (Left ) Diagnosis: (NECROTIZING SOFT TISSUE INFECTION)    Surgeons: Melissa Morris MD Responsible Provider: Mariaa Ramirez MD    Anesthesia Type: general ASA Status: 4          Anesthesia Type: general    Cristhian Phase I: Cristhian Score: 9    Cristhian Phase II:      Last vitals: Reviewed and per EMR flowsheets.        Anesthesia Post Evaluation    Patient location during evaluation: PACU  Patient participation: complete - patient participated  Level of consciousness: awake and alert  Pain score: 2  Airway patency: patent  Nausea & Vomiting: no nausea and no vomiting  Complications: no  Cardiovascular status: hemodynamically stable  Respiratory status: acceptable  Hydration status: euvolemic

## 2022-02-14 NOTE — PROGRESS NOTES
ICU PROGRESS NOTE          PATIENT NAME: [facility] RECORD NO. [id number]  DATE: 2022    PRIMARY CARE PHYSICIAN: Clem Meyers MD    HD: # 6    ASSESSMENT    Patient Active Problem List   Diagnosis    Leonel gangrene    Uncontrolled type 2 diabetes mellitus with hyperglycemia ([facility]    Acute kidney injury superimposed on CKD ([facility]    Hypokalemia    Hyponatremia    Anemia    Morbid obesity with BMI of 45.0-49.9, adult ([facility]    Type 1 diabetes mellitus with stage 3a chronic kidney disease ([facility]       MEDICAL DECISION MAKING AND PLAN    1. Neuro:  1. Fentanyl @ 75 mcg/hr   2. Precedex @ 1.1 mcg/kg/hr  3. Ketamine drip   4. Tylenol 1000 mg q8H, gabapentin 100 mg q8, roxicodone 5mg q 4H PRN     2. CV:  1. HR: 54 - 62  2. MAP: 65 - 84  3. Lactate: 0.62 (1.02)  4. Midodrine 5 mg TID   5. Intermittent levophed to maintain MAP > 65 mmHg over the past 24 hours, currently off    3. Heme:  1. Hgb: 6.9 (7.4)  1. S/p transfusion 1 u PRBCs   2. Plt: 202 (239)  3. DVT ppx: heparin     4. Pulm:  1. PRVC: 370/16/10/40%  2. AB.36/52.7/129.7/30.4  1. PF: 324  3. CXR: asymmetric edema vs infiltrate; worse from previous     5. Renal/lytes:  1. BUN/Cr: 19 (26) / 1.65 (2.44)  2. Na/K+: 133 (135) / 4.0 (3.7)  3. UOP: 0.1 - 0.2  cc/kg/hr overnight   4. Nephrology following: Managing CRRT and electrolytes. Bicarb drip @ 150 cc/hr. 6. GI:  1. NPO - resume TF post operatively   2. Pepcid 20 mg BID     7. ID:  1. Tmax: 98.1 (36.7)  2. WBC: 6.3 (9.3)  3. Antibiotics: clindamycin, zosyn     8. MSK:  1. Necrotizing soft tissue infection of the buttocks and groin   2. S/p incision and debridement , and further debridement later   3. S/p irrigation and debridement of wound 2/10  4. S/p irrigation and debridement, partial closure, and bilateral wound vac placement   5. Plan for return to OR today     9. Endo:  1. B  2. Insulin drip: 64 units over past 24 hours     10.  Lines:  1. PIV, Booker, ETT, OGT, right radial arterial line, right IJ CVC, right femoral polina catheter    CHECKLIST    RASS: -1  RESTRAINTS: bilateral soft wrist   IVF: bicarb drip @ 150 cc/hr   NUTRITION: NPO  ANTIBIOTICS: clindamycin, zosyn   GI: pepcid   DVT: heparin   GLYCEMIC CONTROL: insulin gtt   HOB >45: yes   WOUND CARE: vac change in OR today     RAY Childers was seen and examined at bedside. No acute events overnight. Hemodynamically stable, and afebrile. Remains on CRRT. Wound vacs lost suction overnight. Will plan for change in OR today. OBJECTIVE  VITALS: Temp: Temp: 97 °F (36.1 °C)Temp  Av.7 °F (35.9 °C)  Min: 96.3 °F (35.7 °C)  Max: 97.9 °F (36.6 °C) BP No data recorded. No data recorded. Pulse Pulse  Av.9  Min: 47  Max: 66 Resp Resp  Av.8  Min: 9  Max: 17 Pulse ox SpO2  Av %  Min: 98 %  Max: 100 %    GENERAL: intubated, sedated, no apparent distress   NEURO: intubated, sedated, following commands   HEENT: NCAT   : perineal dressings intact   LUNGS: no acute respiratory distress or accessory muscle use   MECHANICAL VENTILATION: tolerating mechanical ventilation   HEART: regular rate and rhythm   ABDOMEN: soft, non-distended   EXTERMITY: no cyanosis, clubbing or edema    LAB:  CBC:   Recent Labs     22  0551 22  0551 22  1158 22  0421 22  0443   WBC 10.3  --   --  9.3 6.3   HGB 6.7*   < > 7.7* 7.4* 6.9*   HCT 20.5*   < > 23.0* 22.9* 21.6*   MCV 87.6  --   --  87.7 88.5     --   --  239 202    < > = values in this interval not displayed.      BMP:   Recent Labs     22  0551 22  0800 22  0421 22  0421 22  0800 22  1536 22  0443   *   < > 130*   < > 134* 135 133*   K 3.8   < > 3.8   < > 3.8 3.7 4.0   CL 99   < > 98   < > 99 100 101   CO2 19*   < > 21   < > 26 27 25   BUN 30*  --  26*  --   --   --  19   CREATININE 3.13*  --  2.44*  --   --   --  1.65*   GLUCOSE 151*  --  152*  --   --   --  144*    < > = values in this interval not displayed. RADIOLOGY:  XR CHEST PORTABLE    Result Date: 2/14/2022  EXAMINATION: ONE XRAY VIEW OF THE CHEST 2/14/2022 6:02 am COMPARISON: 02/13/2022, 0533 hours. HISTORY: ORDERING SYSTEM PROVIDED HISTORY: intubated TECHNOLOGIST PROVIDED HISTORY: intubated Reason for Exam: supine port  pt on dialysis, difficult to reach pt or get supine due to equipment in use FINDINGS: The ET tube was in satisfactory position, 5.1 cm above the henrietta. The NG tube was at least to the GE junction. A right jugular central venous line was noted with the tip in the superior vena cava. Cardiomegaly was noted with asymmetric pulmonary edema and or pneumonia. These have progressed from 02/13/2022. The regional skeleton was unremarkable. The ET tube was in satisfactory position, 5.1 cm above the henrietta. The NG tube was at least to the GE junction. A right jugular central venous line was noted with the tip in the superior vena cava. Cardiomegaly was noted with asymmetric pulmonary edema and or pneumonia which has progressed from 02/13/2022. XR CHEST PORTABLE    Result Date: 2/13/2022  EXAMINATION: ONE X-RAY VIEW OF THE CHEST 2/13/2022 5:40 am COMPARISON: 02/12/2022 HISTORY: ORDERING SYSTEM PROVIDED HISTORY: intubated TECHNOLOGIST PROVIDED HISTORY: intubated Reason for Exam: port Supine FINDINGS: Stable vague right lung infiltrates are identified. Endotracheal tube and nasogastric tube are stable. There appears to be an additional esophageal catheter. No pneumothorax is identified. The heart is mildly enlarged. No acute osseous abnormality is detected. The lines and tubes are stable. Stable right lung infiltrates. Mild cardiomegaly.        Tootie Crouch DO  2/14/2022  6:48 AM

## 2022-02-14 NOTE — PROGRESS NOTES
Comprehensive Nutrition Assessment    Type and Reason for Visit:  Reassess    Nutrition Recommendations/Plan: Restart Immune Enhancing TF at 25 mL/hr. When able to advance rate, suggest goal of 55 mL/hr to provide 1980 kcal and 124 g pro/day. Will monitor TF tolerance/adequacy. Nutrition Assessment:  Chart reviewed. Pt remains on vent. Noted pt is now on CRRT. S/p debridement of bilateral groins and left buttock wound with partial closure and wound VAC placement today. Plan to restart/keep Immune Enhancing TF at 25 mL/hr today. Meds/labs reviewed. Malnutrition Assessment:  Malnutrition Status:   At risk for malnutrition     Context:  Acute Illness     Findings of the 6 clinical characteristics of malnutrition:  Energy Intake:  Mild decrease in energy intake   Weight Loss:  Unable to assess     Body Fat Loss:  No significant body fat loss     Muscle Mass Loss:  No significant muscle mass loss    Fluid Accumulation:  1 - Mild Extremities,Generalized   Strength:  Not Performed    Estimated Daily Nutrient Needs:  Energy (kcal):  2000 kcal/day; Weight Used for Energy Requirements:  Current     Protein (g):  105-130 g pro/day; Weight Used for Protein Requirements:  Ideal (2-2.5)        Fluid (ml/day):  per MD     Nutrition Related Findings:  Obesity      Wounds:  Surgical Incision (necrotizing soft tissue infection of groin and buttocks- s/p I&D)       Current Nutrition Therapies:    Diet NPO Exceptions are: Sips of Water with Meds  ADULT TUBE FEEDING; Nasogastric; Immune Enhancing; Continuous; 25; No; 30; Before and after each bolus  Current Tube Feeding (TF) Orders:  · Feeding Route: Nasogastric  · Formula: Immune Enhancing  · Schedule: Continuous goal 25 mL/hr --currently off d/t surgery  · Current TF & Flush Orders Provides: 25 mL/hr =900 kcal and 56 g pro/day  · Goal TF & Flush Orders Provides: 55 mL/hr would provide 1980 kcal and 124 g pro/day    Additional Calorie Sources:   None    Anthropometric Measures:  · Height: 5' 3\" (160 cm)  · Current Body Weight: 299 lb 4.8 oz (135.8 kg)   · Ideal Body Weight: 115 lbs; % Ideal Body Weight 260.3 %   · BMI: 53  · BMI Categories: Obese Class 3 (BMI 40.0 or greater)       Nutrition Diagnosis:   · Increased nutrient needs related to healing as evidenced by  necrotizing soft tissue infection of groin and buttocks    · Inadequate oral intake related to impaired respiratory function as evidenced by NPO or clear liquid status due to medical condition    Nutrition Interventions:   Food and/or Nutrient Delivery: Restart Immune Enhancing TF at 25 mL/hr. When able to advance rate, suggest goal of 55 mL/hr to provide 1980 kcal and 124 g pro/day. Nutrition Education/Counseling:  No recommendation at this time   Coordination of Nutrition Care:  Continue to monitor while inpatient    Goals:  meet % of estimated nutrient needs-progressing towards goal     Nutrition Monitoring and Evaluation:   Behavioral-Environmental Outcomes:  None Identified   Food/Nutrient Intake Outcomes:  Enteral Nutrition Intake/Tolerance  Physical Signs/Symptoms Outcomes:  Biochemical Data,Nutrition Focused Physical Findings,Skin,Weight,Fluid Status or Edema     Discharge Planning:     Too soon to determine     Electronically signed by Mago Perry RD, LD on 2/14/22 at 2:57 PM EST    Contact: [phone]

## 2022-02-14 NOTE — PLAN OF CARE
Chlorhexidine bath given. Position changes every 2 hours.   Problem: Skin Integrity:  Goal: Will show no infection signs and symptoms  Description: Will show no infection signs and symptoms  2/14/2022 0527 by Manisha Hernandez  Outcome: Ongoing  2/13/2022 1855 by Ted Bazzi RN  Outcome: Ongoing  Goal: Absence of new skin breakdown  Description: Absence of new skin breakdown  2/14/2022 0527 by Manisha Hernandez  Outcome: Ongoing  2/13/2022 1855 by Ted Bazzi RN  Outcome: Ongoing     Problem: Fluid Volume - Imbalance:  Goal: Absence of imbalanced fluid volume signs and symptoms  Description: Absence of imbalanced fluid volume signs and symptoms  2/14/2022 0527 by Manisha Hernandez  Outcome: Ongoing  2/13/2022 1855 by Ted Bazzi RN  Outcome: Ongoing     Problem: Pain:  Goal: Pain level will decrease  Description: Pain level will decrease  2/14/2022 0527 by Manisha Hernandez  Outcome: Ongoing  2/13/2022 1855 by Ted Bazzi RN  Outcome: Ongoing  Goal: Recognizes and communicates pain  Description: Recognizes and communicates pain  2/14/2022 0527 by Manisha Hernandez  Outcome: Ongoing  2/13/2022 1855 by Ted Bazzi RN  Outcome: Ongoing  Goal: Control of acute pain  Description: Control of acute pain  2/14/2022 0527 by Manisha Hernandez  Outcome: Ongoing  2/13/2022 1855 by Ted Bazzi RN  Outcome: Ongoing  Goal: Control of chronic pain  Description: Control of chronic pain  2/14/2022 0527 by Manisha Hernandez  Outcome: Ongoing  2/13/2022 1855 by Ted Bazzi RN  Outcome: Ongoing     Problem: Serum Glucose Level - Abnormal:  Goal: Ability to maintain appropriate glucose levels will improve to within specified parameters  Description: Ability to maintain appropriate glucose levels will improve to within specified parameters  2/14/2022 0527 by Manisha Hernandez  Outcome: Ongoing  2/13/2022 1855 by Ted Bazzi RN  Outcome: Ongoing     Problem: Skin Integrity - Impaired:  Goal: Will show no infection signs and symptoms  Description: Will show no infection signs and symptoms  2/14/2022 0527 by Jose Kaba  Outcome: Ongoing  2/13/2022 1855 by Candace Camejo RN  Outcome: Ongoing  Goal: Absence of new skin breakdown  Description: Absence of new skin breakdown  2/14/2022 0527 by Jose Kaba  Outcome: Ongoing  2/13/2022 1855 by Candace Camejo RN  Outcome: Ongoing     Problem: Non-Violent Restraints  Goal: Removal from restraints as soon as assessed to be safe  2/14/2022 0527 by Jose Kaba  Outcome: Ongoing  2/13/2022 1855 by Candace Camejo RN  Outcome: Not Met This Shift  Goal: No harm/injury to patient while restraints in use  2/14/2022 0527 by Jose Kaba  Outcome: Ongoing  2/13/2022 1855 by Candace Camejo RN  Outcome: Ongoing  Goal: Patient's dignity will be maintained  2/14/2022 0527 by Jose Kaba  Outcome: Ongoing  2/13/2022 1855 by Candace Camejo RN  Outcome: Ongoing     Problem: Nutrition  Goal: Optimal nutrition therapy  2/14/2022 0527 by Jose Kaba  Outcome: Ongoing  2/13/2022 1855 by Candace Camejo RN  Outcome: Ongoing     Problem: OXYGENATION/RESPIRATORY FUNCTION  Goal: Patient will maintain patent airway  2/14/2022 0527 by Jose Kaba  Outcome: Ongoing  2/13/2022 1855 by Candace Camejo RN  Outcome: Ongoing  2/13/2022 1658 by Aurora Hdz RCP  Outcome: Ongoing  Goal: Patient will achieve/maintain normal respiratory rate/effort  Description: Respiratory rate and effort will be within normal limits for the patient  2/14/2022 0527 by Jose Kaba  Outcome: Ongoing  2/13/2022 1855 by Candace Camejo RN  Outcome: Ongoing  2/13/2022 1658 by Aurora Hdz RCP  Outcome: Ongoing     Problem: MECHANICAL VENTILATION  Goal: Patient will maintain patent airway  2/14/2022 0527 by Jose Kaba  Outcome: Ongoing  2/13/2022 1855 by Candace Camejo RN  Outcome: Ongoing  2/13/2022 1658 by Aurora Hdz RCP  Outcome: Ongoing  Goal: Oral health is maintained or improved  2/14/2022 0527 by Jose Kaba  Outcome: Ongoing  2/13/2022 [id number] by Gracie Severance, RN  Outcome: Ongoing  2/13/2022 1658 by Minerva Hilton RCP  Outcome: Ongoing  Goal: ET tube will be managed safely  2/14/2022 0527 by [facility]  Outcome: Ongoing  2/13/2022 1855 by Gracie Severance, RN  Outcome: Ongoing  2/13/2022 1658 by Minerva Hilton RCP  Outcome: Ongoing  Goal: Ability to express needs and understand communication  2/14/2022 0527 by [facility]  Outcome: Ongoing  2/13/2022 1855 by Gracie Severance, RN  Outcome: Ongoing  2/13/2022 1658 by Minerva Hilton RCP  Outcome: Ongoing  Goal: Mobility/activity is maintained at optimum level for patient  2/14/2022 0527 by [facility]  Outcome: Ongoing  2/13/2022 1855 by Gracie Severance, RN  Outcome: Ongoing  2/13/2022 1658 by Minerva Hilton RCP  Outcome: Ongoing     Problem: SKIN INTEGRITY  Goal: Skin integrity is maintained or improved  2/14/2022 0527 by [facility]  Outcome: Ongoing  2/13/2022 1855 by Gracie Severance, RN  Outcome: Ongoing  2/13/2022 1658 by Minerva Hilton RCP  Outcome: Ongoing     Problem: Confusion - Acute:  Goal: Absence of continued neurological deterioration signs and symptoms  Description: Absence of continued neurological deterioration signs and symptoms  Outcome: Ongoing  Goal: Mental status will be restored to baseline  Description: Mental status will be restored to baseline  Outcome: Ongoing     Problem: Discharge Planning:  Goal: Ability to perform activities of daily living will improve  Description: Ability to perform activities of daily living will improve  Outcome: Ongoing  Goal: Participates in care planning  Description: Participates in care planning  Outcome: Ongoing     Problem: Injury - Risk of, Physical Injury:  Goal: Absence of physical injury  Description: Absence of physical injury  Outcome: Ongoing  Goal: Will remain free from falls  Description: Will remain free from falls  Outcome: Ongoing     Problem: Mood - Altered:  Goal: Mood stable  Description: Mood stable  Outcome: Ongoing  Goal: Absence of abusive behavior  Description: Absence of abusive behavior  Outcome: Ongoing  Goal: Verbalizations of feeling emotionally comfortable while being cared for will increase  Description: Verbalizations of feeling emotionally comfortable while being cared for will increase  Outcome: Ongoing     Problem: Psychomotor Activity - Altered:  Goal: Absence of psychomotor disturbance signs and symptoms  Description: Absence of psychomotor disturbance signs and symptoms  Outcome: Ongoing     Problem: Sensory Perception - Impaired:  Goal: Demonstrations of improved sensory functioning will increase  Description: Demonstrations of improved sensory functioning will increase  Outcome: Ongoing  Goal: Decrease in sensory misperception frequency  Description: Decrease in sensory misperception frequency  Outcome: Ongoing  Goal: Able to refrain from responding to false sensory perceptions  Description: Able to refrain from responding to false sensory perceptions  Outcome: Ongoing  Goal: Demonstrates accurate environmental perceptions  Description: Demonstrates accurate environmental perceptions  Outcome: Ongoing  Goal: Able to distinguish between reality-based and nonreality-based thinking  Description: Able to distinguish between reality-based and nonreality-based thinking  Outcome: Ongoing  Goal: Able to interrupt nonreality-based thinking  Description: Able to interrupt nonreality-based thinking  Outcome: Ongoing     Problem: Sleep Pattern Disturbance:  Goal: Appears well-rested  Description: Appears well-rested  Outcome: Ongoing

## 2022-02-14 NOTE — BRIEF OP NOTE
Brief Postoperative Note      Patient: Ricco Howell  YOB: 1980  MRN: [id number]    Date of Procedure: 2/14/2022    Pre-Op Diagnosis: NECROTIZING SOFT TISSUE INFECTION    Post-Op Diagnosis: Same       Procedure(s):  DEBREIDMENT OF BILATERAL GROINS AND LEFT BUTTOCK WOUND WITH PARTIAL CLOSURE AND WOUND VAC PLACEMENT    Surgeon(s):  Page Faulkner MD    Assistant:  Resident: Prasad Dang DO; Cooper Cordova DO    Anesthesia: General    Estimated Blood Loss (mL): Minimal    Complications: None    Specimens:   * No specimens in log *    Implants:  * No implants in log *      Drains:   Negative Pressure Wound Therapy Buttocks Left (Active)       NG/OG/NJ/NE Tube Orogastric 18 fr Center mouth (Active)   Surrounding Skin Dry; Intact 02/14/22 0400   Securement device Yes 02/14/22 0400   Status Other (Comment) 02/14/22 0400   Placement Verified by External Catheter Length 02/14/22 0400   NG/OG/NJ/NE External Measurement (cm) 55 cm 02/14/22 0400   Tube Feeding Immune Enhancing 02/14/22 0400   Tube Feeding Status Stopped 02/14/22 0400   Rate/Schedule 0 mL/hr 02/14/22 0000   Tube Feeding Intake (mL) 65 ml 02/13/22 1918   Free Water Flush (mL) 100 mL 02/13/22 0040       Urethral Catheter Temperature probe 16 fr (Active)   Catheter Indications Need for fluid volume management of the critically ill patient in a critical care setting 02/14/22 0400   Site Assessment Swelling 02/14/22 0400   Urine Color Yellow 02/14/22 0400   Urine Appearance Clear 02/14/22 0400   Urine Odor Malodorous 02/14/22 0400   Output (mL) 5 mL 02/14/22 0700       [REMOVED] Negative Pressure Wound Therapy Hip Anterior;Right (Removed)   Wound Type Surgical 02/14/22 0000   Cycle Off 02/14/22 0000   Target Pressure (mmHg) 125 02/13/22 1600   Dressing Status Intact 02/14/22 0000   Dressing Changed Dressing reinforced 02/12/22 1600   Drainage Amount Small 02/13/22 1600   Drainage Description Serosanguinous 02/13/22 1600   Output (ml) 0 ml 02/12/22 1900   Wound Assessment Other (Comment) 02/13/22 1600   Vilma-wound Assessment Other (Comment) 02/13/22 1600       [REMOVED] Negative Pressure Wound Therapy Hip Anterior; Left (Removed)   Wound Type Surgical 02/14/22 0000   Cycle Off 02/14/22 0000   Target Pressure (mmHg) 125 02/13/22 1600   Dressing Status Dry; Intact; Clean 02/14/22 0000   Dressing Changed Dressing reinforced 02/12/22 1600   Drainage Amount Small 02/13/22 1600   Drainage Description Serosanguinous 02/13/22 1600   Output (ml) 25 ml 02/12/22 1900   Wound Assessment Other (Comment) 02/13/22 1600   Vilma-wound Assessment Other (Comment) 02/13/22 1600       Findings: Debridement of necrotic skin edges of all wounds, and debridement of necrotic adipose tissue in the left posterior wound. Successful closure of R groin wound, and vac placement of L wound.      Electronically signed by Ping Lyle DO on 2/14/2022 at 2:35 PM

## 2022-02-14 NOTE — PROGRESS NOTES
Nephrology Progress Note      SUBJECTIVE       Pt was seen and examined. No acute issues overnite. Stable hemodynamics . She is offl Levophed at this time and is on midodrine. Dialysis system clotted this morning. She is currently off the CVVH machine. She is heading to or shortly for debridement. The patient is receiving a bicarbonate containing IV fluids prefilter, and despite the fluids she is still clotting in the system. I would recommend low intensity heparin drip per protocol if okay with surgery to help prevent clotting. Her net negative fluid removal with CVVH has been -50 mL an hour when she has been on the machine. She is having stools. She does have a wound VAC in place. 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 .       Urine output is low despite IV Lasix, patient continues on CVVHD. She is currently on -50 mL an hour desired fluid balance. Patient continues to be intubated and on the ventilator, although there is some plan for possible weaning/extubation soon. She continues to receive broad-spectrum antibiotics. OBJECTIVE      CURRENT TEMPERATURE:  Temp: 97 °F (36.1 °C)  MAXIMUM TEMPERATURE OVER 24HRS:  Temp (24hrs), Av.7 °F (35.9 °C), Min:96.3 °F (35.7 °C), Max:97.9 °F (36.6 °C)    CURRENT RESPIRATORY RATE:  Resp: 21  CURRENT PULSE:  Pulse: 59  CURRENT BLOOD PRESSURE:  BP: (!) 99/55  24HR BLOOD PRESSURE RANGE:  No data recorded.  ; No data recorded.     24HR INTAKE/OUTPUT:      Intake/Output Summary (Last 24 hours) at 2022 1107  Last data filed at 2022 0700  Gross per 24 hour   Intake 4401 ml   Output 5281 ml   Net -880 ml     WEIGHT :  Patient Vitals for the past 96 hrs (Last 3 readings):   Weight   22 0600 299 lb 4.8 oz (135.8 kg)     PHYSICAL EXAM      GENERAL APPEARANCE: Intubated and sedated  SKIN: warm and dry, no rash or erythema   ENT: oral ETT in place  NECK:   Thick neck, cannot appreciate JVD, no accessory muscle use  PULMONARY: Bilateral air entry with diminished breath sounds in the bases and bilateral rales as well CARDIOVASCULAR: Regular rate and rhythm positive S1 and S2 no rubs.    ABDOMEN: Obese and soft, active bowel sounds  EXTREMITIES: 1+ peripheral edema     CURRENT MEDICATIONS      0.9 % sodium chloride infusion, Continuous  0.9 % sodium chloride infusion, Continuous  midodrine (PROAMATINE) tablet 5 mg, TID WC  lactated ringers infusion, Continuous  0.9 % sodium chloride infusion, PRN  furosemide (LASIX) injection 80 mg, BID  famotidine (PEPCID) 40 MG/5ML suspension 20 mg, Daily  prismaSATE BK 0/3.5 5,000 mL with potassium chloride 15 mEq solution, Continuous  sodium bicarbonate 150 mEq in dextrose 5 % 1,000 mL infusion, Continuous  heparin (porcine) injection 1,900 Units, PRN  lactated ringers bolus, Once  heparin (porcine) injection 1,000 Units, Once  heparin (porcine) injection 1,600 Units, PRN  piperacillin-tazobactam (ZOSYN) 2,250 mg in dextrose 5 % 50 mL IVPB (mini-bag), Q6H  potassium chloride 20 mEq/50 mL IVPB (Central Line), PRN  magnesium sulfate 1000 mg in dextrose 5% 100 mL IVPB, PRN  calcium gluconate 1000 mg in sodium chloride 50 mL, PRN   Or  calcium gluconate 2000 mg in sodium chloride 100 mL, PRN   Or  calcium gluconate 3,000 mg in dextrose 5 % 100 mL IVPB, PRN   Or  calcium gluconate 4,000 mg in dextrose 5 % 100 mL IVPB, PRN  sodium phosphate 6 mmol in dextrose 5 % 250 mL IVPB, PRN   Or  sodium phosphate 12 mmol in dextrose 5 % 250 mL IVPB, PRN   Or  sodium phosphate 18 mmol in dextrose 5 % 500 mL IVPB, PRN   Or  sodium phosphate 24 mmol in dextrose 5 % 500 mL IVPB, PRN  oxyCODONE (ROXICODONE) immediate release tablet 5 mg, Q4H PRN  fluticasone (FLONASE) 50 MCG/ACT nasal spray 1 spray, Daily  budesonide-formoterol (SYMBICORT) 80-4.5 MCG/ACT inhaler 2 puff, BID  albuterol sulfate  (90 Base) MCG/ACT inhaler 1 puff, Q4H PRN  norepinephrine (LEVOPHED) 16 mg in sodium chloride 0.9 % 250 mL infusion, Continuous  montelukast (SINGULAIR) tablet 10 mg, Daily  [Held by provider] rOPINIRole (REQUIP) tablet 4 mg, Daily  insulin regular (HUMULIN R;NOVOLIN R) 100 Units in sodium chloride 0.9 % 100 mL infusion, Continuous  gabapentin (NEURONTIN) capsule 100 mg, Q8H  vasopressin 20 Units in dextrose 5 % 100 mL infusion, Continuous  fentaNYL 20 mcg/mL Infusion, Continuous  dexmedetomidine (PRECEDEX) 400 mcg in sodium chloride 0.9 % 100 mL infusion, Continuous  heparin (porcine) injection 5,000 Units, 3 times per day  ketamine (KETALAR) 500 mg in sodium chloride 0.9 % 250 mL infusion, Continuous  acetaminophen (TYLENOL) tablet 1,000 mg, 3 times per day  clindamycin (CLEOCIN) 900 mg in dextrose 5 % 50 mL IVPB, Q8H          LABS      CBC:   Recent Labs     02/12/22  0551 02/12/22  0551 02/12/22  1158 02/13/22  0421 02/14/22  0443   WBC 10.3  --   --  9.3 6.3   RBC 2.34*  --   --  2.61* 2.44*   HGB 6.7*   < > 7.7* 7.4* 6.9*   HCT 20.5*   < > 23.0* 22.9* 21.6*   MCV 87.6  --   --  87.7 88.5   MCH 28.6  --   --  28.4 28.3   MCHC 32.7  --   --  32.3 31.9   RDW 14.8*  --   --  14.8* 14.8*     --   --  239 202   MPV 10.9  --   --  11.1 11.4    < > = values in this interval not displayed. BMP:   Recent Labs     02/12/22  0551 02/12/22  0800 02/13/22  0421 02/13/22  0421 02/13/22  0800 02/13/22  1536 02/14/22  0443   *   < > 130*   < > 134* 135 133*   K 3.8   < > 3.8   < > 3.8 3.7 4.0   CL 99   < > 98   < > 99 100 101   CO2 19*   < > 21   < > 26 27 25   BUN 30*  --  26*  --   --   --  19   CREATININE 3.13*  --  2.44*  --   --   --  1.65*   GLUCOSE 151*  --  152*  --   --   --  144*   CALCIUM 7.6*  --  8.4*  --   --   --  8.4*    < > = values in this interval not displayed.       PHOSPHORUS:    Recent Labs     02/12/22  0551 02/13/22 0421 02/14/22 0443   PHOS 5.2* 4.6* 4.0     MAGNESIUM:   Recent Labs     02/12/22  0551 02/13/22 0421 02/14/22 0443   MG 1.9 1.8 1.6     SPEP:   Lab Results   Component Value Date    PROT 5.7 02/09/2022     C3:   Lab Results   Component Value Date    C3 143 02/09/2022     C4:   Lab Results   Component Value Date    C4 19 02/09/2022     URINE SODIUM:    Lab Results   Component Value Date    JORGE 26 02/09/2022      URINE CREATININE:    Lab Results   Component Value Date    LABCREA 142.3 02/09/2022         ASSESSMENT      1. Acute Kidney Injury: Secondary to ischemic ATN from shock/ sepsis systemic intermittent response syndrome possible vancomycin toxicity is Vanco level was 39.6. Oligoanuric creatinine had jumped up to 4.9, CRRT started. 2.  Chronic kidney disease stage III baseline 1.5-1.6 proteinuria about 1 to 2 g from diabetic nephrosclerosis follows up with Dr. Nereyda Marcano  3. Type 2 diabetes with poor control hemoglobin A1c 7-9 range  4. Morbid obesity  5. Leonel's gangrene requiring debridements, patient on antibiotics including clindamycin and Zosyn. 6.  Now off Levophed and is on midodrine for blood pressure support  7. Acute respiratory failure, ventilator dependent with apparently some improvement in ventilator parameters recently  PLAN      1.  CVVHD to continue at -50 mL an hour of desired fluid  balance   2. Antibiotics to continue. 3.  With clotting issues with the CVVHD, I would recommend low intensity heparin protocol if okay with surgery  4. Discontinue IV Lasix secondary to diminished effectiveness  5. Follow labs as ordered      Please do not hesitate to call with questions.     This note is created with the assistance of a speech-recognition program. While intending to generate a document that actually reflects the content of the visit, no guarantees can be provided that every mistake has been identified and corrected by editing    Electronically signed by Nixon Angeles MD on 2/14/2022 at 11:07 AM

## 2022-02-14 NOTE — PLAN OF CARE
by Medina Wesis RN  Outcome: Ongoing  2/14/2022 0527 by Frida Hernandez  Outcome: Ongoing  Goal: Absence of new skin breakdown  Description: Absence of new skin breakdown  2/14/2022 1856 by Medina Weiss RN  Outcome: Ongoing  2/14/2022 0527 by Frida Hernandez  Outcome: Ongoing     Problem: Non-Violent Restraints  Goal: Removal from restraints as soon as assessed to be safe  2/14/2022 1856 by Medina Weiss RN  Outcome: Not Met This Shift  2/14/2022 0527 by Frida Hernandez  Outcome: Ongoing  Goal: No harm/injury to patient while restraints in use  2/14/2022 1856 by Medina Weiss RN  Outcome: Ongoing  2/14/2022 0527 by Frida Hernandez  Outcome: Ongoing  Goal: Patient's dignity will be maintained  2/14/2022 1856 by Medina Weiss RN  Outcome: Ongoing  2/14/2022 0527 by Frida Hernandez  Outcome: Ongoing     Problem: Nutrition  Goal: Optimal nutrition therapy  2/14/2022 1856 by Medina Weiss RN  Outcome: Ongoing  2/14/2022 1509 by Shaw Gallegos RD, LD  Outcome: Ongoing  Note: Nutrition Problem #1: Increased nutrient needs  Intervention: Food and/or Nutrient Delivery:  (Restart Immune Enhancing TF at 25 mL/hr.  When able to advance rate, suggest goal of 55 mL/hr to provide 1980 kcal and 124 g pro/day.)  Nutritional Goals: meet % of estimated nutrient needs    2/14/2022 0527 by Frida Hernandez  Outcome: Ongoing     Problem: OXYGENATION/RESPIRATORY FUNCTION  Goal: Patient will maintain patent airway  2/14/2022 1856 by Medina Weiss RN  Outcome: Ongoing  2/14/2022 0849 by Oriana Gaona RCP  Outcome: Ongoing  2/14/2022 0527 by Frida Hernandez  Outcome: Ongoing  Goal: Patient will achieve/maintain normal respiratory rate/effort  Description: Respiratory rate and effort will be within normal limits for the patient  2/14/2022 1856 by Medina Weiss RN  Outcome: Ongoing  2/14/2022 0849 by Oriana Gaona RCP  Outcome: Ongoing  2/14/2022 0527 by Frida Hernandez  Outcome: Ongoing     Problem: MECHANICAL VENTILATION  Goal: Patient will maintain patent airway  2/14/2022 1856 by Mak Barron RN  Outcome: Ongoing  2/14/2022 0849 by Trevor Rangel RCP  Outcome: Ongoing  2/14/2022 0527 by Michael Krishna  Outcome: Ongoing  Goal: Oral health is maintained or improved  2/14/2022 1856 by Mak Barron RN  Outcome: Ongoing  2/14/2022 0849 by Trevor Rangel RCP  Outcome: Ongoing  2/14/2022 0527 by Michael Krishna  Outcome: Ongoing  Goal: ET tube will be managed safely  2/14/2022 1856 by Mak Barron RN  Outcome: Ongoing  2/14/2022 0849 by Trevor Rangel RCP  Outcome: Ongoing  2/14/2022 0527 by Michael Krishna  Outcome: Ongoing  Goal: Ability to express needs and understand communication  2/14/2022 1856 by Mak Barron RN  Outcome: Ongoing  2/14/2022 0849 by Trevor Rangel RCP  Outcome: Ongoing  2/14/2022 0527 by Michael Krishna  Outcome: Ongoing  Goal: Mobility/activity is maintained at optimum level for patient  2/14/2022 1856 by Mak Barron RN  Outcome: Ongoing  2/14/2022 0849 by Trevor Rangel RCP  Outcome: Ongoing  2/14/2022 0527 by Michael Krishna  Outcome: Ongoing     Problem: SKIN INTEGRITY  Goal: Skin integrity is maintained or improved  2/14/2022 1856 by Mak Barron RN  Outcome: Ongoing  2/14/2022 0527 by Michael Krishna  Outcome: Ongoing     Problem: Confusion - Acute:  Goal: Absence of continued neurological deterioration signs and symptoms  Description: Absence of continued neurological deterioration signs and symptoms  2/14/2022 1856 by Mak Barron RN  Outcome: Ongoing  2/14/2022 0527 by Michael Krishna  Outcome: Ongoing  Goal: Mental status will be restored to baseline  Description: Mental status will be restored to baseline  2/14/2022 1856 by Mak Barron RN  Outcome: Ongoing  2/14/2022 0527 by Michael Krishna  Outcome: Ongoing     Problem: Discharge Planning:  Goal: Ability to perform activities of daily living will improve  Description: Ability to perform activities of daily living will improve  2/14/2022 1856 by Mak Barron frequency  Description: Decrease in sensory misperception frequency  2/14/2022 1856 by Candace Camejo RN  Outcome: Ongoing  2/14/2022 0527 by Jose Kaba  Outcome: Ongoing  Goal: Able to refrain from responding to false sensory perceptions  Description: Able to refrain from responding to false sensory perceptions  2/14/2022 1856 by Candace Camejo RN  Outcome: Ongoing  2/14/2022 0527 by Jose Kaba  Outcome: Ongoing  Goal: Demonstrates accurate environmental perceptions  Description: Demonstrates accurate environmental perceptions  2/14/2022 1856 by Candace Camejo RN  Outcome: Ongoing  2/14/2022 0527 by Jose Kaba  Outcome: Ongoing  Goal: Able to distinguish between reality-based and nonreality-based thinking  Description: Able to distinguish between reality-based and nonreality-based thinking  2/14/2022 1856 by Candace Camejo RN  Outcome: Ongoing  2/14/2022 0527 by Jose Kaba  Outcome: Ongoing  Goal: Able to interrupt nonreality-based thinking  Description: Able to interrupt nonreality-based thinking  2/14/2022 1856 by Candace Camejo RN  Outcome: Ongoing  2/14/2022 0527 by Jose Kaba  Outcome: Ongoing     Problem: Sleep Pattern Disturbance:  Goal: Appears well-rested  Description: Appears well-rested  2/14/2022 1856 by Candace Camejo RN  Outcome: Ongoing  2/14/2022 0527 by Jose Kaba  Outcome: Ongoing

## 2022-02-14 NOTE — PLAN OF CARE
Nutrition Problem #1: Increased nutrient needs  Intervention: Food and/or Nutrient Delivery:  (Restart Immune Enhancing TF at 25 mL/hr.  When able to advance rate, suggest goal of 55 mL/hr to provide 1980 kcal and 124 g pro/day.)  Nutritional Goals: meet % of estimated nutrient needs

## 2022-02-15 ENCOUNTER — APPOINTMENT (OUTPATIENT)
Dept: GENERAL RADIOLOGY | Age: 42
DRG: 853 | End: 2022-02-15
Payer: COMMERCIAL

## 2022-02-15 ENCOUNTER — ANESTHESIA EVENT (OUTPATIENT)
Dept: OPERATING ROOM | Age: 42
DRG: 853 | End: 2022-02-15
Payer: COMMERCIAL

## 2022-02-15 LAB
ABO/RH: NORMAL
ALLEN TEST: ABNORMAL
ANION GAP SERPL CALCULATED.3IONS-SCNC: 10 MMOL/L (ref 9–17)
ANTIBODY SCREEN: NEGATIVE
ARM BAND NUMBER: NORMAL
BLD PROD TYP BPU: NORMAL
BLD PROD TYP BPU: NORMAL
BUN BLDV-MCNC: 24 MG/DL (ref 6–20)
BUN/CREAT BLD: ABNORMAL (ref 9–20)
CALCIUM SERPL-MCNC: 8 MG/DL (ref 8.6–10.4)
CHLORIDE BLD-SCNC: 97 MMOL/L (ref 98–107)
CO2: 23 MMOL/L (ref 20–31)
CREAT SERPL-MCNC: 2.28 MG/DL (ref 0.5–0.9)
CROSSMATCH RESULT: NORMAL
CROSSMATCH RESULT: NORMAL
DISPENSE STATUS BLOOD BANK: NORMAL
DISPENSE STATUS BLOOD BANK: NORMAL
EXPIRATION DATE: NORMAL
FIO2: ABNORMAL
GFR AFRICAN AMERICAN: 28 ML/MIN
GFR NON-AFRICAN AMERICAN: 23 ML/MIN
GFR SERPL CREATININE-BSD FRML MDRD: ABNORMAL ML/MIN/{1.73_M2}
GFR SERPL CREATININE-BSD FRML MDRD: ABNORMAL ML/MIN/{1.73_M2}
GLUCOSE BLD-MCNC: 151 MG/DL (ref 65–105)
GLUCOSE BLD-MCNC: 154 MG/DL (ref 65–105)
GLUCOSE BLD-MCNC: 155 MG/DL (ref 65–105)
GLUCOSE BLD-MCNC: 156 MG/DL (ref 65–105)
GLUCOSE BLD-MCNC: 158 MG/DL (ref 65–105)
GLUCOSE BLD-MCNC: 160 MG/DL (ref 65–105)
GLUCOSE BLD-MCNC: 161 MG/DL (ref 65–105)
GLUCOSE BLD-MCNC: 163 MG/DL (ref 65–105)
GLUCOSE BLD-MCNC: 172 MG/DL (ref 65–105)
GLUCOSE BLD-MCNC: 177 MG/DL (ref 65–105)
GLUCOSE BLD-MCNC: 178 MG/DL (ref 65–105)
GLUCOSE BLD-MCNC: 181 MG/DL (ref 65–105)
GLUCOSE BLD-MCNC: 183 MG/DL (ref 65–105)
GLUCOSE BLD-MCNC: 183 MG/DL (ref 65–105)
GLUCOSE BLD-MCNC: 187 MG/DL (ref 65–105)
GLUCOSE BLD-MCNC: 192 MG/DL (ref 65–105)
GLUCOSE BLD-MCNC: 210 MG/DL (ref 65–105)
GLUCOSE BLD-MCNC: 212 MG/DL (ref 70–99)
GLUCOSE BLD-MCNC: 216 MG/DL (ref 65–105)
GLUCOSE BLD-MCNC: 218 MG/DL (ref 65–105)
GLUCOSE BLD-MCNC: 229 MG/DL (ref 74–100)
HCT VFR BLD CALC: 22.7 % (ref 36.3–47.1)
HEMOGLOBIN: 7.3 G/DL (ref 11.9–15.1)
MAGNESIUM: 1.9 MG/DL (ref 1.6–2.6)
MCH RBC QN AUTO: 28.7 PG (ref 25.2–33.5)
MCHC RBC AUTO-ENTMCNC: 32.2 G/DL (ref 28.4–34.8)
MCV RBC AUTO: 89.4 FL (ref 82.6–102.9)
MODE: ABNORMAL
NEGATIVE BASE EXCESS, ART: ABNORMAL (ref 0–2)
NRBC AUTOMATED: 0 PER 100 WBC
O2 DEVICE/FLOW/%: ABNORMAL
PATIENT TEMP: ABNORMAL
PDW BLD-RTO: 15.3 % (ref 11.8–14.4)
PHOSPHORUS: 5.2 MG/DL (ref 2.6–4.5)
PLATELET # BLD: 257 K/UL (ref 138–453)
PMV BLD AUTO: 11.3 FL (ref 8.1–13.5)
POC HCO3: 28.4 MMOL/L (ref 21–28)
POC LACTIC ACID: 0.92 MMOL/L (ref 0.56–1.39)
POC O2 SATURATION: 99 % (ref 94–98)
POC PCO2 TEMP: ABNORMAL MM HG
POC PCO2: 50.3 MM HG (ref 35–48)
POC PH TEMP: ABNORMAL
POC PH: 7.36 (ref 7.35–7.45)
POC PO2 TEMP: ABNORMAL MM HG
POC PO2: 164.9 MM HG (ref 83–108)
POSITIVE BASE EXCESS, ART: 3 (ref 0–3)
POTASSIUM SERPL-SCNC: 3.9 MMOL/L (ref 3.7–5.3)
RBC # BLD: 2.54 M/UL (ref 3.95–5.11)
SAMPLE SITE: ABNORMAL
SODIUM BLD-SCNC: 130 MMOL/L (ref 135–144)
TCO2 (CALC), ART: ABNORMAL MMOL/L (ref 22–29)
TRANSFUSION STATUS: NORMAL
TRANSFUSION STATUS: NORMAL
UNIT DIVISION: 0
UNIT DIVISION: 0
UNIT NUMBER: NORMAL
UNIT NUMBER: NORMAL
WBC # BLD: 8.7 K/UL (ref 3.5–11.3)

## 2022-02-15 PROCEDURE — 6360000002 HC RX W HCPCS: Performed by: STUDENT IN AN ORGANIZED HEALTH CARE EDUCATION/TRAINING PROGRAM

## 2022-02-15 PROCEDURE — 5A1D70Z PERFORMANCE OF URINARY FILTRATION, INTERMITTENT, LESS THAN 6 HOURS PER DAY: ICD-10-PCS | Performed by: INTERNAL MEDICINE

## 2022-02-15 PROCEDURE — 2580000003 HC RX 258: Performed by: STUDENT IN AN ORGANIZED HEALTH CARE EDUCATION/TRAINING PROGRAM

## 2022-02-15 PROCEDURE — 6370000000 HC RX 637 (ALT 250 FOR IP): Performed by: STUDENT IN AN ORGANIZED HEALTH CARE EDUCATION/TRAINING PROGRAM

## 2022-02-15 PROCEDURE — 2580000003 HC RX 258: Performed by: INTERNAL MEDICINE

## 2022-02-15 PROCEDURE — 82947 ASSAY GLUCOSE BLOOD QUANT: CPT

## 2022-02-15 PROCEDURE — 2000000000 HC ICU R&B

## 2022-02-15 PROCEDURE — 2500000003 HC RX 250 WO HCPCS: Performed by: INTERNAL MEDICINE

## 2022-02-15 PROCEDURE — 2500000003 HC RX 250 WO HCPCS: Performed by: STUDENT IN AN ORGANIZED HEALTH CARE EDUCATION/TRAINING PROGRAM

## 2022-02-15 PROCEDURE — 71045 X-RAY EXAM CHEST 1 VIEW: CPT

## 2022-02-15 PROCEDURE — 90935 HEMODIALYSIS ONE EVALUATION: CPT

## 2022-02-15 PROCEDURE — 94003 VENT MGMT INPAT SUBQ DAY: CPT

## 2022-02-15 PROCEDURE — 80048 BASIC METABOLIC PNL TOTAL CA: CPT

## 2022-02-15 PROCEDURE — 85027 COMPLETE CBC AUTOMATED: CPT

## 2022-02-15 PROCEDURE — 84100 ASSAY OF PHOSPHORUS: CPT

## 2022-02-15 PROCEDURE — 6370000000 HC RX 637 (ALT 250 FOR IP): Performed by: NURSE PRACTITIONER

## 2022-02-15 PROCEDURE — 2700000000 HC OXYGEN THERAPY PER DAY

## 2022-02-15 PROCEDURE — 83735 ASSAY OF MAGNESIUM: CPT

## 2022-02-15 PROCEDURE — 83605 ASSAY OF LACTIC ACID: CPT

## 2022-02-15 PROCEDURE — 99232 SBSQ HOSP IP/OBS MODERATE 35: CPT | Performed by: INTERNAL MEDICINE

## 2022-02-15 PROCEDURE — 94640 AIRWAY INHALATION TREATMENT: CPT

## 2022-02-15 PROCEDURE — 82803 BLOOD GASES ANY COMBINATION: CPT

## 2022-02-15 RX ORDER — OXYCODONE HYDROCHLORIDE 5 MG/1
5 TABLET ORAL EVERY 6 HOURS
Status: DISCONTINUED | OUTPATIENT
Start: 2022-02-15 | End: 2022-02-16

## 2022-02-15 RX ORDER — OXYCODONE HYDROCHLORIDE 5 MG/1
5 TABLET ORAL EVERY 6 HOURS PRN
Status: DISCONTINUED | OUTPATIENT
Start: 2022-02-15 | End: 2022-02-15

## 2022-02-15 RX ORDER — INSULIN GLARGINE 100 [IU]/ML
30 INJECTION, SOLUTION SUBCUTANEOUS ONCE
Status: COMPLETED | OUTPATIENT
Start: 2022-02-15 | End: 2022-02-15

## 2022-02-15 RX ORDER — FENTANYL CITRATE 50 UG/ML
100 INJECTION, SOLUTION INTRAMUSCULAR; INTRAVENOUS
Status: DISCONTINUED | OUTPATIENT
Start: 2022-02-15 | End: 2022-02-17

## 2022-02-15 RX ADMIN — MIDODRINE HYDROCHLORIDE 5 MG: 5 TABLET ORAL at 08:03

## 2022-02-15 RX ADMIN — ACETAMINOPHEN 1000 MG: 500 TABLET ORAL at 21:03

## 2022-02-15 RX ADMIN — DEXMEDETOMIDINE HYDROCHLORIDE 1.1 MCG/KG/HR: 4 INJECTION, SOLUTION INTRAVENOUS at 03:12

## 2022-02-15 RX ADMIN — ACETAMINOPHEN 1000 MG: 500 TABLET ORAL at 13:39

## 2022-02-15 RX ADMIN — FLUTICASONE PROPIONATE 1 SPRAY: 50 SPRAY, METERED NASAL at 08:02

## 2022-02-15 RX ADMIN — DEXMEDETOMIDINE HYDROCHLORIDE 1.1 MCG/KG/HR: 4 INJECTION, SOLUTION INTRAVENOUS at 09:18

## 2022-02-15 RX ADMIN — PIPERACILLIN SODIUM AND TAZOBACTAM SODIUM 2250 MG: 2; .25 INJECTION, POWDER, LYOPHILIZED, FOR SOLUTION INTRAVENOUS at 04:24

## 2022-02-15 RX ADMIN — DEXMEDETOMIDINE HYDROCHLORIDE 1.1 MCG/KG/HR: 4 INJECTION, SOLUTION INTRAVENOUS at 11:43

## 2022-02-15 RX ADMIN — PIPERACILLIN SODIUM AND TAZOBACTAM SODIUM 2250 MG: 2; .25 INJECTION, POWDER, LYOPHILIZED, FOR SOLUTION INTRAVENOUS at 21:40

## 2022-02-15 RX ADMIN — PIPERACILLIN SODIUM AND TAZOBACTAM SODIUM 2250 MG: 2; .25 INJECTION, POWDER, LYOPHILIZED, FOR SOLUTION INTRAVENOUS at 16:04

## 2022-02-15 RX ADMIN — HEPARIN SODIUM 5000 UNITS: 5000 INJECTION INTRAVENOUS; SUBCUTANEOUS at 20:57

## 2022-02-15 RX ADMIN — OXYCODONE 5 MG: 5 TABLET ORAL at 12:34

## 2022-02-15 RX ADMIN — DEXMEDETOMIDINE HYDROCHLORIDE 0.9 MCG/KG/HR: 4 INJECTION, SOLUTION INTRAVENOUS at 18:46

## 2022-02-15 RX ADMIN — OXYCODONE 5 MG: 5 TABLET ORAL at 00:11

## 2022-02-15 RX ADMIN — MIDODRINE HYDROCHLORIDE 5 MG: 5 TABLET ORAL at 17:29

## 2022-02-15 RX ADMIN — CLINDAMYCIN IN 5 PERCENT DEXTROSE 900 MG: 18 INJECTION, SOLUTION INTRAVENOUS at 00:57

## 2022-02-15 RX ADMIN — HEPARIN SODIUM 1900 UNITS: 1000 INJECTION INTRAVENOUS; SUBCUTANEOUS at 19:54

## 2022-02-15 RX ADMIN — HEPARIN SODIUM 5000 UNITS: 5000 INJECTION INTRAVENOUS; SUBCUTANEOUS at 13:38

## 2022-02-15 RX ADMIN — GABAPENTIN 100 MG: 300 CAPSULE ORAL at 05:08

## 2022-02-15 RX ADMIN — FENTANYL CITRATE 100 MCG: 50 INJECTION, SOLUTION INTRAMUSCULAR; INTRAVENOUS at 21:06

## 2022-02-15 RX ADMIN — CLINDAMYCIN IN 5 PERCENT DEXTROSE 900 MG: 18 INJECTION, SOLUTION INTRAVENOUS at 09:00

## 2022-02-15 RX ADMIN — ACETAMINOPHEN 1000 MG: 500 TABLET ORAL at 05:08

## 2022-02-15 RX ADMIN — DEXMEDETOMIDINE HYDROCHLORIDE 1.1 MCG/KG/HR: 4 INJECTION, SOLUTION INTRAVENOUS at 22:13

## 2022-02-15 RX ADMIN — Medication 125 MCG/HR: at 00:54

## 2022-02-15 RX ADMIN — DEXMEDETOMIDINE HYDROCHLORIDE 0.9 MCG/KG/HR: 4 INJECTION, SOLUTION INTRAVENOUS at 15:08

## 2022-02-15 RX ADMIN — FAMOTIDINE 20 MG: 40 POWDER, FOR SUSPENSION ORAL at 08:03

## 2022-02-15 RX ADMIN — GABAPENTIN 100 MG: 300 CAPSULE ORAL at 19:58

## 2022-02-15 RX ADMIN — Medication: at 18:42

## 2022-02-15 RX ADMIN — BUDESONIDE AND FORMOTEROL FUMARATE DIHYDRATE 2 PUFF: 80; 4.5 AEROSOL RESPIRATORY (INHALATION) at 21:36

## 2022-02-15 RX ADMIN — MIDODRINE HYDROCHLORIDE 5 MG: 5 TABLET ORAL at 12:34

## 2022-02-15 RX ADMIN — SODIUM CHLORIDE 10.89 UNITS/HR: 9 INJECTION, SOLUTION INTRAVENOUS at 11:43

## 2022-02-15 RX ADMIN — DEXMEDETOMIDINE HYDROCHLORIDE 1.1 MCG/KG/HR: 4 INJECTION, SOLUTION INTRAVENOUS at 06:14

## 2022-02-15 RX ADMIN — FENTANYL CITRATE 100 MCG: 50 INJECTION, SOLUTION INTRAMUSCULAR; INTRAVENOUS at 17:46

## 2022-02-15 RX ADMIN — SODIUM CHLORIDE 6.5 UNITS/HR: 9 INJECTION, SOLUTION INTRAVENOUS at 23:46

## 2022-02-15 RX ADMIN — HEPARIN SODIUM 5000 UNITS: 5000 INJECTION INTRAVENOUS; SUBCUTANEOUS at 08:04

## 2022-02-15 RX ADMIN — INSULIN GLARGINE 30 UNITS: 100 INJECTION, SOLUTION SUBCUTANEOUS at 13:39

## 2022-02-15 RX ADMIN — OXYCODONE 5 MG: 5 TABLET ORAL at 18:39

## 2022-02-15 RX ADMIN — MONTELUKAST SODIUM 10 MG: 10 TABLET, FILM COATED ORAL at 08:03

## 2022-02-15 RX ADMIN — GABAPENTIN 100 MG: 300 CAPSULE ORAL at 12:34

## 2022-02-15 RX ADMIN — OXYCODONE 5 MG: 5 TABLET ORAL at 06:18

## 2022-02-15 RX ADMIN — PIPERACILLIN SODIUM AND TAZOBACTAM SODIUM 2250 MG: 2; .25 INJECTION, POWDER, LYOPHILIZED, FOR SOLUTION INTRAVENOUS at 10:20

## 2022-02-15 RX ADMIN — HEPARIN SODIUM 1600 UNITS: 1000 INJECTION INTRAVENOUS; SUBCUTANEOUS at 19:53

## 2022-02-15 RX ADMIN — Medication 125 MCG/HR: at 08:03

## 2022-02-15 ASSESSMENT — PULMONARY FUNCTION TESTS
PIF_VALUE: 24
PIF_VALUE: 24
PIF_VALUE: 23
PIF_VALUE: 22
PIF_VALUE: 19
PIF_VALUE: 20
PIF_VALUE: 16
PIF_VALUE: 22
PIF_VALUE: 23
PIF_VALUE: 16
PIF_VALUE: 23
PIF_VALUE: 17
PIF_VALUE: 16
PIF_VALUE: 19
PIF_VALUE: 22
PIF_VALUE: 19
PIF_VALUE: 22
PIF_VALUE: 23
PIF_VALUE: 22
PIF_VALUE: 20
PIF_VALUE: 23
PIF_VALUE: 19
PIF_VALUE: 21
PIF_VALUE: 17
PIF_VALUE: 23
PIF_VALUE: 17
PIF_VALUE: 21
PIF_VALUE: 24
PIF_VALUE: 23
PIF_VALUE: 22

## 2022-02-15 NOTE — ANESTHESIA PRE PROCEDURE
Department of Anesthesiology  Preprocedure Note       Name:  Antoine Waters   Age:  42 y.o.  :  1980                                          MRN:  [id number]         Date:  2/15/2022      Surgeon: Danni Kan):  Lisha Rowe MD    Procedure: Procedure(s):  DEBRIDEMENT BUTTOCK AND GROIN, WOUND VAC EXCHANGE, LAPAROSCOPIC COLOSTOMY CREATION POSSIBLE OPEN, ([address])    Medications prior to admission:   Prior to Admission medications    Medication Sig Start Date End Date Taking?  Authorizing Provider   PRENATAL VIT-DOCUSATE-IRON-FA PO Take 1 tablet by mouth daily 22  Yes Historical Provider, MD   cyanocobalamin 1000 MCG/ML injection Inject 1,000 mcg into the muscle every 30 days 10/18/21  Yes Historical Provider, MD   fluticasone-salmeterol (ADVAIR HFA) 45-21 MCG/ACT inhaler Inhale 2 puffs into the lungs 2 times daily    Historical Provider, MD   Multiple Vitamins-Minerals (MULTIVITAMIN ADULT EXTRA C PO) Take 1 tablet by mouth daily    Historical Provider, MD   vitamin D (ERGOCALCIFEROL) 1.25 MG (25809 UT) CAPS capsule Take 50,000 Units by mouth once a week    Historical Provider, MD   albuterol sulfate HFA (PROAIR HFA) 108 (90 Base) MCG/ACT inhaler Inhale 1 puff into the lungs as needed    Historical Provider, MD   carvedilol (COREG) 25 MG tablet Take 25 mg by mouth daily    Historical Provider, MD   vitamin D (CHOLECALCIFEROL) 125 MCG (5000 UT) CAPS capsule Take 5,000 Units by mouth daily    Historical Provider, MD   DULoxetine (CYMBALTA) 60 MG extended release capsule Take 60 mg by mouth daily    Historical Provider, MD   fenofibrate (TRIGLIDE) 160 MG tablet Take 160 mg by mouth daily    Historical Provider, MD   fluticasone (FLONASE) 50 MCG/ACT nasal spray 1 spray by Each Nostril route    Historical Provider, MD   glucagon 1 MG injection Inject 1 mg into the muscle as needed    Historical Provider, MD   lisinopril (PRINIVIL;ZESTRIL) 40 MG tablet Take 40 mg by mouth daily    Historical Provider, MD montelukast (SINGULAIR) 10 MG tablet Take 10 mg by mouth daily    Historical Provider, MD   omeprazole (PRILOSEC) 20 MG delayed release capsule Take 20 mg by mouth daily    Historical Provider, MD   rOPINIRole (REQUIP) 4 MG tablet Take 4 mg by mouth daily    Historical Provider, MD       Current medications:    Current Facility-Administered Medications   Medication Dose Route Frequency Provider Last Rate Last Admin    oxyCODONE (ROXICODONE) immediate release tablet 5 mg  5 mg Oral Q6H Hector Divers, APRN - CNP   5 mg at 02/15/22 1234    0.9 % sodium chloride infusion   IntraVENous Continuous Denys Lightning, DO 10 mL/hr at 02/15/22 1214 Rate Verify at 02/15/22 1214    midodrine (PROAMATINE) tablet 5 mg  5 mg Per NG tube TID WC Denys Lightning, DO   5 mg at 02/15/22 1234    lactated ringers infusion   IntraVENous Continuous Denys Lightning, DO        0.9 % sodium chloride infusion   IntraVENous PRN Denys Lightning, DO        famotidine (PEPCID) 40 MG/5ML suspension 20 mg  20 mg Oral Daily Denys Lightning, DO   20 mg at 02/15/22 0803    prismaSATE BK 0/3.5 5,000 mL with potassium chloride 15 mEq solution  2,000 mL/hr Dialysis Continuous Denys Lightning, DO 2,000 mL/hr at 02/13/22 1702 2,000 mL/hr at 02/13/22 1702    sodium bicarbonate 150 mEq in dextrose 5 % 1,000 mL infusion   IntraVENous Continuous Musa Banks MD 50 mL/hr at 02/15/22 1224 Associate Infusion Device at 02/15/22 1224    heparin (porcine) injection 1,900 Units  1,900 Units IntraCATHeter PRN Denys Lightning, DO   1,900 Units at 02/13/22 0036    heparin (porcine) injection 1,600 Units  1,600 Units IntraCATHeter PRN Denys Lightning, DO   1,600 Units at 02/13/22 0033    piperacillin-tazobactam (ZOSYN) 2,250 mg in dextrose 5 % 50 mL IVPB (mini-bag)  2,250 mg IntraVENous Q6H Denys Lightning, DO 12.5 mL/hr at 02/15/22 1020 2,250 mg at 02/15/22 1020    potassium chloride 20 mEq/50 mL IVPB (Central Line)  20 mEq IntraVENous PRN Denys Lightning, DO 50 mL/hr at 02/12/22 1739 20 mEq at 02/12/22 1739    fluticasone (FLONASE) 50 MCG/ACT nasal spray 1 spray  1 spray Each Nostril Daily Griselda Reek, DO   1 spray at 02/15/22 0802    budesonide-formoterol (SYMBICORT) 80-4.5 MCG/ACT inhaler 2 puff  2 puff Inhalation BID Griselda Reek, DO   2 puff at 02/14/22 0752    albuterol sulfate  (90 Base) MCG/ACT inhaler 1 puff  1 puff Inhalation Q4H PRN Griselda Reek, DO        montelukast (SINGULAIR) tablet 10 mg  10 mg Oral Daily Griselda Reek, DO   10 mg at 02/15/22 0803    [Held by provider] rOPINIRole (REQUIP) tablet 4 mg  4 mg Oral Daily Bere Ivan, DO   4 mg at 02/09/22 1047    insulin regular (HUMULIN R;NOVOLIN R) 100 Units in sodium chloride 0.9 % 100 mL infusion  1-50 Units/hr IntraVENous Continuous Griselda Reek, DO 7.52 mL/hr at 02/15/22 1321 7.52 Units/hr at 02/15/22 1321    gabapentin (NEURONTIN) capsule 100 mg  100 mg Oral Q8H Akiko De La Cruz, DO   100 mg at 02/15/22 1234    fentaNYL 20 mcg/mL Infusion  25 mcg/hr IntraVENous Continuous Griselda Reek, DO 5 mL/hr at 02/15/22 0809 100 mcg/hr at 02/15/22 0809    dexmedetomidine (PRECEDEX) 400 mcg in sodium chloride 0.9 % 100 mL infusion  0.2 mcg/kg/hr IntraVENous Continuous Griselda Reek, DO 31.4 mL/hr at 02/15/22 1214 1 mcg/kg/hr at 02/15/22 1214    heparin (porcine) injection 5,000 Units  5,000 Units SubCUTAneous 3 times per day Griselda Reek, DO   5,000 Units at 02/15/22 1338    acetaminophen (TYLENOL) tablet 1,000 mg  1,000 mg Oral 3 times per day Griselda Reek, DO   1,000 mg at 02/15/22 1339       Allergies:     Allergies   Allergen Reactions    Aspirin      Other reaction(s): due to kidney function    Ibuprofen      CHRONIC KIDNEY DISEASE STAGE 3   Other reaction(s): due to kidney function         Problem List:    Patient Active Problem List   Diagnosis Code    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 [address] E10.21, N18.31       Past Medical History:        Diagnosis Date    Clinical trial participant 02/09/2022    Protocol [id number]  Study completion 11/FEB/2022       Past Surgical History:        Procedure Laterality Date    ABDOMEN SURGERY Bilateral 2/9/2022    incision and drainage of bilateral necrotizing soft tissue infection of groin and buttocks  performed by Gin Johnson MD at [address] [facility] 2/8/2022    *ADD ON, ASAP* INCISION AND DRAINAGE OF PERINEUM, [facility] performed by Silva Degroot MD at [address] Bilateral 2/10/2022    INCISION AND DRAINAGE BUTTOCK, GROIN  (LITHOTOMY POSITION) performed by Gin Johnson MD at [address] N/A 2/11/2022    INCISION AND DRAINAGE AND DEBRIDEMENT BUTTOCK AND LABIAL ABSCESS , PLACEMENT OF WOUND VAC performed by Avel Cruz DO at [address] [facility] Left 2/14/2022    [facility] OF BILATERAL GROINS AND LEFT BUTTOCK WOUND WITH PARTIAL CLOSURE AND WOUND VAC PLACEMENT performed by Jacinta Helms MD at [address] [facility] History:    Social History     Tobacco Use    Smoking status: Not on file    Smokeless tobacco: Not on file   Substance Use Topics    Alcohol use: Not on file                                Counseling given: Not Answered      Vital Signs (Current):   Vitals:    02/15/22 1100 02/15/22 1108 02/15/22 1200 02/15/22 1300   BP:       Pulse: 62 62 63 62   Resp: 19 19 20 20   Temp:   37.5 °C (99.5 °F)    TempSrc:   Bladder    SpO2: 100% 100% 100% 100%   Weight:       Height:                                                  BP Readings from Last 3 Encounters:   02/11/22 (!) 99/55   02/10/22 (!) 124/102   02/09/22 (!) 98/58       NPO Status: Time of last liquid consumption: 0800                        Time of last solid consumption: 0800                        Date of last liquid consumption: 02/09/22                        Date of last solid food consumption: 02/09/22    BMI:   Wt Readings from Last 3 Encounters:   02/11/22 299 lb 4.8 oz (135.8 kg)     Body mass index is 53.02 kg/m². CBC:   Lab Results   Component Value Date    WBC 8.7 02/15/2022    RBC 2.54 02/15/2022    HGB 7.3 02/15/2022    HCT 22.7 02/15/2022    MCV 89.4 02/15/2022    RDW 15.3 02/15/2022     02/15/2022       CMP:   Lab Results   Component Value Date     02/15/2022    K 3.9 02/15/2022    CL 97 02/15/2022    CO2 23 02/15/2022    BUN 24 02/15/2022    CREATININE 2.28 02/15/2022    GFRAA 28 02/15/2022    LABGLOM 23 02/15/2022    GLUCOSE 212 02/15/2022    PROT 5.7 02/09/2022    CALCIUM 8.0 02/15/2022    BILITOT 0.29 02/09/2022    ALKPHOS 196 02/09/2022    AST 48 02/09/2022    ALT 25 02/09/2022       POC Tests:   Recent Labs     02/14/22  1454 02/14/22  1534 02/15/22  1320   POCGLU 184*   < > 154*   POCNA 135*  --   --    POCK 3.9  --   --    POCCL 98  --   --    POCBUN 19  --   --    POCHEMO 9.1*  --   --    POCHCT 27*  --   --     < > = values in this interval not displayed.        Coags:   Lab Results   Component Value Date    PROTIME 11.3 02/09/2022    INR 1.1 02/09/2022       HCG (If Applicable):   Lab Results   Component Value Date    PREGTESTUR NEGATIVE 02/08/2022        ABGs: No results found for: PHART, PO2ART, XOA1XSO, PMP2YGW, BEART, [id number]     Type & Screen (If Applicable):  No results found for: LABABO, LABRH    Drug/Infectious Status (If Applicable):  No results found for: HIV, HEPCAB    COVID-19 Screening (If Applicable):   Lab Results   Component Value Date    COVID19 Not Detected 02/09/2022           Anesthesia Evaluation  Patient summary reviewed and Nursing notes reviewed no history of anesthetic complications:   Airway: Mallampati: Unable to assess / NA       Comment: ett in place   Dental:          Pulmonary:   (+) sleep apnea:  decreased breath sounds,                            ROS comment: resp failure, on [facility] ventilation    Cardiovascular:  Exercise tolerance: poor (<4 METS),       (-) past MI, CAD and CABG/stent    ECG reviewed  Rhythm: regular  Rate: normal  Echocardiogram reviewed  Stress test reviewed                Neuro/Psych:               GI/Hepatic/Renal:   (+) renal disease: CRI, ARF and dialysis, morbid obesity          Endo/Other:    (+) DiabetesType II DM, poorly controlled, using insulin, blood dyscrasia: anemia:., .                 Abdominal:             Vascular: Other Findings:           Anesthesia Plan      general     ASA 4       Induction: intravenous. MIPS: Postoperative ventilation. Anesthetic plan and risks discussed with mother. Use of blood products discussed with mother whom consented to blood products.    Plan discussed with CRNA and surgical team.                ROMEO Flynn - CRNA   2/15/2022

## 2022-02-15 NOTE — PROGRESS NOTES
ICU PROGRESS NOTE          PATIENT NAME: [facility] RECORD NO. [id number]  DATE: 2/15/2022    PRIMARY CARE PHYSICIAN: Tang Bowling MD    HD: # 7    ASSESSMENT    Patient Active Problem List   Diagnosis    Leonel gangrene    Uncontrolled type 2 diabetes mellitus with hyperglycemia ([facility]    Acute kidney injury superimposed on CKD ([facility]    Hypokalemia    Hyponatremia    Anemia    Morbid obesity with BMI of 45.0-49.9, adult ([facility]    Type 1 diabetes mellitus with stage 3a chronic kidney disease ([facility]       MEDICAL DECISION MAKING AND PLAN    1. Neuro:  1. Fentanyl @ 125 mcg/hr   2. Precedex @ 1.1 mcg/kg/hr  3. Tylenol 1000 mg q8H, gabapentin 100 mg q8, roxicodone 5mg q 4H PRN     2. CV:  1. HR: 59 - 64  2. MAP: 56 - 104  3. Lactate: 0.92 (0.47)  4. Midodrine 5 mg TID   5. Intermittent levophed to maintain MAP > 65 mmHg over the past 24 hours, currently off    3. Heme:  1. Hgb: 7.3 (7.8)  1. Transfused 1 u PRBC's intra-operatively for Hgb 6.9 2/  2. Plt: 257 (236)  3. DVT ppx: heparin     4. Pulm:  1. PRVC: 370/18/10/40%  1. SBT today   2. AB.36/50.3/164.9/28.4  1. PF: 329  3. CXR: pending    5. Renal/lytes:  1. BUN/Cr: 24 (19) / 2.28 (1.82)  2. Na/K+: 130 (129) / 3.9 (3.7)  3. UOP: 0.1 - 0.3  cc/kg/hr overnight   Nephrology following: Planning for HD today. Continued with bicarb drip @ 75 cc/hr   6. GI:  1. TF - advance   2. Pepcid 20 mg BID     7. ID:  1. Tmax: 99.7 (37.6)  2. WBC: 8.7 (7.7)  3. Antibiotics: clindamycin, zosyn     8. MSK:  1. Necrotizing soft tissue infection of the buttocks and groin   2. S/p incision and debridement , and further debridement later   3. S/p irrigation and debridement of wound 2/10  4. S/p irrigation and debridement, partial closure, and bilateral wound vac placement   5. S/p irrigation and debridement, partial closure, L wound vac application   6. Plan to return to OR  for vac change and diverting ostomy creation     9. Endo:  1.  BG: 177  2. Insulin drip: 27 units over past 24 hours     10. Lines:  1. PIV, Booker, ETT, OGT, right radial arterial line, right IJ CVC, right femoral polina catheter    CHECKLIST    RASS: 0  RESTRAINTS: bilateral soft wrist   IVF: bicarb drip @ 75 cc/hr   NUTRITION: TF  ANTIBIOTICS: clindamycin, zosyn   GI: pepcid   DVT: heparin   GLYCEMIC CONTROL: insulin gtt   HOB >45: yes   WOUND CARE: vac change in OR     SUBJECTIVE    Fisher Bound was seen and examined at bedside. No acute events overnight. Hemodynamically stable, and afebrile. Will awaken and follow commands. OBJECTIVE  VITALS: Temp: Temp: 99.3 °F (37.4 °C)Temp  Av.3 °F (36.8 °C)  Min: 98.1 °F (36.7 °C)  Max: 99.3 °F (37.4 °C) BP No data recorded. No data recorded.    Pulse Pulse  Av.6  Min: 56  Max: 65 Resp Resp  Av.4  Min: 6  Max: 22 Pulse ox SpO2  Av.7 %  Min: 93 %  Max: 100 %    GENERAL: intubated, sedated, no apparent distress   NEURO: intubated, sedated, following commands   HEENT: NCAT   : perineal dressings intact   LUNGS: no acute respiratory distress or accessory muscle use   MECHANICAL VENTILATION: tolerating mechanical ventilation   HEART: regular rate and rhythm   ABDOMEN: soft, non-distended   EXTERMITY: no cyanosis, clubbing or edema    LAB:  CBC:   Recent Labs     22  0443 22  1501 02/15/22  0422   WBC 6.3 7.7 8.7   HGB 6.9* 7.8* 7.3*   HCT 21.6* 24.6* 22.7*   MCV 88.5 89.1 89.4    236 257     BMP:   Recent Labs     22  0443 22  1454 22  1501 02/15/22  0422   *  --  129* 130*   K 4.0  --  3.7 3.9     --  93* 97*   CO2 25  --  26 23   BUN 19  --  19 24*   CREATININE 1.65* 2.10* 1.82* 2.28*   GLUCOSE 144*  --  176* 212*         RADIOLOGY:  XR CHEST PORTABLE    Result Date: 2022  EXAMINATION: ONE XRAY VIEW OF THE CHEST 2022 2:31 pm COMPARISON: 2022, 2022 HISTORY: ORDERING SYSTEM PROVIDED HISTORY: Post op - intubated TECHNOLOGIST PROVIDED HISTORY: Post op - intubated FINDINGS: The endotracheal tube terminates in appropriate position above the henrietta. The enteric tube courses off the field of view in the upper abdomen. Right internal jugular line remains in place. .  The cardiac and mediastinal contours appear unchanged. Bilateral patchy airspace disease again demonstrated and without significant interval change. No evidence for pneumothorax. 1. Unchanged position of support devices. 2. No significant change in bilateral airspace disease. XR CHEST PORTABLE    Result Date: 2/14/2022  EXAMINATION: ONE XRAY VIEW OF THE CHEST 2/14/2022 6:02 am COMPARISON: 02/13/2022, 0533 hours. HISTORY: ORDERING SYSTEM PROVIDED HISTORY: intubated TECHNOLOGIST PROVIDED HISTORY: intubated Reason for Exam: supine port  pt on dialysis, difficult to reach pt or get supine due to equipment in use FINDINGS: The ET tube was in satisfactory position, 5.1 cm above the henrietta. The NG tube was at least to the GE junction. A right jugular central venous line was noted with the tip in the superior vena cava. Cardiomegaly was noted with asymmetric pulmonary edema and or pneumonia. These have progressed from 02/13/2022. The regional skeleton was unremarkable. The ET tube was in satisfactory position, 5.1 cm above the henrietta. The NG tube was at least to the GE junction. A right jugular central venous line was noted with the tip in the superior vena cava. Cardiomegaly was noted with asymmetric pulmonary edema and or pneumonia which has progressed from 02/13/2022.          Meagan Varela DO  2/15/2022  6:35 AM

## 2022-02-15 NOTE — PLAN OF CARE
Problem: Skin Integrity:  Goal: Will show no infection signs and symptoms  Description: Will show no infection signs and symptoms  2/15/2022 0533 by Jorge Sanford RN  Outcome: Ongoing     Problem: Skin Integrity:  Goal: Absence of new skin breakdown  Description: Absence of new skin breakdown  2/15/2022 0533 by Jorge Sanford RN  Outcome: Ongoing     Problem: Fluid Volume - Imbalance:  Goal: Absence of imbalanced fluid volume signs and symptoms  Description: Absence of imbalanced fluid volume signs and symptoms  2/15/2022 0533 by Jorge Sanford RN  Outcome: Ongoing     Problem: Pain:  Goal: Pain level will decrease  Description: Pain level will decrease  2/15/2022 0533 by Jorge Sanford RN  Outcome: Ongoing     Problem: Pain:  Goal: Recognizes and communicates pain  Description: Recognizes and communicates pain  2/15/2022 0533 by Jorge Sanford RN  Outcome: Ongoing     Problem: Pain:  Goal: Control of acute pain  Description: Control of acute pain  2/15/2022 0533 by Jorge Sanford RN  Outcome: Ongoing     Problem: Pain:  Goal: Control of chronic pain  Description: Control of chronic pain  2/15/2022 0533 by Jorge Sanford RN  Outcome: Ongoing     Problem: Serum Glucose Level - Abnormal:  Goal: Ability to maintain appropriate glucose levels will improve to within specified parameters  Description: Ability to maintain appropriate glucose levels will improve to within specified parameters  2/15/2022 0533 by Jorge Sanford RN  Outcome: Ongoing     Problem: Skin Integrity - Impaired:  Goal: Will show no infection signs and symptoms  Description: Will show no infection signs and symptoms  2/15/2022 0533 by Jorge Sanford RN  Outcome: Ongoing     Problem: Skin Integrity - Impaired:  Goal: Absence of new skin breakdown  Description: Absence of new skin breakdown  2/15/2022 0533 by Jorge Sanford RN  Outcome: Ongoing     Problem: Non-Violent Restraints  Goal: No harm/injury to patient while restraints in use  2/15/2022 0533 by Jorge Sanford RN  Outcome: Ongoing daily living will improve  Description: Ability to perform activities of daily living will improve  2/15/2022 0533 by Eloise Key RN  Outcome: Ongoing     Problem: Discharge Planning:  Goal: Participates in care planning  Description: Participates in care planning  2/15/2022 0533 by Eloise Key RN  Outcome: Ongoing     Problem: Injury - Risk of, Physical Injury:  Goal: Absence of physical injury  Description: Absence of physical injury  2/15/2022 0533 by Eloise Key RN  Outcome: Ongoing     Problem: Injury - Risk of, Physical Injury:  Goal: Will remain free from falls  Description: Will remain free from falls  2/15/2022 0533 by Eloise Key RN  Outcome: Ongoing     Problem: Mood - Altered:  Goal: Mood stable  Description: Mood stable  2/15/2022 0533 by Eloise Key RN  Outcome: Ongoing     Problem: Mood - Altered:  Goal: Absence of abusive behavior  Description: Absence of abusive behavior  2/15/2022 0533 by Eloise Key RN  Outcome: Ongoing     Problem: Mood - Altered:  Goal: Verbalizations of feeling emotionally comfortable while being cared for will increase  Description: Verbalizations of feeling emotionally comfortable while being cared for will increase  2/15/2022 0533 by Eloise Key RN  Outcome: Ongoing     Problem: Psychomotor Activity - Altered:  Goal: Absence of psychomotor disturbance signs and symptoms  Description: Absence of psychomotor disturbance signs and symptoms  2/15/2022 0533 by Eloise Key RN  Outcome: Ongoing     Problem: Sensory Perception - Impaired:  Goal: Demonstrations of improved sensory functioning will increase  Description: Demonstrations of improved sensory functioning will increase  2/15/2022 0533 by Eloise Key RN  Outcome: Ongoing     Problem: Sensory Perception - Impaired:  Goal: Decrease in sensory misperception frequency  Description: Decrease in sensory misperception frequency  2/15/2022 0533 by Eloise Key RN  Outcome: Ongoing     Problem: Sensory Perception - Impaired:  Goal: Able to

## 2022-02-15 NOTE — PROGRESS NOTES
Nephrology Progress Note      SUBJECTIVE    Patient is still intubated and sedated. Her CVVHD was stopped yesterday. Patient remains hemodynamically stable and she is not on pressors. There is a plan to do diversion colostomy. Patient will be going to the OR tomorrow for colostomy as well as debridement  No significant improvement in urine output noted. She put out close to 300 mL in last 24 hours. Patient remains on broad-spectrum antibiotic next      OBJECTIVE      CURRENT TEMPERATURE:  Temp: 99.5 °F (37.5 °C)  MAXIMUM TEMPERATURE OVER 24HRS:  Temp (24hrs), Av.3 °F (36.8 °C), Min:98.1 °F (36.7 °C), Max:99.7 °F (37.6 °C)    CURRENT RESPIRATORY RATE:  Resp: 20  CURRENT PULSE:  Pulse: 62  CURRENT BLOOD PRESSURE:  BP: (!) 99/55  24HR BLOOD PRESSURE RANGE:  No data recorded.  ; No data recorded. 24HR INTAKE/OUTPUT:      Intake/Output Summary (Last 24 hours) at 2/15/2022 1342  Last data filed at 2/15/2022 1223  Gross per 24 hour   Intake 3294.33 ml   Output 445 ml   Net 2849.33 ml     WEIGHT :  No data found. PHYSICAL EXAM      GENERAL APPEARANCE: Intubated and sedated   SKIN: Warm to touch   ENT: oral ETT in place  NECK: Hard to assess JVD  PULMONARY: Bilateral air entry with diminished breath sounds in the bases and bilateral rales as well CARDIOVASCULAR: Regular rate and rhythm positive S1 and S2 no rubs.    ABDOMEN: Obese and soft, active bowel sounds  EXTREMITIES: Ace edema    CURRENT MEDICATIONS      oxyCODONE (ROXICODONE) immediate release tablet 5 mg, Q6H  insulin glargine (LANTUS) injection vial 30 Units, Once  0.9 % sodium chloride infusion, Continuous  midodrine (PROAMATINE) tablet 5 mg, TID WC  lactated ringers infusion, Continuous  0.9 % sodium chloride infusion, PRN  famotidine (PEPCID) 40 MG/5ML suspension 20 mg, Daily  prismaSATE BK 0/3.5 5,000 mL with potassium chloride 15 mEq solution, Continuous  sodium bicarbonate 150 mEq in dextrose 5 % 1,000 mL infusion, Continuous  heparin (porcine) injection 1,900 Units, PRN  heparin (porcine) injection 1,600 Units, PRN  piperacillin-tazobactam (ZOSYN) 2,250 mg in dextrose 5 % 50 mL IVPB (mini-bag), Q6H  potassium chloride 20 mEq/50 mL IVPB (Central Line), PRN  fluticasone (FLONASE) 50 MCG/ACT nasal spray 1 spray, Daily  budesonide-formoterol (SYMBICORT) 80-4.5 MCG/ACT inhaler 2 puff, BID  albuterol sulfate  (90 Base) MCG/ACT inhaler 1 puff, Q4H PRN  montelukast (SINGULAIR) tablet 10 mg, Daily  [Held by provider] rOPINIRole (REQUIP) tablet 4 mg, Daily  insulin regular (HUMULIN R;NOVOLIN R) 100 Units in sodium chloride 0.9 % 100 mL infusion, Continuous  gabapentin (NEURONTIN) capsule 100 mg, Q8H  fentaNYL 20 mcg/mL Infusion, Continuous  dexmedetomidine (PRECEDEX) 400 mcg in sodium chloride 0.9 % 100 mL infusion, Continuous  heparin (porcine) injection 5,000 Units, 3 times per day  acetaminophen (TYLENOL) tablet 1,000 mg, 3 times per day          LABS      CBC:   Recent Labs     02/14/22  0443 02/14/22  1501 02/15/22  0422   WBC 6.3 7.7 8.7   RBC 2.44* 2.76* 2.54*   HGB 6.9* 7.8* 7.3*   HCT 21.6* 24.6* 22.7*   MCV 88.5 89.1 89.4   MCH 28.3 28.3 28.7   MCHC 31.9 31.7 32.2   RDW 14.8* 15.1* 15.3*    236 257   MPV 11.4 11.7 11.3      BMP:   Recent Labs     02/14/22  0443 02/14/22  1454 02/14/22  1501 02/15/22  0422   *  --  129* 130*   K 4.0  --  3.7 3.9     --  93* 97*   CO2 25  --  26 23   BUN 19  --  19 24*   CREATININE 1.65* 2.10* 1.82* 2.28*   GLUCOSE 144*  --  176* 212*   CALCIUM 8.4*  --  8.4* 8.0*      PHOSPHORUS:    Recent Labs     02/13/22  0421 02/14/22  0443 02/15/22  0422   PHOS 4.6* 4.0 5.2*     MAGNESIUM:   Recent Labs     02/13/22  0421 02/14/22  0443 02/15/22  0422   MG 1.8 1.6 1.9     SPEP:   Lab Results   Component Value Date    PROT 5.7 02/09/2022     C3:   Lab Results   Component Value Date    C3 143 02/09/2022     C4:   Lab Results   Component Value Date    C4 19 02/09/2022     URINE SODIUM:    Lab Results   Component Value Date    JORGE 26 02/09/2022      URINE CREATININE:    Lab Results   Component Value Date    LABCREA 142.3 02/09/2022         ASSESSMENT      1. Acute Kidney Injury: Patient is dialysis dependent secondary to ATN. Patient was on CRRT however the system was discontinued yesterday and will dialyze her with conventional dialysis today. She remains hemodynamically stable  2. Chronic kidney disease stage IIIa due to diabetic nephrosclerosis her baseline 1.5-1.6 proteinuria about 1 to 2 g from diabetic nephrosclerosis follows up with Dr. Mason Plummer  3. Type 2 diabetes with poor control hemoglobin A1c 7-9 range  4. Morbid obesity  5. Leonel's gangrene requiring debridements, patient on antibiotics including clindamycin and Zosyn. 6.  Now off Levophed and is on midodrine for blood pressure support  7. Acute respiratory failure, patient remains vent dependent   PLAN      1. Conventional dialysis today will use albumin to maintain blood pressure. No fluid removal today  2. CBC and BMP in a.m.  3.  Will reassess her in a.m. for need for dialysis or ultrafiltration. Please do not hesitate to call with questions.     This note is created with the assistance of a speech-recognition program. While intending to generate a document that actually reflects the content of the visit, no guarantees can be provided that every mistake has been identified and corrected by editing    Electronically signed by Eliezer Serrano MD on 2/15/2022 at 1:42 PM

## 2022-02-15 NOTE — PLAN OF CARE
Problem: Non-Violent Restraints  Goal: No harm/injury to patient while restraints in use  2/15/2022 1814 by Aleksandr Lan RN  Outcome: Met This Shift  2/15/2022 0533 by Eloise Key RN  Outcome: Ongoing  Goal: Patient's dignity will be maintained  2/15/2022 1814 by Aleksandr Lan RN  Outcome: Met This Shift  2/15/2022 0533 by Eloise Key RN  Outcome: Ongoing     Problem: Skin Integrity:  Goal: Will show no infection signs and symptoms  Description: Will show no infection signs and symptoms  2/15/2022 1814 by Aleksandr Lan RN  Outcome: Ongoing  2/15/2022 0533 by Eloise Key RN  Outcome: Ongoing  Goal: Absence of new skin breakdown  Description: Absence of new skin breakdown  2/15/2022 1814 by Aleksandr Lan RN  Outcome: Ongoing  2/15/2022 0533 by Eloise Key RN  Outcome: Ongoing     Problem: Fluid Volume - Imbalance:  Goal: Absence of imbalanced fluid volume signs and symptoms  Description: Absence of imbalanced fluid volume signs and symptoms  2/15/2022 1814 by Aleksandr Lan RN  Outcome: Ongoing  2/15/2022 0533 by Eloise Key RN  Outcome: Ongoing     Problem: Pain:  Goal: Pain level will decrease  Description: Pain level will decrease  2/15/2022 1814 by Aleksandr Lan RN  Outcome: Ongoing  2/15/2022 0533 by Eloise Key RN  Outcome: Ongoing  Goal: Recognizes and communicates pain  Description: Recognizes and communicates pain  2/15/2022 1814 by Aleksandr Lan RN  Outcome: Ongoing  2/15/2022 0533 by Eloise Key RN  Outcome: Ongoing  Goal: Control of acute pain  Description: Control of acute pain  2/15/2022 1814 by Aleksandr Lan RN  Outcome: Ongoing  2/15/2022 0533 by Eloise Key RN  Outcome: Ongoing  Goal: Control of chronic pain  Description: Control of chronic pain  2/15/2022 1814 by Aleksandr Lan RN  Outcome: Ongoing  2/15/2022 0533 by Eloise Key RN  Outcome: Ongoing     Problem: Serum Glucose Level - Abnormal:  Goal: Ability to maintain appropriate glucose levels will improve to within specified parameters  Description: Ability to maintain appropriate glucose levels will improve to within specified parameters  2/15/2022 1814 by Kamila Fajardo RN  Outcome: Ongoing  2/15/2022 0533 by Felicitas Ojeda RN  Outcome: Ongoing     Problem: Skin Integrity - Impaired:  Goal: Will show no infection signs and symptoms  Description: Will show no infection signs and symptoms  2/15/2022 1814 by Kamila Fajardo RN  Outcome: Ongoing  2/15/2022 0533 by Felicitas Ojeda RN  Outcome: Ongoing  Goal: Absence of new skin breakdown  Description: Absence of new skin breakdown  2/15/2022 1814 by Kamila Fajardo RN  Outcome: Ongoing  2/15/2022 0533 by Felicitas Ojeda RN  Outcome: Ongoing     Problem: Non-Violent Restraints  Goal: Removal from restraints as soon as assessed to be safe  2/15/2022 1814 by Kamila Fajardo RN  Outcome: Ongoing  2/15/2022 0533 by Felicitas Ojeda RN  Outcome: Not Met This Shift     Problem: Nutrition  Goal: Optimal nutrition therapy  2/15/2022 1814 by Kamila Fajardo RN  Outcome: Ongoing  2/15/2022 0533 by Felicitas Ojeda RN  Outcome: Ongoing     Problem: OXYGENATION/RESPIRATORY FUNCTION  Goal: Patient will maintain patent airway  2/15/2022 1814 by Kamila Fajardo RN  Outcome: Ongoing  2/15/2022 1732 by Christine Chaudhari RCP  Outcome: Ongoing  2/15/2022 0533 by Felicitas Ojeda RN  Outcome: Ongoing  Goal: Patient will achieve/maintain normal respiratory rate/effort  Description: Respiratory rate and effort will be within normal limits for the patient  2/15/2022 1814 by Kamila Fajardo RN  Outcome: Ongoing  2/15/2022 1732 by Christine Chaudhari RCP  Outcome: Ongoing  2/15/2022 0533 by Felicitas Ojeda RN  Outcome: Ongoing     Problem: MECHANICAL VENTILATION  Goal: Patient will maintain patent airway  2/15/2022 1814 by Kamila Fajardo RN  Outcome: Ongoing  2/15/2022 1732 by Christine Chaudhari RCP  Outcome: Ongoing  2/15/2022 0533 by Felicitas Ojeda RN  Outcome: Ongoing  Goal: Oral health is maintained or improved  2/15/2022 1814 by Kamila Fajardo RN  Outcome: Ongoing  2/15/2022 1732 by Mago Horvath RCP  Outcome: Ongoing  2/15/2022 0533 by Ledy Giles RN  Outcome: Ongoing  Goal: ET tube will be managed safely  2/15/2022 1814 by Jamie Bourne RN  Outcome: Ongoing  2/15/2022 1732 by Mago Horvath RCP  Outcome: Ongoing  2/15/2022 0533 by Ledy Giles RN  Outcome: Ongoing  Goal: Ability to express needs and understand communication  2/15/2022 1814 by Jamie Bourne RN  Outcome: Ongoing  2/15/2022 1732 by Mago Horvath RCP  Outcome: Ongoing  2/15/2022 0533 by Ledy Giles RN  Outcome: Ongoing  Goal: Mobility/activity is maintained at optimum level for patient  2/15/2022 1814 by Jamie Bourne RN  Outcome: Ongoing  2/15/2022 1732 by Mago Horvath RCP  Outcome: Ongoing  2/15/2022 0533 by Ledy Giles RN  Outcome: Ongoing     Problem: SKIN INTEGRITY  Goal: Skin integrity is maintained or improved  2/15/2022 1814 by Jamie Bourne RN  Outcome: Ongoing  2/15/2022 0533 by Ledy Giles RN  Outcome: Ongoing     Problem: Confusion - Acute:  Goal: Absence of continued neurological deterioration signs and symptoms  Description: Absence of continued neurological deterioration signs and symptoms  2/15/2022 1814 by Jamie Bourne RN  Outcome: Ongoing  2/15/2022 0533 by Ledy Giles RN  Outcome: Ongoing  Goal: Mental status will be restored to baseline  Description: Mental status will be restored to baseline  2/15/2022 1814 by Jamie Bourne RN  Outcome: Ongoing  2/15/2022 0533 by Ledy Giles RN  Outcome: Ongoing     Problem: Discharge Planning:  Goal: Ability to perform activities of daily living will improve  Description: Ability to perform activities of daily living will improve  2/15/2022 1814 by Jamie Bourne RN  Outcome: Ongoing  2/15/2022 0533 by Ledy Giles RN  Outcome: Ongoing  Goal: Participates in care planning  Description: Participates in care planning  2/15/2022 1814 by Jamie Bourne RN  Outcome: Ongoing  2/15/2022 0533 by Ledy Giles RN  Outcome: Ongoing Problem: Injury - Risk of, Physical Injury:  Goal: Absence of physical injury  Description: Absence of physical injury  2/15/2022 1814 by Jamie Bourne RN  Outcome: Ongoing  2/15/2022 0533 by Ledy Giles RN  Outcome: Ongoing  Goal: Will remain free from falls  Description: Will remain free from falls  2/15/2022 1814 by Jamie Bourne RN  Outcome: Ongoing  2/15/2022 0533 by Ledy Giles RN  Outcome: Ongoing     Problem: Mood - Altered:  Goal: Mood stable  Description: Mood stable  2/15/2022 1814 by Jamie Bourne RN  Outcome: Ongoing  2/15/2022 0533 by Ledy Giles RN  Outcome: Ongoing  Goal: Absence of abusive behavior  Description: Absence of abusive behavior  2/15/2022 1814 by Jamie Bourne RN  Outcome: Ongoing  2/15/2022 0533 by Ledy Giles RN  Outcome: Ongoing  Goal: Verbalizations of feeling emotionally comfortable while being cared for will increase  Description: Verbalizations of feeling emotionally comfortable while being cared for will increase  2/15/2022 1814 by Jamie Bourne RN  Outcome: Ongoing  2/15/2022 0533 by Ledy Giles RN  Outcome: Ongoing     Problem: Psychomotor Activity - Altered:  Goal: Absence of psychomotor disturbance signs and symptoms  Description: Absence of psychomotor disturbance signs and symptoms  2/15/2022 1814 by Jamie Bourne RN  Outcome: Ongoing  2/15/2022 0533 by Ledy Giles RN  Outcome: Ongoing     Problem: Sensory Perception - Impaired:  Goal: Demonstrations of improved sensory functioning will increase  Description: Demonstrations of improved sensory functioning will increase  2/15/2022 1814 by Jamie Bourne RN  Outcome: Ongoing  2/15/2022 0533 by Ledy Giles RN  Outcome: Ongoing  Goal: Decrease in sensory misperception frequency  Description: Decrease in sensory misperception frequency  2/15/2022 1814 by Jamie Borune RN  Outcome: Ongoing  2/15/2022 0533 by Ledy Giles RN  Outcome: Ongoing  Goal: Able to refrain from responding to false sensory perceptions  Description: Able to refrain from responding to false sensory perceptions  2/15/2022 1814 by Stan Ronquillo RN  Outcome: Ongoing  2/15/2022 0533 by Cortney Iniguez RN  Outcome: Ongoing  Goal: Demonstrates accurate environmental perceptions  Description: Demonstrates accurate environmental perceptions  2/15/2022 1814 by Stan Ronquillo RN  Outcome: Ongoing  2/15/2022 0533 by Cortney Iniguez RN  Outcome: Ongoing  Goal: Able to distinguish between reality-based and nonreality-based thinking  Description: Able to distinguish between reality-based and nonreality-based thinking  2/15/2022 1814 by Stan Ronquillo RN  Outcome: Ongoing  2/15/2022 0533 by Cortney Iniguez RN  Outcome: Ongoing  Goal: Able to interrupt nonreality-based thinking  Description: Able to interrupt nonreality-based thinking  2/15/2022 1814 by Stan Ronquillo RN  Outcome: Ongoing  2/15/2022 0533 by Cortney Iniguez RN  Outcome: Ongoing     Problem: Sleep Pattern Disturbance:  Goal: Appears well-rested  Description: Appears well-rested  2/15/2022 1814 by Stan Ronquillo RN  Outcome: Ongoing  2/15/2022 0533 by Cortney Iniguez RN  Outcome: Ongoing

## 2022-02-15 NOTE — PROGRESS NOTES
Dialysis Time Out  To be done by RN and tech or 2 RNs  Staff Names Felicia Mcmahans    [x]  Identity of the patient using 2 patient identifiers  [x]  Consent for treatment  [x]  Equipment-proper machine and dialyzer  [x]  B-Hep B status  [x]  Orders- to include bath, blood flow, dialyzer, time and fluid removal  [x]  Access-Correct site and in working order  [x]  Time for patient to ask questions.

## 2022-02-15 NOTE — OP NOTE
Operative Note      Patient: Karine Rubi  YOB: 1980  MRN: [id number]    Date of Procedure: 2/14/2022    Pre-Op Diagnosis: NECROTIZING SOFT TISSUE INFECTION    Post-Op Diagnosis: Same       Procedure(s):  DEBREIDMENT OF BILATERAL GROINS AND LEFT BUTTOCK WOUND WITH PARTIAL CLOSURE AND WOUND VAC PLACEMENT    Surgeon(s):  Angelique Medina MD    Assistant:   Resident: Olga Murillo DO; Bharat Castorena DO    Anesthesia: General    Estimated Blood Loss (mL): Minimal    Complications: None    Specimens:   * No specimens in log *    Implants:  * No implants in log *      Drains:   Negative Pressure Wound Therapy Buttocks Left (Active)   Wound Type Surgical 02/14/22 1600   Dressing Type Black foam 02/14/22 1600   Cycle Continuous 02/14/22 1600   Target Pressure (mmHg) 120 02/14/22 1600   Dressing Status Clean;Dry; Intact 02/14/22 1600   Dressing Changed Changed/New 02/14/22 1600   Drainage Amount Small 02/14/22 1600   Drainage Description Serosanguinous 02/14/22 1600   Wound Assessment Clean;Dry; Intact 02/14/22 1600   Vilma-wound Assessment Fragile 02/14/22 1600       NG/OG/NJ/NE Tube Orogastric 18 fr Center mouth (Active)   Surrounding Skin Dry; Intact 02/14/22 1600   Securement device Yes 02/14/22 1600   Status Other (Comment) 02/14/22 1600   Placement Verified by External Catheter Length 02/14/22 1600   NG/OG/NJ/NE External Measurement (cm) 55 cm 02/14/22 1600   Tube Feeding Immune Enhancing 02/14/22 0400   Tube Feeding Status Stopped 02/14/22 0400   Rate/Schedule 0 mL/hr 02/14/22 0000   Tube Feeding Intake (mL) 65 ml 02/13/22 1918   Free Water Flush (mL) 100 mL 02/13/22 0040       Urethral Catheter Temperature probe 16 fr (Active)   Catheter Indications Need for fluid volume management of the critically ill patient in a critical care setting 02/14/22 1600   Site Assessment Swelling 02/14/22 1600   Urine Color Yellow 02/14/22 1600   Urine Appearance Clear 02/14/22 1600   Urine Odor Malodorous 02/14/22 1600   Output (mL) 175 mL 02/14/22 1647       [REMOVED] Negative Pressure Wound Therapy Hip Anterior;Right (Removed)   Wound Type Surgical 02/14/22 0000   Cycle Off 02/14/22 0000   Target Pressure (mmHg) 125 02/13/22 1600   Dressing Status Intact 02/14/22 0000   Dressing Changed Dressing reinforced 02/12/22 1600   Drainage Amount Small 02/13/22 1600   Drainage Description Serosanguinous 02/13/22 1600   Output (ml) 0 ml 02/12/22 1900   Wound Assessment Other (Comment) 02/13/22 1600   Vilma-wound Assessment Other (Comment) 02/13/22 1600       [REMOVED] Negative Pressure Wound Therapy Hip Anterior; Left (Removed)   Wound Type Surgical 02/14/22 0000   Cycle Off 02/14/22 0000   Target Pressure (mmHg) 125 02/13/22 1600   Dressing Status Dry; Intact; Clean 02/14/22 0000   Dressing Changed Dressing reinforced 02/12/22 1600   Drainage Amount Small 02/13/22 1600   Drainage Description Serosanguinous 02/13/22 1600   Output (ml) 25 ml 02/12/22 1900   Wound Assessment Other (Comment) 02/13/22 1600   Vilma-wound Assessment Other (Comment) 02/13/22 1600       Findings:   Debridement of necrotic skin edges of all wounds, and debridement of necrotic adipose tissue in the left posterior wound. Successful closure of R groin wound, and vac placement of L wound. L wound dimensions approximately 20cm long x 10 cm wide x 10cm deep. R wound dimensions approximately 10cm long x 5cm wide x 4cm deep    Indications:  Is a 69-year-old female who has undergone multiple debridements for perineal/buttock necrotizing soft tissue infection. During her last OR trip, a wound VAC was placed. Decision was made to return to the OR today for reevaluation of her wound and wound VAC replacement. Risk, benefits, and alternatives of surgery were discussed with her mother, and consent was obtained. Detailed Description of Procedure:   Patient was brought back to the operating room from the TICU already intubated.   General anesthesia was induced, and the patient was positioned prone on the operating table. The patient was then prepped and draped in the usual sterile fashion. A formal timeout identifying the correct patient, procedure, operating personnel, allergies and antibiotics was performed. Attention was first turned to the left buttock wound. The skin at the lateral wound edge was noted to be necrotic. This was sharply debrided using a #10 blade scalpel. Hemostasis was achieved using Bovie electrocautery. Necrotic subcutaneous tissue was also noted at the base of the wound. This was sharply excised using Metzenbaum scissors. Debridement was carried out down to the subcutaneous tissue. The wound was then copiously irrigated using the Pulsavac. Additional interrupted 2-0 Prolene sutures were then placed at the inferior aspect of the wound to aid with further closure. The wound was approximately 10cm wide, 20cm long and 10cm deep. Black foam was then placed at the wound bed, was draped, and attached to a wound VAC. Attention was then turned to the right groin wound. Additional interrupted 2-0 Prolene sutures were placed to close the wound. This wound was smaller as it had been previously closed partially. The wound was approximately 5cm wide x 10cm long and 4 cm deep. At this time, the patient was then positioned supine on the operating table. The patient was reprepped and draped in a sterile fashion. Attention was first turned to the anterior right groin wound. This was explored, and no further debridement was needed. It was copiously irrigated with irrisept. The remainder the wound was then closed with 2-0 Prolene in a simple interrupted fashion. Half inch iodoform packing was then placed in between the sutures. Attention was then turned to the left anterior groin wound. Some necrotic skin at the medial wound edge was removed using the Bovie. The wound was irrigated with irrisept, and no further debridement was needed.   The remainder of this wound was then closed with 2-0 Prolene in a simple interrupted fashion. Dry ABD pads were placed over each wound bilaterally. This concluded the procedure. Patient tolerated procedure well, she remained intubated and was taken back to the TICU in stable condition. All sponge, needle, and instrument counts were correct at the completion of the case. Dr. Giles Code  was present for the case. Electronically signed by Jitendra Singh DO on 2/14/2022 at 7:00 PM    I was present for the entire operation.     Pancho Waldron MD

## 2022-02-15 NOTE — PLAN OF CARE
Problem: OXYGENATION/RESPIRATORY FUNCTION  Goal: Patient will maintain patent airway  2/15/2022 1732 by Yana Arteaga RCP  Outcome: Ongoing  2/15/2022 0533 by Soledad Goldberg RN  Outcome: Ongoing     Problem: OXYGENATION/RESPIRATORY FUNCTION  Goal: Patient will achieve/maintain normal respiratory rate/effort  Description: Respiratory rate and effort will be within normal limits for the patient  2/15/2022 1732 by Yana Arteaga RCP  Outcome: Ongoing  2/15/2022 0533 by Soledad Goldberg RN  Outcome: Ongoing     Problem: MECHANICAL VENTILATION  Goal: Patient will maintain patent airway  2/15/2022 1732 by Yana Arteaga RCP  Outcome: Ongoing  2/15/2022 0533 by Soledad Goldberg RN  Outcome: Ongoing     Problem: MECHANICAL VENTILATION  Goal: Oral health is maintained or improved  2/15/2022 1732 by Yana Arteaga RCP  Outcome: Ongoing  2/15/2022 0533 by Soledad Goldberg RN  Outcome: Ongoing     Problem: MECHANICAL VENTILATION  Goal: ET tube will be managed safely  2/15/2022 1732 by Yana Arteaga RCP  Outcome: Ongoing  2/15/2022 0533 by Soledad Goldberg RN  Outcome: Ongoing     Problem: MECHANICAL VENTILATION  Goal: Ability to express needs and understand communication  2/15/2022 1732 by Yana Arteaga RCP  Outcome: Ongoing  2/15/2022 0533 by Soledad Goldberg RN  Outcome: Ongoing     Problem: MECHANICAL VENTILATION  Goal: Mobility/activity is maintained at optimum level for patient  2/15/2022 1732 by Yana Arteaga RCP  Outcome: Ongoing  2/15/2022 0533 by Soledad Goldberg RN  Outcome: Ongoing     Problem: SKIN INTEGRITY  Goal: Skin integrity is maintained or improved  2/15/2022 0533 by Soledad Goldberg RN  Outcome: Ongoing

## 2022-02-15 NOTE — PLAN OF CARE
Problem: OXYGENATION/RESPIRATORY FUNCTION  Goal: Patient will maintain patent airway  2/14/2022 2216 by Jodi Lui RCP  Outcome: Ongoing  2/14/2022 1856 by Jodi Borges RN  Outcome: Ongoing  2/14/2022 0849 by Blair Malagon RCP  Outcome: Ongoing     Problem: OXYGENATION/RESPIRATORY FUNCTION  Goal: Patient will achieve/maintain normal respiratory rate/effort  Description: Respiratory rate and effort will be within normal limits for the patient  2/14/2022 2216 by Jodi Lui RCP  Outcome: Ongoing  2/14/2022 1856 by Jodi Borges RN  Outcome: Ongoing  2/14/2022 0849 by Blair Malagon RCP  Outcome: Ongoing     Problem: MECHANICAL VENTILATION  Goal: Patient will maintain patent airway  2/14/2022 2216 by Jodi Lui RCP  Outcome: Ongoing  2/14/2022 1856 by Jodi Borges RN  Outcome: Ongoing  2/14/2022 0849 by Blair Malagon RCP  Outcome: Ongoing     Problem: MECHANICAL VENTILATION  Goal: Oral health is maintained or improved  2/14/2022 2216 by Jodi Lui RCP  Outcome: Ongoing  2/14/2022 1856 by Jodi Borges RN  Outcome: Ongoing  2/14/2022 0849 by Blair Malagon RCP  Outcome: Ongoing     Problem: MECHANICAL VENTILATION  Goal: ET tube will be managed safely  2/14/2022 2216 by Jodi Lui RCP  Outcome: Ongoing  2/14/2022 1856 by Jodi Borges RN  Outcome: Ongoing  2/14/2022 0849 by Blair Malagon RCP  Outcome: Ongoing     Problem: MECHANICAL VENTILATION  Goal: Ability to express needs and understand communication  2/14/2022 2216 by Jodi Lui RCP  Outcome: Ongoing  2/14/2022 1856 by Jodi Borges RN  Outcome: Ongoing  2/14/2022 0849 by Blair Malagon RCP  Outcome: Ongoing     Problem: MECHANICAL VENTILATION  Goal: Mobility/activity is maintained at optimum level for patient  2/14/2022 1856 by Jodi Borges RN  Outcome: Ongoing  2/14/2022 0849 by Blair Malagon RCP  Outcome: Ongoing

## 2022-02-16 ENCOUNTER — APPOINTMENT (OUTPATIENT)
Dept: GENERAL RADIOLOGY | Age: 42
DRG: 853 | End: 2022-02-16
Payer: COMMERCIAL

## 2022-02-16 ENCOUNTER — ANESTHESIA (OUTPATIENT)
Dept: OPERATING ROOM | Age: 42
DRG: 853 | End: 2022-02-16
Payer: COMMERCIAL

## 2022-02-16 VITALS — RESPIRATION RATE: 20 BRPM | TEMPERATURE: 97.8 F | OXYGEN SATURATION: 77 %

## 2022-02-16 LAB
ALLEN TEST: ABNORMAL
ALLEN TEST: ABNORMAL
ANION GAP SERPL CALCULATED.3IONS-SCNC: 9 MMOL/L (ref 9–17)
BUN BLDV-MCNC: 21 MG/DL (ref 6–20)
BUN/CREAT BLD: ABNORMAL (ref 9–20)
CALCIUM SERPL-MCNC: 7.3 MG/DL (ref 8.6–10.4)
CHLORIDE BLD-SCNC: 101 MMOL/L (ref 98–107)
CO2: 24 MMOL/L (ref 20–31)
CREAT SERPL-MCNC: 1.97 MG/DL (ref 0.5–0.9)
FIO2: 40
FIO2: ABNORMAL
GFR AFRICAN AMERICAN: 34 ML/MIN
GFR NON-AFRICAN AMERICAN: 28 ML/MIN
GFR SERPL CREATININE-BSD FRML MDRD: ABNORMAL ML/MIN/{1.73_M2}
GFR SERPL CREATININE-BSD FRML MDRD: ABNORMAL ML/MIN/{1.73_M2}
GLUCOSE BLD-MCNC: 113 MG/DL (ref 65–105)
GLUCOSE BLD-MCNC: 116 MG/DL (ref 65–105)
GLUCOSE BLD-MCNC: 117 MG/DL (ref 65–105)
GLUCOSE BLD-MCNC: 117 MG/DL (ref 70–99)
GLUCOSE BLD-MCNC: 119 MG/DL (ref 65–105)
GLUCOSE BLD-MCNC: 123 MG/DL (ref 65–105)
GLUCOSE BLD-MCNC: 124 MG/DL (ref 65–105)
GLUCOSE BLD-MCNC: 124 MG/DL (ref 74–100)
GLUCOSE BLD-MCNC: 133 MG/DL (ref 65–105)
GLUCOSE BLD-MCNC: 133 MG/DL (ref 65–105)
GLUCOSE BLD-MCNC: 137 MG/DL (ref 65–105)
GLUCOSE BLD-MCNC: 139 MG/DL (ref 65–105)
GLUCOSE BLD-MCNC: 142 MG/DL (ref 65–105)
GLUCOSE BLD-MCNC: 143 MG/DL (ref 65–105)
GLUCOSE BLD-MCNC: 144 MG/DL (ref 65–105)
GLUCOSE BLD-MCNC: 147 MG/DL (ref 65–105)
GLUCOSE BLD-MCNC: 147 MG/DL (ref 65–105)
GLUCOSE BLD-MCNC: 150 MG/DL (ref 65–105)
GLUCOSE BLD-MCNC: 152 MG/DL (ref 65–105)
GLUCOSE BLD-MCNC: 152 MG/DL (ref 65–105)
GLUCOSE BLD-MCNC: 156 MG/DL (ref 65–105)
GLUCOSE BLD-MCNC: 158 MG/DL (ref 65–105)
GLUCOSE BLD-MCNC: 160 MG/DL (ref 65–105)
GLUCOSE BLD-MCNC: 167 MG/DL (ref 65–105)
GLUCOSE BLD-MCNC: 174 MG/DL (ref 65–105)
GLUCOSE BLD-MCNC: 89 MG/DL (ref 65–105)
HBV SURFACE AB TITR SER: 17.06 MIU/ML
HCT VFR BLD CALC: 23.2 % (ref 36.3–47.1)
HEMOGLOBIN: 7.3 G/DL (ref 11.9–15.1)
HEPATITIS B CORE TOTAL ANTIBODY: NONREACTIVE
HEPATITIS B SURFACE ANTIGEN: NONREACTIVE
HEPATITIS C ANTIBODY: NONREACTIVE
MAGNESIUM: 1.7 MG/DL (ref 1.6–2.6)
MCH RBC QN AUTO: 28.5 PG (ref 25.2–33.5)
MCHC RBC AUTO-ENTMCNC: 31.5 G/DL (ref 28.4–34.8)
MCV RBC AUTO: 90.6 FL (ref 82.6–102.9)
MODE: ABNORMAL
MODE: ABNORMAL
NEGATIVE BASE EXCESS, ART: ABNORMAL (ref 0–2)
NEGATIVE BASE EXCESS, ART: ABNORMAL (ref 0–2)
NRBC AUTOMATED: 0 PER 100 WBC
O2 DEVICE/FLOW/%: ABNORMAL
O2 DEVICE/FLOW/%: ABNORMAL
PATIENT TEMP: ABNORMAL
PATIENT TEMP: ABNORMAL
PDW BLD-RTO: 15.6 % (ref 11.8–14.4)
PHOSPHORUS: 3.4 MG/DL (ref 2.6–4.5)
PLATELET # BLD: 253 K/UL (ref 138–453)
PMV BLD AUTO: 11.2 FL (ref 8.1–13.5)
POC HCO3: 28.4 MMOL/L (ref 21–28)
POC HCO3: 28.9 MMOL/L (ref 21–28)
POC LACTIC ACID: 0.69 MMOL/L (ref 0.56–1.39)
POC LACTIC ACID: 0.72 MMOL/L (ref 0.56–1.39)
POC O2 SATURATION: 96 % (ref 94–98)
POC O2 SATURATION: 99 % (ref 94–98)
POC PCO2 TEMP: ABNORMAL MM HG
POC PCO2 TEMP: ABNORMAL MM HG
POC PCO2: 34.4 MM HG (ref 35–48)
POC PCO2: 41.8 MM HG (ref 35–48)
POC PH TEMP: ABNORMAL
POC PH TEMP: ABNORMAL
POC PH: 7.44 (ref 7.35–7.45)
POC PH: 7.53 (ref 7.35–7.45)
POC PO2 TEMP: ABNORMAL MM HG
POC PO2 TEMP: ABNORMAL MM HG
POC PO2: 110.5 MM HG (ref 83–108)
POC PO2: 80.1 MM HG (ref 83–108)
POSITIVE BASE EXCESS, ART: 4 (ref 0–3)
POSITIVE BASE EXCESS, ART: 6 (ref 0–3)
POTASSIUM SERPL-SCNC: 3.7 MMOL/L (ref 3.7–5.3)
RBC # BLD: 2.56 M/UL (ref 3.95–5.11)
SAMPLE SITE: ABNORMAL
SAMPLE SITE: ABNORMAL
SODIUM BLD-SCNC: 134 MMOL/L (ref 135–144)
TCO2 (CALC), ART: ABNORMAL MMOL/L (ref 22–29)
TCO2 (CALC), ART: ABNORMAL MMOL/L (ref 22–29)
WBC # BLD: 7.1 K/UL (ref 3.5–11.3)

## 2022-02-16 PROCEDURE — 83605 ASSAY OF LACTIC ACID: CPT

## 2022-02-16 PROCEDURE — 94640 AIRWAY INHALATION TREATMENT: CPT

## 2022-02-16 PROCEDURE — 2580000003 HC RX 258: Performed by: STUDENT IN AN ORGANIZED HEALTH CARE EDUCATION/TRAINING PROGRAM

## 2022-02-16 PROCEDURE — 6360000002 HC RX W HCPCS: Performed by: STUDENT IN AN ORGANIZED HEALTH CARE EDUCATION/TRAINING PROGRAM

## 2022-02-16 PROCEDURE — 82803 BLOOD GASES ANY COMBINATION: CPT

## 2022-02-16 PROCEDURE — 2500000003 HC RX 250 WO HCPCS: Performed by: STUDENT IN AN ORGANIZED HEALTH CARE EDUCATION/TRAINING PROGRAM

## 2022-02-16 PROCEDURE — 2709999900 HC NON-CHARGEABLE SUPPLY: Performed by: SURGERY

## 2022-02-16 PROCEDURE — 6370000000 HC RX 637 (ALT 250 FOR IP): Performed by: NURSE PRACTITIONER

## 2022-02-16 PROCEDURE — 2000000000 HC ICU R&B

## 2022-02-16 PROCEDURE — 80048 BASIC METABOLIC PNL TOTAL CA: CPT

## 2022-02-16 PROCEDURE — 6370000000 HC RX 637 (ALT 250 FOR IP): Performed by: STUDENT IN AN ORGANIZED HEALTH CARE EDUCATION/TRAINING PROGRAM

## 2022-02-16 PROCEDURE — 3600000014 HC SURGERY LEVEL 4 ADDTL 15MIN: Performed by: SURGERY

## 2022-02-16 PROCEDURE — 3700000001 HC ADD 15 MINUTES (ANESTHESIA): Performed by: SURGERY

## 2022-02-16 PROCEDURE — 3700000000 HC ANESTHESIA ATTENDED CARE: Performed by: SURGERY

## 2022-02-16 PROCEDURE — 86803 HEPATITIS C AB TEST: CPT

## 2022-02-16 PROCEDURE — 2580000003 HC RX 258: Performed by: SURGERY

## 2022-02-16 PROCEDURE — 86704 HEP B CORE ANTIBODY TOTAL: CPT

## 2022-02-16 PROCEDURE — 85027 COMPLETE CBC AUTOMATED: CPT

## 2022-02-16 PROCEDURE — 84100 ASSAY OF PHOSPHORUS: CPT

## 2022-02-16 PROCEDURE — 2500000003 HC RX 250 WO HCPCS: Performed by: NURSE ANESTHETIST, CERTIFIED REGISTERED

## 2022-02-16 PROCEDURE — 71045 X-RAY EXAM CHEST 1 VIEW: CPT

## 2022-02-16 PROCEDURE — 86317 IMMUNOASSAY INFECTIOUS AGENT: CPT

## 2022-02-16 PROCEDURE — A4217 STERILE WATER/SALINE, 500 ML: HCPCS | Performed by: SURGERY

## 2022-02-16 PROCEDURE — 0D1L4Z4 BYPASS TRANSVERSE COLON TO CUTANEOUS, PERCUTANEOUS ENDOSCOPIC APPROACH: ICD-10-PCS | Performed by: SURGERY

## 2022-02-16 PROCEDURE — 87340 HEPATITIS B SURFACE AG IA: CPT

## 2022-02-16 PROCEDURE — 6370000000 HC RX 637 (ALT 250 FOR IP): Performed by: NURSE ANESTHETIST, CERTIFIED REGISTERED

## 2022-02-16 PROCEDURE — 99232 SBSQ HOSP IP/OBS MODERATE 35: CPT | Performed by: INTERNAL MEDICINE

## 2022-02-16 PROCEDURE — 83735 ASSAY OF MAGNESIUM: CPT

## 2022-02-16 PROCEDURE — 3600000004 HC SURGERY LEVEL 4 BASE: Performed by: SURGERY

## 2022-02-16 PROCEDURE — 0JBB0ZZ EXCISION OF PERINEUM SUBCUTANEOUS TISSUE AND FASCIA, OPEN APPROACH: ICD-10-PCS | Performed by: SURGERY

## 2022-02-16 PROCEDURE — 0JB90ZZ EXCISION OF BUTTOCK SUBCUTANEOUS TISSUE AND FASCIA, OPEN APPROACH: ICD-10-PCS | Performed by: SURGERY

## 2022-02-16 PROCEDURE — 37799 UNLISTED PX VASCULAR SURGERY: CPT

## 2022-02-16 PROCEDURE — 99212 OFFICE O/P EST SF 10 MIN: CPT

## 2022-02-16 PROCEDURE — 6360000002 HC RX W HCPCS: Performed by: NURSE ANESTHETIST, CERTIFIED REGISTERED

## 2022-02-16 RX ORDER — INSULIN GLARGINE 100 [IU]/ML
50 INJECTION, SOLUTION SUBCUTANEOUS ONCE
Status: COMPLETED | OUTPATIENT
Start: 2022-02-16 | End: 2022-02-16

## 2022-02-16 RX ORDER — MIDAZOLAM HYDROCHLORIDE 1 MG/ML
INJECTION INTRAMUSCULAR; INTRAVENOUS PRN
Status: DISCONTINUED | OUTPATIENT
Start: 2022-02-16 | End: 2022-02-16 | Stop reason: SDUPTHER

## 2022-02-16 RX ORDER — SODIUM HYPOCHLORITE 1.25 MG/ML
SOLUTION TOPICAL 2 TIMES DAILY
Status: DISCONTINUED | OUTPATIENT
Start: 2022-02-16 | End: 2022-02-21

## 2022-02-16 RX ORDER — FENTANYL CITRATE 50 UG/ML
INJECTION, SOLUTION INTRAMUSCULAR; INTRAVENOUS PRN
Status: DISCONTINUED | OUTPATIENT
Start: 2022-02-16 | End: 2022-02-16 | Stop reason: SDUPTHER

## 2022-02-16 RX ORDER — MAGNESIUM HYDROXIDE 1200 MG/15ML
LIQUID ORAL CONTINUOUS PRN
Status: COMPLETED | OUTPATIENT
Start: 2022-02-16 | End: 2022-02-16

## 2022-02-16 RX ORDER — ALBUTEROL SULFATE 90 UG/1
AEROSOL, METERED RESPIRATORY (INHALATION) PRN
Status: DISCONTINUED | OUTPATIENT
Start: 2022-02-16 | End: 2022-02-16 | Stop reason: SDUPTHER

## 2022-02-16 RX ORDER — GLYCOPYRROLATE 1 MG/5 ML
SYRINGE (ML) INTRAVENOUS PRN
Status: DISCONTINUED | OUTPATIENT
Start: 2022-02-16 | End: 2022-02-16 | Stop reason: SDUPTHER

## 2022-02-16 RX ORDER — OXYCODONE HYDROCHLORIDE 5 MG/1
5 TABLET ORAL EVERY 6 HOURS PRN
Status: DISCONTINUED | OUTPATIENT
Start: 2022-02-16 | End: 2022-02-25

## 2022-02-16 RX ORDER — PHENYLEPHRINE HCL IN 0.9% NACL 0.5 MG/5ML
SYRINGE (ML) INTRAVENOUS PRN
Status: DISCONTINUED | OUTPATIENT
Start: 2022-02-16 | End: 2022-02-16 | Stop reason: SDUPTHER

## 2022-02-16 RX ORDER — ROCURONIUM BROMIDE 10 MG/ML
INJECTION, SOLUTION INTRAVENOUS PRN
Status: DISCONTINUED | OUTPATIENT
Start: 2022-02-16 | End: 2022-02-16 | Stop reason: SDUPTHER

## 2022-02-16 RX ADMIN — DEXMEDETOMIDINE HYDROCHLORIDE 1.1 MCG/KG/HR: 4 INJECTION, SOLUTION INTRAVENOUS at 22:20

## 2022-02-16 RX ADMIN — PIPERACILLIN SODIUM AND TAZOBACTAM SODIUM 2250 MG: 2; .25 INJECTION, POWDER, LYOPHILIZED, FOR SOLUTION INTRAVENOUS at 03:53

## 2022-02-16 RX ADMIN — PIPERACILLIN SODIUM AND TAZOBACTAM SODIUM 2250 MG: 2; .25 INJECTION, POWDER, LYOPHILIZED, FOR SOLUTION INTRAVENOUS at 18:51

## 2022-02-16 RX ADMIN — FENTANYL CITRATE 100 MCG: 50 INJECTION, SOLUTION INTRAMUSCULAR; INTRAVENOUS at 10:47

## 2022-02-16 RX ADMIN — DEXMEDETOMIDINE HYDROCHLORIDE 1.1 MCG/KG/HR: 4 INJECTION, SOLUTION INTRAVENOUS at 13:19

## 2022-02-16 RX ADMIN — DEXMEDETOMIDINE HYDROCHLORIDE 1.1 MCG/KG/HR: 4 INJECTION, SOLUTION INTRAVENOUS at 19:22

## 2022-02-16 RX ADMIN — DEXMEDETOMIDINE HYDROCHLORIDE 1.1 MCG/KG/HR: 4 INJECTION, SOLUTION INTRAVENOUS at 04:08

## 2022-02-16 RX ADMIN — HEPARIN SODIUM 5000 UNITS: 5000 INJECTION INTRAVENOUS; SUBCUTANEOUS at 21:36

## 2022-02-16 RX ADMIN — FENTANYL CITRATE 100 MCG: 50 INJECTION, SOLUTION INTRAMUSCULAR; INTRAVENOUS at 03:14

## 2022-02-16 RX ADMIN — MIDODRINE HYDROCHLORIDE 5 MG: 5 TABLET ORAL at 16:57

## 2022-02-16 RX ADMIN — ACETAMINOPHEN 1000 MG: 500 TABLET ORAL at 13:53

## 2022-02-16 RX ADMIN — MIDAZOLAM HYDROCHLORIDE 2 MG: 1 INJECTION, SOLUTION INTRAMUSCULAR; INTRAVENOUS at 07:28

## 2022-02-16 RX ADMIN — FENTANYL CITRATE 100 MCG: 50 INJECTION, SOLUTION INTRAMUSCULAR; INTRAVENOUS at 20:26

## 2022-02-16 RX ADMIN — HEPARIN SODIUM 5000 UNITS: 5000 INJECTION INTRAVENOUS; SUBCUTANEOUS at 13:53

## 2022-02-16 RX ADMIN — Medication 100 MCG: at 09:17

## 2022-02-16 RX ADMIN — OXYCODONE 5 MG: 5 TABLET ORAL at 13:53

## 2022-02-16 RX ADMIN — Medication 0.2 MG: at 08:53

## 2022-02-16 RX ADMIN — FENTANYL CITRATE 100 MCG: 50 INJECTION, SOLUTION INTRAMUSCULAR; INTRAVENOUS at 14:16

## 2022-02-16 RX ADMIN — Medication 100 MCG: at 09:02

## 2022-02-16 RX ADMIN — ROCURONIUM BROMIDE 50 MG: 10 INJECTION INTRAVENOUS at 08:09

## 2022-02-16 RX ADMIN — ROCURONIUM BROMIDE 50 MG: 10 INJECTION INTRAVENOUS at 08:42

## 2022-02-16 RX ADMIN — OXYCODONE 5 MG: 5 TABLET ORAL at 00:13

## 2022-02-16 RX ADMIN — ALBUTEROL SULFATE 4 PUFF: 90 AEROSOL, METERED RESPIRATORY (INHALATION) at 08:29

## 2022-02-16 RX ADMIN — PIPERACILLIN SODIUM AND TAZOBACTAM SODIUM 2250 MG: 2; .25 INJECTION, POWDER, LYOPHILIZED, FOR SOLUTION INTRAVENOUS at 10:37

## 2022-02-16 RX ADMIN — GABAPENTIN 100 MG: 300 CAPSULE ORAL at 03:50

## 2022-02-16 RX ADMIN — INSULIN GLARGINE 50 UNITS: 100 INJECTION, SOLUTION SUBCUTANEOUS at 12:04

## 2022-02-16 RX ADMIN — GABAPENTIN 100 MG: 300 CAPSULE ORAL at 21:34

## 2022-02-16 RX ADMIN — OXYCODONE 5 MG: 5 TABLET ORAL at 06:15

## 2022-02-16 RX ADMIN — DAKIN'S SOLUTION 0.125% (QUARTER STRENGTH): 0.12 SOLUTION at 21:43

## 2022-02-16 RX ADMIN — FENTANYL CITRATE 100 MCG: 50 INJECTION, SOLUTION INTRAMUSCULAR; INTRAVENOUS at 07:28

## 2022-02-16 RX ADMIN — Medication: at 18:15

## 2022-02-16 RX ADMIN — DEXMEDETOMIDINE HYDROCHLORIDE 1.1 MCG/KG/HR: 4 INJECTION, SOLUTION INTRAVENOUS at 10:20

## 2022-02-16 RX ADMIN — BUDESONIDE AND FORMOTEROL FUMARATE DIHYDRATE 2 PUFF: 80; 4.5 AEROSOL RESPIRATORY (INHALATION) at 20:08

## 2022-02-16 RX ADMIN — DEXMEDETOMIDINE HYDROCHLORIDE 1.1 MCG/KG/HR: 4 INJECTION, SOLUTION INTRAVENOUS at 01:11

## 2022-02-16 RX ADMIN — ACETAMINOPHEN 1000 MG: 500 TABLET ORAL at 21:34

## 2022-02-16 RX ADMIN — DEXMEDETOMIDINE HYDROCHLORIDE 1.1 MCG/KG/HR: 4 INJECTION, SOLUTION INTRAVENOUS at 07:12

## 2022-02-16 RX ADMIN — ROCURONIUM BROMIDE 50 MG: 10 INJECTION INTRAVENOUS at 07:28

## 2022-02-16 RX ADMIN — DEXMEDETOMIDINE HYDROCHLORIDE 1.1 MCG/KG/HR: 4 INJECTION, SOLUTION INTRAVENOUS at 16:51

## 2022-02-16 RX ADMIN — HEPARIN SODIUM 5000 UNITS: 5000 INJECTION INTRAVENOUS; SUBCUTANEOUS at 06:50

## 2022-02-16 RX ADMIN — GABAPENTIN 100 MG: 300 CAPSULE ORAL at 12:11

## 2022-02-16 RX ADMIN — ACETAMINOPHEN 1000 MG: 500 TABLET ORAL at 06:15

## 2022-02-16 ASSESSMENT — PULMONARY FUNCTION TESTS
PIF_VALUE: 37
PIF_VALUE: 35
PIF_VALUE: 34
PIF_VALUE: 33
PIF_VALUE: 37
PIF_VALUE: 35
PIF_VALUE: 23
PIF_VALUE: 31
PIF_VALUE: 37
PIF_VALUE: 32
PIF_VALUE: 32
PIF_VALUE: 30
PIF_VALUE: 40
PIF_VALUE: 37
PIF_VALUE: 37
PIF_VALUE: 16
PIF_VALUE: 15
PIF_VALUE: 30
PIF_VALUE: 34
PIF_VALUE: 37
PIF_VALUE: 37
PIF_VALUE: 32
PIF_VALUE: 37
PIF_VALUE: 37
PIF_VALUE: 35
PIF_VALUE: 35
PIF_VALUE: 23
PIF_VALUE: 31
PIF_VALUE: 30
PIF_VALUE: 35
PIF_VALUE: 32
PIF_VALUE: 37
PIF_VALUE: 37
PIF_VALUE: 32
PIF_VALUE: 37
PIF_VALUE: 30
PIF_VALUE: 35
PIF_VALUE: 36
PIF_VALUE: 37
PIF_VALUE: 22
PIF_VALUE: 31
PIF_VALUE: 30
PIF_VALUE: 37
PIF_VALUE: 31
PIF_VALUE: 37
PIF_VALUE: 36
PIF_VALUE: 37
PIF_VALUE: 11
PIF_VALUE: 37
PIF_VALUE: 37
PIF_VALUE: 25
PIF_VALUE: 37
PIF_VALUE: 35
PIF_VALUE: 41
PIF_VALUE: 37
PIF_VALUE: 30
PIF_VALUE: 35
PIF_VALUE: 37
PIF_VALUE: 37
PIF_VALUE: 32
PIF_VALUE: 34
PIF_VALUE: 30
PIF_VALUE: 31
PIF_VALUE: 37
PIF_VALUE: 24
PIF_VALUE: 37
PIF_VALUE: 30
PIF_VALUE: 37
PIF_VALUE: 36
PIF_VALUE: 37
PIF_VALUE: 37
PIF_VALUE: 34
PIF_VALUE: 37
PIF_VALUE: 33
PIF_VALUE: 30
PIF_VALUE: 35
PIF_VALUE: 30
PIF_VALUE: 37
PIF_VALUE: 35
PIF_VALUE: 37
PIF_VALUE: 24
PIF_VALUE: 37
PIF_VALUE: 29
PIF_VALUE: 37
PIF_VALUE: 35
PIF_VALUE: 31
PIF_VALUE: 37
PIF_VALUE: 30
PIF_VALUE: 37
PIF_VALUE: 37
PIF_VALUE: 17
PIF_VALUE: 37
PIF_VALUE: 23
PIF_VALUE: 38
PIF_VALUE: 19
PIF_VALUE: 37
PIF_VALUE: 35
PIF_VALUE: 10
PIF_VALUE: 30
PIF_VALUE: 30
PIF_VALUE: 29
PIF_VALUE: 37
PIF_VALUE: 34
PIF_VALUE: 35
PIF_VALUE: 37
PIF_VALUE: 22
PIF_VALUE: 37
PIF_VALUE: 37
PIF_VALUE: 34
PIF_VALUE: 37
PIF_VALUE: 36
PIF_VALUE: 37
PIF_VALUE: 37
PIF_VALUE: 32
PIF_VALUE: 37
PIF_VALUE: 37

## 2022-02-16 ASSESSMENT — PAIN SCALES - GENERAL
PAINLEVEL_OUTOF10: 0
PAINLEVEL_OUTOF10: 0
PAINLEVEL_OUTOF10: 8
PAINLEVEL_OUTOF10: 7
PAINLEVEL_OUTOF10: 0

## 2022-02-16 ASSESSMENT — PAIN DESCRIPTION - PAIN TYPE: TYPE: SURGICAL PAIN

## 2022-02-16 ASSESSMENT — PAIN DESCRIPTION - DESCRIPTORS: DESCRIPTORS: SORE

## 2022-02-16 ASSESSMENT — PAIN DESCRIPTION - LOCATION: LOCATION: ABDOMEN

## 2022-02-16 ASSESSMENT — PAIN DESCRIPTION - FREQUENCY: FREQUENCY: CONTINUOUS

## 2022-02-16 NOTE — PROGRESS NOTES
ICU PROGRESS NOTE          PATIENT NAME: Estefani [facility] RECORD NO. [id number]  DATE: 2022    PRIMARY CARE PHYSICIAN: Pinky Ordonez MD    HD: # 8    ASSESSMENT    Patient Active Problem List   Diagnosis    Leonel gangrene    Uncontrolled type 2 diabetes mellitus with hyperglycemia ([facility]    Acute kidney injury superimposed on CKD ([facility]    Hypokalemia    Hyponatremia    Anemia    Morbid obesity with BMI of 45.0-49.9, adult ([facility]    Type 1 diabetes mellitus with stage 3a chronic kidney disease ([facility]       MEDICAL DECISION MAKING AND PLAN    1. Neuro:  1. Fentanyl pushes   2. Precedex @ 1.1 mcg/kg/hr  3. Tylenol 1000 mg q8H, gabapentin 100 mg q8, roxicodone 5mg q 4H PRN     2. CV:  1. HR: 61 - 71  2. MAP: 69 - 102  3. Lactate: 0.69 (0.92)  4. Midodrine 5 mg TID     3. Heme:  1. Hgb: 7.3 (7.3)  2. Plt: 253 (257)  3. DVT ppx: heparin     4. Pulm:  1. PRVC: 370/18/8/40%  2. AB.44/41.8/80.1/28.4  1. PF: 200  2. PEEP increased to 10 after ABG above   3. CXR: stable     5. Renal/lytes:  1. BUN/Cr: 21 (24) / 1.97 (2.28)  2. Na/K+: 134 (130) / 3.7 (3.9)  3. M.7  Phos: 3.4  4. UOP: 0.1 - 1.5 cc/kg/hr overnight   Nephrology following: Received HD 2/15. Continued with bicarb drip @ 50 cc/hr   6. GI:  1. TF - held at midnight for OR   2. Pepcid 20 mg BID     7. ID:  1. Tmax: 100 (37.8)  2. WBC: 7.1 (8.7)  3. Continue zosyn     8. MSK:  1. Necrotizing soft tissue infection of the buttocks and groin   2. S/p incision and debridement , and further debridement later   3. S/p irrigation and debridement of wound 2/10  4. S/p irrigation and debridement, partial closure, and bilateral wound vac placement   5. S/p irrigation and debridement, partial closure, L wound vac application 3/66  6. Plan to return to OR  for vac change and diverting ostomy creation     9. Endo:  1. B  2. Insulin drip: 178 units over past 24 hours   3. 30 Lantus 2/15    10.  Lines:  1. PIV, Booker, ETT, OGT, right radial arterial line, right IJ CVC, right femoral polina catheter    CHECKLIST    RASS: -1  RESTRAINTS: bilateral soft wrist   IVF: bicarb drip @ 50 cc/hr   NUTRITION: TF - held at midnight for OR   ANTIBIOTICS: zosyn   GI: pepcid   DVT: heparin   GLYCEMIC CONTROL: insulin gtt   HOB >45: yes   WOUND CARE: vac change in OR     SUBJECTIVE    Devere Bumpers was seen and examined at bedside. No acute events overnight. Hemodynamically stable, and afebrile. Attempted SBT yesterday but only briefly. Was awake and following commands, but was placed back on full support for RR 6-8. Received HD yesterday.        OBJECTIVE  VITALS: Temp: Temp: 99.1 °F (37.3 °C)Temp  Av.2 °F (37.3 °C)  Min: 97.7 °F (36.5 °C)  Max: 100 °F (47.2 °C) BP Systolic (40MMQ), DIQ:533 , Min:148 , RQV:678   Diastolic (40LZA), KCV:79, Min:58, Max:59   Pulse Pulse  Av.3  Min: 61  Max: 75 Resp Resp  Av.9  Min: 13  Max: 23 Pulse ox SpO2  Av.3 %  Min: 95 %  Max: 100 %    GENERAL: intubated, sedated, no apparent distress   NEURO: intubated, sedated, following commands   HEENT: NCAT   : perineal dressings intact, wound vac intact with good seal   LUNGS: no acute respiratory distress or accessory muscle use   MECHANICAL VENTILATION: tolerating mechanical ventilation   HEART: regular rate and rhythm   ABDOMEN: soft, non-distended   EXTERMITY: no cyanosis, clubbing or edema    LAB:  CBC:   Recent Labs     22  1501 02/15/22  0422 22  0416   WBC 7.7 8.7 7.1   HGB 7.8* 7.3* 7.3*   HCT 24.6* 22.7* 23.2*   MCV 89.1 89.4 90.6    257 253     BMP:   Recent Labs     22  1501 02/15/22  0422 22  0416   * 130* 134*   K 3.7 3.9 3.7   CL 93* 97* 101   CO2 26 23 24   BUN 19 24* 21*   CREATININE 1.82* 2.28* 1.97*   GLUCOSE 176* 212* 117*         RADIOLOGY:  XR CHEST PORTABLE    Result Date: 2022  EXAMINATION: ONE XRAY VIEW OF THE CHEST 2022 5:23 am COMPARISON: 02/15/2022, 2022 HISTORY: ORDERING SYSTEM PROVIDED HISTORY: intubated TECHNOLOGIST PROVIDED HISTORY: intubated Reason for Exam: port Supine FINDINGS: Endotracheal tube tip is approximately 4 cm above the henrietta. Nasogastric tube tip is below the diaphragm. Right jugular central venous catheter tip projects in the superior vena cava. Stable mild cardiomegaly accentuated by low lung volumes. Similar mild patchy bilateral airspace disease without new lobar consolidation. No significant pleural effusion is seen. There is no acute osseous abnormality. Monitor leads overlie the chest.     Relatively stable cardiopulmonary status     XR CHEST PORTABLE    Result Date: 2/15/2022  EXAMINATION: ONE XRAY VIEW OF THE CHEST 2/15/2022 6:02 am COMPARISON: 02/14/2022 HISTORY: ORDERING SYSTEM PROVIDED HISTORY: intubated TECHNOLOGIST PROVIDED HISTORY: intubated Reason for Exam: supine port FINDINGS: ETT, NG tube and right IJ central line remain in place. Stable cardiomediastinal silhouette. Heart size upper limits normal. Bilateral patchy airspace opacities again noted. Right lower lung bandlike atelectasis is resolving. Probable left pleural effusion. No appreciable pneumothorax. 1. Resolving right lower lung atelectasis. 2. Diffuse opacities representing vascular congestion again noted. 3. Stable support tubes and lines. XR CHEST PORTABLE    Result Date: 2/14/2022  EXAMINATION: ONE XRAY VIEW OF THE CHEST 2/14/2022 2:31 pm COMPARISON: 02/14/2022, 02/13/2022 HISTORY: ORDERING SYSTEM PROVIDED HISTORY: Post op - intubated TECHNOLOGIST PROVIDED HISTORY: Post op - intubated FINDINGS: The endotracheal tube terminates in appropriate position above the henrietta. The enteric tube courses off the field of view in the upper abdomen. Right internal jugular line remains in place. .  The cardiac and mediastinal contours appear unchanged. Bilateral patchy airspace disease again demonstrated and without significant interval change. No evidence for pneumothorax.      1. Unchanged position of support devices. 2. No significant change in bilateral airspace disease.          Alix Swain DO  2/16/2022  6:28 AM

## 2022-02-16 NOTE — PROGRESS NOTES
Hx: diverting transverse loop colostomy created on 2/16/2022    The patient is intubated. Alert. Responds to command. Visitor at bedside viewed the stoma and followed along with basic steps of ostomy care. 02/16/22 1110   Colostomy LUQ Transverse   Placement Date/Time: 02/16/22 1110   Pre-existing: No  Location: LUQ  Colostomy Type: Transverse   Stomal Appliance Changed   Flange Size (inches) 4 Inches   Stoma  Assessment Moist;Pink;Protrudes  (2 1/8\" circular with hard plastic bridge sewn at both ends)   Mucocutaneous Junction Intact   Peristomal Assessment Clean; Intact   Treatment Bag change; Liquid skin barrier;Site care; Other (Comment)  (barrier seal ring x2)   Stool Appearance Loose   Stool Color Brown   Output (mL) 25 ml       Cut barrier to fit stoma with no more than 1/8\" of exposed skin  Apply protective/barrier rings to the peristomal skin over the bridge  Empty the pouch when 1/3 to 1/2 full. Change the pouching system twice weekly and prn  Our goal is to keep the skin around the stoma intact and to have a dependable pouching system without leaks. Call the [address] [place] [address] at [phone] or via [address] [place] [address] by searching \"wound\" and selecting the on-call clinician with questions or concerns.

## 2022-02-16 NOTE — ANESTHESIA POSTPROCEDURE EVALUATION
Department of Anesthesiology  Postprocedure Note    Patient: Brenda Singh  MRN: [id number]  YOB: 1980  Date of evaluation: 2/16/2022  Time:  11:24 AM     Procedure Summary     Date: 02/16/22 Room / Location: [address]    Anesthesia Start: 0725 Anesthesia Stop: 1003    Procedure: DEBRIDEMENT BUTTOCK AND GROIN, WOUND VAC EXCHANGE, LAPAROSCOPIC COLOSTOMY CREATION ([address]) (N/A ) Diagnosis: (NECROTIZING FASCIITIS)    Surgeons: Lorena Casanova MD Responsible Provider: Luis Manuel Cole MD    Anesthesia Type: general ASA Status: 4          Anesthesia Type: general    Cristhian Phase I: Cristhian Score: 9    Cristhian Phase II:      Last vitals: Reviewed and per EMR flowsheets.        Anesthesia Post Evaluation    Patient location during evaluation: PACU  Patient participation: complete - patient cannot participate  Level of consciousness: sedated and ventilated  Pain score: 0  Airway patency: patent  Nausea & Vomiting: no nausea and no vomiting  Complications: no  Cardiovascular status: hemodynamically stable  Respiratory status: ventilator and intubated  Hydration status: euvolemic

## 2022-02-16 NOTE — PROGRESS NOTES
Comprehensive Nutrition Assessment    Type and Reason for Visit:  Reassess,Consult (TF recommendations only- Provider to manage)    Nutrition Recommendations/Plan: Restart Immune Enhancing at 25 mL/hr. When able to advance rate, suggest goal of 55 mL/hr to provide 1980 kcal and 124 g pro/day. Will monitor TF tolerance and care plans. Nutrition Assessment:  Chart reviewed. Pt remains on vent. S/p debridement of buttock and groin, wound VAC exchange, laparoscopic colostomy creation today. Plan to resume TF today - Immune Enhancing formula at 25 mL/hr ordered. RD consulted for goal rate. Meds/labs reviewed. Malnutrition Assessment:  Malnutrition Status:   At risk for malnutrition   'Context:  Acute Illness     Findings of the 6 clinical characteristics of malnutrition:  Energy Intake: 75% or less of estimated energy requirements for 7 or more days  Weight Loss:  No significant weight loss     Body Fat Loss:  No significant body fat loss     Muscle Mass Loss:  No significant muscle mass loss    Fluid Accumulation:  1 - Mild Extremities,Generalized   Strength:  Not Performed    Estimated Daily Nutrient Needs:  Energy (kcal):  2000 kcal/day; Weight Used for Energy Requirements:  Current     Protein (g):  105-130 g pro/day; Weight Used for Protein Requirements:  Ideal (2-2.5)        Fluid (ml/day):  per MD     Nutrition Related Findings:  Obesity      Wounds:  Surgical Incision (necrotizing soft tissue infection of groin and buttocks- s/p I&D)       Current Nutrition Therapies:    Diet NPO Exceptions are: Sips of Water with Meds  ADULT TUBE FEEDING; Nasogastric; Immune Enhancing; Continuous; 25; No; 30; Before and after each bolus  Current Tube Feeding (TF) Orders:  · Feeding Route: Nasogastric  · Formula: Immune Enhancing  · Schedule: Continuous at 25 mL/hr   · Current TF & Flush Orders Provides: 25 mL/hr =900 kcal and 56 g pro/day  · Goal TF & Flush Orders Provides: 55 mL/hr would provide 1980 kcal and 124 g pro/day    Additional Calorie Sources:   None    Anthropometric Measures:  · Height: 5' 3\" (160 cm)  · Current Body Weight: 299 lb 4.8 oz (135.8 kg)   · Admission Body Weight: 276 lb 6.4 oz (125.4 kg)    · Ideal Body Weight: 115 lbs; % Ideal Body Weight 260.3 %   · BMI: 53  · BMI Categories: Obese Class 3 (BMI 40.0 or greater)       Nutrition Diagnosis:   · Increased nutrient needs related to healing as evidenced by necrotizing soft tissue infection of groin and buttocks    · Inadequate oral intake related to impaired respiratory function as evidenced by NPO or clear liquid status due to medical condition    Nutrition Interventions:   Food and/or Nutrient Delivery: Restart Immune Enhancing TF at 25 mL/hr. When able to advance rate, suggest goal of 55 mL/hr to provide 1980 kcal and 124 g pro/day. Nutrition Education/Counseling:  No recommendation at this time   Coordination of Nutrition Care:  Continue to monitor while inpatient    Goals:  meet % of estimated nutrient needs -progressing towards goal       Nutrition Monitoring and Evaluation:   Behavioral-Environmental Outcomes:  None Identified   Food/Nutrient Intake Outcomes:  Enteral Nutrition Intake/Tolerance  Physical Signs/Symptoms Outcomes:  Biochemical Data,Nutrition Focused Physical Findings,Skin,Weight,Fluid Status or Edema     Discharge Planning:     Too soon to determine     Electronically signed by Brenda Price RD, SOL on 2/16/22 at 4:13 PM EST    Contact: [phone]

## 2022-02-16 NOTE — PLAN OF CARE
Problem: OXYGENATION/RESPIRATORY FUNCTION  Goal: Patient will maintain patent airway  2/16/2022 1542 by Jorge Ferrara RCP  Outcome: Ongoing  2/16/2022 1229 by Raffaele Vang RN  Outcome: Ongoing  2/16/2022 0630 by Manisha Hernandez  Outcome: Ongoing  2/16/2022 0618 by Pita Salazar RN  Outcome: Ongoing     Problem: OXYGENATION/RESPIRATORY FUNCTION  Goal: Patient will achieve/maintain normal respiratory rate/effort  Description: Respiratory rate and effort will be within normal limits for the patient  2/16/2022 1542 by Jorge Ferrara RCP  Outcome: Ongoing  2/16/2022 1229 by Raffaele Vang RN  Outcome: Ongoing  2/16/2022 0630 by Manisha Hernandez  Outcome: Ongoing  2/16/2022 0618 by Pita Salazar RN  Outcome: Ongoing     Problem: MECHANICAL VENTILATION  Goal: Patient will maintain patent airway  2/16/2022 1542 by Jorge Ferrara RCP  Outcome: Ongoing  2/16/2022 1229 by Raffaele Vang RN  Outcome: Ongoing  2/16/2022 0630 by Manisha Hernandez  Outcome: Ongoing  2/16/2022 0618 by Pita Salazar RN  Outcome: Ongoing     Problem: MECHANICAL VENTILATION  Goal: Oral health is maintained or improved  2/16/2022 1542 by Jorge Ferrara RCP  Outcome: Ongoing  2/16/2022 1229 by Raffaele Vang RN  Outcome: Ongoing  2/16/2022 0630 by Manisha Hernandez  Outcome: Ongoing  2/16/2022 0618 by Pita Salazar RN  Outcome: Ongoing

## 2022-02-16 NOTE — OP NOTE
Operative Note      Patient: Ave Martinez  YOB: 1980  MRN: [id number]    Date of Procedure: 2/16/2022    Pre-Op Diagnosis: NECROTIZING FASCIITIS    Post-Op Diagnosis: Same       Procedure(s):  DEBRIDEMENT BUTTOCK AND GROIN, WOUND VAC EXCHANGE, LAPAROSCOPIC COLOSTOMY CREATION ([address]), wound [id number]    Surgeon(s):  Marlena Mccarthy MD    Assistant:   Guerline Bautista DO PGY5    Anesthesia: General    Estimated Blood Loss (mL): 52ZM    Complications: None    Specimens:   * No specimens in log *    Implants:  * No implants in log *      Drains:   Negative Pressure Wound Therapy Buttocks Left (Active)   Wound Type Surgical 02/16/22 1000   Dressing Type Black foam 02/16/22 1000   Cycle Continuous 02/16/22 1000   Target Pressure (mmHg) 125 02/16/22 1000   Dressing Status Changed 02/16/22 1000   Dressing Changed Changed/New 02/16/22 1000   Drainage Amount Small 02/16/22 1000   Drainage Description Serosanguinous 02/16/22 1000   Output (ml) 50 ml 02/16/22 0400   Wound Assessment Clean;Dry; Intact 02/16/22 1000   Vilma-wound Assessment Fragile 02/14/22 1600       NG/OG/NJ/NE Tube Orogastric 18 fr Center mouth (Active)   Surrounding Skin Dry; Intact 02/16/22 1000   Securement device Yes 02/16/22 1000   Status Clamped 02/16/22 1000   Placement Verified by External Catheter Length 02/16/22 1000   NG/OG/NJ/NE External Measurement (cm) 55 cm 02/16/22 1000   Tube Feeding Immune Enhancing 02/16/22 0400   Tube Feeding Status Stopped 02/16/22 1000   Rate/Schedule 0 mL/hr 02/16/22 0000   Tube Feeding Intake (mL) 91 ml 02/16/22 0000   Free Water Flush (mL) 30 mL 02/16/22 0600       Colostomy LUQ Transverse (Active)   Stomal Appliance Changed 02/16/22 1110   Flange Size (inches) 4 Inches 02/16/22 1110   Stoma  Assessment Moist;Pink;Protrudes 02/16/22 1110   Mucocutaneous Junction Intact 02/16/22 1110   Peristomal Assessment Clean; Intact 02/16/22 1110   Treatment Bag change; Liquid skin barrier;Site care; Other (Comment) 02/16/22 1110   Stool Appearance Loose 02/16/22 1110   Stool Color Brown 02/16/22 1110   Output (mL) 25 ml 02/16/22 1110       Urethral Catheter Temperature probe 16 fr (Active)   Catheter Indications Need for fluid volume management of the critically ill patient in a critical care setting 02/16/22 1000   Site Assessment Swelling 02/16/22 1000   Urine Color Yellow 02/16/22 1000   Urine Appearance Clear 02/16/22 1000   Urine Odor Malodorous 02/15/22 0000   Output (mL) 17 mL 02/16/22 1100       [REMOVED] Negative Pressure Wound Therapy Hip Anterior;Right (Removed)   Wound Type Surgical 02/14/22 0000   Cycle Off 02/14/22 0000   Target Pressure (mmHg) 125 02/13/22 1600   Dressing Status Intact 02/14/22 0000   Dressing Changed Dressing reinforced 02/12/22 1600   Drainage Amount Small 02/13/22 1600   Drainage Description Serosanguinous 02/13/22 1600   Output (ml) 0 ml 02/12/22 1900   Wound Assessment Other (Comment) 02/13/22 1600   Vilma-wound Assessment Other (Comment) 02/13/22 1600       [REMOVED] Negative Pressure Wound Therapy Hip Anterior; Left (Removed)   Wound Type Surgical 02/14/22 0000   Cycle Off 02/14/22 0000   Target Pressure (mmHg) 125 02/13/22 1600   Dressing Status Dry; Intact; Clean 02/14/22 0000   Dressing Changed Dressing reinforced 02/12/22 1600   Drainage Amount Small 02/13/22 1600   Drainage Description Serosanguinous 02/13/22 1600   Output (ml) 25 ml 02/12/22 1900   Wound Assessment Other (Comment) 02/13/22 1600   Vilma-wound Assessment Other (Comment) 02/13/22 1600       Detailed Description of Procedure:   Procedure, benefits, risks explained to the patient/POA who understood and consent was obtained. Patient was taken the operating room and placed in supine position. Preprocedure timeout performed. General anesthesia was induced. Patient already had an ET tube and Booker catheter in place. Patient was then placed in a prone position on the operating room table. The previous wound VAC was removed. The wounds of the buttock and vulvar area were irrigated thoroughly and sharp incision and debridement was performed. The area of the wound that was sharply excised was 15 x 10 x 10 cm. The tissue excised was skin and subcutaneous tissue. An irrigating wound VAC was then replaced with good seal.    Next, the patient was turned into supine position on another operating room table. The abdomen was prepped and draped in usual sterile fashion. A 5 mm skin incision was created at Allen's point, the varies needle was used to enter the abdomen. A saline drop test was performed to ensure that we were in the abdomen. Insufflation was began. Once appropriate insufflation to 15 mmHg was obtained, a 5 mm Optiview port was placed into the previous incision created. The laparoscope was used to view the abdomen and no injuries were created upon entrance into the abdomen with the varies needle. Next, under direct visualization, 2 further 5 mm ports were placed 1 supraumbilical and one in the right upper quadrant. The transverse colon was found to be lying in good position to create a transverse loop colostomy within the mid upper abdomen. A 10 mm port was placed in the mid upper abdomen under direct visualization, using a laparoscopic Oxford the transverse colon was grasped. Next, a circular incision was created around the 10 mm port dissection was carried down through the subcutaneous tissue to create a site for the colostomy. The fascia was then incised in a cruciate type fashion and 3 finger breaths were able to fit through the incision. The 10 mm port was removed and the bowel was brought up through the ostomy site. The other 5 mm ports were then removed. Once the bowel was brought up to the ostomy site a defect was created within the mesentery just beneath the colon to place a bridge for the loop colostomy. The bridge was sutured into place using silk suture.   Next, a colotomy was created along the length of the bowel using Bovie electrocautery. The colostomy was then created in a Yesenia type fashion using 3-0 Vicryl sutures. The colostomy was felt to be patent. Hemostasis was good. The port sites were then closed using 4-0 Monocryl sutures. Skin glue was applied to the port sites. An ostomy appliance was then placed around the colostomy. Electronically signed by Sy Gambino DO on 2/16/2022 at 12:05 PM       I was present for the entire operation.     Wang Correa MD

## 2022-02-16 NOTE — PROGRESS NOTES
Nephrology Progress Note      SUBJECTIVE    Patient is still intubated and sedated. Her CVVHD was stopped on 2022 when she underwent a standard intermittent hemodialysis treatment yesterday, 2/15/2022 with minimum fluid removal.  The patient underwent debridement of buttock and groin and wound VAC exchange and also underwent laparoscopic diverting colostomy creation today. The patient's vital signs are stable after surgery. She is on the ventilator with FiO2 of 40% with a PEEP of 5. She continues to have diffuse edema. Patient remains hemodynamically stable and she is not on pressors. Some improvement in urine output yesterday was 765 mL in 24 hours documented. Patient is sedated on the ventilator. Her mother was at the bedside and questions regarding dialysis answered to the best of my ability. We will schedule the patient for dialysis tomorrow. OBJECTIVE      CURRENT TEMPERATURE:  Temp: 97.5 °F (36.4 °C)  MAXIMUM TEMPERATURE OVER 24HRS:  Temp (24hrs), Av.5 °F (36.9 °C), Min:96.6 °F (35.9 °C), Max:100 °F (37.8 °C)    CURRENT RESPIRATORY RATE:  Resp: 16  CURRENT PULSE:  Pulse: 66  CURRENT BLOOD PRESSURE:  BP: (!) 148/58  24HR BLOOD PRESSURE RANGE:  Systolic (25DXP), GLP:931 , Min:148 , SJB:349   ; Diastolic (23DPT), JVN:28, Min:58, Max:59    24HR INTAKE/OUTPUT:      Intake/Output Summary (Last 24 hours) at 2022 1709  Last data filed at 2022 1600  Gross per 24 hour   Intake 2939.84 ml   Output 1619 ml   Net 1320.84 ml     WEIGHT :  No data found. PHYSICAL EXAM      GENERAL APPEARANCE: Intubated and sedated on the ventilator orally intubated with FiO2 of 40% and PEEP of 5  SKIN: Warm to touch   ENT: oral ETT in place  NECK: Thick neck, midline trachea no accessory muscle use  PULMONARY: Bilateral air entry with diminished breath sounds in the bases and  CARDIOVASCULAR: Regular rate and rhythm positive S1 and S2 no rubs.   Somewhat distant heart tones  ABDOMEN: Obese, postsurgical abdomen pelvic area with surgery earlier today  EXTREMITIES: 2+ diffuse edema with no cyanosis and 2+ DP pulses bilaterally    CURRENT MEDICATIONS      oxyCODONE (ROXICODONE) immediate release tablet 5 mg, Q6H PRN  piperacillin-tazobactam (ZOSYN) 2,250 mg in dextrose 5 % 50 mL IVPB (mini-bag), Q8H  fentaNYL (SUBLIMAZE) injection 100 mcg, Q1H PRN  0.9 % sodium chloride infusion, Continuous  midodrine (PROAMATINE) tablet 5 mg, TID WC  0.9 % sodium chloride infusion, PRN  famotidine (PEPCID) 40 MG/5ML suspension 20 mg, Daily  prismaSATE BK 0/3.5 5,000 mL with potassium chloride 15 mEq solution, Continuous  sodium bicarbonate 150 mEq in dextrose 5 % 1,000 mL infusion, Continuous  heparin (porcine) injection 1,900 Units, PRN  heparin (porcine) injection 1,600 Units, PRN  fluticasone (FLONASE) 50 MCG/ACT nasal spray 1 spray, Daily  budesonide-formoterol (SYMBICORT) 80-4.5 MCG/ACT inhaler 2 puff, BID  albuterol sulfate  (90 Base) MCG/ACT inhaler 1 puff, Q4H PRN  montelukast (SINGULAIR) tablet 10 mg, Daily  rOPINIRole (REQUIP) tablet 4 mg, Daily  insulin regular (HUMULIN R;NOVOLIN R) 100 Units in sodium chloride 0.9 % 100 mL infusion, Continuous  gabapentin (NEURONTIN) capsule 100 mg, Q8H  dexmedetomidine (PRECEDEX) 400 mcg in sodium chloride 0.9 % 100 mL infusion, Continuous  heparin (porcine) injection 5,000 Units, 3 times per day  acetaminophen (TYLENOL) tablet 1,000 mg, 3 times per day          LABS      CBC:   Recent Labs     02/14/22  1501 02/15/22  0422 02/16/22  0416   WBC 7.7 8.7 7.1   RBC 2.76* 2.54* 2.56*   HGB 7.8* 7.3* 7.3*   HCT 24.6* 22.7* 23.2*   MCV 89.1 89.4 90.6   MCH 28.3 28.7 28.5   MCHC 31.7 32.2 31.5   RDW 15.1* 15.3* 15.6*    257 253   MPV 11.7 11.3 11.2      BMP:   Recent Labs     02/14/22  1501 02/15/22  0422 02/16/22  0416   * 130* 134*   K 3.7 3.9 3.7   CL 93* 97* 101   CO2 26 23 24   BUN 19 24* 21*   CREATININE 1.82* 2.28* 1.97*   GLUCOSE 176* 212* 117*   CALCIUM 8.4* 8.0* 7.3*      PHOSPHORUS:    Recent Labs     02/14/22  0443 02/15/22  0422 02/16/22  0416   PHOS 4.0 5.2* 3.4     MAGNESIUM:   Recent Labs     02/14/22  0443 02/15/22  0422 02/16/22  0416   MG 1.6 1.9 1.7     SPEP:   Lab Results   Component Value Date    PROT 5.7 02/09/2022     C3:   Lab Results   Component Value Date    C3 143 02/09/2022     C4:   Lab Results   Component Value Date    C4 19 02/09/2022     URINE SODIUM:    Lab Results   Component Value Date    JORGE 26 02/09/2022      URINE CREATININE:    Lab Results   Component Value Date    LABCREA 142.3 02/09/2022         ASSESSMENT      1. Acute Kidney Injury: Patient is dialysis dependent secondary to ATN. Patient was on CRRT however the system was discontinued 2/14/2022 and then had conventional dialysis 2/15/2022. She remains hemodynamically stable and not requiring vasopressors. 2.    Chronic kidney disease stage IIIa due to diabetic nephrosclerosis her baseline 1.5-1.6 proteinuria about 1 to 2 g from diabetic nephrosclerosis follows up with Dr. Tamiko Peña  3. Type 2 diabetes with poor control hemoglobin A1c 7-9 range  4. Morbid obesity  5. Leonel's gangrene requiring debridements, patient on antibiotics including clindamycin and Zosyn. 6.  Postoperative days #0 today with further debridement of buttock and groin wound VAC exchange and diverting colostomy creation laparoscopically  7. Acute respiratory failure, patient remains vent dependent   PLAN      1. No dialysis today in the setting of the patient recently getting out of the operating room  2. Hemodialysis tomorrow and we will attempt ultrafiltration as well  3. Follow labs as ordered      Please do not hesitate to call with questions.     This note is created with the assistance of a speech-recognition program. While intending to generate a document that actually reflects the content of the visit, no guarantees can be provided that every mistake has been identified and corrected by editing    Electronically signed by Luis M Salamanca MD on 2/16/2022 at 5:09 PM

## 2022-02-16 NOTE — DISCHARGE INSTR - COC
Continuity of Care Form    Patient Name: Rosalinda Lundberg   :  1980  MRN:  [id number]    [facility] date:  2022  Discharge date:  22    Code Status Order: Full Code   Advance Directives:   Advance Care Flowsheet Documentation       Date/Time Healthcare Directive Type of Healthcare Directive Copy in [address] Agent's Name Healthcare Agent's Phone Number    22 1257 No, patient does not have an advance directive for healthcare treatment -- -- -- -- --            Admitting Physician:  Caridad Rendon MD  PCP: Puma Dumas MD    Discharging Nurse: [facility] Unit/Room#: 0485/6094-47  Discharging Unit Phone Number: [phone]    Emergency Contact:   Extended Emergency Contact Information  Primary Emergency Contact: Damon Mccarthy Phone: [phone]  Relation: Parent    Past Surgical History:  Past Surgical History:   Procedure Laterality Date    ABDOMEN SURGERY Bilateral 2022    incision and drainage of bilateral necrotizing soft tissue infection of groin and buttocks  performed by Caridad Rendon MD at [address] [place] [address] 2022    *ADD ON, ASAP* INCISION AND DRAINAGE OF PERINEUM, BRIAN KNIFE PRONE performed by Jacinta Mora MD at [address] [place] [address] [place],[address] Bilateral 2/10/2022    INCISION AND DRAINAGE BUTTOCK, GROIN  (LITHOTOMY POSITION) performed by Caridad Rendon MD at [address] [place] [address] 2022    INCISION AND DRAINAGE AND DEBRIDEMENT BUTTOCK AND LABIAL ABSCESS , PLACEMENT OF WOUND VAC performed by Little Orozco DO at [address] [facility] Left 2022    [facility] OF BILATERAL GROINS AND LEFT BUTTOCK WOUND WITH PARTIAL CLOSURE AND WOUND VAC PLACEMENT performed by Huy Ang MD at [place] [address]       Immunization History:   Immunization History   Administered Date(s) Administered    COVID-19, J&J, PF, 0.5 mL 2021, 2021       Active Problems:  Patient Active Problem List Diagnosis Code    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    Uncontrolled type 2 diabetes mellitus with hyperglycemia (HCC) E11.65    Acute kidney injury superimposed on CKD ([facility]) N17.9, N18.9    Hypokalemia E87.6    Hyponatremia E87.1    Anemia D64.9    Morbid obesity with BMI of 45.0-49.9, adult ([facility]) E66.01, Z68.42    Type 1 diabetes mellitus with stage 3a chronic kidney disease (HCC) E10.21, N18.31       Isolation/Infection:   Isolation            No Isolation          Patient Infection Status       None to display            Nurse Assessment:  Last Vital Signs: BP (!) 148/58   Pulse 65   Temp 97.5 °F (36.4 °C) (Bladder)   Resp 15   Ht 5' 3\" (1.6 m)   Wt 299 lb 4.8 oz (135.8 kg)   SpO2 100%   BMI 53.02 kg/m²     Last documented pain score (0-10 scale): Pain Level: 0  Last Weight:   Wt Readings from Last 1 Encounters:   02/11/22 299 lb 4.8 oz (135.8 kg)     Mental Status:  oriented and alert    IV Access:  - None    Nursing Mobility/ADLs:  Walking   Independent  Transfer  Independent  Bathing  Independent  Dressing  Independent  Toileting  Independent  Feeding  Independent  Med Admin  Independent  Med Delivery   whole    Wound Care Documentation and Therapy:  Negative Pressure Wound Therapy Buttocks Left (Active)   Wound Type Surgical 02/16/22 1000   Dressing Type Black foam 02/16/22 1000   Cycle Continuous 02/16/22 1000   Target Pressure (mmHg) 125 02/16/22 1000   Dressing Status Changed 02/16/22 1000   Dressing Changed Changed/New 02/16/22 1000   Drainage Amount Small 02/16/22 1000   Drainage Description Serosanguinous 02/16/22 1000   Output (ml) 50 ml 02/16/22 0400   Wound Assessment Clean;Dry; Intact 02/16/22 1000   Vilma-wound Assessment Fragile 02/14/22 1600   Number of days: 2        Elimination:  Continence:    Bowel: Yes  Bladder: Yes  Urinary Catheter: None   Colostomy/Ileostomy/Ileal Conduit: Yes  Colostomy LUQ Transverse-Stomal Appliance: Changed  Colostomy LUQ Transverse-Flange Size (inches): 4 Score:  Readmission Risk              Risk of Unplanned Readmission:  23           Discharging to Facility/ Agency   Name: [facility]  Address: [place] [address], [address] [address]  Phone: [phone]  Fax: [phone]    Dialysis Facility (if applicable)   Name:  Address:  Dialysis Schedule:  Phone:  Fax:    / signature: Electronically signed by Patrick Collier RN on 2/25/22 at 1:28 PM EST    PHYSICIAN SECTION    Prognosis: Good    Condition at Discharge: Stable    Rehab Potential (if transferring to Rehab): {Prognosis:[id number]}    Recommended Labs or Other Treatments After Discharge: Negative Pressure Wound Therapy black foam wound vac to be changed Monday/Wednesday/Friday schedule. Please take pictures of wound and fax to [phone].  -Pack between staples of right groin sutures with iodoform packing. Cover with ABD. Change daily. Change ABD overlying left groin to keep clean and dry.    -Ostomy assessment and teaching. Physician Certification: I certify the above information and transfer of Denys Solorzano  is necessary for the continuing treatment of the diagnosis listed and that she requires Home Care for less 30 days.      Update Admission H&P: No change in H&P    PHYSICIAN SIGNATURE:  Electronically signed by ROMEO Navarro CNP on 2/25/22 at 1:58 PM EST

## 2022-02-16 NOTE — PROGRESS NOTES
Spoke with pt's mom at bedside. Reviewed LTAC and SNF choices with her. Her choices in order are:  [facility]:  [facility]. [facility]  2. [facility]    SNF:  [address]  2.  [facility]

## 2022-02-16 NOTE — PLAN OF CARE
Problem: Skin Integrity:  Goal: Will show no infection signs and symptoms  Description: Will show no infection signs and symptoms  2/16/2022 0618 by Dashawn Fregoso RN  Outcome: Ongoing  2/15/2022 1814 by Agata Linder RN  Outcome: Ongoing  Goal: Absence of new skin breakdown  Description: Absence of new skin breakdown  2/16/2022 0618 by Dashawn Fregoso RN  Outcome: Ongoing  2/15/2022 1814 by Agata Linder RN  Outcome: Ongoing     Problem: Fluid Volume - Imbalance:  Goal: Absence of imbalanced fluid volume signs and symptoms  Description: Absence of imbalanced fluid volume signs and symptoms  2/16/2022 0618 by Dashawn Fregoso RN  Outcome: Ongoing  2/15/2022 1814 by Agata Linder RN  Outcome: Ongoing     Problem: Pain:  Goal: Pain level will decrease  Description: Pain level will decrease  2/16/2022 0618 by Dashawn Fregoso RN  Outcome: Ongoing  2/15/2022 1814 by Agata Lnider RN  Outcome: Ongoing  Goal: Recognizes and communicates pain  Description: Recognizes and communicates pain  2/16/2022 0618 by Dashawn Fregoso RN  Outcome: Ongoing  2/15/2022 1814 by Agata Linder RN  Outcome: Ongoing  Goal: Control of acute pain  Description: Control of acute pain  2/16/2022 0618 by Dashawn Fregoso RN  Outcome: Ongoing  2/15/2022 1814 by Agata Linder RN  Outcome: Ongoing  Goal: Control of chronic pain  Description: Control of chronic pain  2/16/2022 0618 by Dashawn Fregoso RN  Outcome: Ongoing  2/15/2022 1814 by Agata Linder RN  Outcome: Ongoing     Problem: Serum Glucose Level - Abnormal:  Goal: Ability to maintain appropriate glucose levels will improve to within specified parameters  Description: Ability to maintain appropriate glucose levels will improve to within specified parameters  2/16/2022 0618 by Dashawn Fregoso RN  Outcome: Ongoing  2/15/2022 1814 by Agata Linder RN  Outcome: Ongoing     Problem: Skin Integrity - Impaired:  Goal: Will show no infection signs and symptoms  Description: Will show no infection signs and symptoms  2/16/2022 0618 by Amy Das RN  Outcome: Ongoing  2/15/2022 1814 by Genia Nance RN  Outcome: Ongoing  Goal: Absence of new skin breakdown  Description: Absence of new skin breakdown  2/16/2022 0618 by Amy Das RN  Outcome: Ongoing  2/15/2022 1814 by Genia Nance RN  Outcome: Ongoing     Problem: Non-Violent Restraints  Goal: Removal from restraints as soon as assessed to be safe  2/16/2022 0618 by Amy Das RN  Outcome: Ongoing  2/15/2022 1814 by Genia Nance RN  Outcome: Ongoing  Goal: No harm/injury to patient while restraints in use  2/16/2022 0618 by Amy Das RN  Outcome: Ongoing  2/15/2022 1814 by Genia Nance RN  Outcome: Met This Shift  Goal: Patient's dignity will be maintained  2/16/2022 0618 by Amy Das RN  Outcome: Ongoing  2/15/2022 1814 by Genia Nance RN  Outcome: Met This Shift     Problem: Nutrition  Goal: Optimal nutrition therapy  2/16/2022 0618 by Amy Das RN  Outcome: Ongoing  2/15/2022 1814 by Genia Nance RN  Outcome: Ongoing     Problem: OXYGENATION/RESPIRATORY FUNCTION  Goal: Patient will maintain patent airway  2/16/2022 0618 by Amy Das RN  Outcome: Ongoing  2/15/2022 1814 by Genia Nance RN  Outcome: Ongoing  2/15/2022 1732 by Esther Ford RCP  Outcome: Ongoing  Goal: Patient will achieve/maintain normal respiratory rate/effort  Description: Respiratory rate and effort will be within normal limits for the patient  2/16/2022 0618 by Amy Das RN  Outcome: Ongoing  2/15/2022 1814 by Genia Nance RN  Outcome: Ongoing  2/15/2022 1732 by Esther Ford RCP  Outcome: Ongoing     Problem: MECHANICAL VENTILATION  Goal: Patient will maintain patent airway  2/16/2022 0618 by Amy Das RN  Outcome: Ongoing  2/15/2022 1814 by Genia Nance RN  Outcome: Ongoing  2/15/2022 1732 by Esther Ford RCP  Outcome: Ongoing  Goal: Oral health is maintained or improved  2/16/2022 0618 by Amy Das, RN  Outcome: Ongoing  2/15/2022 1814 by Edelmira Barney RN  Outcome: Ongoing  2/15/2022 1732 by Bula Meigs, RCP  Outcome: Ongoing  Goal: ET tube will be managed safely  2/16/2022 0618 by Anne Domínguez RN  Outcome: Ongoing  2/15/2022 1814 by Edelmira Barney RN  Outcome: Ongoing  2/15/2022 1732 by Bula Meigs, RCP  Outcome: Ongoing  Goal: Ability to express needs and understand communication  2/16/2022 0618 by Anne Domínguez RN  Outcome: Ongoing  2/15/2022 1814 by Edelmira Barney RN  Outcome: Ongoing  2/15/2022 1732 by Bula Meigs, RCP  Outcome: Ongoing  Goal: Mobility/activity is maintained at optimum level for patient  2/16/2022 0618 by Anne Domínguez RN  Outcome: Ongoing  2/15/2022 1814 by Edelmira Barney RN  Outcome: Ongoing  2/15/2022 1732 by Bula Meigs, RCP  Outcome: Ongoing     Problem: SKIN INTEGRITY  Goal: Skin integrity is maintained or improved  2/16/2022 0618 by Anne Domínguez RN  Outcome: Ongoing  2/15/2022 1814 by Edelmira Barney RN  Outcome: Ongoing     Problem: Confusion - Acute:  Goal: Absence of continued neurological deterioration signs and symptoms  Description: Absence of continued neurological deterioration signs and symptoms  2/16/2022 0618 by Anne Domínguez RN  Outcome: Ongoing  2/15/2022 1814 by Edelmira Barney RN  Outcome: Ongoing  Goal: Mental status will be restored to baseline  Description: Mental status will be restored to baseline  2/16/2022 0618 by Anne Domínguez RN  Outcome: Ongoing  2/15/2022 1814 by Edelmira Barney RN  Outcome: Ongoing     Problem: Discharge Planning:  Goal: Ability to perform activities of daily living will improve  Description: Ability to perform activities of daily living will improve  2/16/2022 0618 by Anne Domínguez RN  Outcome: Ongoing  2/15/2022 1814 by Edelmira Barney RN  Outcome: Ongoing  Goal: Participates in care planning  Description: Participates in care planning  2/16/2022 0618 by Anne Domínguez RN  Outcome: Ongoing  2/15/2022 1814 by Edelmira Barney RN  Outcome: Ongoing     Problem: Injury - Risk of, Physical Injury:  Goal: Absence of physical injury  Description: Absence of physical injury  2/16/2022 0618 by Zenia Bolanos RN  Outcome: Ongoing  2/15/2022 1814 by Alec aRo RN  Outcome: Ongoing  Goal: Will remain free from falls  Description: Will remain free from falls  2/16/2022 0618 by Zenia Bolanos RN  Outcome: Ongoing  2/15/2022 1814 by Alec Rao RN  Outcome: Ongoing     Problem: Mood - Altered:  Goal: Mood stable  Description: Mood stable  2/16/2022 0618 by Zenia Bolanos RN  Outcome: Ongoing  2/15/2022 1814 by Alec Rao RN  Outcome: Ongoing  Goal: Absence of abusive behavior  Description: Absence of abusive behavior  2/16/2022 0618 by Zenia Bolanos RN  Outcome: Ongoing  2/15/2022 1814 by Alec Rao RN  Outcome: Ongoing  Goal: Verbalizations of feeling emotionally comfortable while being cared for will increase  Description: Verbalizations of feeling emotionally comfortable while being cared for will increase  2/16/2022 0618 by Zenia Bolanos RN  Outcome: Ongoing  2/15/2022 1814 by Alec Rao RN  Outcome: Ongoing     Problem: Psychomotor Activity - Altered:  Goal: Absence of psychomotor disturbance signs and symptoms  Description: Absence of psychomotor disturbance signs and symptoms  2/16/2022 0618 by Zenia Bolanos RN  Outcome: Ongoing  2/15/2022 1814 by Alec Rao RN  Outcome: Ongoing     Problem: Sensory Perception - Impaired:  Goal: Demonstrations of improved sensory functioning will increase  Description: Demonstrations of improved sensory functioning will increase  2/16/2022 0618 by Zenia Bolanos RN  Outcome: Ongoing  2/15/2022 1814 by Alec Rao RN  Outcome: Ongoing  Goal: Decrease in sensory misperception frequency  Description: Decrease in sensory misperception frequency  2/16/2022 0618 by Zenia Bolanos RN  Outcome: Ongoing  2/15/2022 1814 by Alec Rao RN  Outcome: Ongoing  Goal: Able to refrain from responding to false sensory perceptions  Description: Able to refrain from responding to false sensory perceptions  2/16/2022 0618 by Chip Velazquez RN  Outcome: Ongoing  2/15/2022 1814 by Juanita Lazar RN  Outcome: Ongoing  Goal: Demonstrates accurate environmental perceptions  Description: Demonstrates accurate environmental perceptions  2/16/2022 0618 by Chip Velazquez RN  Outcome: Ongoing  2/15/2022 1814 by Juanita Lazar RN  Outcome: Ongoing  Goal: Able to distinguish between reality-based and nonreality-based thinking  Description: Able to distinguish between reality-based and nonreality-based thinking  2/16/2022 0618 by Chip Velazquez RN  Outcome: Ongoing  2/15/2022 1814 by Juanita Lazar RN  Outcome: Ongoing  Goal: Able to interrupt nonreality-based thinking  Description: Able to interrupt nonreality-based thinking  2/16/2022 0618 by Chip Velazquez RN  Outcome: Ongoing  2/15/2022 1814 by Juanita Lazar RN  Outcome: Ongoing     Problem: Sleep Pattern Disturbance:  Goal: Appears well-rested  Description: Appears well-rested  2/16/2022 0618 by Chip Velazquez RN  Outcome: Ongoing  2/15/2022 1814 by Juanita Lazar RN  Outcome: Ongoing   Chip Velazquez RN

## 2022-02-16 NOTE — PROGRESS NOTES
Dialysis Post Treatment Note  Vitals:    02/15/22 1933   BP: (!) 148/58   Pulse: 64   Resp: 20   Temp:    SpO2: 100%       Post-weight = Weight: 299 lb 4.8 oz (135.8 kg)  Total Liters Processed = Total Liters Processed (l/min): 63.7 l/min  Rinseback Volume (mL) = Rinseback Volume (ml): 300 ml  Net Removal (mL) = NET Removed (ml): 100 ml    Type of access used=femoral cvc  Length of treatment=210 minutes

## 2022-02-16 NOTE — PLAN OF CARE
Problem: Skin Integrity:  Goal: Will show no infection signs and symptoms  Description: Will show no infection signs and symptoms  2/16/2022 1229 by Kerry Ha RN  Outcome: Ongoing  2/16/2022 0630 by Christin Chandra  Outcome: Ongoing  2/16/2022 0618 by Lili Maynard RN  Outcome: Ongoing  Goal: Absence of new skin breakdown  Description: Absence of new skin breakdown  2/16/2022 1229 by Kerry Ha RN  Outcome: Ongoing  2/16/2022 0630 by Christin Chandra  Outcome: Ongoing  2/16/2022 0618 by Lili Maynard RN  Outcome: Ongoing     Problem: Fluid Volume - Imbalance:  Goal: Absence of imbalanced fluid volume signs and symptoms  Description: Absence of imbalanced fluid volume signs and symptoms  2/16/2022 1229 by Kerry Ha RN  Outcome: Ongoing  2/16/2022 0630 by Christin Chandra  Outcome: Ongoing  2/16/2022 0618 by Lili Maynard RN  Outcome: Ongoing     Problem: Pain:  Description: Pain management should include both nonpharmacologic and pharmacologic interventions.   Goal: Pain level will decrease  Description: Pain level will decrease  2/16/2022 1229 by Kerry Ha RN  Outcome: Ongoing  2/16/2022 0630 by Christin Chandra  Outcome: Ongoing  2/16/2022 0618 by Lili Maynard RN  Outcome: Ongoing  Goal: Recognizes and communicates pain  Description: Recognizes and communicates pain  2/16/2022 1229 by Kerry Ha RN  Outcome: Ongoing  2/16/2022 0630 by Christin Chandra  Outcome: Ongoing  2/16/2022 0618 by Lili Maynard RN  Outcome: Ongoing  Goal: Control of acute pain  Description: Control of acute pain  2/16/2022 1229 by Kerry Ha RN  Outcome: Ongoing  2/16/2022 0630 by Christin Chandra  Outcome: Ongoing  2/16/2022 0618 by Lili Maynard RN  Outcome: Ongoing  Goal: Control of chronic pain  Description: Control of chronic pain  2/16/2022 1229 by Kerry Ha RN  Outcome: Ongoing  2/16/2022 0630 by Christin Chandra  Outcome: Ongoing  2/16/2022 0618 by Poncho Chandler RN  Outcome: Ongoing     Problem: Serum Glucose Level - Abnormal:  Goal: Ability to maintain appropriate glucose levels will improve to within specified parameters  Description: Ability to maintain appropriate glucose levels will improve to within specified parameters  2/16/2022 1229 by Sergo Aguilar RN  Outcome: Ongoing  2/16/2022 0630 by Brittney Mariscal  Outcome: Ongoing  2/16/2022 0618 by Poncho Chandler RN  Outcome: Ongoing     Problem: Skin Integrity - Impaired:  Goal: Will show no infection signs and symptoms  Description: Will show no infection signs and symptoms  2/16/2022 1229 by Sergo Aguilar RN  Outcome: Ongoing  2/16/2022 0630 by Brittney Mariscal  Outcome: Ongoing  2/16/2022 0618 by Poncho Chandler RN  Outcome: Ongoing  Goal: Absence of new skin breakdown  Description: Absence of new skin breakdown  2/16/2022 1229 by Sergo Aguilar RN  Outcome: Ongoing  2/16/2022 0630 by Brittney Mariscal  Outcome: Ongoing  2/16/2022 0618 by Poncho Chandler RN  Outcome: Ongoing     Problem: Non-Violent Restraints  Goal: Removal from restraints as soon as assessed to be safe  2/16/2022 1229 by Sergo Aguilar RN  Outcome: Ongoing  2/16/2022 0630 by Brittney Mariscal  Outcome: Ongoing  2/16/2022 0618 by Poncho Chandler RN  Outcome: Ongoing  Goal: No harm/injury to patient while restraints in use  2/16/2022 1229 by Sergo Aguilar RN  Outcome: Ongoing  2/16/2022 0630 by Brittney Mariscal  Outcome: Ongoing  2/16/2022 0618 by Poncho Chandler RN  Outcome: Ongoing  Goal: Patient's dignity will be maintained  2/16/2022 1229 by Sergo Aguilar RN  Outcome: Ongoing  2/16/2022 0630 by Brittney Mariscal  Outcome: Ongoing  2/16/2022 0618 by Poncho Chandler RN  Outcome: Ongoing     Problem: Nutrition  Goal: Optimal nutrition therapy  2/16/2022 1229 by Sergo Aguilar RN  Outcome: Ongoing  2/16/2022 0630 by Brittney Mariscal  Outcome: Ongoing  2/16/2022 0618 by Poncho Chandler RN  Outcome: Ongoing     Problem: OXYGENATION/RESPIRATORY FUNCTION  Goal: Patient will maintain patent airway  2/16/2022 1229 by Oracio Carlson RN  Outcome: Ongoing  2/16/2022 0630 by Deniz Zhang  Outcome: Ongoing  2/16/2022 0618 by Sydnee Almanza RN  Outcome: Ongoing  Goal: Patient will achieve/maintain normal respiratory rate/effort  Description: Respiratory rate and effort will be within normal limits for the patient  2/16/2022 1229 by Oracio Carlson RN  Outcome: Ongoing  2/16/2022 0630 by Deniz Zhang  Outcome: Ongoing  2/16/2022 0618 by Sydnee Almanza RN  Outcome: Ongoing     Problem: MECHANICAL VENTILATION  Goal: Patient will maintain patent airway  2/16/2022 1229 by Oracio Carlson RN  Outcome: Ongoing  2/16/2022 0630 by Deniz Zhang  Outcome: Ongoing  2/16/2022 0618 by Sydnee Almanza RN  Outcome: Ongoing  Goal: Oral health is maintained or improved  2/16/2022 1229 by Oracio Carlson RN  Outcome: Ongoing  2/16/2022 0630 by Deniz Zhang  Outcome: Ongoing  2/16/2022 0618 by Sydnee Almanza RN  Outcome: Ongoing  Goal: ET tube will be managed safely  2/16/2022 1229 by Oracio Carlson RN  Outcome: Ongoing  2/16/2022 0630 by Deniz Zhang  Outcome: Ongoing  2/16/2022 0618 by Sydnee Almanza RN  Outcome: Ongoing  Goal: Ability to express needs and understand communication  2/16/2022 1229 by Oracio Carlson RN  Outcome: Ongoing  2/16/2022 0630 by Deniz Zhang  Outcome: Ongoing  2/16/2022 0618 by Sydnee Almanza RN  Outcome: Ongoing  Goal: Mobility/activity is maintained at optimum level for patient  2/16/2022 1229 by Oracio Carlson RN  Outcome: Ongoing  2/16/2022 0630 by Deniz Zhang  Outcome: Ongoing  2/16/2022 0618 by Sydnee Almanza RN  Outcome: Ongoing     Problem: SKIN INTEGRITY  Goal: Skin integrity is maintained or improved  2/16/2022 1229 by Oracio Carlson RN  Outcome: Ongoing  2/16/2022 0630 by Deinz Zhang  Outcome: Ongoing  2/16/2022 0618 by Pita Salazar RN  Outcome: Ongoing     Problem: Confusion - Acute:  Goal: Absence of continued neurological deterioration signs and symptoms  Description: Absence of continued neurological deterioration signs and symptoms  2/16/2022 1229 by Raffaele Vang RN  Outcome: Ongoing  2/16/2022 0630 by Jopatricio Hernandez  Outcome: Ongoing  2/16/2022 0618 by Pita Salazar RN  Outcome: Ongoing  Goal: Mental status will be restored to baseline  Description: Mental status will be restored to baseline  2/16/2022 1229 by Raffaele Vang RN  Outcome: Ongoing  2/16/2022 0630 by Jopatricio Hernandez  Outcome: Ongoing  2/16/2022 0618 by Pita Salazar RN  Outcome: Ongoing     Problem: Discharge Planning:  Goal: Ability to perform activities of daily living will improve  Description: Ability to perform activities of daily living will improve  2/16/2022 1229 by Raffaele Vang RN  Outcome: Ongoing  2/16/2022 0630 by Manisha Hernandez  Outcome: Ongoing  2/16/2022 0618 by Pita Salazar RN  Outcome: Ongoing  Goal: Participates in care planning  Description: Participates in care planning  2/16/2022 1229 by Raffaele Vang RN  Outcome: Ongoing  2/16/2022 0630 by Manisha Hernandez  Outcome: Ongoing  2/16/2022 0618 by Pita Salazar RN  Outcome: Ongoing     Problem: Injury - Risk of, Physical Injury:  Goal: Absence of physical injury  Description: Absence of physical injury  2/16/2022 1229 by Raffaele Vang RN  Outcome: Ongoing  2/16/2022 0630 by Manisha Hernandez  Outcome: Ongoing  2/16/2022 0618 by Pita Salazar RN  Outcome: Ongoing  Goal: Will remain free from falls  Description: Will remain free from falls  2/16/2022 1229 by Raffaele Vang RN  Outcome: Ongoing  2/16/2022 0630 by Manisha Hernandez  Outcome: Ongoing  2/16/2022 0618 by Pita Salazar RN  Outcome: Ongoing     Problem: Mood - Altered:  Goal: Mood stable  Description: Mood stable  2/16/2022 1229 by Raffaele Vang RN  Outcome: Ongoing  2/16/2022 0630 by Silverio Dryariadna  Outcome: Ongoing  2/16/2022 0618 by Scott Gallardo RN  Outcome: Ongoing  Goal: Absence of abusive behavior  Description: Absence of abusive behavior  2/16/2022 1229 by Maco Perez RN  Outcome: Ongoing  2/16/2022 0630 by Nusrat Basilio  Outcome: Ongoing  2/16/2022 0618 by Scott Gallardo RN  Outcome: Ongoing  Goal: Verbalizations of feeling emotionally comfortable while being cared for will increase  Description: Verbalizations of feeling emotionally comfortable while being cared for will increase  2/16/2022 1229 by Maco Perez RN  Outcome: Ongoing  2/16/2022 0630 by Nusrat Basilio  Outcome: Ongoing  2/16/2022 0618 by Scott Gallardo RN  Outcome: Ongoing     Problem: Psychomotor Activity - Altered:  Goal: Absence of psychomotor disturbance signs and symptoms  Description: Absence of psychomotor disturbance signs and symptoms  2/16/2022 1229 by Maco Perez RN  Outcome: Ongoing  2/16/2022 0630 by Nusrat Basilio  Outcome: Ongoing  2/16/2022 0618 by Scott Gallardo RN  Outcome: Ongoing     Problem: Sensory Perception - Impaired:  Goal: Demonstrations of improved sensory functioning will increase  Description: Demonstrations of improved sensory functioning will increase  2/16/2022 1229 by Maco Perez RN  Outcome: Ongoing  2/16/2022 0630 by Nusrat Basilio  Outcome: Ongoing  2/16/2022 0618 by Scott Gallardo RN  Outcome: Ongoing  Goal: Decrease in sensory misperception frequency  Description: Decrease in sensory misperception frequency  2/16/2022 1229 by Maco Perez RN  Outcome: Ongoing  2/16/2022 0630 by Nusrat Basilio  Outcome: Ongoing  2/16/2022 0618 by Scott Gallardo RN  Outcome: Ongoing  Goal: Able to refrain from responding to false sensory perceptions  Description: Able to refrain from responding to false sensory perceptions  2/16/2022 1229 by Maco Perez RN  Outcome: Ongoing  2/16/2022 0630 by Corrin Severe Tre  Outcome: Ongoing  2/16/2022 0618 by Ambrose Groves RN  Outcome: Ongoing  Goal: Demonstrates accurate environmental perceptions  Description: Demonstrates accurate environmental perceptions  2/16/2022 1229 by Kuldeep Ansari RN  Outcome: Ongoing  2/16/2022 0630 by Maurisio Matthews  Outcome: Ongoing  2/16/2022 0618 by Ambrose Groves RN  Outcome: Ongoing  Goal: Able to distinguish between reality-based and nonreality-based thinking  Description: Able to distinguish between reality-based and nonreality-based thinking  2/16/2022 1229 by Kuldeep Ansari RN  Outcome: Ongoing  2/16/2022 0630 by Maurisio Matthews  Outcome: Ongoing  2/16/2022 0618 by Amrbose Groves RN  Outcome: Ongoing  Goal: Able to interrupt nonreality-based thinking  Description: Able to interrupt nonreality-based thinking  2/16/2022 1229 by Kuldeep Ansari RN  Outcome: Ongoing  2/16/2022 0630 by Maurisio Matthews  Outcome: Ongoing  2/16/2022 0618 by Ambrose Groves RN  Outcome: Ongoing     Problem: Sleep Pattern Disturbance:  Goal: Appears well-rested  Description: Appears well-rested  2/16/2022 1229 by Kuldeep Ansari RN  Outcome: Ongoing  2/16/2022 0630 by Maurisio Matthews  Outcome: Ongoing  2/16/2022 0618 by Ambrose Groves RN  Outcome: Ongoing

## 2022-02-17 ENCOUNTER — APPOINTMENT (OUTPATIENT)
Dept: GENERAL RADIOLOGY | Age: 42
DRG: 853 | End: 2022-02-17
Payer: COMMERCIAL

## 2022-02-17 ENCOUNTER — ANESTHESIA EVENT (OUTPATIENT)
Dept: OPERATING ROOM | Age: 42
DRG: 853 | End: 2022-02-17
Payer: COMMERCIAL

## 2022-02-17 LAB
ALLEN TEST: ABNORMAL
ANION GAP SERPL CALCULATED.3IONS-SCNC: 10 MMOL/L (ref 9–17)
BUN BLDV-MCNC: 26 MG/DL (ref 6–20)
BUN/CREAT BLD: ABNORMAL (ref 9–20)
CALCIUM SERPL-MCNC: 7.6 MG/DL (ref 8.6–10.4)
CHLORIDE BLD-SCNC: 100 MMOL/L (ref 98–107)
CO2: 26 MMOL/L (ref 20–31)
CREAT SERPL-MCNC: 2.23 MG/DL (ref 0.5–0.9)
FIO2: 40
GFR AFRICAN AMERICAN: 29 ML/MIN
GFR NON-AFRICAN AMERICAN: 24 ML/MIN
GFR SERPL CREATININE-BSD FRML MDRD: ABNORMAL ML/MIN/{1.73_M2}
GFR SERPL CREATININE-BSD FRML MDRD: ABNORMAL ML/MIN/{1.73_M2}
GLUCOSE BLD-MCNC: 113 MG/DL (ref 65–105)
GLUCOSE BLD-MCNC: 116 MG/DL (ref 65–105)
GLUCOSE BLD-MCNC: 121 MG/DL (ref 65–105)
GLUCOSE BLD-MCNC: 129 MG/DL (ref 65–105)
GLUCOSE BLD-MCNC: 129 MG/DL (ref 65–105)
GLUCOSE BLD-MCNC: 134 MG/DL (ref 65–105)
GLUCOSE BLD-MCNC: 135 MG/DL (ref 70–99)
HCT VFR BLD CALC: 21.2 % (ref 36.3–47.1)
HEMOGLOBIN: 7 G/DL (ref 11.9–15.1)
MAGNESIUM: 1.7 MG/DL (ref 1.6–2.6)
MCH RBC QN AUTO: 29.3 PG (ref 25.2–33.5)
MCHC RBC AUTO-ENTMCNC: 33 G/DL (ref 28.4–34.8)
MCV RBC AUTO: 88.7 FL (ref 82.6–102.9)
MODE: ABNORMAL
NEGATIVE BASE EXCESS, ART: ABNORMAL (ref 0–2)
NRBC AUTOMATED: 0 PER 100 WBC
O2 DEVICE/FLOW/%: ABNORMAL
PATIENT TEMP: ABNORMAL
PDW BLD-RTO: 15.8 % (ref 11.8–14.4)
PHOSPHORUS: 3.8 MG/DL (ref 2.6–4.5)
PLATELET # BLD: 288 K/UL (ref 138–453)
PMV BLD AUTO: 10.4 FL (ref 8.1–13.5)
POC HCO3: 30.4 MMOL/L (ref 21–28)
POC LACTIC ACID: 0.82 MMOL/L (ref 0.56–1.39)
POC O2 SATURATION: 99 % (ref 94–98)
POC PCO2 TEMP: ABNORMAL MM HG
POC PCO2: 35.4 MM HG (ref 35–48)
POC PH TEMP: ABNORMAL
POC PH: 7.54 (ref 7.35–7.45)
POC PO2 TEMP: ABNORMAL MM HG
POC PO2: 133.5 MM HG (ref 83–108)
POSITIVE BASE EXCESS, ART: 7 (ref 0–3)
POTASSIUM SERPL-SCNC: 3.8 MMOL/L (ref 3.7–5.3)
RBC # BLD: 2.39 M/UL (ref 3.95–5.11)
SAMPLE SITE: ABNORMAL
SODIUM BLD-SCNC: 136 MMOL/L (ref 135–144)
TCO2 (CALC), ART: ABNORMAL MMOL/L (ref 22–29)
WBC # BLD: 8.7 K/UL (ref 3.5–11.3)

## 2022-02-17 PROCEDURE — 6370000000 HC RX 637 (ALT 250 FOR IP): Performed by: STUDENT IN AN ORGANIZED HEALTH CARE EDUCATION/TRAINING PROGRAM

## 2022-02-17 PROCEDURE — 6370000000 HC RX 637 (ALT 250 FOR IP): Performed by: NURSE PRACTITIONER

## 2022-02-17 PROCEDURE — 6360000002 HC RX W HCPCS: Performed by: NURSE PRACTITIONER

## 2022-02-17 PROCEDURE — 6360000002 HC RX W HCPCS: Performed by: STUDENT IN AN ORGANIZED HEALTH CARE EDUCATION/TRAINING PROGRAM

## 2022-02-17 PROCEDURE — 83605 ASSAY OF LACTIC ACID: CPT

## 2022-02-17 PROCEDURE — 2580000003 HC RX 258: Performed by: STUDENT IN AN ORGANIZED HEALTH CARE EDUCATION/TRAINING PROGRAM

## 2022-02-17 PROCEDURE — 2500000003 HC RX 250 WO HCPCS: Performed by: STUDENT IN AN ORGANIZED HEALTH CARE EDUCATION/TRAINING PROGRAM

## 2022-02-17 PROCEDURE — 90935 HEMODIALYSIS ONE EVALUATION: CPT

## 2022-02-17 PROCEDURE — 71045 X-RAY EXAM CHEST 1 VIEW: CPT

## 2022-02-17 PROCEDURE — 83735 ASSAY OF MAGNESIUM: CPT

## 2022-02-17 PROCEDURE — 80048 BASIC METABOLIC PNL TOTAL CA: CPT

## 2022-02-17 PROCEDURE — 82803 BLOOD GASES ANY COMBINATION: CPT

## 2022-02-17 PROCEDURE — 94003 VENT MGMT INPAT SUBQ DAY: CPT

## 2022-02-17 PROCEDURE — 90935 HEMODIALYSIS ONE EVALUATION: CPT | Performed by: INTERNAL MEDICINE

## 2022-02-17 PROCEDURE — 84100 ASSAY OF PHOSPHORUS: CPT

## 2022-02-17 PROCEDURE — 2580000003 HC RX 258: Performed by: NURSE PRACTITIONER

## 2022-02-17 PROCEDURE — 2000000000 HC ICU R&B

## 2022-02-17 PROCEDURE — 85027 COMPLETE CBC AUTOMATED: CPT

## 2022-02-17 PROCEDURE — 94640 AIRWAY INHALATION TREATMENT: CPT

## 2022-02-17 PROCEDURE — 82947 ASSAY GLUCOSE BLOOD QUANT: CPT

## 2022-02-17 RX ORDER — CHOLECALCIFEROL (VITAMIN D3) 125 MCG
5 CAPSULE ORAL NIGHTLY PRN
Status: DISCONTINUED | OUTPATIENT
Start: 2022-02-17 | End: 2022-02-18

## 2022-02-17 RX ORDER — ALBUMIN (HUMAN) 12.5 G/50ML
25 SOLUTION INTRAVENOUS PRN
Status: DISCONTINUED | OUTPATIENT
Start: 2022-02-17 | End: 2022-02-26 | Stop reason: HOSPADM

## 2022-02-17 RX ORDER — INSULIN GLARGINE 100 [IU]/ML
30 INJECTION, SOLUTION SUBCUTANEOUS 2 TIMES DAILY
Status: DISCONTINUED | OUTPATIENT
Start: 2022-02-17 | End: 2022-02-26 | Stop reason: HOSPADM

## 2022-02-17 RX ORDER — DEXTROSE AND SODIUM CHLORIDE 5; .45 G/100ML; G/100ML
INJECTION, SOLUTION INTRAVENOUS CONTINUOUS
Status: DISCONTINUED | OUTPATIENT
Start: 2022-02-18 | End: 2022-02-18

## 2022-02-17 RX ORDER — POLYETHYLENE GLYCOL 3350 17 G/17G
17 POWDER, FOR SOLUTION ORAL DAILY
Status: DISCONTINUED | OUTPATIENT
Start: 2022-02-17 | End: 2022-02-25

## 2022-02-17 RX ADMIN — DEXMEDETOMIDINE HYDROCHLORIDE 1.1 MCG/KG/HR: 4 INJECTION, SOLUTION INTRAVENOUS at 07:22

## 2022-02-17 RX ADMIN — DAKIN'S SOLUTION 0.125% (QUARTER STRENGTH): 0.12 SOLUTION at 10:02

## 2022-02-17 RX ADMIN — INSULIN GLARGINE 30 UNITS: 100 INJECTION, SOLUTION SUBCUTANEOUS at 10:00

## 2022-02-17 RX ADMIN — OXYCODONE 5 MG: 5 TABLET ORAL at 20:04

## 2022-02-17 RX ADMIN — BUDESONIDE AND FORMOTEROL FUMARATE DIHYDRATE 2 PUFF: 80; 4.5 AEROSOL RESPIRATORY (INHALATION) at 08:07

## 2022-02-17 RX ADMIN — HEPARIN SODIUM 1900 UNITS: 1000 INJECTION INTRAVENOUS; SUBCUTANEOUS at 13:35

## 2022-02-17 RX ADMIN — HEPARIN SODIUM 5000 UNITS: 5000 INJECTION INTRAVENOUS; SUBCUTANEOUS at 20:03

## 2022-02-17 RX ADMIN — DEXMEDETOMIDINE HYDROCHLORIDE 1.1 MCG/KG/HR: 4 INJECTION, SOLUTION INTRAVENOUS at 04:24

## 2022-02-17 RX ADMIN — HEPARIN SODIUM 5000 UNITS: 5000 INJECTION INTRAVENOUS; SUBCUTANEOUS at 05:52

## 2022-02-17 RX ADMIN — Medication: at 18:54

## 2022-02-17 RX ADMIN — PIPERACILLIN SODIUM AND TAZOBACTAM SODIUM 2250 MG: 2; .25 INJECTION, POWDER, LYOPHILIZED, FOR SOLUTION INTRAVENOUS at 18:56

## 2022-02-17 RX ADMIN — DAKIN'S SOLUTION 0.125% (QUARTER STRENGTH): 0.12 SOLUTION at 19:34

## 2022-02-17 RX ADMIN — DEXMEDETOMIDINE HYDROCHLORIDE 1.1 MCG/KG/HR: 4 INJECTION, SOLUTION INTRAVENOUS at 01:50

## 2022-02-17 RX ADMIN — INSULIN LISPRO 3 UNITS: 100 INJECTION, SOLUTION INTRAVENOUS; SUBCUTANEOUS at 16:03

## 2022-02-17 RX ADMIN — PIPERACILLIN SODIUM AND TAZOBACTAM SODIUM 2250 MG: 2; .25 INJECTION, POWDER, LYOPHILIZED, FOR SOLUTION INTRAVENOUS at 03:06

## 2022-02-17 RX ADMIN — ACETAMINOPHEN 1000 MG: 500 TABLET ORAL at 20:03

## 2022-02-17 RX ADMIN — ACETAMINOPHEN 1000 MG: 500 TABLET ORAL at 05:53

## 2022-02-17 RX ADMIN — FAMOTIDINE 20 MG: 40 POWDER, FOR SUSPENSION ORAL at 07:58

## 2022-02-17 RX ADMIN — Medication 5 MG: at 19:51

## 2022-02-17 RX ADMIN — FLUTICASONE PROPIONATE 1 SPRAY: 50 SPRAY, METERED NASAL at 08:02

## 2022-02-17 RX ADMIN — INSULIN LISPRO 3 UNITS: 100 INJECTION, SOLUTION INTRAVENOUS; SUBCUTANEOUS at 12:07

## 2022-02-17 RX ADMIN — PIPERACILLIN SODIUM AND TAZOBACTAM SODIUM 2250 MG: 2; .25 INJECTION, POWDER, LYOPHILIZED, FOR SOLUTION INTRAVENOUS at 11:10

## 2022-02-17 RX ADMIN — HEPARIN SODIUM 1600 UNITS: 1000 INJECTION INTRAVENOUS; SUBCUTANEOUS at 13:35

## 2022-02-17 RX ADMIN — BUDESONIDE AND FORMOTEROL FUMARATE DIHYDRATE 2 PUFF: 80; 4.5 AEROSOL RESPIRATORY (INHALATION) at 19:15

## 2022-02-17 RX ADMIN — ROPINIROLE HYDROCHLORIDE 4 MG: 1 TABLET, FILM COATED ORAL at 07:57

## 2022-02-17 RX ADMIN — GABAPENTIN 100 MG: 300 CAPSULE ORAL at 19:34

## 2022-02-17 RX ADMIN — MIDODRINE HYDROCHLORIDE 5 MG: 5 TABLET ORAL at 07:58

## 2022-02-17 RX ADMIN — GABAPENTIN 100 MG: 300 CAPSULE ORAL at 04:26

## 2022-02-17 RX ADMIN — HEPARIN SODIUM 5000 UNITS: 5000 INJECTION INTRAVENOUS; SUBCUTANEOUS at 14:04

## 2022-02-17 RX ADMIN — INSULIN LISPRO 3 UNITS: 100 INJECTION, SOLUTION INTRAVENOUS; SUBCUTANEOUS at 19:43

## 2022-02-17 RX ADMIN — MONTELUKAST SODIUM 10 MG: 10 TABLET, FILM COATED ORAL at 07:58

## 2022-02-17 ASSESSMENT — PULMONARY FUNCTION TESTS
PIF_VALUE: 23
PIF_VALUE: 22
PIF_VALUE: 20
PIF_VALUE: 10
PIF_VALUE: 21
PIF_VALUE: 11

## 2022-02-17 ASSESSMENT — PAIN SCALES - GENERAL
PAINLEVEL_OUTOF10: 7
PAINLEVEL_OUTOF10: 0
PAINLEVEL_OUTOF10: 7
PAINLEVEL_OUTOF10: 0
PAINLEVEL_OUTOF10: 7
PAINLEVEL_OUTOF10: 0

## 2022-02-17 NOTE — PROGRESS NOTES
Order obtain for extubation. SpO2 of 98 on 40% FiO2. Patient extubated and placed on 6 liters NC. Post extubation SpO2 is 98 % with HR 71 bpm and RR 15 breaths/min. Patient had strong cough that was productive of yellow sputum. Extubation Well tolerated by patient. .   Breath Sounds: diminished.      Pt extubated per order Du Dinh, JANISP   9:21 AM

## 2022-02-17 NOTE — PLAN OF CARE
Problem: Skin Integrity:  Goal: Will show no infection signs and symptoms  Description: Will show no infection signs and symptoms  2/17/2022 0335 by Gris Zacarias RN  Outcome: Ongoing  2/17/2022 0335 by Gris Zacarias RN  Outcome: Ongoing  Goal: Absence of new skin breakdown  Description: Absence of new skin breakdown  2/17/2022 0335 by Gris Zacarias RN  Outcome: Ongoing  2/17/2022 0335 by Gris Zacarias RN  Outcome: Ongoing     Problem: Fluid Volume - Imbalance:  Goal: Absence of imbalanced fluid volume signs and symptoms  Description: Absence of imbalanced fluid volume signs and symptoms  2/17/2022 0335 by Gris Zacarias RN  Outcome: Ongoing  2/17/2022 0335 by Gris Zacarias RN  Outcome: Ongoing     Problem: Pain:  Goal: Pain level will decrease  Description: Pain level will decrease  2/17/2022 0335 by Gris Zacarias RN  Outcome: Ongoing  2/17/2022 0335 by Gris Zacarias RN  Outcome: Ongoing  Goal: Recognizes and communicates pain  Description: Recognizes and communicates pain  2/17/2022 0335 by Gris Zacarias RN  Outcome: Ongoing  2/17/2022 0335 by Gris Zacarias RN  Outcome: Ongoing  Goal: Control of acute pain  Description: Control of acute pain  2/17/2022 0335 by Gris Zacarias RN  Outcome: Ongoing  2/17/2022 0335 by Gris Zacarias RN  Outcome: Ongoing  Goal: Control of chronic pain  Description: Control of chronic pain  2/17/2022 0335 by Gris Zacarias RN  Outcome: Ongoing  2/17/2022 0335 by Gris Zacarias RN  Outcome: Ongoing     Problem: Serum Glucose Level - Abnormal:  Goal: Ability to maintain appropriate glucose levels will improve to within specified parameters  Description: Ability to maintain appropriate glucose levels will improve to within specified parameters  2/17/2022 0335 by Gris Zacarias RN  Outcome: Ongoing  2/17/2022 0335 by Gris Zacarias RN  Outcome: Ongoing     Problem: Skin Integrity - Impaired:  Goal: Will show no infection signs and symptoms  Description: Will show no infection signs and symptoms  2/17/2022 0335 by Marily Solomon RN  Outcome: Ongoing  2/17/2022 0335 by Marily Solomon RN  Outcome: Ongoing  Goal: Absence of new skin breakdown  Description: Absence of new skin breakdown  2/17/2022 0335 by Marily Solomon RN  Outcome: Ongoing  2/17/2022 0335 by Marily Solomon RN  Outcome: Ongoing     Problem: Non-Violent Restraints  Goal: Removal from restraints as soon as assessed to be safe  2/17/2022 0335 by Marily Solomon RN  Outcome: Ongoing  2/17/2022 0335 by Marily Solomon RN  Outcome: Ongoing  Goal: No harm/injury to patient while restraints in use  2/17/2022 0335 by Marily Solomon RN  Outcome: Ongoing  2/17/2022 0335 by Marily Solomon RN  Outcome: Ongoing  Goal: Patient's dignity will be maintained  2/17/2022 0335 by Marily Solomon RN  Outcome: Ongoing  2/17/2022 0335 by Marily Solomon RN  Outcome: Ongoing     Problem: Nutrition  Goal: Optimal nutrition therapy  2/17/2022 0335 by Marily Solomon RN  Outcome: Ongoing  2/17/2022 0335 by Marily Solomon RN  Outcome: Ongoing     Problem: OXYGENATION/RESPIRATORY FUNCTION  Goal: Patient will maintain patent airway  2/17/2022 0335 by Marily Solomon RN  Outcome: Ongoing  2/17/2022 0335 by Marily Solomon RN  Outcome: Ongoing  2/17/2022 0220 by Pedro Mohamud RCP  Outcome: Ongoing  2/16/2022 1542 by Elgin Parrish RCP  Outcome: Ongoing  Goal: Patient will achieve/maintain normal respiratory rate/effort  Description: Respiratory rate and effort will be within normal limits for the patient  2/17/2022 0335 by Marily Solomon RN  Outcome: Ongoing  2/17/2022 0335 by Marily Solomon RN  Outcome: Ongoing  2/17/2022 0220 by Pedro Mohamud RCP  Outcome: Ongoing  2/16/2022 1542 by Elgin Parrish RCP  Outcome: Ongoing     Problem: MECHANICAL VENTILATION  Goal: Patient will maintain patent airway  2/17/2022 0335 by Zelphia Puls, RN  Outcome: Ongoing  2/17/2022 0335 by Marily Solomon RN  Outcome: Ongoing  2/17/2022 0220 by JANIS TaverasP  Outcome: Ongoing  2/16/2022 1542 by Elgin Parrish RCP  Outcome: Ongoing  Goal: Oral health is maintained or improved  2/17/2022 0335 by Raheel Lechuga RN  Outcome: Ongoing  2/17/2022 0335 by Raheel Lechuga RN  Outcome: Ongoing  2/17/2022 0220 by Katey Steiner RCP  Outcome: Ongoing  2/16/2022 1542 by JANIS AcevesP  Outcome: Ongoing  Goal: ET tube will be managed safely  2/17/2022 0335 by Raheel Lechuga RN  Outcome: Ongoing  2/17/2022 0335 by Raheel Lechuga RN  Outcome: Ongoing  2/17/2022 0220 by Katey Steiner RCP  Outcome: Ongoing  2/16/2022 1542 by Haylee Barba RCP  Outcome: Ongoing  Goal: Ability to express needs and understand communication  2/17/2022 0335 by Raheel Lechuga RN  Outcome: Ongoing  2/17/2022 0335 by Raheel Lechuga RN  Outcome: Ongoing  2/17/2022 0220 by Katey Steiner RCP  Outcome: Ongoing  2/16/2022 1542 by Haylee Barba RCP  Outcome: Ongoing  Goal: Mobility/activity is maintained at optimum level for patient  2/17/2022 0335 by Raheel Lechuga RN  Outcome: Ongoing  2/17/2022 0335 by Raheel Lechuga RN  Outcome: Ongoing  2/17/2022 0220 by JANIS FengP  Outcome: Ongoing  2/16/2022 1542 by Haylee Barba RCHENRY  Outcome: Ongoing     Problem: SKIN INTEGRITY  Goal: Skin integrity is maintained or improved  2/17/2022 0335 by Raheel Lechuga RN  Outcome: Ongoing  2/17/2022 0335 by Raheel Lechuga RN  Outcome: Ongoing     Problem: Confusion - Acute:  Goal: Absence of continued neurological deterioration signs and symptoms  Description: Absence of continued neurological deterioration signs and symptoms  2/17/2022 0335 by Raheel Lechuga RN  Outcome: Ongoing  2/17/2022 0335 by Raheel Lechuga RN  Outcome: Ongoing  Goal: Mental status will be restored to baseline  Description: Mental status will be restored to baseline  2/17/2022 0335 by Raheel Lechuga RN  Outcome: Ongoing  2/17/2022 0335 by Raheel Lechuga RN  Outcome: Ongoing     Problem: Discharge Planning:  Goal: Ability to perform activities of daily living will improve  Description: Ability to perform activities of daily living will improve  2/17/2022 8571 by Nena Castro RN  Outcome: Ongoing  2/17/2022 0335 by Nena Castro RN  Outcome: Ongoing  Goal: Participates in care planning  Description: Participates in care planning  2/17/2022 0335 by Nena Castro RN  Outcome: Ongoing  2/17/2022 0335 by Nena Castro RN  Outcome: Ongoing     Problem: Injury - Risk of, Physical Injury:  Goal: Absence of physical injury  Description: Absence of physical injury  2/17/2022 0335 by Nena Castro RN  Outcome: Ongoing  2/17/2022 0335 by Nena Castro RN  Outcome: Ongoing  Goal: Will remain free from falls  Description: Will remain free from falls  2/17/2022 0335 by Nena Castro RN  Outcome: Ongoing  2/17/2022 0335 by Nena Castro RN  Outcome: Ongoing     Problem: Mood - Altered:  Goal: Mood stable  Description: Mood stable  2/17/2022 0335 by Nena Castro RN  Outcome: Ongoing  2/17/2022 0335 by Nena Castro RN  Outcome: Ongoing  Goal: Absence of abusive behavior  Description: Absence of abusive behavior  2/17/2022 0335 by Nena Castro RN  Outcome: Ongoing  2/17/2022 0335 by Nena Castro RN  Outcome: Ongoing  Goal: Verbalizations of feeling emotionally comfortable while being cared for will increase  Description: Verbalizations of feeling emotionally comfortable while being cared for will increase  2/17/2022 0335 by Nena Castro RN  Outcome: Ongoing  2/17/2022 0335 by Nena Castro RN  Outcome: Ongoing     Problem: Psychomotor Activity - Altered:  Goal: Absence of psychomotor disturbance signs and symptoms  Description: Absence of psychomotor disturbance signs and symptoms  2/17/2022 0335 by Nena Castro RN  Outcome: Ongoing  2/17/2022 0335 by Nena Castro RN  Outcome: Ongoing     Problem: Sensory Perception - Impaired:  Goal: Demonstrations of improved sensory functioning will increase  Description: Demonstrations of improved sensory functioning will increase  2/17/2022 0335 by Nena Castro RN  Outcome: Ongoing  2/17/2022 0335 by Nena Castro RN  Outcome: Ongoing  Goal: Decrease in sensory misperception frequency  Description: Decrease in sensory misperception frequency  2/17/2022 0335 by Marily Solomon RN  Outcome: Ongoing  2/17/2022 0335 by Marily Solomon RN  Outcome: Ongoing  Goal: Able to refrain from responding to false sensory perceptions  Description: Able to refrain from responding to false sensory perceptions  2/17/2022 0335 by Marily Solomon RN  Outcome: Ongoing  2/17/2022 0335 by Marily Solomon RN  Outcome: Ongoing  Goal: Demonstrates accurate environmental perceptions  Description: Demonstrates accurate environmental perceptions  2/17/2022 0335 by Marily Solomon RN  Outcome: Ongoing  2/17/2022 0335 by Marily Solomon RN  Outcome: Ongoing  Goal: Able to distinguish between reality-based and nonreality-based thinking  Description: Able to distinguish between reality-based and nonreality-based thinking  2/17/2022 0335 by Marily Solomon RN  Outcome: Ongoing  2/17/2022 0335 by Marily Solomon RN  Outcome: Ongoing  Goal: Able to interrupt nonreality-based thinking  Description: Able to interrupt nonreality-based thinking  2/17/2022 0335 by Marily Solomon RN  Outcome: Ongoing  2/17/2022 0335 by Marily Solomon RN  Outcome: Ongoing     Problem: Sleep Pattern Disturbance:  Goal: Appears well-rested  Description: Appears well-rested  2/17/2022 0335 by Marily Solomon RN  Outcome: Ongoing  2/17/2022 0335 by Marily Solomon RN  Outcome: Ongoing

## 2022-02-17 NOTE — PROGRESS NOTES
Dialysis Time Out  To be done by RN and tech or 2 RNs  Staff Names Melany LIM RN and Butch Delgado RN    [x]  Identity of the patient using 2 patient identifiers  [x]  Consent for treatment  [x]  Equipment-proper machine and dialyzer  [x]  B-Hep B status  [x]  Orders- to include bath, blood flow, dialyzer, time and fluid removal  [x]  Access-Correct site and in working order  [x]  Time for patient to ask questions.

## 2022-02-17 NOTE — PLAN OF CARE
Problem: OXYGENATION/RESPIRATORY FUNCTION  Goal: Patient will maintain patent airway  2/17/2022 1232 by Chrissy Galicia RCP  Outcome: Ongoing  2/17/2022 0730 by Jacky Carl RN  Outcome: Ongoing  2/17/2022 0335 by Carly Alonso RN  Outcome: Ongoing  2/17/2022 0335 by Carly Alonso RN  Outcome: Ongoing  2/17/2022 0220 by Amanda Coley RCP  Outcome: Ongoing     Problem: OXYGENATION/RESPIRATORY FUNCTION  Goal: Patient will achieve/maintain normal respiratory rate/effort  Description: Respiratory rate and effort will be within normal limits for the patient  2/17/2022 1232 by Chrissy Galicia RCP  Outcome: Ongoing  2/17/2022 0730 by Jacky Carl RN  Outcome: Ongoing  2/17/2022 0335 by Carly Alonso RN  Outcome: Ongoing  2/17/2022 0335 by Carly Alonso RN  Outcome: Ongoing  2/17/2022 0220 by Amanda Coley RCP  Outcome: Ongoing

## 2022-02-17 NOTE — PROGRESS NOTES
ICU PROGRESS NOTE          PATIENT NAME: [facility] RECORD NO. [id number]  DATE: 2022    PRIMARY CARE PHYSICIAN: Hosea Hurst MD    HD: # 9    ASSESSMENT    Patient Active Problem List   Diagnosis    Leonel gangrene    [facility]controlled [facility]    Acute kidney injury superimposed on CKD ([facility]    Hypokalemia    Hyponatremia    Anemia    Morbid obesity with BMI of 45.0-49.9, adult ([facility]    Type 1 diabetes mellitus with stage 3a chronic kidney disease ([facility]       MEDICAL DECISION MAKING AND PLAN    1. Neuro:  1. Fentanyl pushes   2. Precedex @ 1.1 mcg/kg/hr - held for SBT   3. Tylenol 1000 mg q8H, gabapentin 100 mg q8, roxicodone 5mg q 4H PRN     2. CV:  1. HR: 57 - 64  2. MAP: 53 - 113  3. Lactate: 0.82 (0.72)  4. Midodrine 5 mg TID     3. Heme:  1. Hgb: 7.0 (7.3)  2. Plt: 288 (263)  3. DVT ppx: heparin     4. Pulm:  1. PRVC: 370/20/10/40%  1. SBT 5/5; plan for possible extubation today   2. AB.5/35.4/133.5/30.4  1. PF: 333  3. CXR: improving BOB      5. Renal/lytes:  1. BUN/Cr: 26 (21) / 2.23 (1.97)  2. Na/K+: 136 (134) / 3.8 (3.7)  3. M.7  Phos: 3.8  4. UOP: 0.2 - 0.3 cc/kg/hr overnight   Nephrology following: HD today; continue bicarb drip @ 50 cc/hr   6. GI:  1. TF - @ goal. Hold for possible extubation. 2. Will attempt bedside swallow after extubation  3. Pepcid 20 mg BID   4. S/p laparoscopic transverse loop colostomy creation   1. Will begin bowel regimen   2. Wound/ostomy following for assistance in ostomy care     7. ID:  1. Tmax: 99.5 (37.5)  2. WBC: 8.7 (7.1)  3. Continue zosyn, stop date     8. MSK:  1. Necrotizing soft tissue infection of the buttocks and groin   2. S/p incision and debridement , and further debridement later   3. S/p irrigation and debridement of wound 2/10  4. S/p irrigation and debridement, partial closure, and bilateral wound vac placement   5.  S/p irrigation and debridement, partial closure, L wound vac application   6. S/p irrigating wound vac placement, laparoscopic loop colostomy creation     9. Endo:  1. B  2. Insulin drip: 18 units over past 24 hours   1. Will discontinue insulin drip  2. Add HDSS   3. Lantus 30 BID     10. Lines:  1. PIV, Booker, ETT, OGT, right radial arterial line, right IJ CVC, right femoral polina catheter  1. DC arterial line; will DC CVC once adequate peripheral access is obtained     CHECKLIST    RASS: 0  RESTRAINTS: bilateral soft wrist   IVF: bicarb drip @ 50 cc/hr   NUTRITION: TF - held at midnight for OR   ANTIBIOTICS: zosyn   GI: pepcid   DVT: heparin   GLYCEMIC CONTROL: insulin gtt   HOB >45: yes   WOUND CARE: vac change in OR     SUBJECTIVE    Joanna Salter was seen and examined at bedside. No acute events overnight. Hemodynamically stable and afebrile. Sedation paused. Awake and following commands. OBJECTIVE  VITALS: Temp: Temp: 99.1 °F (37.3 °C)Temp  Av.5 °F (36.9 °C)  Min: 96.6 °F (35.9 °C)  Max: 99.6 °F (37.6 °C) BP No data recorded. No data recorded.    Pulse Pulse  Av.2  Min: 62  Max: 66 Resp Resp  Av.7  Min: 0  Max: 24 Pulse ox SpO2  Av.6 %  Min: 77 %  Max: 100 %    GENERAL: intubated, sedated, no apparent distress   NEURO: intubated, sedated, following commands   HEENT: NCAT   : perineal dressings intact, wound vac intact with good seal   LUNGS: no acute respiratory distress or accessory muscle use   MECHANICAL VENTILATION: tolerating mechanical ventilation   HEART: regular rate and rhythm   ABDOMEN: soft, non-distended, ostomy pink and viable - stool output    EXTERMITY: no cyanosis, clubbing or edema    LAB:  CBC:   Recent Labs     02/15/22  0422 22  0416 22  0609   WBC 8.7 7.1 8.7   HGB 7.3* 7.3* 7.0*   HCT 22.7* 23.2* 21.2*   MCV 89.4 90.6 88.7    253 288     BMP:   Recent Labs     02/15/22  0422 22  0416 22  0609   * 134* 136   K 3.9 3.7 3.8   CL 97* 101 100   CO2 23 24 26 BUN 24* 21* 26*   CREATININE 2.28* 1.97* 2.23*   GLUCOSE 212* 117* 135*         RADIOLOGY:  XR CHEST PORTABLE    Result Date: 2/17/2022  EXAMINATION: ONE XRAY VIEW OF THE CHEST 2/17/2022 6:27 am COMPARISON: 02/16/2022, 02/15/2022 HISTORY: ORDERING SYSTEM PROVIDED HISTORY: intubated TECHNOLOGIST PROVIDED HISTORY: intubated FINDINGS: The endotracheal tube terminates in appropriate position above the henrietta. The enteric tube courses off the field of view in the upper abdomen. Right internal jugular line remains in place. .  The cardiac and mediastinal contours appear unchanged. Interval improved aeration of the left upper lung field. Otherwise bilateral airspace disease without significant change. No evidence for pneumothorax or significant effusion. 1. Unchanged position of support devices. 2. Bilateral airspace disease again demonstrated with interval improved aeration of the left upper lobe. XR CHEST PORTABLE    Result Date: 2/16/2022  EXAMINATION: ONE XRAY VIEW OF THE CHEST 2/16/2022 11:07 am COMPARISON: AP chest from 02/16/2022 at 05:12 HISTORY: ORDERING SYSTEM PROVIDED HISTORY: post op, intubated TECHNOLOGIST PROVIDED HISTORY: post op, intubated Reason for Exam: post op intubated port supine at 1104 am History of rectal surgery 02/8, 02/10, 02/11 and 2/14/22 FINDINGS: ETT tip stable satisfactory, 4.2 cm above the henrietta. Enteric tube and right-sided central line positions also stable/satisfactory. Overlying ECG monitor leads. Enlarged but stable cardiomediastinal shadow. Similar patchy hazy airspace abnormalities and probable basilar atelectasis; greater localized opacity left base now partially obscuring the left hemidiaphragm. No other interval change. Some degree of vascular congestion possible. No pneumothorax or large pleural effusion. Bones stable.      Slightly greater opacity left base now obscuring the hemidiaphragm; consider volume loss, developing consolidation/infiltrate and/or minimal pleural fluid. No large pleural effusion or other interval change. XR CHEST PORTABLE    Result Date: 2/16/2022  EXAMINATION: ONE XRAY VIEW OF THE CHEST 2/16/2022 5:23 am COMPARISON: 02/15/2022, 02/14/2022 HISTORY: ORDERING SYSTEM PROVIDED HISTORY: intubated TECHNOLOGIST PROVIDED HISTORY: intubated Reason for Exam: port Supine FINDINGS: Endotracheal tube tip is approximately 4 cm above the henrietta. Nasogastric tube tip is below the diaphragm. Right jugular central venous catheter tip projects in the superior vena cava. Stable mild cardiomegaly accentuated by low lung volumes. Similar mild patchy bilateral airspace disease without new lobar consolidation. No significant pleural effusion is seen. There is no acute osseous abnormality.   Monitor leads overlie the chest.     Relatively stable cardiopulmonary status       Barbi Venegas DO  2/17/2022  7:08 AM

## 2022-02-17 NOTE — PROGRESS NOTES
Nephrology Progress Note      SUBJECTIVE    Patient was seen and examined. Patient was seen and examined on hemodialysis. Hemodialysis orders were reviewed with dialysis nurses. She was tolerating dialysis fairly well with a target of 2.5 L. Her blood pressure remained stable around 1 86-5 40 systolic. Patient was alert and awake. Denies any chest pain  Or cramping. OBJECTIVE      CURRENT TEMPERATURE:  Temp: 98.2 °F (36.8 °C)  MAXIMUM TEMPERATURE OVER 24HRS:  Temp (24hrs), Av.4 °F (36.9 °C), Min:97.5 °F (36.4 °C), Max:99.1 °F (37.3 °C)    CURRENT RESPIRATORY RATE:  Resp: 21  CURRENT PULSE:  Pulse: 66  CURRENT BLOOD PRESSURE:  BP: (!) 123/48  24HR BLOOD PRESSURE RANGE:  Systolic (60QXC), BZI:593 , Min:123 , RHA:093   ; Diastolic (38KOZ), MJE:47, Min:48, Max:48    24HR INTAKE/OUTPUT:      Intake/Output Summary (Last 24 hours) at 2022 1100  Last data filed at 2022 1000  Gross per 24 hour   Intake 2242.39 ml   Output 1208 ml   Net 1034.39 ml     WEIGHT :  Patient Vitals for the past 96 hrs (Last 3 readings):   Weight   22 0950 (!) 302 lb 0.5 oz (137 kg)   22 0600 (!) 302 lb (137 kg)     PHYSICAL EXAM      GENERAL APPEARANCE: Alert and oriented x3. Dialysis was in progress. Her mother was present at his bedside    SKIN: Warm to touch and no erythema  NECK: Flat JVD. No lymphadenopathy  PULMONARY: Bilateral air entry and clear to auscultation no crackles or wheezes  CARDIOVASCULAR: Regular rate and rhythm positive S1 and S2 no rubs.    ABDOMEN: Divergent colostomy was present  EXTREMITIES: 2-3+ edema    CURRENT MEDICATIONS      docusate (COLACE) 50 MG/5ML liquid 100 mg, Daily  polyethylene glycol (GLYCOLAX) packet 17 g, Daily  piperacillin-tazobactam (ZOSYN) 2,250 mg in dextrose 5 % 50 mL IVPB (mini-bag), Q8H  insulin glargine (LANTUS) injection vial 30 Units, BID  insulin lispro (HUMALOG) injection vial 0-18 Units, Q4H  oxyCODONE (ROXICODONE) immediate release tablet 5 mg, Q6H PRN  sodium hypochlorite (DAKINS) 0.125 % external solution, BID  0.9 % sodium chloride infusion, Continuous  midodrine (PROAMATINE) tablet 5 mg, TID WC  0.9 % sodium chloride infusion, PRN  famotidine (PEPCID) 40 MG/5ML suspension 20 mg, Daily  prismaSATE BK 0/3.5 5,000 mL with potassium chloride 15 mEq solution, Continuous  sodium bicarbonate 150 mEq in dextrose 5 % 1,000 mL infusion, Continuous  heparin (porcine) injection 1,900 Units, PRN  heparin (porcine) injection 1,600 Units, PRN  fluticasone (FLONASE) 50 MCG/ACT nasal spray 1 spray, Daily  budesonide-formoterol (SYMBICORT) 80-4.5 MCG/ACT inhaler 2 puff, BID  albuterol sulfate  (90 Base) MCG/ACT inhaler 1 puff, Q4H PRN  montelukast (SINGULAIR) tablet 10 mg, Daily  rOPINIRole (REQUIP) tablet 4 mg, Daily  gabapentin (NEURONTIN) capsule 100 mg, Q8H  heparin (porcine) injection 5,000 Units, 3 times per day  acetaminophen (TYLENOL) tablet 1,000 mg, 3 times per day          LABS      CBC:   Recent Labs     02/15/22  0422 02/16/22  0416 02/17/22  0609   WBC 8.7 7.1 8.7   RBC 2.54* 2.56* 2.39*   HGB 7.3* 7.3* 7.0*   HCT 22.7* 23.2* 21.2*   MCV 89.4 90.6 88.7   MCH 28.7 28.5 29.3   MCHC 32.2 31.5 33.0   RDW 15.3* 15.6* 15.8*    253 288   MPV 11.3 11.2 10.4      BMP:   Recent Labs     02/15/22  0422 02/16/22  0416 02/17/22  0609   * 134* 136   K 3.9 3.7 3.8   CL 97* 101 100   CO2 23 24 26   BUN 24* 21* 26*   CREATININE 2.28* 1.97* 2.23*   GLUCOSE 212* 117* 135*   CALCIUM 8.0* 7.3* 7.6*      PHOSPHORUS:    Recent Labs     02/15/22  0422 02/16/22  0416 02/17/22  0609   PHOS 5.2* 3.4 3.8     MAGNESIUM:   Recent Labs     02/15/22  0422 02/16/22  0416 02/17/22  0609   MG 1.9 1.7 1.7     SPEP:   Lab Results   Component Value Date    PROT 5.7 02/09/2022     C3:   Lab Results   Component Value Date    C3 143 02/09/2022     C4:   Lab Results   Component Value Date    C4 19 02/09/2022     URINE SODIUM:    Lab Results   Component Value Date    JORGE 32 02/09/2022      URINE CREATININE:    Lab Results   Component Value Date    LABCREA 142.3 02/09/2022         ASSESSMENT    1. Acute Kidney Injury: Acute kidney injury on top of chronic kidney disease. At present patient is dialysis dependent. Patient was initially on CVVHD. Switch to intermittent dialysis. Patient tolerated fairly well. Patient is still 15 L positive from her admitting day. 2.    Chronic kidney disease stage IIIa due to diabetic nephrosclerosis her baseline 1.5-1.6 proteinuria about 1 to 2 g from diabetic nephrosclerosis follows up with Dr. Lewis Schroeder  3. Type 2 diabetes which is longstanding and poorly controlled  4. Morbid obesity  5. Leonel's gangrene requiring debridements, patient on antibiotics including clindamycin and Zosyn. 6.  History of sepsis now patient is off pressors  7. Acute respiratory failure, patient remains vent dependent   PLAN      1. We will remove 2 to 2.5 L with dialysis today. 2.  We will dialyze her or ultrafiltrate her tomorrow for fluid removal  3. Will use salt poor albumin if needed for low blood pressure during dialysis   4. CBC and BMP in a.m. Please do not hesitate to call with questions.     This note is created with the assistance of a speech-recognition program. While intending to generate a document that actually reflects the content of the visit, no guarantees can be provided that every mistake has been identified and corrected by editing    Electronically signed by John Moore MD on 2/17/2022 at 11:00 AM

## 2022-02-17 NOTE — PLAN OF CARE
Problem: Skin Integrity:  Goal: Will show no infection signs and symptoms  Description: Will show no infection signs and symptoms  2/17/2022 0730 by Elsi Tatum RN  Outcome: Ongoing  2/17/2022 0335 by Teresa Terry RN  Outcome: Ongoing  2/17/2022 0335 by Teresa Terry RN  Outcome: Ongoing  Goal: Absence of new skin breakdown  Description: Absence of new skin breakdown  2/17/2022 0730 by Elsi Tatum RN  Outcome: Ongoing  2/17/2022 0335 by Teresa Terry RN  Outcome: Ongoing  2/17/2022 0335 by Teresa Terry RN  Outcome: Ongoing     Problem: Fluid Volume - Imbalance:  Goal: Absence of imbalanced fluid volume signs and symptoms  Description: Absence of imbalanced fluid volume signs and symptoms  2/17/2022 0730 by Elsi Tatum RN  Outcome: Ongoing  2/17/2022 0335 by Teresa Terry RN  Outcome: Ongoing  2/17/2022 0335 by Teresa Terry RN  Outcome: Ongoing     Problem: Pain:  Description: Pain management should include both nonpharmacologic and pharmacologic interventions.   Goal: Pain level will decrease  Description: Pain level will decrease  2/17/2022 0730 by Elsi Tatum RN  Outcome: Ongoing  2/17/2022 0335 by Teresa Terry RN  Outcome: Ongoing  2/17/2022 0335 by Teresa Terry RN  Outcome: Ongoing  Goal: Recognizes and communicates pain  Description: Recognizes and communicates pain  2/17/2022 0730 by Elsi Tatum RN  Outcome: Ongoing  2/17/2022 0335 by Teresa Terry RN  Outcome: Ongoing  2/17/2022 0335 by Teresa Terry RN  Outcome: Ongoing  Goal: Control of acute pain  Description: Control of acute pain  2/17/2022 0730 by Elsi Tatum RN  Outcome: Ongoing  2/17/2022 0335 by Teresa Terry RN  Outcome: Ongoing  2/17/2022 0335 by Teresa Terry RN  Outcome: Ongoing  Goal: Control of chronic pain  Description: Control of chronic pain  2/17/2022 0730 by Elsi Tatum RN  Outcome: Ongoing  2/17/2022 0335 by Teresa Terry RN  Outcome: Ongoing  2/17/2022 8061 by Armstead Habermann, RN  Outcome: Ongoing     Problem: Serum Glucose Level - Abnormal:  Goal: Ability to maintain appropriate glucose levels will improve to within specified parameters  Description: Ability to maintain appropriate glucose levels will improve to within specified parameters  2/17/2022 0730 by Carlos Gutierrez RN  Outcome: Ongoing  2/17/2022 0335 by Armstead Habermann, RN  Outcome: Ongoing  2/17/2022 0335 by Armstead Habermann, RN  Outcome: Ongoing     Problem: Skin Integrity - Impaired:  Goal: Will show no infection signs and symptoms  Description: Will show no infection signs and symptoms  2/17/2022 0730 by Carlos Gutierrez RN  Outcome: Ongoing  2/17/2022 0335 by Armstead Habermann, RN  Outcome: Ongoing  2/17/2022 0335 by Armstead Habermann, RN  Outcome: Ongoing  Goal: Absence of new skin breakdown  Description: Absence of new skin breakdown  2/17/2022 0730 by Carlos Gutierrez RN  Outcome: Ongoing  2/17/2022 0335 by Armstead Habermann, RN  Outcome: Ongoing  2/17/2022 0335 by Armstead Habermann, RN  Outcome: Ongoing     Problem: Non-Violent Restraints  Goal: Removal from restraints as soon as assessed to be safe  2/17/2022 0730 by Carlos Gutierrez RN  Outcome: Ongoing  2/17/2022 0335 by Armstead Habermann, RN  Outcome: Ongoing  2/17/2022 0335 by Armstead Habermann, RN  Outcome: Ongoing  Goal: No harm/injury to patient while restraints in use  2/17/2022 0730 by Carlos Gutierrez RN  Outcome: Ongoing  2/17/2022 0335 by Armstead Habermann, RN  Outcome: Ongoing  2/17/2022 0335 by Armstead Habermann, RN  Outcome: Ongoing  Goal: Patient's dignity will be maintained  2/17/2022 0730 by Carlos Gutierrez RN  Outcome: Ongoing  2/17/2022 0335 by Armstead Habermann, RN  Outcome: Ongoing  2/17/2022 0335 by Armstead Habermann, RN  Outcome: Ongoing     Problem: Nutrition  Goal: Optimal nutrition therapy  2/17/2022 0730 by Carlos Gutierrez RN  Outcome: Ongoing  2/17/2022 0335 by Armstead Habermann, RN  Outcome: Ongoing  2/17/2022 0335 by Armstead Habermann, RN  Outcome: Ongoing     Problem: OXYGENATION/RESPIRATORY FUNCTION  Goal: Patient will maintain patent airway  2/17/2022 0730 by Romeo Jason RN  Outcome: Ongoing  2/17/2022 0335 by Princess Kessler RN  Outcome: Ongoing  2/17/2022 0335 by Princess Kessler RN  Outcome: Ongoing  2/17/2022 0220 by JANIS LaurentP  Outcome: Ongoing  Goal: Patient will achieve/maintain normal respiratory rate/effort  Description: Respiratory rate and effort will be within normal limits for the patient  2/17/2022 0730 by Romeo Jason, RN  Outcome: Ongoing  2/17/2022 0335 by Princess Kessler RN  Outcome: Ongoing  2/17/2022 0335 by Princess Kessler RN  Outcome: Ongoing  2/17/2022 0220 by Sandra Carr RCP  Outcome: Ongoing     Problem: MECHANICAL VENTILATION  Goal: Patient will maintain patent airway  2/17/2022 0730 by Romeo Jason RN  Outcome: Ongoing  2/17/2022 0335 by Princess Kessler RN  Outcome: Ongoing  2/17/2022 0335 by Princess Kessler, RN  Outcome: Ongoing  2/17/2022 0220 by JANIS LaurentP  Outcome: Ongoing  Goal: Oral health is maintained or improved  2/17/2022 0730 by Romeo Jason RN  Outcome: Ongoing  2/17/2022 0335 by Princess Kessler RN  Outcome: Ongoing  2/17/2022 0335 by Princess Kessler RN  Outcome: Ongoing  2/17/2022 0220 by Sandra Carr RCP  Outcome: Ongoing  Goal: ET tube will be managed safely  2/17/2022 0730 by Romeo Jason, RN  Outcome: Ongoing  2/17/2022 0335 by Princess Kessler RN  Outcome: Ongoing  2/17/2022 0335 by Princess Kessler, RN  Outcome: Ongoing  2/17/2022 0220 by JANIS LaurentP  Outcome: Ongoing  Goal: Ability to express needs and understand communication  2/17/2022 0730 by Romeo Jason RN  Outcome: Ongoing  2/17/2022 0335 by Princess Kessler RN  Outcome: Ongoing  2/17/2022 0335 by Princess Kessler RN  Outcome: Ongoing  2/17/2022 0220 by Sandra Carr RCP  Outcome: Ongoing  Goal: Mobility/activity is maintained at optimum level for patient  2/17/2022 0730 by Deana Aguirre Claude Mcconnell RN  Outcome: Ongoing  2/17/2022 0335 by Elaine Quinonez RN  Outcome: Ongoing  2/17/2022 0335 by Elaine Quinonez RN  Outcome: Ongoing  2/17/2022 0220 by Payal Carl RCP  Outcome: Ongoing     Problem: SKIN INTEGRITY  Goal: Skin integrity is maintained or improved  2/17/2022 0730 by Fiona Blanco RN  Outcome: Ongoing  2/17/2022 0335 by Elaine Quinonez RN  Outcome: Ongoing  2/17/2022 0335 by Elaine Quinonez RN  Outcome: Ongoing     Problem: Confusion - Acute:  Goal: Absence of continued neurological deterioration signs and symptoms  Description: Absence of continued neurological deterioration signs and symptoms  2/17/2022 0730 by Fiona Blanco RN  Outcome: Ongoing  2/17/2022 0335 by Elaine Quinonez RN  Outcome: Ongoing  2/17/2022 0335 by Elaine Quinonez RN  Outcome: Ongoing  Goal: Mental status will be restored to baseline  Description: Mental status will be restored to baseline  2/17/2022 0730 by Fiona Blanco RN  Outcome: Ongoing  2/17/2022 0335 by Elaine Quinonez RN  Outcome: Ongoing  2/17/2022 0335 by Elaine Quinonez RN  Outcome: Ongoing     Problem: Discharge Planning:  Goal: Ability to perform activities of daily living will improve  Description: Ability to perform activities of daily living will improve  2/17/2022 0730 by Fiona Blanco RN  Outcome: Ongoing  2/17/2022 0335 by Elaine Quinonez RN  Outcome: Ongoing  2/17/2022 0335 by Elaine Quinonez RN  Outcome: Ongoing  Goal: Participates in care planning  Description: Participates in care planning  2/17/2022 0730 by Fiona Blanco RN  Outcome: Ongoing  2/17/2022 0335 by Elaine Quinonez RN  Outcome: Ongoing  2/17/2022 0335 by Elaine Quinonez RN  Outcome: Ongoing     Problem: Injury - Risk of, Physical Injury:  Goal: Absence of physical injury  Description: Absence of physical injury  2/17/2022 0730 by Fiona Blanco RN  Outcome: Ongoing  2/17/2022 0335 by Elaine Quinonez RN  Outcome: Ongoing  2/17/2022 0335 by Jodi Lynn RAMONITA [place]  Outcome: Ongoing  Goal: Will remain free from falls  Description: Will remain free from falls  2/17/2022 0730 by Elsi Tatum RN  Outcome: Ongoing  2/17/2022 0335 by Jerseycarla Terry, RN  Outcome: Ongoing  2/17/2022 0335 by Teresa Terry, RN  Outcome: Ongoing     Problem: Mood - Altered:  Goal: Mood stable  Description: Mood stable  2/17/2022 0730 by Elsi Tatum RN  Outcome: Ongoing  2/17/2022 0335 by Jerseycarla Terry, RN  Outcome: Ongoing  2/17/2022 0335 by Teresa Terry, RN  Outcome: Ongoing  Goal: Absence of abusive behavior  Description: Absence of abusive behavior  2/17/2022 0730 by Elsi Tatum RN  Outcome: Ongoing  2/17/2022 0335 by Teresa Terry, RN  Outcome: Ongoing  2/17/2022 0335 by Teresa Terry, RN  Outcome: Ongoing  Goal: Verbalizations of feeling emotionally comfortable while being cared for will increase  Description: Verbalizations of feeling emotionally comfortable while being cared for will increase  2/17/2022 0730 by Elsi Tatum RN  Outcome: Ongoing  2/17/2022 0335 by Teresa Terry, RN  Outcome: Ongoing  2/17/2022 0335 by Teresa Terry RN  Outcome: Ongoing     Problem: Psychomotor Activity - Altered:  Goal: Absence of psychomotor disturbance signs and symptoms  Description: Absence of psychomotor disturbance signs and symptoms  2/17/2022 0730 by Elsi Tatum RN  Outcome: Ongoing  2/17/2022 0335 by Teresa Terry, RN  Outcome: Ongoing  2/17/2022 0335 by Teresa Terry RN  Outcome: Ongoing     Problem: Sensory Perception - Impaired:  Goal: Demonstrations of improved sensory functioning will increase  Description: Demonstrations of improved sensory functioning will increase  2/17/2022 0730 by Elsi Tatum RN  Outcome: Ongoing  2/17/2022 0335 by Jerseycarla Terry, RN  Outcome: Ongoing  2/17/2022 0335 by Teresa Terry, RN  Outcome: Ongoing  Goal: Decrease in sensory misperception frequency  Description: Decrease in sensory misperception frequency  2/17/2022 0730 by Zelda Schirmer, RN  Outcome: Ongoing  2/17/2022 0335 by Nena Castro RN  Outcome: Ongoing  2/17/2022 0335 by Nena Castro RN  Outcome: Ongoing  Goal: Able to refrain from responding to false sensory perceptions  Description: Able to refrain from responding to false sensory perceptions  2/17/2022 0730 by Zelda Schirmer, RN  Outcome: Ongoing  2/17/2022 0335 by Nena Castro RN  Outcome: Ongoing  2/17/2022 0335 by Nena Castro RN  Outcome: Ongoing  Goal: Demonstrates accurate environmental perceptions  Description: Demonstrates accurate environmental perceptions  2/17/2022 0730 by Zelda Schirmer, RN  Outcome: Ongoing  2/17/2022 0335 by Nena Castro RN  Outcome: Ongoing  2/17/2022 0335 by Nena Castro RN  Outcome: Ongoing  Goal: Able to distinguish between reality-based and nonreality-based thinking  Description: Able to distinguish between reality-based and nonreality-based thinking  2/17/2022 0730 by Zelda Schirmer, RN  Outcome: Ongoing  2/17/2022 0335 by Nena Castro RN  Outcome: Ongoing  2/17/2022 0335 by Nena Castro RN  Outcome: Ongoing  Goal: Able to interrupt nonreality-based thinking  Description: Able to interrupt nonreality-based thinking  2/17/2022 0730 by Zelda Schirmer, RN  Outcome: Ongoing  2/17/2022 0335 by Nena Castro RN  Outcome: Ongoing  2/17/2022 0335 by Nena Castro RN  Outcome: Ongoing     Problem: Sleep Pattern Disturbance:  Goal: Appears well-rested  Description: Appears well-rested  2/17/2022 0730 by Zelda Schirmer, RN  Outcome: Ongoing  2/17/2022 0335 by Nena Castro RN  Outcome: Ongoing  2/17/2022 0335 by Nena Castro RN  Outcome: Ongoing

## 2022-02-17 NOTE — PROGRESS NOTES
Dialysis Post Treatment Note  Vitals:    02/17/22 1350   BP: (!) 174/70   Pulse: 95   Resp: 16   Temp: 97.9 °F (36.6 °C)   SpO2: 100%     Pre-Weight = 134.5kg  Post-weight = Weight: 296 lb 8.3 oz (134.5 kg)  Total Liters Processed = Total Liters Processed (l/min): 83.8 l/min  Rinseback Volume (mL) = Rinseback Volume (ml): 300 ml  Net Removal (mL) = NET Removed (ml): 2500 ml  Patient's dry weight= Not established  Type of access used= Femoral temp cath  Length of treatment=210    Pt tolerated treatment well. Catheter worked well although arterial port is sluggish when aspirated so had to reverse lines. Maintained BP without supportive meds. Report given to floor nurse. Pt stable and denies any needs.

## 2022-02-17 NOTE — PLAN OF CARE
Problem: OXYGENATION/RESPIRATORY FUNCTION  Goal: Patient will maintain patent airway  2/17/2022 0220 by Isabel Chandler, RCP  Outcome: Ongoing  2/16/2022 1542 by Manolo Monae, RCP  Outcome: Ongoing  2/16/2022 1229 by Elsi Tatum RN  Outcome: Ongoing     Problem: OXYGENATION/RESPIRATORY FUNCTION  Goal: Patient will achieve/maintain normal respiratory rate/effort  Description: Respiratory rate and effort will be within normal limits for the patient  2/17/2022 0220 by Endivyaue Geraldine, RCP  Outcome: Ongoing  2/16/2022 1542 by Manolo Monae, RCP  Outcome: Ongoing  2/16/2022 1229 by Elsi Tatum RN  Outcome: Ongoing     Problem: MECHANICAL VENTILATION  Goal: Patient will maintain patent airway  2/17/2022 0220 by Isabel Geraldine, RCP  Outcome: Ongoing  2/16/2022 1542 by Manolo Monae, RCP  Outcome: Ongoing  2/16/2022 1229 by Elsi Tatum RN  Outcome: Ongoing     Problem: MECHANICAL VENTILATION  Goal: Oral health is maintained or improved  2/17/2022 0220 by Isabel Geraldine, RCP  Outcome: Ongoing  2/16/2022 1542 by Manolo Monae, RCP  Outcome: Ongoing  2/16/2022 1229 by Elsi Tatum RN  Outcome: Ongoing     Problem: MECHANICAL VENTILATION  Goal: ET tube will be managed safely  2/17/2022 0220 by Endivyaue Geraldine, RCP  Outcome: Ongoing  2/16/2022 1542 by Manolo Monae, RCP  Outcome: Ongoing  2/16/2022 1229 by Elsi Tatum RN  Outcome: Ongoing     Problem: MECHANICAL VENTILATION  Goal: Ability to express needs and understand communication  2/17/2022 0220 by Endivyaue Geraldine, RCP  Outcome: Ongoing  2/16/2022 1542 by Manolo Monae, RCP  Outcome: Ongoing  2/16/2022 1229 by Elsi Tatum RN  Outcome: Ongoing     Problem: MECHANICAL VENTILATION  Goal: Mobility/activity is maintained at optimum level for patient  2/17/2022 0220 by Enrigue Geraldine, RCP  Outcome: Ongoing  2/16/2022 1542 by Manolo Monae, RCP  Outcome: Ongoing  2/16/2022 1229 by Elsi Tatum RN  Outcome: Ongoing

## 2022-02-18 ENCOUNTER — APPOINTMENT (OUTPATIENT)
Dept: DIALYSIS | Age: 42
DRG: 853 | End: 2022-02-18
Payer: COMMERCIAL

## 2022-02-18 ENCOUNTER — ANESTHESIA (OUTPATIENT)
Dept: OPERATING ROOM | Age: 42
DRG: 853 | End: 2022-02-18
Payer: COMMERCIAL

## 2022-02-18 VITALS — SYSTOLIC BLOOD PRESSURE: 142 MMHG | DIASTOLIC BLOOD PRESSURE: 76 MMHG | TEMPERATURE: 97 F | OXYGEN SATURATION: 100 %

## 2022-02-18 LAB
ABO/RH: NORMAL
ABSOLUTE EOS #: 0.15 K/UL (ref 0–0.44)
ABSOLUTE IMMATURE GRANULOCYTE: 0.33 K/UL (ref 0–0.3)
ABSOLUTE LYMPH #: 1.56 K/UL (ref 1.1–3.7)
ABSOLUTE MONO #: 1.02 K/UL (ref 0.1–1.2)
ANION GAP SERPL CALCULATED.3IONS-SCNC: 13 MMOL/L (ref 9–17)
ANTIBODY SCREEN: NEGATIVE
ARM BAND NUMBER: NORMAL
BASOPHILS # BLD: 1 % (ref 0–2)
BASOPHILS ABSOLUTE: 0.08 K/UL (ref 0–0.2)
BLOOD BANK SPECIMEN: NORMAL
BUN BLDV-MCNC: 16 MG/DL (ref 6–20)
BUN/CREAT BLD: ABNORMAL (ref 9–20)
CALCIUM SERPL-MCNC: 7.8 MG/DL (ref 8.6–10.4)
CHLORIDE BLD-SCNC: 94 MMOL/L (ref 98–107)
CO2: 24 MMOL/L (ref 20–31)
CREAT SERPL-MCNC: 1.81 MG/DL (ref 0.5–0.9)
DIFFERENTIAL TYPE: ABNORMAL
EOSINOPHILS RELATIVE PERCENT: 1 % (ref 1–4)
EXPIRATION DATE: NORMAL
GFR AFRICAN AMERICAN: 37 ML/MIN
GFR NON-AFRICAN AMERICAN: 31 ML/MIN
GFR SERPL CREATININE-BSD FRML MDRD: ABNORMAL ML/MIN/{1.73_M2}
GFR SERPL CREATININE-BSD FRML MDRD: ABNORMAL ML/MIN/{1.73_M2}
GLUCOSE BLD-MCNC: 145 MG/DL (ref 65–105)
GLUCOSE BLD-MCNC: 150 MG/DL (ref 65–105)
GLUCOSE BLD-MCNC: 161 MG/DL (ref 65–105)
GLUCOSE BLD-MCNC: 164 MG/DL (ref 65–105)
GLUCOSE BLD-MCNC: 179 MG/DL (ref 65–105)
GLUCOSE BLD-MCNC: 180 MG/DL (ref 65–105)
GLUCOSE BLD-MCNC: 182 MG/DL (ref 65–105)
GLUCOSE BLD-MCNC: 197 MG/DL (ref 65–105)
GLUCOSE BLD-MCNC: 222 MG/DL (ref 70–99)
GLUCOSE BLD-MCNC: 241 MG/DL (ref 65–105)
GLUCOSE BLD-MCNC: 257 MG/DL (ref 65–105)
GLUCOSE BLD-MCNC: 298 MG/DL (ref 65–105)
GLUCOSE BLD-MCNC: 336 MG/DL (ref 65–105)
HCT VFR BLD CALC: 24.7 % (ref 36.3–47.1)
HEMOGLOBIN: 7.6 G/DL (ref 11.9–15.1)
IMMATURE GRANULOCYTES: 2 %
LYMPHOCYTES # BLD: 11 % (ref 24–43)
MAGNESIUM: 1.7 MG/DL (ref 1.6–2.6)
MCH RBC QN AUTO: 29.2 PG (ref 25.2–33.5)
MCHC RBC AUTO-ENTMCNC: 30.8 G/DL (ref 28.4–34.8)
MCV RBC AUTO: 95 FL (ref 82.6–102.9)
MONOCYTES # BLD: 8 % (ref 3–12)
NRBC AUTOMATED: 0 PER 100 WBC
PDW BLD-RTO: 15.9 % (ref 11.8–14.4)
PHOSPHORUS: 3.4 MG/DL (ref 2.6–4.5)
PLATELET # BLD: 355 K/UL (ref 138–453)
PLATELET ESTIMATE: ABNORMAL
PMV BLD AUTO: 10.6 FL (ref 8.1–13.5)
POTASSIUM SERPL-SCNC: 3.5 MMOL/L (ref 3.7–5.3)
RBC # BLD: 2.6 M/UL (ref 3.95–5.11)
RBC # BLD: ABNORMAL 10*6/UL
SEG NEUTROPHILS: 77 % (ref 36–65)
SEGMENTED NEUTROPHILS ABSOLUTE COUNT: 10.54 K/UL (ref 1.5–8.1)
SODIUM BLD-SCNC: 131 MMOL/L (ref 135–144)
WBC # BLD: 13.7 K/UL (ref 3.5–11.3)
WBC # BLD: ABNORMAL 10*3/UL

## 2022-02-18 PROCEDURE — 6370000000 HC RX 637 (ALT 250 FOR IP): Performed by: STUDENT IN AN ORGANIZED HEALTH CARE EDUCATION/TRAINING PROGRAM

## 2022-02-18 PROCEDURE — 6360000002 HC RX W HCPCS: Performed by: STUDENT IN AN ORGANIZED HEALTH CARE EDUCATION/TRAINING PROGRAM

## 2022-02-18 PROCEDURE — 3600000004 HC SURGERY LEVEL 4 BASE: Performed by: SURGERY

## 2022-02-18 PROCEDURE — 2720000010 HC SURG SUPPLY STERILE: Performed by: SURGERY

## 2022-02-18 PROCEDURE — 80048 BASIC METABOLIC PNL TOTAL CA: CPT

## 2022-02-18 PROCEDURE — 2W07X6Z CHANGE PRESSURE DRESSING ON LEFT INGUINAL REGION: ICD-10-PCS | Performed by: SURGERY

## 2022-02-18 PROCEDURE — 0JD90ZZ EXTRACTION OF BUTTOCK SUBCUTANEOUS TISSUE AND FASCIA, OPEN APPROACH: ICD-10-PCS | Performed by: SURGERY

## 2022-02-18 PROCEDURE — 2709999900 HC NON-CHARGEABLE SUPPLY: Performed by: SURGERY

## 2022-02-18 PROCEDURE — 6370000000 HC RX 637 (ALT 250 FOR IP): Performed by: EMERGENCY MEDICINE

## 2022-02-18 PROCEDURE — 82947 ASSAY GLUCOSE BLOOD QUANT: CPT

## 2022-02-18 PROCEDURE — A4217 STERILE WATER/SALINE, 500 ML: HCPCS | Performed by: SURGERY

## 2022-02-18 PROCEDURE — 6360000002 HC RX W HCPCS

## 2022-02-18 PROCEDURE — 86850 RBC ANTIBODY SCREEN: CPT

## 2022-02-18 PROCEDURE — 36415 COLL VENOUS BLD VENIPUNCTURE: CPT

## 2022-02-18 PROCEDURE — 6360000002 HC RX W HCPCS: Performed by: NURSE PRACTITIONER

## 2022-02-18 PROCEDURE — 3600000014 HC SURGERY LEVEL 4 ADDTL 15MIN: Performed by: SURGERY

## 2022-02-18 PROCEDURE — 99232 SBSQ HOSP IP/OBS MODERATE 35: CPT | Performed by: INTERNAL MEDICINE

## 2022-02-18 PROCEDURE — 94761 N-INVAS EAR/PLS OXIMETRY MLT: CPT

## 2022-02-18 PROCEDURE — 2700000000 HC OXYGEN THERAPY PER DAY

## 2022-02-18 PROCEDURE — 83735 ASSAY OF MAGNESIUM: CPT

## 2022-02-18 PROCEDURE — 2580000003 HC RX 258: Performed by: STUDENT IN AN ORGANIZED HEALTH CARE EDUCATION/TRAINING PROGRAM

## 2022-02-18 PROCEDURE — 85025 COMPLETE CBC W/AUTO DIFF WBC: CPT

## 2022-02-18 PROCEDURE — 2580000003 HC RX 258: Performed by: NURSE PRACTITIONER

## 2022-02-18 PROCEDURE — 3700000001 HC ADD 15 MINUTES (ANESTHESIA): Performed by: SURGERY

## 2022-02-18 PROCEDURE — 3700000000 HC ANESTHESIA ATTENDED CARE: Performed by: SURGERY

## 2022-02-18 PROCEDURE — 6370000000 HC RX 637 (ALT 250 FOR IP): Performed by: NURSE PRACTITIONER

## 2022-02-18 PROCEDURE — 94640 AIRWAY INHALATION TREATMENT: CPT

## 2022-02-18 PROCEDURE — 2060000000 HC ICU INTERMEDIATE R&B

## 2022-02-18 PROCEDURE — 86900 BLOOD TYPING SEROLOGIC ABO: CPT

## 2022-02-18 PROCEDURE — 86901 BLOOD TYPING SEROLOGIC RH(D): CPT

## 2022-02-18 PROCEDURE — 84100 ASSAY OF PHOSPHORUS: CPT

## 2022-02-18 PROCEDURE — 2580000003 HC RX 258: Performed by: SURGERY

## 2022-02-18 PROCEDURE — 90935 HEMODIALYSIS ONE EVALUATION: CPT

## 2022-02-18 PROCEDURE — 6360000002 HC RX W HCPCS: Performed by: NURSE ANESTHETIST, CERTIFIED REGISTERED

## 2022-02-18 PROCEDURE — 2500000003 HC RX 250 WO HCPCS: Performed by: NURSE ANESTHETIST, CERTIFIED REGISTERED

## 2022-02-18 RX ORDER — ONDANSETRON 2 MG/ML
4 INJECTION INTRAMUSCULAR; INTRAVENOUS
Status: DISCONTINUED | OUTPATIENT
Start: 2022-02-18 | End: 2022-02-18

## 2022-02-18 RX ORDER — FENTANYL CITRATE 50 UG/ML
INJECTION, SOLUTION INTRAMUSCULAR; INTRAVENOUS PRN
Status: DISCONTINUED | OUTPATIENT
Start: 2022-02-18 | End: 2022-02-18 | Stop reason: SDUPTHER

## 2022-02-18 RX ORDER — ROCURONIUM BROMIDE 10 MG/ML
INJECTION, SOLUTION INTRAVENOUS PRN
Status: DISCONTINUED | OUTPATIENT
Start: 2022-02-18 | End: 2022-02-18 | Stop reason: SDUPTHER

## 2022-02-18 RX ORDER — SODIUM CHLORIDE 9 MG/ML
25 INJECTION, SOLUTION INTRAVENOUS PRN
Status: DISCONTINUED | OUTPATIENT
Start: 2022-02-18 | End: 2022-02-18

## 2022-02-18 RX ORDER — FENTANYL CITRATE 50 UG/ML
50 INJECTION, SOLUTION INTRAMUSCULAR; INTRAVENOUS EVERY 5 MIN PRN
Status: DISCONTINUED | OUTPATIENT
Start: 2022-02-18 | End: 2022-02-18

## 2022-02-18 RX ORDER — CHOLECALCIFEROL (VITAMIN D3) 125 MCG
5 CAPSULE ORAL ONCE
Status: COMPLETED | OUTPATIENT
Start: 2022-02-18 | End: 2022-02-18

## 2022-02-18 RX ORDER — CHOLECALCIFEROL (VITAMIN D3) 125 MCG
10 CAPSULE ORAL NIGHTLY PRN
Status: DISCONTINUED | OUTPATIENT
Start: 2022-02-19 | End: 2022-02-26 | Stop reason: HOSPADM

## 2022-02-18 RX ORDER — ONDANSETRON 2 MG/ML
4 INJECTION INTRAMUSCULAR; INTRAVENOUS EVERY 6 HOURS PRN
Status: DISCONTINUED | OUTPATIENT
Start: 2022-02-18 | End: 2022-02-26 | Stop reason: HOSPADM

## 2022-02-18 RX ORDER — HYDRALAZINE HYDROCHLORIDE 20 MG/ML
5 INJECTION INTRAMUSCULAR; INTRAVENOUS EVERY 6 HOURS PRN
Status: DISCONTINUED | OUTPATIENT
Start: 2022-02-18 | End: 2022-02-26 | Stop reason: HOSPADM

## 2022-02-18 RX ORDER — MAGNESIUM HYDROXIDE 1200 MG/15ML
LIQUID ORAL CONTINUOUS PRN
Status: COMPLETED | OUTPATIENT
Start: 2022-02-18 | End: 2022-02-18

## 2022-02-18 RX ORDER — SODIUM CHLORIDE 0.9 % (FLUSH) 0.9 %
5-40 SYRINGE (ML) INJECTION PRN
Status: DISCONTINUED | OUTPATIENT
Start: 2022-02-18 | End: 2022-02-18

## 2022-02-18 RX ORDER — MAGNESIUM SULFATE HEPTAHYDRATE 40 MG/ML
4000 INJECTION, SOLUTION INTRAVENOUS ONCE
Status: COMPLETED | OUTPATIENT
Start: 2022-02-18 | End: 2022-02-18

## 2022-02-18 RX ORDER — SODIUM CHLORIDE 0.9 % (FLUSH) 0.9 %
5-40 SYRINGE (ML) INJECTION EVERY 12 HOURS SCHEDULED
Status: DISCONTINUED | OUTPATIENT
Start: 2022-02-18 | End: 2022-02-18

## 2022-02-18 RX ORDER — DEXAMETHASONE SODIUM PHOSPHATE 10 MG/ML
INJECTION INTRAMUSCULAR; INTRAVENOUS PRN
Status: DISCONTINUED | OUTPATIENT
Start: 2022-02-18 | End: 2022-02-18 | Stop reason: SDUPTHER

## 2022-02-18 RX ORDER — MEPERIDINE HYDROCHLORIDE 50 MG/ML
12.5 INJECTION INTRAMUSCULAR; INTRAVENOUS; SUBCUTANEOUS EVERY 5 MIN PRN
Status: DISCONTINUED | OUTPATIENT
Start: 2022-02-18 | End: 2022-02-18

## 2022-02-18 RX ORDER — ONDANSETRON 2 MG/ML
INJECTION INTRAMUSCULAR; INTRAVENOUS PRN
Status: DISCONTINUED | OUTPATIENT
Start: 2022-02-18 | End: 2022-02-18 | Stop reason: SDUPTHER

## 2022-02-18 RX ORDER — ONDANSETRON 2 MG/ML
INJECTION INTRAMUSCULAR; INTRAVENOUS
Status: COMPLETED
Start: 2022-02-18 | End: 2022-02-18

## 2022-02-18 RX ORDER — LIDOCAINE HYDROCHLORIDE 10 MG/ML
INJECTION, SOLUTION EPIDURAL; INFILTRATION; INTRACAUDAL; PERINEURAL PRN
Status: DISCONTINUED | OUTPATIENT
Start: 2022-02-18 | End: 2022-02-18 | Stop reason: SDUPTHER

## 2022-02-18 RX ORDER — FENTANYL CITRATE 50 UG/ML
25 INJECTION, SOLUTION INTRAMUSCULAR; INTRAVENOUS EVERY 5 MIN PRN
Status: DISCONTINUED | OUTPATIENT
Start: 2022-02-18 | End: 2022-02-18

## 2022-02-18 RX ORDER — PROPOFOL 10 MG/ML
INJECTION, EMULSION INTRAVENOUS PRN
Status: DISCONTINUED | OUTPATIENT
Start: 2022-02-18 | End: 2022-02-18 | Stop reason: SDUPTHER

## 2022-02-18 RX ADMIN — DAKIN'S SOLUTION 0.125% (QUARTER STRENGTH): 0.12 SOLUTION at 07:57

## 2022-02-18 RX ADMIN — INSULIN GLARGINE 30 UNITS: 100 INJECTION, SOLUTION SUBCUTANEOUS at 21:01

## 2022-02-18 RX ADMIN — DAKIN'S SOLUTION 0.125% (QUARTER STRENGTH): 0.12 SOLUTION at 21:21

## 2022-02-18 RX ADMIN — BUDESONIDE AND FORMOTEROL FUMARATE DIHYDRATE 2 PUFF: 80; 4.5 AEROSOL RESPIRATORY (INHALATION) at 19:28

## 2022-02-18 RX ADMIN — HEPARIN SODIUM 1900 UNITS: 1000 INJECTION INTRAVENOUS; SUBCUTANEOUS at 19:00

## 2022-02-18 RX ADMIN — HYDRALAZINE HYDROCHLORIDE 5 MG: 20 INJECTION INTRAMUSCULAR; INTRAVENOUS at 12:48

## 2022-02-18 RX ADMIN — POTASSIUM BICARBONATE 40 MEQ: 391 TABLET, EFFERVESCENT ORAL at 05:10

## 2022-02-18 RX ADMIN — PIPERACILLIN SODIUM AND TAZOBACTAM SODIUM 2250 MG: 2; .25 INJECTION, POWDER, LYOPHILIZED, FOR SOLUTION INTRAVENOUS at 03:06

## 2022-02-18 RX ADMIN — DEXAMETHASONE SODIUM PHOSPHATE 10 MG: 10 INJECTION INTRAMUSCULAR; INTRAVENOUS at 09:57

## 2022-02-18 RX ADMIN — PHENYLEPHRINE HYDROCHLORIDE 50 MCG: 10 INJECTION INTRAVENOUS at 10:24

## 2022-02-18 RX ADMIN — OXYCODONE 5 MG: 5 TABLET ORAL at 03:00

## 2022-02-18 RX ADMIN — Medication 5 MG: at 20:57

## 2022-02-18 RX ADMIN — POLYETHYLENE GLYCOL 3350 17 G: 17 POWDER, FOR SOLUTION ORAL at 12:14

## 2022-02-18 RX ADMIN — ACETAMINOPHEN 1000 MG: 500 TABLET ORAL at 20:58

## 2022-02-18 RX ADMIN — INSULIN LISPRO 3 UNITS: 100 INJECTION, SOLUTION INTRAVENOUS; SUBCUTANEOUS at 08:01

## 2022-02-18 RX ADMIN — SUGAMMADEX 281 MG: 100 INJECTION, SOLUTION INTRAVENOUS at 11:02

## 2022-02-18 RX ADMIN — FENTANYL CITRATE 50 MCG: 50 INJECTION, SOLUTION INTRAMUSCULAR; INTRAVENOUS at 11:02

## 2022-02-18 RX ADMIN — MONTELUKAST SODIUM 10 MG: 10 TABLET, FILM COATED ORAL at 12:14

## 2022-02-18 RX ADMIN — Medication 5 MG: at 23:33

## 2022-02-18 RX ADMIN — INSULIN LISPRO 3 UNITS: 100 INJECTION, SOLUTION INTRAVENOUS; SUBCUTANEOUS at 12:22

## 2022-02-18 RX ADMIN — ACETAMINOPHEN 1000 MG: 500 TABLET ORAL at 14:05

## 2022-02-18 RX ADMIN — PHENYLEPHRINE HYDROCHLORIDE 50 MCG: 10 INJECTION INTRAVENOUS at 10:42

## 2022-02-18 RX ADMIN — INSULIN LISPRO 9 UNITS: 100 INJECTION, SOLUTION INTRAVENOUS; SUBCUTANEOUS at 23:36

## 2022-02-18 RX ADMIN — DOCUSATE SODIUM LIQUID 100 MG: 100 LIQUID ORAL at 12:14

## 2022-02-18 RX ADMIN — SODIUM CHLORIDE: 9 INJECTION, SOLUTION INTRAVENOUS at 09:52

## 2022-02-18 RX ADMIN — HEPARIN SODIUM 5000 UNITS: 5000 INJECTION INTRAVENOUS; SUBCUTANEOUS at 21:00

## 2022-02-18 RX ADMIN — ACETAMINOPHEN 1000 MG: 500 TABLET ORAL at 05:09

## 2022-02-18 RX ADMIN — OXYCODONE 5 MG: 5 TABLET ORAL at 12:15

## 2022-02-18 RX ADMIN — INSULIN LISPRO 9 UNITS: 100 INJECTION, SOLUTION INTRAVENOUS; SUBCUTANEOUS at 21:00

## 2022-02-18 RX ADMIN — MAGNESIUM SULFATE IN WATER 4000 MG: 40 INJECTION, SOLUTION INTRAVENOUS at 05:12

## 2022-02-18 RX ADMIN — ONDANSETRON 4 MG: 2 INJECTION INTRAMUSCULAR; INTRAVENOUS at 05:33

## 2022-02-18 RX ADMIN — HYDRALAZINE HYDROCHLORIDE 5 MG: 20 INJECTION INTRAMUSCULAR; INTRAVENOUS at 18:49

## 2022-02-18 RX ADMIN — PIPERACILLIN SODIUM AND TAZOBACTAM SODIUM 2250 MG: 2; .25 INJECTION, POWDER, LYOPHILIZED, FOR SOLUTION INTRAVENOUS at 18:47

## 2022-02-18 RX ADMIN — HEPARIN SODIUM 1600 UNITS: 1000 INJECTION INTRAVENOUS; SUBCUTANEOUS at 19:00

## 2022-02-18 RX ADMIN — GABAPENTIN 100 MG: 300 CAPSULE ORAL at 03:00

## 2022-02-18 RX ADMIN — ONDANSETRON 4 MG: 2 INJECTION INTRAMUSCULAR; INTRAVENOUS at 10:52

## 2022-02-18 RX ADMIN — PROPOFOL 200 MG: 10 INJECTION, EMULSION INTRAVENOUS at 09:57

## 2022-02-18 RX ADMIN — OXYCODONE 5 MG: 5 TABLET ORAL at 20:57

## 2022-02-18 RX ADMIN — LIDOCAINE HYDROCHLORIDE 50 MG: 10 INJECTION, SOLUTION EPIDURAL; INFILTRATION; INTRACAUDAL at 09:57

## 2022-02-18 RX ADMIN — HEPARIN SODIUM 5000 UNITS: 5000 INJECTION INTRAVENOUS; SUBCUTANEOUS at 14:03

## 2022-02-18 RX ADMIN — HEPARIN SODIUM 5000 UNITS: 5000 INJECTION INTRAVENOUS; SUBCUTANEOUS at 05:09

## 2022-02-18 RX ADMIN — INSULIN LISPRO 3 UNITS: 100 INJECTION, SOLUTION INTRAVENOUS; SUBCUTANEOUS at 02:59

## 2022-02-18 RX ADMIN — GABAPENTIN 100 MG: 300 CAPSULE ORAL at 12:14

## 2022-02-18 RX ADMIN — INSULIN LISPRO 6 UNITS: 100 INJECTION, SOLUTION INTRAVENOUS; SUBCUTANEOUS at 00:32

## 2022-02-18 RX ADMIN — FLUTICASONE PROPIONATE 1 SPRAY: 50 SPRAY, METERED NASAL at 08:06

## 2022-02-18 RX ADMIN — FENTANYL CITRATE 50 MCG: 50 INJECTION, SOLUTION INTRAMUSCULAR; INTRAVENOUS at 09:57

## 2022-02-18 RX ADMIN — PIPERACILLIN SODIUM AND TAZOBACTAM SODIUM 2250 MG: 2; .25 INJECTION, POWDER, LYOPHILIZED, FOR SOLUTION INTRAVENOUS at 11:24

## 2022-02-18 RX ADMIN — INSULIN LISPRO 12 UNITS: 100 INJECTION, SOLUTION INTRAVENOUS; SUBCUTANEOUS at 16:14

## 2022-02-18 RX ADMIN — ROPINIROLE HYDROCHLORIDE 4 MG: 1 TABLET, FILM COATED ORAL at 12:14

## 2022-02-18 RX ADMIN — ROCURONIUM BROMIDE 50 MG: 10 INJECTION INTRAVENOUS at 09:57

## 2022-02-18 RX ADMIN — GABAPENTIN 100 MG: 300 CAPSULE ORAL at 21:00

## 2022-02-18 RX ADMIN — BUDESONIDE AND FORMOTEROL FUMARATE DIHYDRATE 2 PUFF: 80; 4.5 AEROSOL RESPIRATORY (INHALATION) at 08:13

## 2022-02-18 RX ADMIN — PHENYLEPHRINE HYDROCHLORIDE 100 MCG: 10 INJECTION INTRAVENOUS at 10:07

## 2022-02-18 RX ADMIN — FAMOTIDINE 20 MG: 40 POWDER, FOR SUSPENSION ORAL at 12:14

## 2022-02-18 RX ADMIN — INSULIN GLARGINE 30 UNITS: 100 INJECTION, SOLUTION SUBCUTANEOUS at 08:01

## 2022-02-18 ASSESSMENT — PULMONARY FUNCTION TESTS
PIF_VALUE: 36
PIF_VALUE: 45
PIF_VALUE: 35
PIF_VALUE: 35
PIF_VALUE: 34
PIF_VALUE: 34
PIF_VALUE: 35
PIF_VALUE: 1
PIF_VALUE: 34
PIF_VALUE: 36
PIF_VALUE: 33
PIF_VALUE: 36
PIF_VALUE: 1
PIF_VALUE: 37
PIF_VALUE: 31
PIF_VALUE: 33
PIF_VALUE: 39
PIF_VALUE: 32
PIF_VALUE: 15
PIF_VALUE: 35
PIF_VALUE: 33
PIF_VALUE: 0
PIF_VALUE: 35
PIF_VALUE: 32
PIF_VALUE: 35
PIF_VALUE: 41
PIF_VALUE: 32
PIF_VALUE: 3
PIF_VALUE: 36
PIF_VALUE: 34
PIF_VALUE: 35
PIF_VALUE: 34
PIF_VALUE: 36
PIF_VALUE: 34
PIF_VALUE: 33
PIF_VALUE: 35
PIF_VALUE: 34
PIF_VALUE: 33
PIF_VALUE: 35
PIF_VALUE: 34
PIF_VALUE: 35
PIF_VALUE: 37
PIF_VALUE: 30
PIF_VALUE: 35
PIF_VALUE: 32
PIF_VALUE: 36
PIF_VALUE: 33
PIF_VALUE: 2
PIF_VALUE: 6
PIF_VALUE: 36
PIF_VALUE: 35
PIF_VALUE: 29
PIF_VALUE: 34
PIF_VALUE: 36
PIF_VALUE: 43
PIF_VALUE: 1
PIF_VALUE: 27
PIF_VALUE: 34
PIF_VALUE: 32
PIF_VALUE: 21
PIF_VALUE: 35
PIF_VALUE: 40
PIF_VALUE: 33
PIF_VALUE: 32
PIF_VALUE: 36
PIF_VALUE: 6
PIF_VALUE: 6
PIF_VALUE: 36
PIF_VALUE: 5
PIF_VALUE: 5
PIF_VALUE: 33
PIF_VALUE: 35
PIF_VALUE: 35

## 2022-02-18 ASSESSMENT — PAIN DESCRIPTION - PAIN TYPE: TYPE: SURGICAL PAIN

## 2022-02-18 ASSESSMENT — PAIN DESCRIPTION - LOCATION: LOCATION: ABDOMEN

## 2022-02-18 ASSESSMENT — PAIN SCALES - GENERAL
PAINLEVEL_OUTOF10: 5
PAINLEVEL_OUTOF10: 5
PAINLEVEL_OUTOF10: 3
PAINLEVEL_OUTOF10: 0
PAINLEVEL_OUTOF10: 8
PAINLEVEL_OUTOF10: 0
PAINLEVEL_OUTOF10: 7
PAINLEVEL_OUTOF10: 0
PAINLEVEL_OUTOF10: 0

## 2022-02-18 ASSESSMENT — PAIN DESCRIPTION - DESCRIPTORS: DESCRIPTORS: SORE

## 2022-02-18 ASSESSMENT — PAIN DESCRIPTION - FREQUENCY: FREQUENCY: CONTINUOUS

## 2022-02-18 NOTE — PLAN OF CARE
Nutrition Problem #1: Increased nutrient needs  Intervention: Food and/or Nutrient Delivery:  (Continue Carb Controlled diet - increase carb choices to at least 4/meal. Continue ONS- suggest change to Ensure High Protein supplements.)  Nutritional Goals: meet % of estimated nutrient needs

## 2022-02-18 NOTE — ANESTHESIA POSTPROCEDURE EVALUATION
Department of Anesthesiology  Postprocedure Note    Patient: Faith Diaz  MRN: [id number]  YOB: 1980  Date of evaluation: 2/18/2022  Time:  12:43 PM     Procedure Summary     Date: 02/18/22 Room / Location: [address]    Anesthesia Start: 8088 Anesthesia Stop: 5922    Procedure: LEFT GROIN, BUTTOCK  WOUND EXPLORATION AND DEBRIDEMENT,  WOUND VAC EXCHANGE, ([address]) (Left ) Diagnosis: (NECROTIZING SOFT TISSUE INFECTION)    Surgeons: Du Leos MD Responsible Provider: Fernando Dumas MD    Anesthesia Type: general ASA Status: 4          Anesthesia Type: general    Cristhian Phase I: Cristhian Score: 9    Cristhian Phase II:      Last vitals: Reviewed and per EMR flowsheets.    POST-OP ANESTHESIA NOTE       BP (!) 189/91   Pulse 77   Temp 97 °F (36.1 °C) (Bladder)   Resp 12   Ht 5' 3\" (1.6 m)   Wt (!) 309 lb 14.4 oz (140.6 kg)   SpO2 97%   BMI 54.90 kg/m²    Pain Assessment: 0-10  Pain Level: 8           Anesthesia Post Evaluation    Patient location during evaluation: ICU  Patient participation: complete - patient participated  Level of consciousness: awake  Pain score: 8  Airway patency: patent  Nausea & Vomiting: no vomiting and no nausea  Complications: no  Cardiovascular status: hemodynamically stable  Respiratory status: acceptable  Hydration status: stable

## 2022-02-18 NOTE — PROGRESS NOTES
Dialysis Time Out  To be done by RN and tech or 2 RNs  Staff Names Jossy Calderon, RN & Justin Medina, floor RN    [x]  Identity of the patient using 2 patient identifiers  [x]  Consent for treatment  [x]  Equipment-proper machine and dialyzer  [x]  B-Hep B status  [x]  Orders- to include bath, blood flow, dialyzer, time and fluid removal  [x]  Access-Correct site and in working order  [x]  Time for patient to ask questions.

## 2022-02-18 NOTE — PROGRESS NOTES
Comprehensive Nutrition Assessment    Type and Reason for Visit:  Reassess    Nutrition Recommendations/Plan: Continue Carb Controlled diet - increase carb choices to at least 4/meal. Continue ONS- suggest change to Ensure High Protein supplements. Encourage PO intake as tolerated. Monitor tolerance to diet and adequacy of intake. Nutrition Assessment:  Chart reviewed. Pt was extubated yesterday. Tube Feeding discontinued. Pt was started on a clear liquid diet yesterday, but was NPO this morning for surgery (s/p debridement and wound VAC exchange). Carb Controlled diet just initiated. Meds/labs reviewed. Malnutrition Assessment:  Malnutrition Status: At risk for malnutrition  Context:  Acute Illness     Findings of the 6 clinical characteristics of malnutrition:  Energy Intake:  1 - 75% or less of estimated energy requirements for 7 or more days  Weight Loss:  No significant weight loss     Body Fat Loss:  No significant body fat loss     Muscle Mass Loss:  No significant muscle mass loss    Fluid Accumulation:  1 - Mild Extremities,Generalized   Strength:  Not Performed    Estimated Daily Nutrient Needs:  Energy (kcal):  5506-4214 kcal/day; Weight Used for Energy Requirements:  Current     Protein (g):  105-130 g pro/day; Weight Used for Protein Requirements:  Ideal (2-2.5)        Fluid (ml/day):  per MD      Nutrition Related Findings:  Obesity      Wounds:  Surgical Incision (necrotizing soft tissue infection of groin and buttocks- s/p I&D)       Current Nutrition Therapies:    ADULT DIET;  Regular; 3 carb choices (45 gm/meal)  ADULT ORAL NUTRITION SUPPLEMENT; AM Snack, PM Snack, HS Snack; Standard High Calorie/High Protein Oral Supplement    Anthropometric Measures:  · Height: 5' 3\" (160 cm)  · Current Body Weight: 309 lb 14.4 oz (140.6 kg)   · Admission Body Weight: 276 lb 6.4 oz (125.4 kg)    · Ideal Body Weight: 115 lbs; % Ideal Body Weight 269.5 %   · BMI: 54.9  · BMI Categories: Obese Class 3 (BMI 40.0 or greater)       Nutrition Diagnosis:   · Increased nutrient needs related to healing as evidenced by necrotizing soft tissue infection of groin and buttocks    · Inadequate oral intake related to recent extubation , recent diet advancement as evidenced by NPO/CL until this morning (no meal intake yet today)    Nutrition Interventions:   Food and/or Nutrient Delivery:  Continue Carb Controlled diet - increase carb choices to at least 4/meal. Continue ONS- suggest change to Ensure High Protein supplements. Nutrition Education/Counseling:  No recommendation at this time   Coordination of Nutrition Care:  Continue to monitor while inpatient    Goals:  meet % of estimated nutrient needs -progressing towards goal     Nutrition Monitoring and Evaluation:   Behavioral-Environmental Outcomes:  None Identified   Food/Nutrient Intake Outcomes:  Diet Advancement/Tolerance,Food and Nutrient Intake,Supplement Intake  Physical Signs/Symptoms Outcomes:  Biochemical Data,Nutrition Focused Physical Findings,Skin,Weight     Discharge Planning:     Too soon to determine     Electronically signed by Vivi Garcias RD, LD on 2/18/22 at 12:01 PM EST    Contact: [phone]

## 2022-02-18 NOTE — OP NOTE
Operative Note      Patient: David Alegria  YOB: 1980  MRN: [id number]    Date of Procedure: 2/18/2022    Pre-Op Diagnosis: NECROTIZING SOFT TISSUE INFECTION    Post-Op Diagnosis: Same       Procedure(s):  LEFT GROIN, BUTTOCK  WOUND EXPLORATION AND DEBRIDEMENT,  WOUND VAC EXCHANGE, (Caleb Arrbal)    Surgeon(s):  Danyel Ashby MD    Assistant:   Resident: Lesa Castro DO; Michael Parker DO    Anesthesia: General    Estimated Blood Loss (mL): Minimal    Complications: None    Specimens:   * No specimens in log *    Implants:  * No implants in log *      Drains:   Negative Pressure Wound Therapy Buttocks Left (Active)   Wound Type Surgical 02/18/22 1110   Dressing Type Black foam 02/18/22 1110   Cycle Continuous 02/18/22 1110   Target Pressure (mmHg) 125 02/18/22 1110   Irrigation Solution Dakins 0.125% 02/18/22 1110   Canister changed? Yes 02/18/22 1110   Dressing Status Changed 02/18/22 1110   Dressing Changed Changed/New 02/18/22 1110   Drainage Amount Moderate 02/18/22 1110   Drainage Description Serosanguinous 02/18/22 1110   Output (ml) 0 ml 02/18/22 1110   Wound Assessment Other (Comment) 02/18/22 1110       Colostomy LUQ Transverse (Active)   Stomal Appliance Intact;Dry;Clean 02/18/22 0800   Flange Size (inches) 4 Inches 02/18/22 0800   Stoma  Assessment Pink;Moist;Protrudes 02/18/22 0800   Mucocutaneous Junction Intact 02/18/22 0800   Peristomal Assessment Clean; Intact 02/18/22 0800   Treatment Bag change; Liquid skin barrier;Site care; Other (Comment) 02/16/22 1110   Stool Appearance Loose 02/18/22 0800   Stool Color Brown 02/18/22 0800   Output (mL) 550 ml 02/18/22 0038       Urethral Catheter Temperature probe 16 fr (Active)   Catheter Indications Need for fluid volume management of the critically ill patient in a critical care setting 02/18/22 1200   Site Assessment Swelling 02/18/22 1200   Urine Color Yellow 02/18/22 1200   Urine Appearance Clear 02/18/22 1200   Urine Odor Malodorous 02/18/22 1200   Output (mL) 40 mL 02/18/22 1200       [REMOVED] Negative Pressure Wound Therapy Hip Anterior;Right (Removed)   Wound Type Surgical 02/14/22 0000   Cycle Off 02/14/22 0000   Target Pressure (mmHg) 125 02/13/22 1600   Dressing Status Intact 02/14/22 0000   Dressing Changed Dressing reinforced 02/12/22 1600   Drainage Amount Small 02/13/22 1600   Drainage Description Serosanguinous 02/13/22 1600   Output (ml) 0 ml 02/12/22 1900   Wound Assessment Other (Comment) 02/13/22 1600   Vilma-wound Assessment Other (Comment) 02/13/22 1600       [REMOVED] Negative Pressure Wound Therapy Hip Anterior; Left (Removed)   Wound Type Surgical 02/14/22 0000   Cycle Off 02/14/22 0000   Target Pressure (mmHg) 125 02/13/22 1600   Dressing Status Dry; Intact; Clean 02/14/22 0000   Dressing Changed Dressing reinforced 02/12/22 1600   Drainage Amount Small 02/13/22 1600   Drainage Description Serosanguinous 02/13/22 1600   Output (ml) 25 ml 02/12/22 1900   Wound Assessment Other (Comment) 02/13/22 1600   Vilma-wound Assessment Other (Comment) 02/13/22 1600       [REMOVED] Negative Pressure Wound Therapy Buttocks Left (Removed)   Wound Type Surgical 02/18/22 0800   Dressing Type Black foam 02/18/22 0800   Cycle Continuous 02/18/22 0800   Target Pressure (mmHg) 125 02/18/22 0800   Irrigation Solution Dakins 0.125% 02/18/22 0800   Canister changed?  No 02/18/22 0800   Dressing Status Intact;Dry;Clean 02/18/22 0800   Dressing Changed Changed/New 02/17/22 0800   Drainage Amount Moderate 02/18/22 0800   Drainage Description Serosanguinous 02/18/22 0800   Output (ml) 0 ml 02/18/22 0000   Wound Assessment Other (Comment) 02/18/22 0800   Vilma-wound Assessment Fragile 02/14/22 1600       [REMOVED] NG/OG/NJ/NE Tube Orogastric 18 fr Center mouth (Removed)   Surrounding Skin Intact;Dry 02/17/22 0800   Securement device Yes 02/17/22 0800   Status Other (Comment) 02/17/22 0800   Placement Verified by External Catheter Length 02/17/22 0800 NG/OG/NJ/NE External Measurement (cm) 55 cm 02/17/22 0800   Tube Feeding Immune Enhancing 02/17/22 0800   Tube Feeding Status Continuous 02/17/22 0800   Rate/Schedule 15 mL/hr 02/17/22 0800   Tube Feeding Intake (mL) 91 ml 02/16/22 0000   Free Water Flush (mL) 60 mL 02/17/22 0800       Findings: L buttock wound sharply debrided of fibrinous exudate down to healthy subcutaneous tissue. New irrigating wound vac placed. New wound measurements 11 cm x 7 cm x 4 cm. Indications: Patient is a 42year old female who has undergone multiple debridements for perineal/butock necrotizing soft tissue infection. She most recently had an irrigating wound vac placed to the left buttock 2/16/2022. Decision was made to return to the OR today for wound exploration, debridement and wound vac change. Risks, benefits, and alternatives to surgery were discussed with the patient and all questions answered. Written consent was obtained and witnessed. Detailed Description of Procedure:   Patient was brought back to the OR, and general anesthesia was induced on the hospital cart. Patient was then positioned prone on the operating table. The previous wound vac was removed. The patient was prepped and draped in the usual sterile fashion. A formal timeout identifying the correct patient, procedure, surgical personnel and antibiotics was performed. The left buttock wound was inspected. Non-viable tissue was sharply debrided down to healthy subcutaneous tissue using Metzenbaum scissors. The entire wound bed was then irrigated with the Misonix device. Hemostasis was achieved using Bovie electrocautery. The wound was measured prior to vac placement, and now measures 11 cm x 7 cm x 4 cm. An irrigating wound vac was then placed. The seal was tested, and noted to be good. This concluded the case. The patient tolerated the procedure well, was extubated, and transported back to the TICU in stable condition.  All sponge, instrument and needle counts were correct at the conclusion of the case. Dr. Bushra Viramontes was present for the entire procedure.      Electronically signed by Louis Carr DO on 2/18/2022 at 12:54 PM

## 2022-02-18 NOTE — PROGRESS NOTES
ICU PROGRESS NOTE          PATIENT NAME: Estefani [facility] RECORD NO. [id number]  DATE: 2022    PRIMARY CARE PHYSICIAN: Fernando Allen MD    HD: # 10    ASSESSMENT    Patient Active Problem List   Diagnosis    Leonel gangrene    Uncontrolled type 2 diabetes mellitus with hyperglycemia ([facility]    Acute [facility] superimposed on CKD ([facility]    Hypokalemia    Hyponatremia    Anemia    Morbid obesity with BMI of 45.0-49.9, adult ([facility]    Type 1 diabetes mellitus with stage 3a chronic kidney disease ([facility]       MEDICAL DECISION MAKING AND PLAN    1. Neuro:  1. Tylenol 1000 mg q8H, gabapentin 100 mg q8, roxicodone 5mg q 4H PRN   2. Melatonin PRN   3. Patient takes clonidine at home for sleep, will verify and consider re-starting tonight     2. CV:  1. HR: 58 - 105  2. MAP: 83 - 100  3. Midodrine 5 mg TID     3. Heme:  1. Hgb: 7.6 (7.0)  2. Plt: 355 (288)  3. DVT ppx: heparin     4. Pulm:  1. Extubated   2. CXR: improving BOB      5. Renal/lytes:  1. BUN/Cr: 15 (26) / 1.81 (2.23)  2. Na/K+: 131 (136) / 3.5 (3.8)  3. M.7  Phos: 3.4  1. K+ and Mg replaced   4. UOP: 0.1 - 0.6 cc/kg/hr overnight   Nephrology following: received HD ; continue bicarb drip @ 50 cc/hr   6. GI:  1. Diet: CLD - NPO @ MN for OR   2. Pepcid 20 mg BID   3. S/p laparoscopic transverse loop colostomy creation   1. Bowel regimen   2. Wound/ostomy following for assistance in ostomy care   3. 900 cc out of ostomy overnight, 1200 cc over past 24 hours      7. ID:  1. Tmax: 98.8 (37.1)  2. WBC: 13.7 (8.7)  3. Continue zosyn, stop date     8. MSK:  1. Necrotizing soft tissue infection of the buttocks and groin   2. S/p incision and debridement , and further debridement later   3. S/p irrigation and debridement of wound 2/10  4. S/p irrigation and debridement, partial closure, and bilateral wound vac placement   5. S/p irrigation and debridement, partial closure, L wound vac application   6.  S/p irrigating wound vac placement, laparoscopic loop colostomy creation   7. Plan for return to OR today for vac change     9. Endo:  1. B  2. HDSS: 18 units over past 24 hours   3. Lantus 30 BID, PM dose held due to being NPO overnight for OR      10. Lines:  1. PIV, Booker     CHECKLIST    RASS: 0  RESTRAINTS: none  IVF: bicarb drip @ 50 cc/hr   NUTRITION: NPO since midnight for OR today   ANTIBIOTICS: zosyn   GI: pepcid   DVT: heparin   GLYCEMIC CONTROL: lantus, HDSS   HOB >45: yes   WOUND CARE: daily packing changes to right groin; vac change in OR     SUBJECTIVE    David Alegria was seen and examined at bedside. No acute events overnight. Hemodynamically stable, and afebrile. Reports that her colostomy site feels a little sore, but pain controlled on current regimen. Ostomy functioning with 900 cc output overnight.        OBJECTIVE  VITALS: Temp: Temp: 98.2 °F (36.8 °C)Temp  Av.2 °F (36.8 °C)  Min: 97.9 °F (36.6 °C)  Max: 98.8 °F (44.6 °C) BP Systolic (85UNL), RRQ:145 , Min:123 , PGO:605   Diastolic (84DQT), LXJ:73, Min:48, Max:76   Pulse Pulse  Av.7  Min: 58  Max: 105 Resp Resp  Av.3  Min: 10  Max: 21 Pulse ox SpO2  Av.7 %  Min: 89 %  Max: 100 %    GENERAL: awake, alert, no apparent distress   NEURO: awake, alert, following commands, moving all extremities   HEENT: NCAT   : perineal dressings intact, wound vac intact with good seal   LUNGS: no acute respiratory distress or accessory muscle use   HEART: regular rate and rhythm   ABDOMEN: soft, non-distended, ostomy pink and viable - stool output, incisions clean and dry with skin glue intact   EXTERMITY: no cyanosis, clubbing or edema    LAB:  CBC:   Recent Labs     22  0416 22  0609 22  0330   WBC 7.1 8.7 13.7*   HGB 7.3* 7.0* 7.6*   HCT 23.2* 21.2* 24.7*   MCV 90.6 88.7 95.0    288 355     BMP:   Recent Labs     22  0416 22  0609 22  0330   * 136 131*   K 3.7 3.8 3.5*    100 94*   CO2 24 26 24   BUN 21* 26* 16   CREATININE 1.97* 2.23* 1.81*   GLUCOSE 117* 135* 222*         RADIOLOGY:  XR CHEST PORTABLE    Result Date: 2/17/2022  EXAMINATION: ONE XRAY VIEW OF THE CHEST 2/17/2022 6:27 am COMPARISON: 02/16/2022, 02/15/2022 HISTORY: ORDERING SYSTEM PROVIDED HISTORY: intubated TECHNOLOGIST PROVIDED HISTORY: intubated FINDINGS: The endotracheal tube terminates in appropriate position above the henrietta. The enteric tube courses off the field of view in the upper abdomen. Right internal jugular line remains in place. .  The cardiac and mediastinal contours appear unchanged. Interval improved aeration of the left upper lung field. Otherwise bilateral airspace disease without significant change. No evidence for pneumothorax or significant effusion. 1. Unchanged position of support devices. 2. Bilateral airspace disease again demonstrated with interval improved aeration of the left upper lobe. XR CHEST PORTABLE    Result Date: 2/16/2022  EXAMINATION: ONE XRAY VIEW OF THE CHEST 2/16/2022 11:07 am COMPARISON: AP chest from 02/16/2022 at 05:12 HISTORY: ORDERING SYSTEM PROVIDED HISTORY: post op, intubated TECHNOLOGIST PROVIDED HISTORY: post op, intubated Reason for Exam: post op intubated port supine at 1104 am History of rectal surgery 02/8, 02/10, 02/11 and 2/14/22 FINDINGS: ETT tip stable satisfactory, 4.2 cm above the henrietta. Enteric tube and right-sided central line positions also stable/satisfactory. Overlying ECG monitor leads. Enlarged but stable cardiomediastinal shadow. Similar patchy hazy airspace abnormalities and probable basilar atelectasis; greater localized opacity left base now partially obscuring the left hemidiaphragm. No other interval change. Some degree of vascular congestion possible. No pneumothorax or large pleural effusion. Bones stable.      Slightly greater opacity left base now obscuring the hemidiaphragm; consider volume loss, developing consolidation/infiltrate and/or minimal pleural fluid. No large pleural effusion or other interval change.          Animalvitae Sole, DO  2/18/2022  7:09 AM

## 2022-02-18 NOTE — PROGRESS NOTES
Nephrology Progress Note      SUBJECTIVE    Patient was seen and examined. Patient remains hemodynamically stable. She went to the OR yesterday. She tolerated procedure fairly well  Patient is still in quite a significant positive fluid balance  No significant improvement in urine output noted  Yesterday 3 L was removed with dialysis and patient tolerated well without drop in blood pressure. Patient is not on any pressors      OBJECTIVE      CURRENT TEMPERATURE:  Temp: 97 °F (36.1 °C)  MAXIMUM TEMPERATURE OVER 24HRS:  Temp (24hrs), Av °F (36.1 °C), Min:96.7 °F (35.9 °C), Max:98.8 °F (37.1 °C)    CURRENT RESPIRATORY RATE:  Resp: 18  CURRENT PULSE:  Pulse: 77  CURRENT BLOOD PRESSURE:  BP: (!) 168/78  24HR BLOOD PRESSURE RANGE:  Systolic (08NIE), MBX:296 , Min:83 , ABK:480   ; Diastolic (69AUG), JVA:44, Min:55, Max:106    24HR INTAKE/OUTPUT:      Intake/Output Summary (Last 24 hours) at 2022 1235  Last data filed at 2022 1110  Gross per 24 hour   Intake 2338.67 ml   Output 4580 ml   Net -2241.33 ml     WEIGHT :  Patient Vitals for the past 96 hrs (Last 3 readings):   Weight   22 0343 (!) 309 lb 14.4 oz (140.6 kg)   22 1350 296 lb 8.3 oz (134.5 kg)   22 0950 (!) 302 lb 0.5 oz (137 kg)     PHYSICAL EXAM      GENERAL APPEARANCE: Alert and awake x3   SKIN: Warm to touch and no erythema  NECK: No lymphadenopathy  PULMONARY: Bilateral air entry and clear to auscultation no crackles or wheezes  CARDIOVASCULAR: Regular rate and rhythm positive S1 and S2 no rubs.    ABDOMEN: Divergent colostomy was present  EXTREMITIES: 1-2+ lower extremity edema    CURRENT MEDICATIONS      ondansetron (ZOFRAN) injection 4 mg, Q6H PRN  docusate (COLACE) 50 MG/5ML liquid 100 mg, Daily  polyethylene glycol (GLYCOLAX) packet 17 g, Daily  piperacillin-tazobactam (ZOSYN) 2,250 mg in dextrose 5 % 50 mL IVPB (mini-bag), Q8H  insulin glargine (LANTUS) injection vial 30 Units, BID  insulin lispro (HUMALOG) injection vial 0-18 Units, Q4H  albumin human 25 % IV solution 25 g, PRN  melatonin tablet 5 mg, Nightly PRN  oxyCODONE (ROXICODONE) immediate release tablet 5 mg, Q6H PRN  sodium hypochlorite (DAKINS) 0.125 % external solution, BID  0.9 % sodium chloride infusion, Continuous  midodrine (PROAMATINE) tablet 5 mg, TID WC  famotidine (PEPCID) 40 MG/5ML suspension 20 mg, Daily  prismaSATE BK 0/3.5 5,000 mL with potassium chloride 15 mEq solution, Continuous  sodium bicarbonate 150 mEq in dextrose 5 % 1,000 mL infusion, Continuous  heparin (porcine) injection 1,900 Units, PRN  heparin (porcine) injection 1,600 Units, PRN  fluticasone (FLONASE) 50 MCG/ACT nasal spray 1 spray, Daily  budesonide-formoterol (SYMBICORT) 80-4.5 MCG/ACT inhaler 2 puff, BID  albuterol sulfate  (90 Base) MCG/ACT inhaler 1 puff, Q4H PRN  montelukast (SINGULAIR) tablet 10 mg, Daily  rOPINIRole (REQUIP) tablet 4 mg, Daily  gabapentin (NEURONTIN) capsule 100 mg, Q8H  heparin (porcine) injection 5,000 Units, 3 times per day  acetaminophen (TYLENOL) tablet 1,000 mg, 3 times per day          LABS      CBC:   Recent Labs     02/16/22  0416 02/17/22  0609 02/18/22  0330   WBC 7.1 8.7 13.7*   RBC 2.56* 2.39* 2.60*   HGB 7.3* 7.0* 7.6*   HCT 23.2* 21.2* 24.7*   MCV 90.6 88.7 95.0   MCH 28.5 29.3 29.2   MCHC 31.5 33.0 30.8   RDW 15.6* 15.8* 15.9*    288 355   MPV 11.2 10.4 10.6      BMP:   Recent Labs     02/16/22  0416 02/17/22  0609 02/18/22  0330   * 136 131*   K 3.7 3.8 3.5*    100 94*   CO2 24 26 24   BUN 21* 26* 16   CREATININE 1.97* 2.23* 1.81*   GLUCOSE 117* 135* 222*   CALCIUM 7.3* 7.6* 7.8*      PHOSPHORUS:    Recent Labs     02/16/22  0416 02/17/22  0609 02/18/22  0330   PHOS 3.4 3.8 3.4     MAGNESIUM:   Recent Labs     02/16/22  0416 02/17/22  0609 02/18/22  0330   MG 1.7 1.7 1.7     SPEP:   Lab Results   Component Value Date    PROT 5.7 02/09/2022     C3:   Lab Results   Component Value Date    C3 143 02/09/2022     C4:   Lab Results   Component Value Date    C4 19 02/09/2022     URINE SODIUM:    Lab Results   Component Value Date    JORGE 26 02/09/2022      URINE CREATININE:    Lab Results   Component Value Date    LABCREA 142.3 02/09/2022         ASSESSMENT    1. Acute Kidney Injury: Acute kidney injury on top of chronic kidney disease. At present patient is dialysis dependent. Patient was initially on CVVHD. Switch to intermittent dialysis. So far patient is tolerating intermittent dialysis fairly well. 2.  Significant volume overload  3. Chronic kidney disease stage IIIa due to diabetic nephrosclerosis her baseline 1.5-1.6 proteinuria about 1 to 2 g from diabetic nephrosclerosis follows up with Dr. Bhupinder Desai  4. Longstanding type 2 diabetes   5. Morbid obesity  6. Leonel's gangrene requiring debridements, patient on antibiotics including clindamycin and Zosyn. 7.  Metabolic acidosis: Resolved  PLAN      1. Ultrafiltration today  2. Start Lasix 80 mg 3 times daily  3. BMP and CBC in a.m.  4.  We will consider dialysis and fluid removal again in a.m.  5.  Discontinue bicarbonate containing fluid    Please do not hesitate to call with questions.     This note is created with the assistance of a speech-recognition program. While intending to generate a document that actually reflects the content of the visit, no guarantees can be provided that every mistake has been identified and corrected by editing    Electronically signed by Rachel Pierce MD on 2/18/2022 at 12:35 PM

## 2022-02-18 NOTE — PLAN OF CARE
Problem: Skin Integrity:  Goal: Will show no infection signs and symptoms  Description: Will show no infection signs and symptoms  2/18/2022 0723 by Maco Perez RN  Outcome: Ongoing  2/17/2022 2127 by Sage Sargent RN  Outcome: Ongoing  Goal: Absence of new skin breakdown  Description: Absence of new skin breakdown  2/18/2022 0723 by Maco Perez RN  Outcome: Ongoing  2/17/2022 2127 by Sage Sargent RN  Outcome: Ongoing     Problem: Fluid Volume - Imbalance:  Goal: Absence of imbalanced fluid volume signs and symptoms  Description: Absence of imbalanced fluid volume signs and symptoms  2/18/2022 0723 by Maco Perez RN  Outcome: Ongoing  2/17/2022 2127 by Sage Sargent RN  Outcome: Ongoing     Problem: Pain:  Description: Pain management should include both nonpharmacologic and pharmacologic interventions.   Goal: Pain level will decrease  Description: Pain level will decrease  2/18/2022 0723 by Maco Perez RN  Outcome: Ongoing  2/17/2022 2127 by Sage Sargent RN  Outcome: Ongoing  Goal: Recognizes and communicates pain  Description: Recognizes and communicates pain  2/18/2022 0723 by Maco Perez RN  Outcome: Ongoing  2/17/2022 2127 by Sage Sargent RN  Outcome: Ongoing  Goal: Control of acute pain  Description: Control of acute pain  2/18/2022 0723 by Maco Perez RN  Outcome: Ongoing  2/17/2022 2127 by Sage Sargent RN  Outcome: Ongoing  Goal: Control of chronic pain  Description: Control of chronic pain  2/18/2022 0723 by Maco Perez RN  Outcome: Ongoing  2/17/2022 2127 by Sage Sargent RN  Outcome: Ongoing     Problem: Serum Glucose Level - Abnormal:  Goal: Ability to maintain appropriate glucose levels will improve to within specified parameters  Description: Ability to maintain appropriate glucose levels will improve to within specified parameters  2/18/2022 0723 by Maco Perez RN  Outcome: Ongoing  2/17/2022 2127 by Leonard Grissom RN  Outcome: Ongoing     Problem: Skin Integrity - Impaired:  Goal: Will show no infection signs and symptoms  Description: Will show no infection signs and symptoms  2/18/2022 0723 by Nicola Riddle RN  Outcome: Ongoing  2/17/2022 2127 by Leonard Grissom RN  Outcome: Ongoing  Goal: Absence of new skin breakdown  Description: Absence of new skin breakdown  2/18/2022 0723 by Nicola Riddle RN  Outcome: Ongoing  2/17/2022 2127 by Leonard Grissom RN  Outcome: Ongoing     Problem: Nutrition  Goal: Optimal nutrition therapy  2/18/2022 0723 by Nicola Riddle RN  Outcome: Ongoing  2/17/2022 2127 by Leonard Grissom RN  Outcome: Ongoing     Problem: OXYGENATION/RESPIRATORY FUNCTION  Goal: Patient will maintain patent airway  2/18/2022 0723 by Nicola Riddle RN  Outcome: Ongoing  2/17/2022 2127 by Leonard Grissom RN  Outcome: Ongoing  Goal: Patient will achieve/maintain normal respiratory rate/effort  Description: Respiratory rate and effort will be within normal limits for the patient  2/18/2022 0723 by Nicola Riddle RN  Outcome: Ongoing  2/17/2022 2127 by Leonard Grissom RN  Outcome: Ongoing     Problem: SKIN INTEGRITY  Goal: Skin integrity is maintained or improved  2/18/2022 0723 by Nicola Riddle RN  Outcome: Ongoing  2/17/2022 2127 by Leonard Grissom RN  Outcome: Ongoing     Problem: Confusion - Acute:  Goal: Absence of continued neurological deterioration signs and symptoms  Description: Absence of continued neurological deterioration signs and symptoms  2/18/2022 0723 by Nicola Riddle RN  Outcome: Ongoing  2/17/2022 2127 by Leonard Grissom RN  Outcome: Ongoing  Goal: Mental status will be restored to baseline  Description: Mental status will be restored to baseline  2/18/2022 0723 by Nicola Riddle RN  Outcome: Ongoing  2/17/2022 2127 by Leonard Grissom RN  Outcome: Ongoing     Problem: Discharge Planning:  Goal: Ability to perform activities of daily living will improve  Description: Ability to perform activities of daily living will improve  2/18/2022 0723 by Salomón Montejo RN  Outcome: Ongoing  2/17/2022 2127 by Adriana Maxwell RN  Outcome: Ongoing  Goal: Participates in care planning  Description: Participates in care planning  2/18/2022 0723 by Salomón Montejo RN  Outcome: Ongoing  2/17/2022 2127 by Adriana Maxwell RN  Outcome: Ongoing     Problem: Injury - Risk of, Physical Injury:  Goal: Absence of physical injury  Description: Absence of physical injury  2/18/2022 0723 by Salomón Montejo RN  Outcome: Ongoing  2/17/2022 2127 by Adriana Maxwell RN  Outcome: Ongoing  Goal: Will remain free from falls  Description: Will remain free from falls  2/18/2022 0723 by Salomón Montejo RN  Outcome: Ongoing  2/17/2022 2127 by Adriana Maxwell RN  Outcome: Ongoing     Problem: Mood - Altered:  Goal: Mood stable  Description: Mood stable  2/18/2022 0723 by Salomón Montejo RN  Outcome: Ongoing  2/17/2022 2127 by Adriana Maxwell RN  Outcome: Ongoing  Goal: Absence of abusive behavior  Description: Absence of abusive behavior  2/18/2022 0723 by Salomón Montejo RN  Outcome: Ongoing  2/17/2022 2127 by Adriana Maxwell RN  Outcome: Ongoing  Goal: Verbalizations of feeling emotionally comfortable while being cared for will increase  Description: Verbalizations of feeling emotionally comfortable while being cared for will increase  2/18/2022 0723 by Salomón Montejo RN  Outcome: Ongoing  2/17/2022 2127 by Adriana Maxwell RN  Outcome: Ongoing     Problem: Psychomotor Activity - Altered:  Goal: Absence of psychomotor disturbance signs and symptoms  Description: Absence of psychomotor disturbance signs and symptoms  2/18/2022 0723 by Salomón Montejo RN  Outcome: Ongoing  2/17/2022 2127 by Adriana Maxwell RN  Outcome: Ongoing     Problem: Sensory Perception - Impaired:  Goal: Demonstrations of improved sensory functioning will increase  Description: Demonstrations of improved sensory functioning will increase  2/18/2022 0723 by Lisa Melgar RN  Outcome: Ongoing  2/17/2022 2127 by Vladislav Webster RN  Outcome: Ongoing  Goal: Decrease in sensory misperception frequency  Description: Decrease in sensory misperception frequency  2/18/2022 0723 by Lisa Melgar RN  Outcome: Ongoing  2/17/2022 2127 by Vladislav Webster RN  Outcome: Ongoing  Goal: Able to refrain from responding to false sensory perceptions  Description: Able to refrain from responding to false sensory perceptions  2/18/2022 0723 by Lisa Melgar RN  Outcome: Ongoing  2/17/2022 2127 by Vladislav Webster RN  Outcome: Ongoing  Goal: Demonstrates accurate environmental perceptions  Description: Demonstrates accurate environmental perceptions  2/18/2022 0723 by Lisa Melgar RN  Outcome: Ongoing  2/17/2022 2127 by Vladislav Webster RN  Outcome: Ongoing  Goal: Able to distinguish between reality-based and nonreality-based thinking  Description: Able to distinguish between reality-based and nonreality-based thinking  2/18/2022 0723 by Lisa Melgar RN  Outcome: Ongoing  2/17/2022 2127 by Vladislav Webster RN  Outcome: Ongoing  Goal: Able to interrupt nonreality-based thinking  Description: Able to interrupt nonreality-based thinking  2/18/2022 0723 by Lisa Melgar RN  Outcome: Ongoing  2/17/2022 2127 by Vladislav Webster RN  Outcome: Ongoing     Problem: Sleep Pattern Disturbance:  Goal: Appears well-rested  Description: Appears well-rested  2/18/2022 0723 by Lisa Melgar RN  Outcome: Ongoing  2/17/2022 2127 by Vladislav Webster RN  Outcome: Ongoing

## 2022-02-18 NOTE — PROGRESS NOTES
7598/3529: Dr. Dania Cobb perfectseved regarding gtts, lantus, AM heparin before OR, and pt asking for melatonin. 1938: Per Dr. Dania Cobb: Hold lantus tonight, give 0600 heparin. , melatonin added, continue HCO3 gtt, don't start D5W1/2NS. 1747: Dr. Dania Cobb orders perfectserved back by Sena Reich as confirmation of plan. 1927: Pt requesting something else to help sleep, pt states she takes Klonopin at home for sleep, not listed in home meds, Dr. Dania Cobb perfectserved with patient request.  3884: Dr. Dania Cobb asked if patient in pain. 65: Patient said she was \"good, 5/10\"  0252: Dr. Dania Cobb stated to give PRN Asya, and day team will discuss what to add for tonight. 0256: Dr. Dania Cobb perfectserved back by Sena Reich that asya is for 7-10/10 but will give dose now per her order. 0423: Dr. Dania Cobb perfectserved regarding K/Mg, see orders. 4559: Dr. Dania Cobb perfectserved for zofran PRN d/t pt nauseated. See orders.

## 2022-02-18 NOTE — BRIEF OP NOTE
Brief Postoperative Note      Patient: Darvin Mobley  YOB: 1980  MRN: [id number]    Date of Procedure: 2/18/2022    Pre-Op Diagnosis: NECROTIZING SOFT TISSUE INFECTION    Post-Op Diagnosis: Same       Procedure(s):  LEFT GROIN, BUTTOCK  WOUND EXPLORATION AND DEBRIDEMENT,  WOUND VAC EXCHANGE, ([address])    Surgeon(s):  Karely Pritchett MD    Assistant:  Resident: Kenrick Westbrook DO; Yeimy Dyer DO    Anesthesia: General    Estimated Blood Loss (mL): Minimal    Complications: None    Specimens:   * No specimens in log *    Implants:  * No implants in log *      Drains:   Negative Pressure Wound Therapy Buttocks Left (Active)       Colostomy LUQ Transverse (Active)   Stomal Appliance Intact;Dry;Clean 02/18/22 0800   Flange Size (inches) 4 Inches 02/18/22 0800   Stoma  Assessment Pink;Moist;Protrudes 02/18/22 0800   Mucocutaneous Junction Intact 02/18/22 0800   Peristomal Assessment Clean; Intact 02/18/22 0800   Treatment Bag change; Liquid skin barrier;Site care; Other (Comment) 02/16/22 1110   Stool Appearance Loose 02/18/22 0800   Stool Color Brown 02/18/22 0800   Output (mL) 550 ml 02/18/22 0038       Urethral Catheter Temperature probe 16 fr (Active)   Catheter Indications Need for fluid volume management of the critically ill patient in a critical care setting 02/18/22 0800   Site Assessment Swelling 02/18/22 0800   Urine Color Yellow 02/18/22 0800   Urine Appearance Clear 02/18/22 0800   Urine Odor Malodorous 02/18/22 0800   Output (mL) 25 mL 02/18/22 0949       [REMOVED] Negative Pressure Wound Therapy Hip Anterior;Right (Removed)   Wound Type Surgical 02/14/22 0000   Cycle Off 02/14/22 0000   Target Pressure (mmHg) 125 02/13/22 1600   Dressing Status Intact 02/14/22 0000   Dressing Changed Dressing reinforced 02/12/22 1600   Drainage Amount Small 02/13/22 1600   Drainage Description Serosanguinous 02/13/22 1600   Output (ml) 0 ml 02/12/22 1900   Wound Assessment Other (Comment) 02/13/22 1600 Vilma-wound Assessment Other (Comment) 02/13/22 1600       [REMOVED] Negative Pressure Wound Therapy Hip Anterior; Left (Removed)   Wound Type Surgical 02/14/22 0000   Cycle Off 02/14/22 0000   Target Pressure (mmHg) 125 02/13/22 1600   Dressing Status Dry; Intact; Clean 02/14/22 0000   Dressing Changed Dressing reinforced 02/12/22 1600   Drainage Amount Small 02/13/22 1600   Drainage Description Serosanguinous 02/13/22 1600   Output (ml) 25 ml 02/12/22 1900   Wound Assessment Other (Comment) 02/13/22 1600   Vilma-wound Assessment Other (Comment) 02/13/22 1600       [REMOVED] Negative Pressure Wound Therapy Buttocks Left (Removed)   Wound Type Surgical 02/18/22 0800   Dressing Type Black foam 02/18/22 0800   Cycle Continuous 02/18/22 0800   Target Pressure (mmHg) 125 02/18/22 0800   Irrigation Solution Dakins 0.125% 02/18/22 0800   Canister changed? No 02/18/22 0800   Dressing Status Intact;Dry;Clean 02/18/22 0800   Dressing Changed Changed/New 02/18/22 0800   Drainage Amount Moderate 02/18/22 0800   Drainage Description Serosanguinous 02/18/22 0800   Output (ml) 0 ml 02/18/22 0000   Wound Assessment Other (Comment) 02/18/22 0800   Vilma-wound Assessment Fragile 02/14/22 1600       [REMOVED] NG/OG/NJ/NE Tube Orogastric 18 fr Center mouth (Removed)   Surrounding Skin Intact;Dry 02/17/22 0800   Securement device Yes 02/17/22 0800   Status Other (Comment) 02/17/22 0800   Placement Verified by External Catheter Length 02/17/22 0800   NG/OG/NJ/NE External Measurement (cm) 55 cm 02/17/22 0800   Tube Feeding Immune Enhancing 02/17/22 0800   Tube Feeding Status Continuous 02/17/22 0800   Rate/Schedule 15 mL/hr 02/17/22 0800   Tube Feeding Intake (mL) 91 ml 02/16/22 0000   Free Water Flush (mL) 60 mL 02/17/22 0800       Findings: L buttock wound sharply debrided of fibrinous exudate down to healthy subcutaneous tissue. New irrigating wound vac placed. New wound measurements 11 cm x 7 cm x 4 cm.      Electronically signed by [facility] I Sunil Boo,  on 2/18/2022 at 11:16 AM

## 2022-02-18 NOTE — PLAN OF CARE
Problem: Skin Integrity:  Goal: Will show no infection signs and symptoms  Description: Will show no infection signs and symptoms  2/17/2022 2127 by Lauren Pierre RN  Outcome: Ongoing  2/17/2022 0730 by Dominguez Helms RN  Outcome: Ongoing  Goal: Absence of new skin breakdown  Description: Absence of new skin breakdown  2/17/2022 2127 by Lauren Pierre RN  Outcome: Ongoing  2/17/2022 0730 by Dominguez Helms RN  Outcome: Ongoing     Problem: Fluid Volume - Imbalance:  Goal: Absence of imbalanced fluid volume signs and symptoms  Description: Absence of imbalanced fluid volume signs and symptoms  2/17/2022 2127 by Lauren Pierre RN  Outcome: Ongoing  2/17/2022 0730 by Dominguez Helms RN  Outcome: Ongoing     Problem: Pain:  Goal: Pain level will decrease  Description: Pain level will decrease  2/17/2022 2127 by Lauren Pierre RN  Outcome: Ongoing  2/17/2022 0730 by Dominguez Helms RN  Outcome: Ongoing  Goal: Recognizes and communicates pain  Description: Recognizes and communicates pain  2/17/2022 2127 by Lauren Pierre RN  Outcome: Ongoing  2/17/2022 0730 by Dominguez Helms RN  Outcome: Ongoing  Goal: Control of acute pain  Description: Control of acute pain  2/17/2022 2127 by Lauren Pierre RN  Outcome: Ongoing  2/17/2022 0730 by Dominguez Helms RN  Outcome: Ongoing  Goal: Control of chronic pain  Description: Control of chronic pain  2/17/2022 2127 by Lauren Pierre RN  Outcome: Ongoing  2/17/2022 0730 by Dominguez Helms RN  Outcome: Ongoing     Problem: Serum Glucose Level - Abnormal:  Goal: Ability to maintain appropriate glucose levels will improve to within specified parameters  Description: Ability to maintain appropriate glucose levels will improve to within specified parameters  2/17/2022 2127 by Lauren Pierre RN  Outcome: Ongoing  2/17/2022 0730 by Dominguez Helms RN  Outcome: Ongoing     Problem: Skin Integrity - Impaired:  Goal: Will show no infection signs and symptoms  Description: Will show no infection signs and symptoms  2/17/2022 2127 by Mane Zapata RN  Outcome: Ongoing  2/17/2022 0730 by Sergo Aguilar RN  Outcome: Ongoing  Goal: Absence of new skin breakdown  Description: Absence of new skin breakdown  2/17/2022 2127 by Mane Zapata RN  Outcome: Ongoing  2/17/2022 0730 by Sergo Aguilar RN  Outcome: Ongoing     Problem: Nutrition  Goal: Optimal nutrition therapy  2/17/2022 2127 by Mane Zapata RN  Outcome: Ongoing  2/17/2022 0730 by Sergo Aguilar RN  Outcome: Ongoing     Problem: OXYGENATION/RESPIRATORY FUNCTION  Goal: Patient will maintain patent airway  2/17/2022 2127 by Mane Zapata RN  Outcome: Ongoing  2/17/2022 1232 by Linda Malloy RCP  Outcome: Ongoing  2/17/2022 0730 by Sergo Aguilar RN  Outcome: Ongoing  Goal: Patient will achieve/maintain normal respiratory rate/effort  Description: Respiratory rate and effort will be within normal limits for the patient  2/17/2022 2127 by Mane Zapata RN  Outcome: Ongoing  2/17/2022 1232 by Linda Malloy RCP  Outcome: Ongoing  2/17/2022 0730 by Sergo Aguilar RN  Outcome: Ongoing     Problem: SKIN INTEGRITY  Goal: Skin integrity is maintained or improved  2/17/2022 2127 by Mane Zapata RN  Outcome: Ongoing  2/17/2022 0730 by Sergo Aguilar RN  Outcome: Ongoing     Problem: Confusion - Acute:  Goal: Absence of continued neurological deterioration signs and symptoms  Description: Absence of continued neurological deterioration signs and symptoms  2/17/2022 2127 by Mane Zapata RN  Outcome: Ongoing  2/17/2022 0730 by Sergo Aguilar RN  Outcome: Ongoing  Goal: Mental status will be restored to baseline  Description: Mental status will be restored to baseline  2/17/2022 2127 by Mane Zapata RN  Outcome: Ongoing  2/17/2022 0730 by Sergo Aguilar RN  Outcome: Ongoing     Problem: Discharge Planning:  Goal: Ability to perform activities of daily living will improve  Description: Ability to perform activities of daily living will improve  2/17/2022 2127 by Dora Dickens RN  Outcome: Ongoing  2/17/2022 0730 by Tania Umana RN  Outcome: Ongoing  Goal: Participates in care planning  Description: Participates in care planning  2/17/2022 2127 by Dora Dickens RN  Outcome: Ongoing  2/17/2022 0730 by Tania Umana RN  Outcome: Ongoing     Problem: Injury - Risk of, Physical Injury:  Goal: Absence of physical injury  Description: Absence of physical injury  2/17/2022 2127 by Dora Dickens RN  Outcome: Ongoing  2/17/2022 0730 by Tania Umana RN  Outcome: Ongoing  Goal: Will remain free from falls  Description: Will remain free from falls  2/17/2022 2127 by Dora Dickens RN  Outcome: Ongoing  2/17/2022 0730 by Tania Umana RN  Outcome: Ongoing     Problem: Mood - Altered:  Goal: Mood stable  Description: Mood stable  2/17/2022 2127 by Dora Dickens RN  Outcome: Ongoing  2/17/2022 0730 by Tania Umana RN  Outcome: Ongoing  Goal: Absence of abusive behavior  Description: Absence of abusive behavior  2/17/2022 2127 by Dora Dickens RN  Outcome: Ongoing  2/17/2022 0730 by Tania Umana RN  Outcome: Ongoing  Goal: Verbalizations of feeling emotionally comfortable while being cared for will increase  Description: Verbalizations of feeling emotionally comfortable while being cared for will increase  2/17/2022 2127 by Dora Dickens RN  Outcome: Ongoing  2/17/2022 0730 by Tania Umana RN  Outcome: Ongoing     Problem: Psychomotor Activity - Altered:  Goal: Absence of psychomotor disturbance signs and symptoms  Description: Absence of psychomotor disturbance signs and symptoms  2/17/2022 2127 by Dora Dickens RN  Outcome: Ongoing  2/17/2022 0730 by Tania Umana RN  Outcome: Ongoing     Problem: Sensory Perception - Impaired:  Goal: Demonstrations of improved sensory functioning will increase  Description: Demonstrations of improved sensory functioning will increase  2/17/2022 2127 by Jessica Meng RN  Outcome: Ongoing  2/17/2022 0730 by Topher Leblanc RN  Outcome: Ongoing  Goal: Decrease in sensory misperception frequency  Description: Decrease in sensory misperception frequency  2/17/2022 2127 by Jessica Meng RN  Outcome: Ongoing  2/17/2022 0730 by Topher Leblanc RN  Outcome: Ongoing  Goal: Able to refrain from responding to false sensory perceptions  Description: Able to refrain from responding to false sensory perceptions  2/17/2022 2127 by Jessica Meng RN  Outcome: Ongoing  2/17/2022 0730 by Topher Leblanc RN  Outcome: Ongoing  Goal: Demonstrates accurate environmental perceptions  Description: Demonstrates accurate environmental perceptions  2/17/2022 2127 by Jessica Meng RN  Outcome: Ongoing  2/17/2022 0730 by Topher Leblanc RN  Outcome: Ongoing  Goal: Able to distinguish between reality-based and nonreality-based thinking  Description: Able to distinguish between reality-based and nonreality-based thinking  2/17/2022 2127 by Jessica Meng RN  Outcome: Ongoing  2/17/2022 0730 by Topher Leblanc RN  Outcome: Ongoing  Goal: Able to interrupt nonreality-based thinking  Description: Able to interrupt nonreality-based thinking  2/17/2022 2127 by Jessica Meng RN  Outcome: Ongoing  2/17/2022 0730 by Topher Leblanc RN  Outcome: Ongoing     Problem: Sleep Pattern Disturbance:  Goal: Appears well-rested  Description: Appears well-rested  2/17/2022 2127 by Jessica Meng RN  Outcome: Ongoing  2/17/2022 0730 by Topher Leblanc RN  Outcome: Ongoing

## 2022-02-19 LAB
ABSOLUTE EOS #: 0.06 K/UL (ref 0–0.44)
ABSOLUTE IMMATURE GRANULOCYTE: 0.24 K/UL (ref 0–0.3)
ABSOLUTE LYMPH #: 1.21 K/UL (ref 1.1–3.7)
ABSOLUTE MONO #: 0.98 K/UL (ref 0.1–1.2)
ANION GAP SERPL CALCULATED.3IONS-SCNC: 13 MMOL/L (ref 9–17)
BASOPHILS # BLD: 0 % (ref 0–2)
BASOPHILS ABSOLUTE: 0.05 K/UL (ref 0–0.2)
BUN BLDV-MCNC: 19 MG/DL (ref 6–20)
BUN/CREAT BLD: ABNORMAL (ref 9–20)
CALCIUM SERPL-MCNC: 7.8 MG/DL (ref 8.6–10.4)
CHLORIDE BLD-SCNC: 98 MMOL/L (ref 98–107)
CO2: 22 MMOL/L (ref 20–31)
CREAT SERPL-MCNC: 2.24 MG/DL (ref 0.5–0.9)
DIFFERENTIAL TYPE: ABNORMAL
EOSINOPHILS RELATIVE PERCENT: 1 % (ref 1–4)
GFR AFRICAN AMERICAN: 29 ML/MIN
GFR NON-AFRICAN AMERICAN: 24 ML/MIN
GFR SERPL CREATININE-BSD FRML MDRD: ABNORMAL ML/MIN/{1.73_M2}
GFR SERPL CREATININE-BSD FRML MDRD: ABNORMAL ML/MIN/{1.73_M2}
GLUCOSE BLD-MCNC: 100 MG/DL (ref 65–105)
GLUCOSE BLD-MCNC: 116 MG/DL (ref 65–105)
GLUCOSE BLD-MCNC: 126 MG/DL (ref 65–105)
GLUCOSE BLD-MCNC: 130 MG/DL (ref 65–105)
GLUCOSE BLD-MCNC: 136 MG/DL (ref 65–105)
GLUCOSE BLD-MCNC: 145 MG/DL (ref 70–99)
GLUCOSE BLD-MCNC: 172 MG/DL (ref 65–105)
HCT VFR BLD CALC: 24.9 % (ref 36.3–47.1)
HEMOGLOBIN: 7.8 G/DL (ref 11.9–15.1)
IMMATURE GRANULOCYTES: 2 %
LYMPHOCYTES # BLD: 10 % (ref 24–43)
MAGNESIUM: 2.4 MG/DL (ref 1.6–2.6)
MCH RBC QN AUTO: 29.2 PG (ref 25.2–33.5)
MCHC RBC AUTO-ENTMCNC: 31.3 G/DL (ref 28.4–34.8)
MCV RBC AUTO: 93.3 FL (ref 82.6–102.9)
MONOCYTES # BLD: 8 % (ref 3–12)
NRBC AUTOMATED: 0 PER 100 WBC
PDW BLD-RTO: 15.6 % (ref 11.8–14.4)
PLATELET # BLD: 357 K/UL (ref 138–453)
PLATELET ESTIMATE: ABNORMAL
PMV BLD AUTO: 10.5 FL (ref 8.1–13.5)
POTASSIUM SERPL-SCNC: 4 MMOL/L (ref 3.7–5.3)
RBC # BLD: 2.67 M/UL (ref 3.95–5.11)
RBC # BLD: ABNORMAL 10*6/UL
SEG NEUTROPHILS: 79 % (ref 36–65)
SEGMENTED NEUTROPHILS ABSOLUTE COUNT: 9.59 K/UL (ref 1.5–8.1)
SODIUM BLD-SCNC: 133 MMOL/L (ref 135–144)
WBC # BLD: 12.1 K/UL (ref 3.5–11.3)
WBC # BLD: ABNORMAL 10*3/UL

## 2022-02-19 PROCEDURE — 6360000002 HC RX W HCPCS: Performed by: STUDENT IN AN ORGANIZED HEALTH CARE EDUCATION/TRAINING PROGRAM

## 2022-02-19 PROCEDURE — 85025 COMPLETE CBC W/AUTO DIFF WBC: CPT

## 2022-02-19 PROCEDURE — 97162 PT EVAL MOD COMPLEX 30 MIN: CPT

## 2022-02-19 PROCEDURE — 83735 ASSAY OF MAGNESIUM: CPT

## 2022-02-19 PROCEDURE — 6370000000 HC RX 637 (ALT 250 FOR IP): Performed by: STUDENT IN AN ORGANIZED HEALTH CARE EDUCATION/TRAINING PROGRAM

## 2022-02-19 PROCEDURE — 80048 BASIC METABOLIC PNL TOTAL CA: CPT

## 2022-02-19 PROCEDURE — 97535 SELF CARE MNGMENT TRAINING: CPT

## 2022-02-19 PROCEDURE — 97530 THERAPEUTIC ACTIVITIES: CPT

## 2022-02-19 PROCEDURE — 94640 AIRWAY INHALATION TREATMENT: CPT

## 2022-02-19 PROCEDURE — 36415 COLL VENOUS BLD VENIPUNCTURE: CPT

## 2022-02-19 PROCEDURE — 90935 HEMODIALYSIS ONE EVALUATION: CPT | Performed by: INTERNAL MEDICINE

## 2022-02-19 PROCEDURE — 2580000003 HC RX 258: Performed by: STUDENT IN AN ORGANIZED HEALTH CARE EDUCATION/TRAINING PROGRAM

## 2022-02-19 PROCEDURE — 2700000000 HC OXYGEN THERAPY PER DAY

## 2022-02-19 PROCEDURE — 82947 ASSAY GLUCOSE BLOOD QUANT: CPT

## 2022-02-19 PROCEDURE — 97166 OT EVAL MOD COMPLEX 45 MIN: CPT

## 2022-02-19 PROCEDURE — 2060000000 HC ICU INTERMEDIATE R&B

## 2022-02-19 PROCEDURE — 90935 HEMODIALYSIS ONE EVALUATION: CPT

## 2022-02-19 RX ORDER — DULOXETIN HYDROCHLORIDE 30 MG/1
60 CAPSULE, DELAYED RELEASE ORAL DAILY
Status: DISCONTINUED | OUTPATIENT
Start: 2022-02-20 | End: 2022-02-26 | Stop reason: HOSPADM

## 2022-02-19 RX ADMIN — POLYETHYLENE GLYCOL 3350 17 G: 17 POWDER, FOR SOLUTION ORAL at 08:16

## 2022-02-19 RX ADMIN — BUDESONIDE AND FORMOTEROL FUMARATE DIHYDRATE 2 PUFF: 80; 4.5 AEROSOL RESPIRATORY (INHALATION) at 21:39

## 2022-02-19 RX ADMIN — INSULIN LISPRO 3 UNITS: 100 INJECTION, SOLUTION INTRAVENOUS; SUBCUTANEOUS at 11:41

## 2022-02-19 RX ADMIN — GABAPENTIN 100 MG: 300 CAPSULE ORAL at 21:39

## 2022-02-19 RX ADMIN — BUDESONIDE AND FORMOTEROL FUMARATE DIHYDRATE 2 PUFF: 80; 4.5 AEROSOL RESPIRATORY (INHALATION) at 08:36

## 2022-02-19 RX ADMIN — HEPARIN SODIUM 5000 UNITS: 5000 INJECTION INTRAVENOUS; SUBCUTANEOUS at 06:24

## 2022-02-19 RX ADMIN — MONTELUKAST SODIUM 10 MG: 10 TABLET, FILM COATED ORAL at 08:15

## 2022-02-19 RX ADMIN — FLUTICASONE PROPIONATE 1 SPRAY: 50 SPRAY, METERED NASAL at 08:16

## 2022-02-19 RX ADMIN — ACETAMINOPHEN 1000 MG: 500 TABLET ORAL at 21:38

## 2022-02-19 RX ADMIN — OXYCODONE 5 MG: 5 TABLET ORAL at 23:49

## 2022-02-19 RX ADMIN — INSULIN GLARGINE 30 UNITS: 100 INJECTION, SOLUTION SUBCUTANEOUS at 08:15

## 2022-02-19 RX ADMIN — HEPARIN SODIUM 5000 UNITS: 5000 INJECTION INTRAVENOUS; SUBCUTANEOUS at 21:38

## 2022-02-19 RX ADMIN — DOCUSATE SODIUM LIQUID 100 MG: 100 LIQUID ORAL at 08:16

## 2022-02-19 RX ADMIN — ONDANSETRON 4 MG: 2 INJECTION INTRAMUSCULAR; INTRAVENOUS at 09:32

## 2022-02-19 RX ADMIN — GABAPENTIN 100 MG: 300 CAPSULE ORAL at 11:42

## 2022-02-19 RX ADMIN — HYDRALAZINE HYDROCHLORIDE 5 MG: 20 INJECTION INTRAMUSCULAR; INTRAVENOUS at 23:49

## 2022-02-19 RX ADMIN — ACETAMINOPHEN 1000 MG: 500 TABLET ORAL at 14:23

## 2022-02-19 RX ADMIN — DAKIN'S SOLUTION 0.125% (QUARTER STRENGTH): 0.12 SOLUTION at 21:49

## 2022-02-19 RX ADMIN — FAMOTIDINE 20 MG: 40 POWDER, FOR SUSPENSION ORAL at 08:16

## 2022-02-19 RX ADMIN — OXYCODONE 5 MG: 5 TABLET ORAL at 14:23

## 2022-02-19 RX ADMIN — ACETAMINOPHEN 1000 MG: 500 TABLET ORAL at 06:24

## 2022-02-19 RX ADMIN — DAKIN'S SOLUTION 0.125% (QUARTER STRENGTH): 0.12 SOLUTION at 08:17

## 2022-02-19 RX ADMIN — GABAPENTIN 100 MG: 300 CAPSULE ORAL at 04:00

## 2022-02-19 RX ADMIN — INSULIN GLARGINE 30 UNITS: 100 INJECTION, SOLUTION SUBCUTANEOUS at 21:38

## 2022-02-19 RX ADMIN — HEPARIN SODIUM 5000 UNITS: 5000 INJECTION INTRAVENOUS; SUBCUTANEOUS at 14:24

## 2022-02-19 RX ADMIN — PIPERACILLIN SODIUM AND TAZOBACTAM SODIUM 2250 MG: 2; .25 INJECTION, POWDER, LYOPHILIZED, FOR SOLUTION INTRAVENOUS at 03:40

## 2022-02-19 RX ADMIN — ROPINIROLE HYDROCHLORIDE 4 MG: 1 TABLET, FILM COATED ORAL at 08:16

## 2022-02-19 ASSESSMENT — PAIN SCALES - GENERAL
PAINLEVEL_OUTOF10: 8
PAINLEVEL_OUTOF10: 8
PAINLEVEL_OUTOF10: 0
PAINLEVEL_OUTOF10: 7
PAINLEVEL_OUTOF10: 7
PAINLEVEL_OUTOF10: 4
PAINLEVEL_OUTOF10: 4
PAINLEVEL_OUTOF10: 0
PAINLEVEL_OUTOF10: 10
PAINLEVEL_OUTOF10: 4

## 2022-02-19 ASSESSMENT — PAIN DESCRIPTION - PAIN TYPE
TYPE: SURGICAL PAIN
TYPE: SURGICAL PAIN;ACUTE PAIN
TYPE: ACUTE PAIN;SURGICAL PAIN

## 2022-02-19 ASSESSMENT — PAIN DESCRIPTION - LOCATION: LOCATION: PERINEUM

## 2022-02-19 ASSESSMENT — PAIN - FUNCTIONAL ASSESSMENT: PAIN_FUNCTIONAL_ASSESSMENT: ACTIVITIES ARE NOT PREVENTED

## 2022-02-19 NOTE — CARE COORDINATION
Transitional Planning     Received update from [facility], trauma, that pt is requesting [facility]. Referral sent.

## 2022-02-19 NOTE — PROGRESS NOTES
Physical Therapy    Facility/Department: [address]  Initial Assessment    NAME: Ave Martinez  : 1980  MRN: [id number]    Chief Complaint   Patient presents with    Abscess       Date of Service: 2022    Discharge Recommendations:    Further therapy recommended at discharge. PT Equipment Recommendations  Other: CTA, pt requires RW to safetly amb with assist at this time. Assessment   Body structures, Functions, Activity limitations: Decreased functional mobility ; Decreased strength  Assessment: Pt amb 4' Rw CGA. Pt would benefit from continued acute PT to address deficits. 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 has a past medical history of Clinical trial participant.   has a past surgical history that includes Rectal surgery (N/A, 2022); Abdomen surgery (Bilateral, 2022); Rectal surgery (Bilateral, 2/10/2022); Rectal surgery (N/A, 2022); Wound Exploration (Left, 2022); and colostomy (N/A, 2022). Restrictions  Restrictions/Precautions  Restrictions/Precautions: General Precautions,Fall Risk  Required Braces or Orthoses?: No  Position Activity Restriction  Other position/activity restrictions: s/p I &D  twice, 2/10, , ,  with wound vac and colostomy,  and planned vac change . Pt extubated .   Vision/Hearing  Vision: Impaired  Vision Exceptions: Wears glasses for reading  Hearing: Within functional limits     Subjective  General  Chart Reviewed: Yes  Patient assessed for rehabilitation services?: Yes  Response To Previous Treatment: Not applicable  Family / Caregiver Present: No  Follows Commands: Within Functional Limits  General Comment  Comments: OT co-eval. RN and pt agreeable to PT.  Pt alert in bed upon arrival.  Pain Screening  Patient Currently in Pain: Denies  Vital Signs  Patient Currently in Pain: Denies  Pre Treatment Pain Screening  Intervention List: Patient able to continue with treatment    Orientation  Orientation  Overall Orientation Status: Within Functional Limits  Social/Functional History  Social/Functional History  Lives With:  (kids (15, 15))  Type of Home: Apartment (1st floor)  Home Layout: One level  Home Access: Level entry  Bathroom Shower/Tub: Tub/Shower unit  Bathroom Toilet: Standard  Bathroom Equipment: Grab bars in shower,Shower chair (not used chair recently)  Bathroom Accessibility: Accessible  Home Equipment: Rolling walker,Cane (rollator)  Receives Help From: Family (mother able to come assist)  ADL Assistance: Independent  Homemaking Assistance: Independent  Homemaking Responsibilities: Yes  Ambulation Assistance: Independent  Transfer Assistance: Independent  Active : Yes  Mode of Transportation: Car  Occupation: On disability  Leisure & Hobbies: music, drive, hang out with kids  Additional Comments: HD new with admission, no 02 baseline  Cognition    WFL    Objective          AROM RLE (degrees)  RLE AROM: WFL  AROM LLE (degrees)  LLE AROM : WFL  AROM RUE (degrees)  RUE AROM : WFL  AROM LUE (degrees)  LUE AROM : WFL  Strength RLE  Strength RLE: WFL  Comment: 4- grossly  Strength LLE  Strength LLE: WFL  Comment: 4- grossly  Strength RUE  Strength RUE: WFL  Strength LUE  Strength LUE: WFL     Sensation  Overall Sensation Status: WFL (except intermittant numbness/ tingling in hands and feet d/t swelling per pt, acute.)  Bed mobility  Supine to Sit: Moderate assistance;2 Person assistance  Sit to Supine:  (left in chair)  Scooting: Contact guard assistance  Transfers  Sit to Stand: Contact guard assistance  Stand to sit: Contact guard assistance  Comment: initial stand from bed pt states dizziness that's severe, BP wfl, it resolved. some when standing from commode but less. also resolved  Ambulation  Ambulation?: Yes  Ambulation 1  Surface: level tile  Device: Rolling Walker  Assistance: Contact guard assistance  Quality of Gait: slowed, no LOB or buckling, unsteady, c/o dizziness with resolves with breaks and time. Distance: 2', toileted, 4'  Stairs/Curb  Stairs?: No     Balance  Posture: Fair  Sitting - Static: Good  Sitting - Dynamic: Good;-  Standing - Static: Fair;+  Standing - Dynamic: Fair  Comments: RW used while assessing standing balance  Exercises  Comments: toileted, see OT     Plan   Plan  Times per week: 5-6x/wk  Current Treatment Recommendations: Taylor Leitz Training,Functional Mobility Training,Transfer Training,Gait Training,Stair training,Home Exercise Program,Patient/Caregiver Education & Training,Safety Education & Training,Equipment Evaluation, Education, & procurement  Safety Devices  Type of devices: Call light within reach,Nurse notified,Gait belt,Patient at risk for falls,Left in chair,All fall risk precautions in place  Restraints  Initially in place: No    AM-PAC Score  AM-PAC Inpatient Mobility Raw Score : 14 (02/19/22 1433)  AM-PAC Inpatient T-Scale Score : 38.1 (02/19/22 1433)  Mobility Inpatient CMS 0-100% Score: 61.29 (02/19/22 1433)  Mobility Inpatient CMS G-Code Modifier : CL (02/19/22 1433)          Goals  Short term goals  Time Frame for Short term goals: 14 visits  Short term goal 1: Pt will be Fabiola bed mobility  Short term goal 2: Pt will be Fabiola transfers  Short term goal 3: Pt will be Fabiola amb 240' RW or least restrictive ADA  Short term goal 4: Pt will navigate 2 steps Fabiola for safe curb navigation.        Therapy Time   Individual Concurrent Group Co-treatment   Time In 1341         Time Out 1415         Minutes 34         Timed Code Treatment Minutes: 9 Minutes       Cherylene Bookman, PT

## 2022-02-19 NOTE — PROGRESS NOTES
Nephrology Progress Note      SUBJECTIVE    Patient was seen and examined. Patient was lying flat. She was alert and oriented x3. Dialysis was in progress. Today's target was to remove 3 L of fluid so far patient is tolerating fairly well. She is not tachycardic or complaining of cramping  Blood pressure remained stable  Urine output in last 24 hours was close to 900 mL, patient is on 80 mg of Lasix twice daily      OBJECTIVE      CURRENT TEMPERATURE:  Temp: 98.3 °F (36.8 °C)  MAXIMUM TEMPERATURE OVER 24HRS:  Temp (24hrs), Av.5 °F (36.4 °C), Min:96.9 °F (36.1 °C), Max:98.9 °F (37.2 °C)    CURRENT RESPIRATORY RATE:  Resp: 22  CURRENT PULSE:  Pulse: 77  CURRENT BLOOD PRESSURE:  BP: (!) 135/58  24HR BLOOD PRESSURE RANGE:  Systolic (28TSF), FOL:259 , Min:83 , PCB:158   ; Diastolic (80KKH), XIB:52, Min:55, Max:97    24HR INTAKE/OUTPUT:      Intake/Output Summary (Last 24 hours) at 2022 1045  Last data filed at 2022 1000  Gross per 24 hour   Intake 1407.37 ml   Output 5232 ml   Net -3824.63 ml     WEIGHT :  Patient Vitals for the past 96 hrs (Last 3 readings):   Weight   22 0945 (!) 304 lb 14.3 oz (138.3 kg)   22 0600 (!) 304 lb 14.3 oz (138.3 kg)   22 1904 (!) 303 lb 5.7 oz (137.6 kg)     PHYSICAL EXAM      GENERAL APPEARANCE: Alert and awake x3   SKIN: Warm to touch and no erythema  NECK: No lymphadenopathy  PULMONARY: Bilateral air entry and clear  CARDIOVASCULAR: Regular rate and rhythm positive S1 and S2 no rubs.    ABDOMEN: Divergent colostomy was present  EXTREMITIES: 1-2+ extremity edema  CURRENT MEDICATIONS      ondansetron (ZOFRAN) injection 4 mg, Q6H PRN  hydrALAZINE (APRESOLINE) injection 5 mg, Q6H PRN  melatonin tablet 10 mg, Nightly PRN  docusate (COLACE) 50 MG/5ML liquid 100 mg, Daily  polyethylene glycol (GLYCOLAX) packet 17 g, Daily  insulin glargine (LANTUS) injection vial 30 Units, BID  insulin lispro (HUMALOG) injection vial 0-18 Units, Q4H  albumin human 25 % IV solution 25 g, PRN  oxyCODONE (ROXICODONE) immediate release tablet 5 mg, Q6H PRN  sodium hypochlorite (DAKINS) 0.125 % external solution, BID  0.9 % sodium chloride infusion, Continuous  famotidine (PEPCID) 40 MG/5ML suspension 20 mg, Daily  heparin (porcine) injection 1,900 Units, PRN  heparin (porcine) injection 1,600 Units, PRN  fluticasone (FLONASE) 50 MCG/ACT nasal spray 1 spray, Daily  budesonide-formoterol (SYMBICORT) 80-4.5 MCG/ACT inhaler 2 puff, BID  albuterol sulfate  (90 Base) MCG/ACT inhaler 1 puff, Q4H PRN  montelukast (SINGULAIR) tablet 10 mg, Daily  rOPINIRole (REQUIP) tablet 4 mg, Daily  gabapentin (NEURONTIN) capsule 100 mg, Q8H  heparin (porcine) injection 5,000 Units, 3 times per day  acetaminophen (TYLENOL) tablet 1,000 mg, 3 times per day          LABS      CBC:   Recent Labs     02/17/22  0609 02/18/22 0330 02/19/22 0319   WBC 8.7 13.7* 12.1*   RBC 2.39* 2.60* 2.67*   HGB 7.0* 7.6* 7.8*   HCT 21.2* 24.7* 24.9*   MCV 88.7 95.0 93.3   MCH 29.3 29.2 29.2   MCHC 33.0 30.8 31.3   RDW 15.8* 15.9* 15.6*    355 357   MPV 10.4 10.6 10.5      BMP:   Recent Labs     02/17/22  0609 02/18/22  0330 02/19/22  0319    131* 133*   K 3.8 3.5* 4.0    94* 98   CO2 26 24 22   BUN 26* 16 19   CREATININE 2.23* 1.81* 2.24*   GLUCOSE 135* 222* 145*   CALCIUM 7.6* 7.8* 7.8*      PHOSPHORUS:    Recent Labs     02/17/22  0609 02/18/22  0330   PHOS 3.8 3.4     MAGNESIUM:   Recent Labs     02/17/22  0609 02/18/22  0330 02/19/22  0319   MG 1.7 1.7 2.4     SPEP:   Lab Results   Component Value Date    PROT 5.7 02/09/2022     C3:   Lab Results   Component Value Date    C3 143 02/09/2022     C4:   Lab Results   Component Value Date    C4 19 02/09/2022     URINE SODIUM:    Lab Results   Component Value Date    JORGE 26 02/09/2022      URINE CREATININE:    Lab Results   Component Value Date    LABCREA 142.3 02/09/2022         ASSESSMENT    1.   Acute kidney injury so far patient is dialysis dependent there is no significant improvement in urine output. Due to volume overload patient is receiving dialysis with ultrafiltration. We will plan to remove 3 L of fluid today. Patient is tolerating ultrafiltration fairly well. 2.  Significant volume overload: Improving with dialysis and ultrafiltration  3. Chronic kidney disease stage IIIa due to diabetic nephrosclerosis her baseline 1.5-1.6 proteinuria about 1 to 2 g from diabetic nephrosclerosis follows up with Dr. Lewis Schroeder  4. Longstanding type 2 diabetes   5. Morbid obesity  6. Leonel's gangrene requiring debridements, patient on antibiotics including clindamycin and Zosyn. 7.  Metabolic acidosis: Resolved  PLAN      1. Dialysis with ultrafiltration as ordered  2. Continue Lasix 80 mg 3 times a day  3. We will follow urine output  4. Thank you very much will evaluate for potential need for dialysis next please do not hesitate to call with questions.     This note is created with the assistance of a speech-recognition program. While intending to generate a document that actually reflects the content of the visit, no guarantees can be provided that every mistake has been identified and corrected by editing    Electronically signed by John Moore MD on 2/19/2022 at 10:45 AM

## 2022-02-19 NOTE — PROGRESS NOTES
Dialysis Post Treatment Note  Vitals:    02/18/22 1904   BP: (!) 167/81   Pulse: 79   Resp: 14   Temp: 97.8 °F (36.6 °C)   SpO2: 97%     Pre-Weight = 140.6kg  Post-weight = Weight: (!) 303 lb 5.7 oz (137.6 kg)  Total Liters Processed = Total Liters Processed (l/min): 68.4 l/min  Rinseback Volume (mL) = Rinseback Volume (ml): 300 ml  Net Removal (mL) = NET Removed (ml): 3000 ml  Patient's dry weight=TBD  Type of access used=right fem cath  Length of treatment=180 minutes    All blood returned, sterile caps placed and lines heparinized. Patient tolerated treatment well, however Venous pressure and TMP were high during all of treatment.  Post /81

## 2022-02-19 NOTE — PROGRESS NOTES
ICU PROGRESS NOTE          PATIENT NAME: Estefani [facility] RECORD NO. [id number]  DATE: 2022    PRIMARY CARE PHYSICIAN: Ena Galvin MD    HD: # 11    ASSESSMENT    Patient Active Problem List   Diagnosis    Leonel gangrene    Uncontrolled type 2 diabetes mellitus with hyperglycemia ([facility]    Acute kidney injury superimposed on CKD ([facility]    Hypokalemia    Hyponatremia    Anemia    Morbid obesity with BMI of 45.0-49.9, adult ([facility]    Type 1 diabetes mellitus with stage 3a chronic kidney disease ([facility]       MEDICAL DECISION MAKING AND PLAN    1. Neuro:  1. Tylenol 1000 mg q8H, gabapentin 100 mg q8, roxicodone 5mg q 4H PRN   2. Melatonin PRN     2. CV:  1. HR: 71 - 91  2. MAP: 78 - 103    3. Heme:  1. Hgb: 7.8 (7.6)  2. Plt: 351 (355)  3. DVT ppx: heparin     4. Pulm:  1. Extubated     5. Renal/lytes:  1. BUN/Cr: 19 (16) / 2.24 (1.87)  2. Na/K+: 133 (131) / 4.0 (3.5)  3. M.4    4. UOP: 0.3  cc/kg/hr overnight   Nephrology following: managing HD   6. GI:  1. Diet: diabetic diet   2. Pepcid 20 mg BID   3. S/p laparoscopic transverse loop colostomy creation   1. Bowel regimen   2. Wound/ostomy following for assistance in ostomy care   3. 320 cc out of ostomy overnight, 920 cc over past 24 hours      7. ID:  1. Tmax: 98.9 (37.2)  2. WBC: 12.1 (13.7)  3. Continue zosyn, stop date     8. MSK:  1. Necrotizing soft tissue infection of the buttocks and groin   1. S/p incision and debridement , and further debridement later   2. S/p irrigation and debridement of wound 2/10  3. S/p irrigation and debridement, partial closure, and bilateral wound vac placement   4. S/p irrigation and debridement, partial closure, L wound vac application   5. S/p irrigating wound vac placement, laparoscopic loop colostomy creation   6. S/p debridement and irrigating wound vac change \  7. Plan for return to OR for vac change   2. PT/OT     9. Endo:  1. B  2.  HDSS: 36 units over past 24 hours   3. Lantus 30 BID - will adjust based on insulin requirements     10. Lines:  1. PIV    CHECKLIST    RASS: 0  RESTRAINTS: none  IVF: none  NUTRITION: CLD   ANTIBIOTICS: zosyn   GI: pepcid   DVT: heparin   GLYCEMIC CONTROL: lantus, HDSS   HOB >45: yes   WOUND CARE: daily packing changes to right groin; vac change in OR     SUBJECTIVE    Darene Blinks was seen and examined at bedside. No acute events overnight. Hemodynamically stable, and afebrile. Pain controlled. Encourage to get OOB today. OBJECTIVE  VITALS: Temp: Temp: 98.3 °F (36.8 °C)Temp  Av °F (36.1 °C)  Min: 96.7 °F (35.9 °C)  Max: 98.9 °F (64.6 °C) BP Systolic (50DMX), JOK:021 , Min:83 , PU   Diastolic (03WCR), OAQ:05, Min:55, Max:106   Pulse Pulse  Av.9  Min: 72  Max: 91 Resp Resp  Av.4  Min: 0  Max: 34 Pulse ox SpO2  Av.4 %  Min: 61 %  Max: 100 %    GENERAL: awake, alert, no apparent distress   NEURO: awake, alert, following commands, moving all extremities   HEENT: NCAT   : perineal dressings intact, wound vac intact with good seal   LUNGS: no acute respiratory distress or accessory muscle use   HEART: regular rate and rhythm   ABDOMEN: soft, non-distended, ostomy pink and viable - stool output, incisions clean and dry with skin glue intact   EXTERMITY: no cyanosis, clubbing or edema    LAB:  CBC:   Recent Labs     22  0609 22  0330 22   WBC 8.7 13.7* 12.1*   HGB 7.0* 7.6* 7.8*   HCT 21.2* 24.7* 24.9*   MCV 88.7 95.0 93.3    355 357     BMP:   Recent Labs     22  0609 22  0330 22    131* 133*   K 3.8 3.5* 4.0    94* 98   CO2 26 24 22   BUN 26* 16 19   CREATININE 2.23* 1.81* 2.24*   GLUCOSE 135* 222* 145*         RADIOLOGY:  No results found.           Jitendra Singh DO  2022  7:29 AM

## 2022-02-19 NOTE — CARE COORDINATION
Dispo planning    Met with the pt to discuss discharge planning. Plan to return home at discharge, has 24/7 support from her mother. Discussed HC wound vac changes at home. Discussed getting up out of bed and working with PT/OT. If requiring extensive assistance discussed a back up plan of rehab. Pt agreeable and would like a referral to [facility] rehab. Updated Social Work for the potential for outpatient dialysis. Per PT/OT mary walked 4 ft with 2 person assist. Will discuss potential PM&R consult as back up plan. Provided [employer] paperwork to Trauma resident. Updated Trauma team and Kenyetta Bender.

## 2022-02-19 NOTE — PROGRESS NOTES
Occupational Therapy   Occupational Therapy Initial Assessment  Date: 2022   Patient Name: Bay Almonte  MRN: [id number]     : 1980    Date of Service: 2022    Patient Active Problem List   Diagnosis    Leonel gangrene    Uncontrolled type 2 diabetes mellitus with hyperglycemia ([facility]    Acute kidney injury superimposed on CKD ([facility]    Hypokalemia    Hyponatremia    Anemia    Morbid obesity with BMI of 45.0-49.9, adult ([facility]    Type 1 diabetes mellitus with stage 3a chronic kidney disease ([facility]     Discharge Recommendations:  Patient would benefit from continued therapy after discharge       Assessment   Performance deficits / Impairments: Decreased functional mobility ; Decreased ADL status; Decreased strength;Decreased safe awareness;Decreased cognition;Decreased endurance;Decreased coordination;Decreased balance;Decreased fine motor control;Decreased high-level IADLs  Assessment: Pt currently limited in performing ADL tasks and functional transfers/functional mobility due to above noted deficits. Pt unsafe to return to prior living arrangements at this time and will require continued skilled therapy intervention while hospitalized and at discharge to maximize pt's safety and independence in performing functional tasks. 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      has a past medical history of Clinical trial participant.   has a past surgical history that includes Rectal surgery (N/A, 2/8/2022); Abdomen surgery (Bilateral, 2/9/2022); Rectal surgery (Bilateral, 2/10/2022); Rectal surgery (N/A, 2/11/2022); Wound Exploration (Left, 2/14/2022); and colostomy (N/A, 2/16/2022). Restrictions  Restrictions/Precautions  Restrictions/Precautions: General Precautions,Fall Risk,Surgical Protocols  Required Braces or Orthoses?: No  Position Activity Restriction  Other position/activity restrictions: Pt s/p multiple I&Ds of L groin/buttock as well as wound vac placement/wound vac exchanges. Pt s/p laparoscopic loop colostomy creation on 2/17. Subjective   General  Patient assessed for rehabilitation services?: Yes  Family / Caregiver Present: No  General Comment  Comments: RN ok'd for therapy this PM. Pt agreeable to participate in session and pleasant/cooperative throughout. Pt with 2L O2 in place throughout.    Patient Currently in Pain: Denies    Social/Functional History  Social/Functional History  Lives With:  (kids (15, 15))  Type of Home: Apartment (1st floor)  Home Layout: One level  Home Access: Level entry  Bathroom Shower/Tub: Tub/Shower unit  Bathroom Toilet: Standard  Bathroom Equipment: Grab bars in [facility] chair (not used chair recently)  Bathroom Accessibility: Accessible  Home Equipment: [address] (rollator)  Receives Help From: Family (mother able to come assist)  ADL Assistance: Independent  Homemaking Assistance: Independent  Homemaking Responsibilities: Yes  Ambulation Assistance: Independent  Transfer Assistance: Independent  Active : Yes  Mode of Transportation: Car  Occupation: On disability  Leisure & Hobbies: music, drive, hang out with kids  Additional Comments: HD new with admission, no 02 baseline       Objective   Vision: Impaired  Vision Exceptions: Wears glasses for reading  Hearing: Within functional limits    Orientation  Overall Orientation Status: Within Functional Limits        Balance  Sitting Balance: Stand by assistance  Standing Balance: Contact guard assistance  Standing Balance  Time: ~15 seconds; ~30 seconds  Activity: static standing at EOB, stand step transfer from bed > BSC; static standing at [facility] during pericare, functional mobility from [facility] > chair  Comment: Close CGA as pt reporting significant dizziness during both bouts of standing/mobility and requiring mod verbal cues for sequencing/RW mngt and to incorporate breathing techniques; mildly unsteady however no true LOB; noted fatigue and dizziness with minimal exertion    Toilet Transfers  Toilet - Technique: Stand step  Equipment Used: Extra wide bedside commode  Toilet Transfer: Contact guard assistance (with use fo RW)     ADL  Feeding: Setup; Increased time to complete  Grooming: Increased time to complete;Minimal assistance  UE Bathing: Increased time to complete; Moderate assistance  LE Bathing: Increased time to complete;Maximum assistance  UE Dressing: Increased time to complete; Moderate assistance  LE Dressing: Maximum assistance; Increased time to complete (seated EOB to don socks; pt unable to lift BLE up when attempting figure four technique and with increased abdominal pain and reports of dizziness/fatigue with attempted trunk flexion forward while seated EOB to reach BLE)  Toileting: Increased time to complete; Moderate assistance (CGA with use of RW for stand step transfer to [facility], mod A for pericare while standing with unilateral UE support on RW)     Tone RUE  RUE Tone: Normotonic  Tone LUE  LUE Tone: Normotonic  Coordination  Movements Are Fluid And Coordinated: No  Coordination and Movement description: Fine motor impairments;Gross motor impairments;Decreased speed;Decreased accuracy; Right UE;Left UE        Bed mobility  Supine to Sit: Moderate assistance;2 Person assistance (HOB raised ~45* and use of bed rail; assistance required for trunk progression)  Sit to Supine:  (pt retired up to chair)  Scooting: Minimal assistance  Comment: Increased time/effort to perform; significant use of bed rail; mod VCs for breathing techniques and initiation/sequencing     Transfers  Stand Step Transfers: Contact guard assistance  Sit to stand: Contact guard assistance  Stand to sit: Contact guard assistance  Transfer Comments: Mod VCs for proper hand placement/sequencing/safety awareness with use of RW during functional transfers; increased time/effort to perform; pt noted to hold breath with positional changes and required cues to incorporate breathing techniques and to keep eyes open/head up throughout        Cognition  Overall Cognitive Status: Exceptions  Following Commands: Follows multistep commands with repitition; Follows multistep commands with increased time  Problem Solving: Assistance required to identify errors made;Assistance required to correct errors made  Insights: Decreased awareness of deficits  Initiation: Requires cues for some  Sequencing: Requires cues for some           Sensation  Overall Sensation Status: Impaired (Pt reports chronic numbness/tingling to B hands/feet at baseline secondary to neuropathy however reports currently numbness/tingling is more significant due to edema)        LUE AROM (degrees)  LUE AROM : WFL  Left Hand AROM (degrees)  Left Hand AROM: WFL  RUE AROM (degrees)  RUE AROM : WFL  Right Hand AROM (degrees)  Right Hand AROM: WFL  LUE Strength  Gross LUE Strength:  (grossly 4/5)  L Hand General: 4/5  RUE Strength  Gross RUE Strength:  (grossly 4/5)  R Hand General: 4/5        Plan   Plan  Times per week: 3-5x/wk  Times per day: Daily  Current Treatment Recommendations: Balance Training,Functional Mobility Training,Endurance Training,Safety Education & Training,Self-Care / ADL,Home Management Training,Patient/Caregiver Education & Training,Equipment Evaluation, Education, & procurement,Strengthening      AM-PAC Score   AM-PAC Inpatient Daily Activity Raw Score: 16 (02/19/22 1451)  AM-PAC Inpatient ADL T-Scale Score : 35.96 (02/19/22 1451)  ADL Inpatient CMS 0-100% Score: 53.32 (02/19/22 1451)  ADL Inpatient CMS G-Code Modifier : CK (02/19/22 1451)    Goals  Short term goals  Time Frame for Short term goals: Pt will, by discharge:  Short term goal 1: Perform functional transfers/functional mobility with mod IND using least restrictive AD  Short term goal 2: Perform grooming/UB ADL tasks independently  Short term goal 3: Perform toileting/LB ADL tasks with SBA including setup and use of AE/DME PRN  Short term goal 4:  Independently demo good safety awareness during engagement in all ADLs and functional transfers/functional mobility  Short term goal 5: Demo 8+ minutes standing tolerance with use of least restrictive AD for increased participation in ADL/IADL tasks       Therapy Time   Individual Concurrent Group Co-treatment   Time In 1341         Time Out 1417         Minutes 36         Timed Code Treatment Minutes: 21 Minutes       Abi Ruiz OTR/L

## 2022-02-20 LAB
ABSOLUTE EOS #: 0.31 K/UL (ref 0–0.44)
ABSOLUTE IMMATURE GRANULOCYTE: 0.2 K/UL (ref 0–0.3)
ABSOLUTE LYMPH #: 1.57 K/UL (ref 1.1–3.7)
ABSOLUTE MONO #: 0.87 K/UL (ref 0.1–1.2)
ANION GAP SERPL CALCULATED.3IONS-SCNC: 13 MMOL/L (ref 9–17)
BASOPHILS # BLD: 1 % (ref 0–2)
BASOPHILS ABSOLUTE: 0.08 K/UL (ref 0–0.2)
BUN BLDV-MCNC: 14 MG/DL (ref 6–20)
BUN/CREAT BLD: ABNORMAL (ref 9–20)
CALCIUM SERPL-MCNC: 8 MG/DL (ref 8.6–10.4)
CHLORIDE BLD-SCNC: 95 MMOL/L (ref 98–107)
CO2: 25 MMOL/L (ref 20–31)
CREAT SERPL-MCNC: 1.9 MG/DL (ref 0.5–0.9)
DIFFERENTIAL TYPE: ABNORMAL
EOSINOPHILS RELATIVE PERCENT: 3 % (ref 1–4)
GFR AFRICAN AMERICAN: 35 ML/MIN
GFR NON-AFRICAN AMERICAN: 29 ML/MIN
GFR SERPL CREATININE-BSD FRML MDRD: ABNORMAL ML/MIN/{1.73_M2}
GFR SERPL CREATININE-BSD FRML MDRD: ABNORMAL ML/MIN/{1.73_M2}
GLUCOSE BLD-MCNC: 122 MG/DL (ref 65–105)
GLUCOSE BLD-MCNC: 80 MG/DL (ref 65–105)
GLUCOSE BLD-MCNC: 85 MG/DL (ref 70–99)
GLUCOSE BLD-MCNC: 86 MG/DL (ref 65–105)
GLUCOSE BLD-MCNC: 88 MG/DL (ref 65–105)
GLUCOSE BLD-MCNC: 88 MG/DL (ref 65–105)
GLUCOSE BLD-MCNC: 93 MG/DL (ref 65–105)
HCT VFR BLD CALC: 25.3 % (ref 36.3–47.1)
HEMOGLOBIN: 7.8 G/DL (ref 11.9–15.1)
IMMATURE GRANULOCYTES: 2 %
LYMPHOCYTES # BLD: 17 % (ref 24–43)
MAGNESIUM: 1.9 MG/DL (ref 1.6–2.6)
MCH RBC QN AUTO: 28.6 PG (ref 25.2–33.5)
MCHC RBC AUTO-ENTMCNC: 30.8 G/DL (ref 28.4–34.8)
MCV RBC AUTO: 92.7 FL (ref 82.6–102.9)
MONOCYTES # BLD: 10 % (ref 3–12)
NRBC AUTOMATED: 0 PER 100 WBC
PDW BLD-RTO: 15.9 % (ref 11.8–14.4)
PLATELET # BLD: 363 K/UL (ref 138–453)
PLATELET ESTIMATE: ABNORMAL
PMV BLD AUTO: 10.5 FL (ref 8.1–13.5)
POTASSIUM SERPL-SCNC: 3.9 MMOL/L (ref 3.7–5.3)
RBC # BLD: 2.73 M/UL (ref 3.95–5.11)
RBC # BLD: ABNORMAL 10*6/UL
SEG NEUTROPHILS: 67 % (ref 36–65)
SEGMENTED NEUTROPHILS ABSOLUTE COUNT: 6 K/UL (ref 1.5–8.1)
SODIUM BLD-SCNC: 133 MMOL/L (ref 135–144)
WBC # BLD: 9 K/UL (ref 3.5–11.3)
WBC # BLD: ABNORMAL 10*3/UL

## 2022-02-20 PROCEDURE — 2060000000 HC ICU INTERMEDIATE R&B

## 2022-02-20 PROCEDURE — 6370000000 HC RX 637 (ALT 250 FOR IP): Performed by: STUDENT IN AN ORGANIZED HEALTH CARE EDUCATION/TRAINING PROGRAM

## 2022-02-20 PROCEDURE — 36415 COLL VENOUS BLD VENIPUNCTURE: CPT

## 2022-02-20 PROCEDURE — 6360000002 HC RX W HCPCS: Performed by: STUDENT IN AN ORGANIZED HEALTH CARE EDUCATION/TRAINING PROGRAM

## 2022-02-20 PROCEDURE — 99232 SBSQ HOSP IP/OBS MODERATE 35: CPT | Performed by: INTERNAL MEDICINE

## 2022-02-20 PROCEDURE — 2500000003 HC RX 250 WO HCPCS

## 2022-02-20 PROCEDURE — 94640 AIRWAY INHALATION TREATMENT: CPT

## 2022-02-20 PROCEDURE — 82947 ASSAY GLUCOSE BLOOD QUANT: CPT

## 2022-02-20 PROCEDURE — 94761 N-INVAS EAR/PLS OXIMETRY MLT: CPT

## 2022-02-20 PROCEDURE — 80048 BASIC METABOLIC PNL TOTAL CA: CPT

## 2022-02-20 PROCEDURE — 2700000000 HC OXYGEN THERAPY PER DAY

## 2022-02-20 PROCEDURE — 85025 COMPLETE CBC W/AUTO DIFF WBC: CPT

## 2022-02-20 PROCEDURE — 83735 ASSAY OF MAGNESIUM: CPT

## 2022-02-20 RX ORDER — LABETALOL HYDROCHLORIDE 5 MG/ML
10 INJECTION, SOLUTION INTRAVENOUS ONCE
Status: COMPLETED | OUTPATIENT
Start: 2022-02-20 | End: 2022-02-20

## 2022-02-20 RX ORDER — LISINOPRIL 20 MG/1
40 TABLET ORAL DAILY
Status: DISCONTINUED | OUTPATIENT
Start: 2022-02-20 | End: 2022-02-26 | Stop reason: HOSPADM

## 2022-02-20 RX ADMIN — ROPINIROLE HYDROCHLORIDE 4 MG: 1 TABLET, FILM COATED ORAL at 08:23

## 2022-02-20 RX ADMIN — HYDRALAZINE HYDROCHLORIDE 5 MG: 20 INJECTION INTRAMUSCULAR; INTRAVENOUS at 17:01

## 2022-02-20 RX ADMIN — DOCUSATE SODIUM LIQUID 100 MG: 100 LIQUID ORAL at 08:24

## 2022-02-20 RX ADMIN — LISINOPRIL 40 MG: 20 TABLET ORAL at 13:55

## 2022-02-20 RX ADMIN — DAKIN'S SOLUTION 0.125% (QUARTER STRENGTH): 0.12 SOLUTION at 21:26

## 2022-02-20 RX ADMIN — INSULIN GLARGINE 30 UNITS: 100 INJECTION, SOLUTION SUBCUTANEOUS at 08:23

## 2022-02-20 RX ADMIN — Medication 10 MG: at 21:20

## 2022-02-20 RX ADMIN — FAMOTIDINE 20 MG: 40 POWDER, FOR SUSPENSION ORAL at 08:24

## 2022-02-20 RX ADMIN — GABAPENTIN 100 MG: 300 CAPSULE ORAL at 03:44

## 2022-02-20 RX ADMIN — ACETAMINOPHEN 1000 MG: 500 TABLET ORAL at 06:04

## 2022-02-20 RX ADMIN — HEPARIN SODIUM 5000 UNITS: 5000 INJECTION INTRAVENOUS; SUBCUTANEOUS at 13:55

## 2022-02-20 RX ADMIN — BUDESONIDE AND FORMOTEROL FUMARATE DIHYDRATE 2 PUFF: 80; 4.5 AEROSOL RESPIRATORY (INHALATION) at 09:26

## 2022-02-20 RX ADMIN — GABAPENTIN 100 MG: 300 CAPSULE ORAL at 11:57

## 2022-02-20 RX ADMIN — OXYCODONE 5 MG: 5 TABLET ORAL at 09:40

## 2022-02-20 RX ADMIN — HEPARIN SODIUM 5000 UNITS: 5000 INJECTION INTRAVENOUS; SUBCUTANEOUS at 06:04

## 2022-02-20 RX ADMIN — DAKIN'S SOLUTION 0.125% (QUARTER STRENGTH): 0.12 SOLUTION at 08:24

## 2022-02-20 RX ADMIN — DULOXETINE HYDROCHLORIDE 60 MG: 30 CAPSULE, DELAYED RELEASE ORAL at 08:23

## 2022-02-20 RX ADMIN — OXYCODONE 5 MG: 5 TABLET ORAL at 17:04

## 2022-02-20 RX ADMIN — GABAPENTIN 100 MG: 300 CAPSULE ORAL at 21:19

## 2022-02-20 RX ADMIN — ACETAMINOPHEN 1000 MG: 500 TABLET ORAL at 13:55

## 2022-02-20 RX ADMIN — HEPARIN SODIUM 5000 UNITS: 5000 INJECTION INTRAVENOUS; SUBCUTANEOUS at 21:19

## 2022-02-20 RX ADMIN — ACETAMINOPHEN 1000 MG: 500 TABLET ORAL at 21:19

## 2022-02-20 ASSESSMENT — PAIN SCALES - GENERAL
PAINLEVEL_OUTOF10: 8
PAINLEVEL_OUTOF10: 7
PAINLEVEL_OUTOF10: 7
PAINLEVEL_OUTOF10: 5
PAINLEVEL_OUTOF10: 7
PAINLEVEL_OUTOF10: 8
PAINLEVEL_OUTOF10: 8
PAINLEVEL_OUTOF10: 5

## 2022-02-20 ASSESSMENT — PAIN DESCRIPTION - PAIN TYPE
TYPE: ACUTE PAIN;SURGICAL PAIN

## 2022-02-20 NOTE — PROGRESS NOTES
Obtained grey irrigating wound vac sponge from surgery. Placed in patients room for bedside wound vac change on 2/21.

## 2022-02-20 NOTE — PLAN OF CARE
Problem: Pain:  Goal: Recognizes and communicates pain  Description: Recognizes and communicates pain  2/20/2022 0227 by Alisia Falcon RN  Outcome: Met This Shift  2/19/2022 1747 by Tyree De Oliveira RN  Outcome: Ongoing     Problem: Serum Glucose Level - Abnormal:  Goal: Ability to maintain appropriate glucose levels will improve to within specified parameters  Description: Ability to maintain appropriate glucose levels will improve to within specified parameters  2/20/2022 0227 by Alisia Falcon RN  Outcome: Met This Shift  2/19/2022 1747 by Tyree De Oliveira RN  Outcome: Ongoing     Problem: OXYGENATION/RESPIRATORY FUNCTION  Goal: Patient will maintain patent airway  2/20/2022 0227 by Alisia Falcon RN  Outcome: Met This Shift  2/19/2022 1747 by Tyree De Oliveira RN  Outcome: Ongoing  Goal: Patient will achieve/maintain normal respiratory rate/effort  Description: Respiratory rate and effort will be within normal limits for the patient  2/20/2022 0227 by Alisia Falcon RN  Outcome: Met This Shift  2/19/2022 1747 by Tyree De Oliveira RN  Outcome: Ongoing     Problem: Confusion - Acute:  Goal: Absence of continued neurological deterioration signs and symptoms  Description: Absence of continued neurological deterioration signs and symptoms  2/20/2022 0227 by Alisia Falcon RN  Outcome: Met This Shift  2/19/2022 1747 by Tyree De Oliveira RN  Outcome: Ongoing  Goal: Mental status will be restored to baseline  Description: Mental status will be restored to baseline  2/20/2022 0227 by Alisia Falcon RN  Outcome: Met This Shift  2/19/2022 1747 by Tyree De Oliveira RN  Outcome: Ongoing     Problem: Discharge Planning:  Goal: Participates in care planning  Description: Participates in care planning  2/20/2022 0227 by Alisia Falcon RN  Outcome: Met This Shift  2/19/2022 1747 by Tyree De Oliveira RN  Outcome: Ongoing     Problem: Injury - Risk of, Physical Injury:  Goal: Absence of physical injury  Description: Absence of physical injury  2/20/2022 5777 by Vick Sweeney RN  Outcome: Met This Shift  2/19/2022 1747 by Elaina Pan RN  Outcome: Ongoing  Goal: Will remain free from falls  Description: Will remain free from falls  2/20/2022 0227 by Vick Sweeney RN  Outcome: Met This Shift  2/19/2022 1747 by Elaina Pan RN  Outcome: Ongoing     Problem: Mood - Altered:  Goal: Mood stable  Description: Mood stable  2/20/2022 0227 by Vick Sweeney RN  Outcome: Met This Shift  2/19/2022 1747 by Elaina Pna RN  Outcome: Ongoing  Goal: Absence of abusive behavior  Description: Absence of abusive behavior  2/20/2022 0227 by Vick Sweeney RN  Outcome: Met This Shift  2/19/2022 1747 by Elaina Pan RN  Outcome: Ongoing  Goal: Verbalizations of feeling emotionally comfortable while being cared for will increase  Description: Verbalizations of feeling emotionally comfortable while being cared for will increase  2/20/2022 0227 by Vick Sweeney RN  Outcome: Met This Shift  2/19/2022 1747 by Elaina Pan RN  Outcome: Ongoing     Problem: Psychomotor Activity - Altered:  Goal: Absence of psychomotor disturbance signs and symptoms  Description: Absence of psychomotor disturbance signs and symptoms  2/20/2022 0227 by Vick Sweeney RN  Outcome: Met This Shift  2/19/2022 1747 by Elaina Pan RN  Outcome: Ongoing     Problem: Sleep Pattern Disturbance:  Goal: Appears well-rested  Description: Appears well-rested  2/20/2022 0227 by Vick Sweeney RN  Outcome: Met This Shift  2/19/2022 1747 by Elaina Pan RN  Outcome: Ongoing     Problem: Skin Integrity:  Goal: Will show no infection signs and symptoms  Description: Will show no infection signs and symptoms  2/20/2022 0227 by Vick Sweeney RN  Outcome: Ongoing  2/19/2022 1747 by Elaina Pan RN  Outcome: Ongoing  Goal: Absence of new skin breakdown  Description: Absence of new skin breakdown  2/20/2022 0227 by Vick Sweeney RN  Outcome: Ongoing  2/19/2022 1747 by Elaina Pan RN  Outcome: Ongoing     Problem: Fluid Volume - Imbalance:  Goal: Absence of imbalanced fluid volume signs and symptoms  Description: Absence of imbalanced fluid volume signs and symptoms  2/20/2022 0227 by Jesica Briones RN  Outcome: Ongoing  2/19/2022 1747 by Cuca Walker RN  Outcome: Ongoing     Problem: Pain:  Goal: Pain level will decrease  Description: Pain level will decrease  2/20/2022 0227 by Jesica Briones RN  Outcome: Ongoing  2/19/2022 1747 by Cuca Walker RN  Outcome: Ongoing  Goal: Control of acute pain  Description: Control of acute pain  2/20/2022 0227 by Jesica Briones RN  Outcome: Ongoing  2/19/2022 1747 by Cuca Walker RN  Outcome: Ongoing  Goal: Control of chronic pain  Description: Control of chronic pain  2/20/2022 0227 by Jesica Briones RN  Outcome: Ongoing  2/19/2022 1747 by Cuca Walker RN  Outcome: Ongoing     Problem: Skin Integrity - Impaired:  Goal: Will show no infection signs and symptoms  Description: Will show no infection signs and symptoms  2/20/2022 0227 by Jesica Briones RN  Outcome: Ongoing  2/19/2022 1747 by Cuca Walker RN  Outcome: Ongoing  Goal: Absence of new skin breakdown  Description: Absence of new skin breakdown  2/20/2022 0227 by Jesica Briones RN  Outcome: Ongoing  2/19/2022 1747 by Cuca Walker RN  Outcome: Ongoing     Problem: Nutrition  Goal: Optimal nutrition therapy  2/20/2022 0227 by Jesica Briones RN  Outcome: Ongoing  2/19/2022 1747 by Cuca Walker RN  Outcome: Ongoing     Problem: SKIN INTEGRITY  Goal: Skin integrity is maintained or improved  2/20/2022 0227 by Jesica Briones RN  Outcome: Ongoing  2/19/2022 1747 by Cuca Walker RN  Outcome: Ongoing     Problem: Discharge Planning:  Goal: Ability to perform activities of daily living will improve  Description: Ability to perform activities of daily living will improve  2/20/2022 0227 by Jesica Briones RN  Outcome: Ongoing  2/19/2022 1747 by Cuca Walker RN  Outcome: Ongoing     Problem: Sensory Perception - Impaired:  Goal: Demonstrations of improved sensory functioning will increase  Description: Demonstrations of improved sensory functioning will increase  2/20/2022 0227 by Viki Fuentes RN  Outcome: Ongoing  2/19/2022 1747 by Yoanna Saxena RN  Outcome: Ongoing  Goal: Decrease in sensory misperception frequency  Description: Decrease in sensory misperception frequency  2/20/2022 0227 by Viki Fuentes RN  Outcome: Ongoing  2/19/2022 1747 by Yoanna Saxena RN  Outcome: Ongoing  Goal: Able to refrain from responding to false sensory perceptions  Description: Able to refrain from responding to false sensory perceptions  2/19/2022 1747 by Yoanna Saxena RN  Outcome: Ongoing  Goal: Demonstrates accurate environmental perceptions  Description: Demonstrates accurate environmental perceptions  2/20/2022 0227 by Viki Fuentes RN  Outcome: Ongoing  2/19/2022 1747 by Yoanna Saxena RN  Outcome: Ongoing  Goal: Able to distinguish between reality-based and nonreality-based thinking  Description: Able to distinguish between reality-based and nonreality-based thinking  2/20/2022 0227 by Viki Fuentes RN  Outcome: Ongoing  2/19/2022 1747 by Yoanna Saxena RN  Outcome: Ongoing  Goal: Able to interrupt nonreality-based thinking  Description: Able to interrupt nonreality-based thinking  2/20/2022 0227 by Viki Fuentes RN  Outcome: Ongoing  2/19/2022 1747 by Yoanna Saxena RN  Outcome: Ongoing     Problem: Pain:  Goal: Pain level will decrease  Description: Pain level will decrease  2/20/2022 0227 by Viki Fuentes RN  Outcome: Ongoing  2/19/2022 1747 by Yoanna Saxena RN  Outcome: Ongoing  Goal: Control of acute pain  Description: Control of acute pain  2/20/2022 0227 by Viki Fuentes RN  Outcome: Ongoing  2/19/2022 1747 by Yoanna Saxena RN  Outcome: Ongoing  Goal: Control of chronic pain  Description: Control of chronic pain  2/20/2022 0227 by Viki Fuentes RN  Outcome: Ongoing  2/19/2022 1747 by Yoanna Saxena RN  Outcome: Ongoing

## 2022-02-20 NOTE — PLAN OF CARE
Problem: Skin Integrity:  Goal: Will show no infection signs and symptoms  Description: Will show no infection signs and symptoms  2/20/2022 1549 by Annel Kelley RN  Outcome: Ongoing     Problem: Skin Integrity:  Goal: Absence of new skin breakdown  Description: Absence of new skin breakdown  2/20/2022 1549 by Annel Kelley RN  Outcome: Ongoing     Problem: Fluid Volume - Imbalance:  Goal: Absence of imbalanced fluid volume signs and symptoms  Description: Absence of imbalanced fluid volume signs and symptoms  2/20/2022 1549 by Annel Kelley RN  Outcome: Ongoing     Problem: Pain:  Goal: Pain level will decrease  Description: Pain level will decrease  2/20/2022 1549 by Annel Kelley RN  Outcome: Ongoing     Problem: Pain:  Goal: Recognizes and communicates pain  Description: Recognizes and communicates pain  2/20/2022 1549 by Annel Kelley RN  Outcome: Ongoing     Problem: Pain:  Goal: Control of acute pain  Description: Control of acute pain  2/20/2022 1549 by Annel Kelley RN  Outcome: Ongoing     Problem: Pain:  Goal: Control of chronic pain  Description: Control of chronic pain  2/20/2022 1549 by Annel Kelley RN  Outcome: Ongoing     Problem: Serum Glucose Level - Abnormal:  Goal: Ability to maintain appropriate glucose levels will improve to within specified parameters  Description: Ability to maintain appropriate glucose levels will improve to within specified parameters  2/20/2022 1549 by Annel Kelley RN  Outcome: Ongoing     Problem: Skin Integrity - Impaired:  Goal: Will show no infection signs and symptoms  Description: Will show no infection signs and symptoms  2/20/2022 1549 by Annel Kelley RN  Outcome: Ongoing     Problem: Skin Integrity - Impaired:  Goal: Absence of new skin breakdown  Description: Absence of new skin breakdown  2/20/2022 1549 by nAnel Kelley RN  Outcome: Ongoing     Problem: Nutrition  Goal: Optimal nutrition therapy  2/20/2022 1549 by Annel Kelley RN  Outcome: Ongoing     Problem: OXYGENATION/RESPIRATORY FUNCTION  Goal: Patient will maintain patent airway  2/20/2022 1549 by Alona Meyer RN  Outcome: Ongoing     Problem: OXYGENATION/RESPIRATORY FUNCTION  Goal: Patient will achieve/maintain normal respiratory rate/effort  Description: Respiratory rate and effort will be within normal limits for the patient  2/20/2022 1549 by Alona Meyer RN  Outcome: Ongoing     Problem: SKIN INTEGRITY  Goal: Skin integrity is maintained or improved  2/20/2022 1549 by Alona Meyer RN  Outcome: Ongoing     Problem: Confusion - Acute:  Goal: Absence of continued neurological deterioration signs and symptoms  Description: Absence of continued neurological deterioration signs and symptoms  2/20/2022 1549 by Alona Meyer RN  Outcome: Ongoing     Problem: Confusion - Acute:  Goal: Mental status will be restored to baseline  Description: Mental status will be restored to baseline  2/20/2022 1549 by Alona Meyer RN  Outcome: Ongoing     Problem: Discharge Planning:  Goal: Ability to perform activities of daily living will improve  Description: Ability to perform activities of daily living will improve  2/20/2022 1549 by Alona Meyer RN  Outcome: Ongoing     Problem: Discharge Planning:  Goal: Participates in care planning  Description: Participates in care planning  2/20/2022 1549 by Alona Meyer RN  Outcome: Ongoing     Problem: Injury - Risk of, Physical Injury:  Goal: Absence of physical injury  Description: Absence of physical injury  2/20/2022 1549 by Alona Meyer RN  Outcome: Ongoing     Problem: Injury - Risk of, Physical Injury:  Goal: Will remain free from falls  Description: Will remain free from falls  2/20/2022 1549 by Alona Meyer RN  Outcome: Ongoing     Problem: Mood - Altered:  Goal: Absence of abusive behavior  Description: Absence of abusive behavior  2/20/2022 1549 by Alona Meyer RN  Outcome: Ongoing     Problem: Mood - Altered:  Goal: Mood stable  Description: Mood stable  2/20/2022 1549 by Camilo Hardy, RN  Outcome: Ongoing

## 2022-02-20 NOTE — CARE COORDINATION
[facility] Flow/Interdisciplinary Rounds Progress Note    Quality Flow Rounds held on February 20, 2022 at [address] Attending:  Bedside Nurse, ,  and Nursing Unit Leadership    Barriers to Discharge: Placement    Anticipated Discharge Date:       Anticipated Discharge Disposition: ARU    Readmission Risk              Risk of Unplanned Readmission:  22           Discussed patient goal for the day, patient clinical progression, and barriers to discharge. The following Goal(s) of the Day/Commitment(s) have been identified:  Activity Progression  Transitional Care Plan     Received a call from Bartolo Brothers in Admissions with [facility].   She has received the referral, states they do accept patient's insurance, will review clinicals with their Medical team and f/u with ADELAIDE Magaña RN  February 20, 2022

## 2022-02-20 NOTE — PROGRESS NOTES
FLOOR PROGRESS NOTE    PATIENT NAME: Faith Diaz  MEDICAL RECORD NO. [id number]  DATE: 2/20/2022    PRIMARY CARE PHYSICIAN: Kim Trujillo MD    HD: # 12    ASSESSMENT    Patient Active Problem List   Diagnosis    Leonel gangrene    Uncontrolled type 2 diabetes mellitus with hyperglycemia ([facility] [address]    Acute kidney injury superimposed on CKD ([facility] [address]    Hypokalemia    Hyponatremia    Anemia    Morbid obesity with BMI of 45.0-49.9, adult ([facility] [address]    Type 1 diabetes mellitus with stage 3a chronic kidney disease ([facility] [address]       MEDICAL DECISION MAKING AND PLAN    Neuro:  Tylenol 1000 mg q8H, gabapentin 100 mg q8, roxicodone 5mg q 4H PRN   Melatonin PRN for sleep     CV:  HR: 29'B  Systolic 088'J-817, will plan to reintroduce patient's home antiHTN meds.  Start lisinopril today, hold coreg for now    Heme:  Hgb: 7.8 (7.8)  Plt: 363  DVT ppx: heparin     Pulm:  Extubated 2/17  4L NC, encourage IS/deep breathing  Continue symbicort, singulair     Renal/lytes:  BMP pending today, follow up   Nephrology following - Lasix, HD management   UOP: 0.3  cc/kg/hr overnight    GI:  Diet: diabetic diet   Pepcid 20 mg BID   S/p laparoscopic transverse loop colostomy creation 2/17  Bowel regimen   Wound/ostomy following for assistance in ostomy care   875cc/24h from ostomy     ID:  Afebrile   WBC: 9 (12.1)  Completed course or abx    MSK:  Necrotizing soft tissue infection of the buttocks and groin   S/p incision and debridement 2/9, and further debridement later 2/9  S/p irrigation and debridement of wound 2/10  S/p irrigation and debridement, partial closure, and bilateral wound vac placement 2/11  S/p irrigation and debridement, partial closure, L wound vac application 4/95  S/p irrigating wound vac placement, laparoscopic loop colostomy creation 2/17  S/p debridement and irrigating wound vac change 2/18\  Plan for vac change 2/21   PT/OT     Endo:  BG: goal <180   HDSS: 3 units over past 24 hours   Lantus 30 BID, continue - will adjust PRN based on insulin requirements     Lines:  PIV    RAY Rubi was seen and examined at bedside. No acute events overnight. Hemodynamically stable, and afebrile. Pain controlled this AM. Denies nausea, emesis except some nausea with ensure shakes. Encourage to get OOB today. OBJECTIVE  VITALS: Temp: Temp: 97.3 °F (36.3 °C)Temp  Av.1 °F (36.7 °C)  Min: 97.3 °F (36.3 °C)  Max: 98.4 °F (80.7 °C) BP Systolic (51AEJ), MGO:142 , Min:126 , ZFD:327   Diastolic (66IIZ), KXT:67, Min:54, Max:115   Pulse Pulse  Av.2  Min: 67  Max: 87 Resp Resp  Av.4  Min: 8  Max: 24 Pulse ox SpO2  Av %  Min: 92 %  Max: 100 %    GENERAL: awake, alert, no apparent distress   NEURO: awake, alert, following commands, moving all extremities, no focal deficits   HEENT: NCAT   : wound vac intact with good seal   LUNGS: no acute respiratory distress or accessory muscle use   HEART: regular rate and rhythm   ABDOMEN: soft, non-distended, ostomy pink and viable - stool output in ostomy appliance, incisions clean and dry with skin glue intact   EXTERMITY: no cyanosis, clubbing or edema    LAB:  CBC:   Recent Labs     22  0330 22  0319   WBC 13.7* 12.1*   HGB 7.6* 7.8*   HCT 24.7* 24.9*   MCV 95.0 93.3    357     BMP:   Recent Labs     22  0330 22  0319   * 133*   K 3.5* 4.0   CL 94* 98   CO2 24 22   BUN 16 19   CREATININE 1.81* 2.24*   GLUCOSE 222* 145*       RADIOLOGY:  No new imaging. Abigail Chavez DO        Attending Note      I have reviewed the above [facility] note(s) and I either performed the key elements of the medical history and physical exam or was present with the surgery resident when the key elements of the medical history and physical exam were performed. I have discussed the findings, established the care plan and recommendations with the surgical team.  States tolerating diet. Good stoma output, VAC change later this week.     Evangelist Alvarez, MD  2/20/2022  8:09 AM

## 2022-02-20 NOTE — PROGRESS NOTES
Nephrology Progress Note      SUBJECTIVE    Patient seen and examined. Patient was lying flat. She was alert and awake. She is out of ICU today. Patient was dialyzed and ultrafiltrate it yesterday with removal of 3 to 3.5 L and she tolerated well  Her electrolytes are within normal limits and there is no need for dialysis today  OBJECTIVE      CURRENT TEMPERATURE:  Temp: 98.2 °F (36.8 °C)  MAXIMUM TEMPERATURE OVER 24HRS:  Temp (24hrs), Av.1 °F (36.7 °C), Min:97.3 °F (36.3 °C), Max:98.4 °F (36.9 °C)    CURRENT RESPIRATORY RATE:  Resp: 16  CURRENT PULSE:  Pulse: 82  CURRENT BLOOD PRESSURE:  BP: (!) 157/77  24HR BLOOD PRESSURE RANGE:  Systolic (28ASN), SDU:298 , Min:136 , STI:363   ; Diastolic (48WLU), ZEI:31, Min:66, Max:115    24HR INTAKE/OUTPUT:      Intake/Output Summary (Last 24 hours) at 2022 1132  Last data filed at 2022 9082  Gross per 24 hour   Intake 865 ml   Output 5225 ml   Net -4360 ml     WEIGHT :  Patient Vitals for the past 96 hrs (Last 3 readings):   Weight   22 0600 (!) 311 lb 15.2 oz (141.5 kg)   22 1245 298 lb 4.5 oz (135.3 kg)   22 0945 (!) 304 lb 14.3 oz (138.3 kg)     PHYSICAL EXAM      GENERAL APPEARANCE: Alert and awake x3   SKIN: To touch and no erythema  NECK: No lymphadenopathy  PULMONARY: Bilateral air entry and clear  CARDIOVASCULAR: Regular rate and rhythm positive S1 and S2 no rubs.    ABDOMEN: Divergent colostomy was present  EXTREMITIES: 1-2+ extremity edema  CURRENT MEDICATIONS      DULoxetine (CYMBALTA) extended release capsule 60 mg, Daily  ondansetron (ZOFRAN) injection 4 mg, Q6H PRN  hydrALAZINE (APRESOLINE) injection 5 mg, Q6H PRN  melatonin tablet 10 mg, Nightly PRN  docusate (COLACE) 50 MG/5ML liquid 100 mg, Daily  polyethylene glycol (GLYCOLAX) packet 17 g, Daily  insulin glargine (LANTUS) injection vial 30 Units, BID  insulin lispro (HUMALOG) injection vial 0-18 Units, Q4H  albumin human 25 % IV solution 25 g, PRN  oxyCODONE (ROXICODONE) immediate release tablet 5 mg, Q6H PRN  sodium hypochlorite (DAKINS) 0.125 % external solution, BID  0.9 % sodium chloride infusion, Continuous  famotidine (PEPCID) 40 MG/5ML suspension 20 mg, Daily  heparin (porcine) injection 1,900 Units, PRN  heparin (porcine) injection 1,600 Units, PRN  fluticasone (FLONASE) 50 MCG/ACT nasal spray 1 spray, Daily  budesonide-formoterol (SYMBICORT) 80-4.5 MCG/ACT inhaler 2 puff, BID  albuterol sulfate  (90 Base) MCG/ACT inhaler 1 puff, Q4H PRN  montelukast (SINGULAIR) tablet 10 mg, Daily  rOPINIRole (REQUIP) tablet 4 mg, Daily  gabapentin (NEURONTIN) capsule 100 mg, Q8H  heparin (porcine) injection 5,000 Units, 3 times per day  acetaminophen (TYLENOL) tablet 1,000 mg, 3 times per day          LABS      CBC:   Recent Labs     02/18/22 0330 02/19/22 0319 02/20/22  0645   WBC 13.7* 12.1* 9.0   RBC 2.60* 2.67* 2.73*   HGB 7.6* 7.8* 7.8*   HCT 24.7* 24.9* 25.3*   MCV 95.0 93.3 92.7   MCH 29.2 29.2 28.6   MCHC 30.8 31.3 30.8   RDW 15.9* 15.6* 15.9*    357 363   MPV 10.6 10.5 10.5      BMP:   Recent Labs     02/18/22 0330 02/19/22 0319 02/20/22  0645   * 133* 133*   K 3.5* 4.0 3.9   CL 94* 98 95*   CO2 24 22 25   BUN 16 19 14   CREATININE 1.81* 2.24* 1.90*   GLUCOSE 222* 145* 85   CALCIUM 7.8* 7.8* 8.0*      PHOSPHORUS:    Recent Labs     02/18/22 0330   PHOS 3.4     MAGNESIUM:   Recent Labs     02/18/22 0330 02/19/22 0319 02/20/22  0645   MG 1.7 2.4 1.9     SPEP:   Lab Results   Component Value Date    PROT 5.7 02/09/2022     C3:   Lab Results   Component Value Date    C3 143 02/09/2022     C4:   Lab Results   Component Value Date    C4 19 02/09/2022     URINE SODIUM:    Lab Results   Component Value Date    JORGE 26 02/09/2022      URINE CREATININE:    Lab Results   Component Value Date    LABCREA 142.3 02/09/2022         ASSESSMENT    1. Acute kidney injury so far patient is dialysis dependent and she was dialyzed yesterday.   We will evaluate her in a.m. for potential dialysis  2. Significant volume overload improving with dialysis and ultrafiltration   3. Chronic kidney disease stage IIIa due to diabetic nephrosclerosis her baseline 1.5-1.6 proteinuria about 1 to 2 g from diabetic nephrosclerosis follows up with Dr. Amanda Cope  4. Longstanding type 2 diabetes   5. Morbid obesity  6. Leonel's gangrene requiring debridements, patient on antibiotics including clindamycin and Zosyn. 7.  Metabolic acidosis: Resolved  PLAN      1.  D no need for dialysis today  2. Continue Lasix for now  3. We will evaluate her in a.m. for potential dialysis  Thank you very much will evaluate for potential need for dialysis next please do not hesitate to call with questions.     This note is created with the assistance of a speech-recognition program. While intending to generate a document that actually reflects the content of the visit, no guarantees can be provided that every mistake has been identified and corrected by editing    Electronically signed by Quan Ventura MD on 2/20/2022 at 11:32 AM

## 2022-02-21 LAB
ABSOLUTE EOS #: 0.31 K/UL (ref 0–0.44)
ABSOLUTE IMMATURE GRANULOCYTE: 0.12 K/UL (ref 0–0.3)
ABSOLUTE LYMPH #: 1.36 K/UL (ref 1.1–3.7)
ABSOLUTE MONO #: 0.65 K/UL (ref 0.1–1.2)
ANION GAP SERPL CALCULATED.3IONS-SCNC: 11 MMOL/L (ref 9–17)
BASOPHILS # BLD: 1 % (ref 0–2)
BASOPHILS ABSOLUTE: 0.08 K/UL (ref 0–0.2)
BUN BLDV-MCNC: 17 MG/DL (ref 6–20)
BUN/CREAT BLD: ABNORMAL (ref 9–20)
CALCIUM SERPL-MCNC: 8.1 MG/DL (ref 8.6–10.4)
CHLORIDE BLD-SCNC: 102 MMOL/L (ref 98–107)
CO2: 24 MMOL/L (ref 20–31)
CREAT SERPL-MCNC: 2 MG/DL (ref 0.5–0.9)
DIFFERENTIAL TYPE: ABNORMAL
EOSINOPHILS RELATIVE PERCENT: 4 % (ref 1–4)
GFR AFRICAN AMERICAN: 33 ML/MIN
GFR NON-AFRICAN AMERICAN: 27 ML/MIN
GFR SERPL CREATININE-BSD FRML MDRD: ABNORMAL ML/MIN/{1.73_M2}
GFR SERPL CREATININE-BSD FRML MDRD: ABNORMAL ML/MIN/{1.73_M2}
GLUCOSE BLD-MCNC: 113 MG/DL (ref 65–105)
GLUCOSE BLD-MCNC: 144 MG/DL (ref 65–105)
GLUCOSE BLD-MCNC: 78 MG/DL (ref 65–105)
GLUCOSE BLD-MCNC: 87 MG/DL (ref 65–105)
GLUCOSE BLD-MCNC: 89 MG/DL (ref 65–105)
GLUCOSE BLD-MCNC: 92 MG/DL (ref 65–105)
GLUCOSE BLD-MCNC: 97 MG/DL (ref 70–99)
GLUCOSE BLD-MCNC: 99 MG/DL (ref 65–105)
HCT VFR BLD CALC: 28.4 % (ref 36.3–47.1)
HEMOGLOBIN: 8.4 G/DL (ref 11.9–15.1)
IMMATURE GRANULOCYTES: 2 %
INR BLD: 1
LYMPHOCYTES # BLD: 18 % (ref 24–43)
MAGNESIUM: 2 MG/DL (ref 1.6–2.6)
MCH RBC QN AUTO: 29.8 PG (ref 25.2–33.5)
MCHC RBC AUTO-ENTMCNC: 29.6 G/DL (ref 28.4–34.8)
MCV RBC AUTO: 100.7 FL (ref 82.6–102.9)
MONOCYTES # BLD: 9 % (ref 3–12)
NRBC AUTOMATED: 0 PER 100 WBC
PDW BLD-RTO: 15.9 % (ref 11.8–14.4)
PLATELET # BLD: 333 K/UL (ref 138–453)
PLATELET ESTIMATE: ABNORMAL
PMV BLD AUTO: 9.9 FL (ref 8.1–13.5)
POTASSIUM SERPL-SCNC: 4.4 MMOL/L (ref 3.7–5.3)
PROTHROMBIN TIME: 10.4 SEC (ref 9.1–12.3)
RBC # BLD: 2.82 M/UL (ref 3.95–5.11)
RBC # BLD: ABNORMAL 10*6/UL
SEG NEUTROPHILS: 66 % (ref 36–65)
SEGMENTED NEUTROPHILS ABSOLUTE COUNT: 4.91 K/UL (ref 1.5–8.1)
SODIUM BLD-SCNC: 137 MMOL/L (ref 135–144)
WBC # BLD: 7.4 K/UL (ref 3.5–11.3)
WBC # BLD: ABNORMAL 10*3/UL

## 2022-02-21 PROCEDURE — 2580000003 HC RX 258: Performed by: STUDENT IN AN ORGANIZED HEALTH CARE EDUCATION/TRAINING PROGRAM

## 2022-02-21 PROCEDURE — 36415 COLL VENOUS BLD VENIPUNCTURE: CPT

## 2022-02-21 PROCEDURE — 85025 COMPLETE CBC W/AUTO DIFF WBC: CPT

## 2022-02-21 PROCEDURE — 6360000002 HC RX W HCPCS: Performed by: STUDENT IN AN ORGANIZED HEALTH CARE EDUCATION/TRAINING PROGRAM

## 2022-02-21 PROCEDURE — 80048 BASIC METABOLIC PNL TOTAL CA: CPT

## 2022-02-21 PROCEDURE — 83735 ASSAY OF MAGNESIUM: CPT

## 2022-02-21 PROCEDURE — 99222 1ST HOSP IP/OBS MODERATE 55: CPT | Performed by: PHYSICAL MEDICINE & REHABILITATION

## 2022-02-21 PROCEDURE — 6370000000 HC RX 637 (ALT 250 FOR IP): Performed by: STUDENT IN AN ORGANIZED HEALTH CARE EDUCATION/TRAINING PROGRAM

## 2022-02-21 PROCEDURE — 94640 AIRWAY INHALATION TREATMENT: CPT

## 2022-02-21 PROCEDURE — 6370000000 HC RX 637 (ALT 250 FOR IP): Performed by: INTERNAL MEDICINE

## 2022-02-21 PROCEDURE — 94761 N-INVAS EAR/PLS OXIMETRY MLT: CPT

## 2022-02-21 PROCEDURE — 99232 SBSQ HOSP IP/OBS MODERATE 35: CPT | Performed by: INTERNAL MEDICINE

## 2022-02-21 PROCEDURE — 99212 OFFICE O/P EST SF 10 MIN: CPT

## 2022-02-21 PROCEDURE — 1200000000 HC SEMI PRIVATE

## 2022-02-21 PROCEDURE — 85610 PROTHROMBIN TIME: CPT

## 2022-02-21 RX ORDER — CARVEDILOL 12.5 MG/1
12.5 TABLET ORAL 2 TIMES DAILY
Status: DISCONTINUED | OUTPATIENT
Start: 2022-02-21 | End: 2022-02-23

## 2022-02-21 RX ORDER — BUMETANIDE 1 MG/1
2 TABLET ORAL DAILY
Status: DISCONTINUED | OUTPATIENT
Start: 2022-02-21 | End: 2022-02-22

## 2022-02-21 RX ORDER — CARVEDILOL 25 MG/1
25 TABLET ORAL DAILY
Status: DISCONTINUED | OUTPATIENT
Start: 2022-02-21 | End: 2022-02-21

## 2022-02-21 RX ORDER — CEFAZOLIN SODIUM 1 G/50ML
1000 INJECTION, SOLUTION INTRAVENOUS ONCE
Status: DISCONTINUED | OUTPATIENT
Start: 2022-02-22 | End: 2022-02-21

## 2022-02-21 RX ORDER — OXYCODONE HYDROCHLORIDE 5 MG/1
5 TABLET ORAL ONCE
Status: COMPLETED | OUTPATIENT
Start: 2022-02-21 | End: 2022-02-21

## 2022-02-21 RX ORDER — SODIUM CHLORIDE, SODIUM LACTATE, POTASSIUM CHLORIDE, CALCIUM CHLORIDE 600; 310; 30; 20 MG/100ML; MG/100ML; MG/100ML; MG/100ML
INJECTION, SOLUTION INTRAVENOUS CONTINUOUS
Status: DISCONTINUED | OUTPATIENT
Start: 2022-02-22 | End: 2022-02-24

## 2022-02-21 RX ADMIN — ROPINIROLE HYDROCHLORIDE 4 MG: 1 TABLET, FILM COATED ORAL at 09:36

## 2022-02-21 RX ADMIN — OXYCODONE 5 MG: 5 TABLET ORAL at 12:14

## 2022-02-21 RX ADMIN — SODIUM CHLORIDE, POTASSIUM CHLORIDE, SODIUM LACTATE AND CALCIUM CHLORIDE: 600; 310; 30; 20 INJECTION, SOLUTION INTRAVENOUS at 23:28

## 2022-02-21 RX ADMIN — ACETAMINOPHEN 1000 MG: 500 TABLET ORAL at 05:17

## 2022-02-21 RX ADMIN — ACETAMINOPHEN 1000 MG: 500 TABLET ORAL at 21:12

## 2022-02-21 RX ADMIN — DOCUSATE SODIUM LIQUID 100 MG: 100 LIQUID ORAL at 11:34

## 2022-02-21 RX ADMIN — FAMOTIDINE 20 MG: 40 POWDER, FOR SUSPENSION ORAL at 09:35

## 2022-02-21 RX ADMIN — GABAPENTIN 100 MG: 300 CAPSULE ORAL at 21:12

## 2022-02-21 RX ADMIN — INSULIN GLARGINE 30 UNITS: 100 INJECTION, SOLUTION SUBCUTANEOUS at 09:37

## 2022-02-21 RX ADMIN — CARVEDILOL 12.5 MG: 25 TABLET, FILM COATED ORAL at 21:16

## 2022-02-21 RX ADMIN — LISINOPRIL 40 MG: 20 TABLET ORAL at 09:36

## 2022-02-21 RX ADMIN — OXYCODONE 5 MG: 5 TABLET ORAL at 09:46

## 2022-02-21 RX ADMIN — BUDESONIDE AND FORMOTEROL FUMARATE DIHYDRATE 2 PUFF: 80; 4.5 AEROSOL RESPIRATORY (INHALATION) at 21:02

## 2022-02-21 RX ADMIN — ACETAMINOPHEN 1000 MG: 500 TABLET ORAL at 14:21

## 2022-02-21 RX ADMIN — HYDRALAZINE HYDROCHLORIDE 5 MG: 20 INJECTION INTRAMUSCULAR; INTRAVENOUS at 04:07

## 2022-02-21 RX ADMIN — CARVEDILOL 25 MG: 25 TABLET, FILM COATED ORAL at 12:16

## 2022-02-21 RX ADMIN — POLYETHYLENE GLYCOL 3350 17 G: 17 POWDER, FOR SOLUTION ORAL at 09:36

## 2022-02-21 RX ADMIN — DULOXETINE HYDROCHLORIDE 60 MG: 30 CAPSULE, DELAYED RELEASE ORAL at 09:36

## 2022-02-21 RX ADMIN — DAKIN'S SOLUTION 0.125% (QUARTER STRENGTH): 0.12 SOLUTION at 14:00

## 2022-02-21 RX ADMIN — HEPARIN SODIUM 5000 UNITS: 5000 INJECTION INTRAVENOUS; SUBCUTANEOUS at 14:20

## 2022-02-21 RX ADMIN — SODIUM CHLORIDE, POTASSIUM CHLORIDE, SODIUM LACTATE AND CALCIUM CHLORIDE: 600; 310; 30; 20 INJECTION, SOLUTION INTRAVENOUS at 14:28

## 2022-02-21 RX ADMIN — GABAPENTIN 100 MG: 300 CAPSULE ORAL at 12:13

## 2022-02-21 RX ADMIN — BUMETANIDE 2 MG: 1 TABLET ORAL at 17:49

## 2022-02-21 RX ADMIN — OXYCODONE 5 MG: 5 TABLET ORAL at 02:34

## 2022-02-21 RX ADMIN — GABAPENTIN 100 MG: 300 CAPSULE ORAL at 04:07

## 2022-02-21 RX ADMIN — FLUTICASONE PROPIONATE 1 SPRAY: 50 SPRAY, METERED NASAL at 09:42

## 2022-02-21 RX ADMIN — HEPARIN SODIUM 5000 UNITS: 5000 INJECTION INTRAVENOUS; SUBCUTANEOUS at 05:17

## 2022-02-21 RX ADMIN — BUDESONIDE AND FORMOTEROL FUMARATE DIHYDRATE 2 PUFF: 80; 4.5 AEROSOL RESPIRATORY (INHALATION) at 09:05

## 2022-02-21 RX ADMIN — MONTELUKAST SODIUM 10 MG: 10 TABLET, FILM COATED ORAL at 09:36

## 2022-02-21 ASSESSMENT — PAIN SCALES - GENERAL
PAINLEVEL_OUTOF10: 6
PAINLEVEL_OUTOF10: 5
PAINLEVEL_OUTOF10: 0
PAINLEVEL_OUTOF10: 0
PAINLEVEL_OUTOF10: 6
PAINLEVEL_OUTOF10: 7
PAINLEVEL_OUTOF10: 5
PAINLEVEL_OUTOF10: 7
PAINLEVEL_OUTOF10: 5

## 2022-02-21 ASSESSMENT — PAIN DESCRIPTION - PAIN TYPE
TYPE: ACUTE PAIN;SURGICAL PAIN
TYPE: ACUTE PAIN;SURGICAL PAIN

## 2022-02-21 NOTE — CARE COORDINATION
Noted plans for pt to go to [facility] at discharge.   Spoke with Jesse Aguiar, liaison at [facility] -  pt will have dialysis at [facility] and then their SW will arrange for OP dialysis

## 2022-02-21 NOTE — PROGRESS NOTES
Primary notified of pt's wound vac leaking even after nurse attempted to reseal with tegaderm. Physicians to bedside to fix wound vac, nurse noticed leaking occurring again. Nurse told to clamp until morning if leak continues. Wound vac clamped.     Electronically signed by Bob Osborne RN on 2/21/2022 at 12:55 AM

## 2022-02-21 NOTE — PROGRESS NOTES
Wound Vac Change:    Patient's irrigating wound vac to the left buttock was exchanged at bedside for a black foam wound vac. Patient tolerated well without issues. Good seal achieved. Continue vac at 125mmHg. Notify trauma surgery resident with any issues with vac.      Yamil Godinez, DO  General Surgery PGY-3

## 2022-02-21 NOTE — PROGRESS NOTES
FLOOR PROGRESS NOTE    PATIENT NAME: Denys Solorzano  MEDICAL RECORD NO. [id number]  DATE: 2/21/2022    PRIMARY CARE PHYSICIAN: oHsea Hurst MD    HD: # 15    ASSESSMENT    Patient Active Problem List   Diagnosis    Leonel gangrene    Uncontrolled type 2 diabetes mellitus with hyperglycemia ([facility] [address]    Acute kidney injury superimposed on CKD ([facility] [address]    Hypokalemia    Hyponatremia    Anemia    Morbid obesity with BMI of 45.0-49.9, adult ([facility] [address]    Type 1 diabetes mellitus with stage 3a chronic kidney disease ([facility] [address]       MEDICAL DECISION MAKING AND PLAN    1. Neuro:  1. Tylenol 1000 mg q8H, gabapentin 100 mg q8, roxicodone 5mg q 4H PRN   2. Melatonin PRN for sleep     2. CV:  1. HR: 70's  2. Systolic 480'O-392'L Reintroduce patient's home antiHTN meds. Resume home lisinopril and coreg. PRN IV antihypertensives    3. Heme:  1. Hgb: 8.4 (7.8)  2. DVT ppx: heparin     4. Pulm:  1. Extubated 2/17  2. NC, encourage IS/deep breathing  3. Continue symbicort, singulair     5. Renal/lytes:  1. BUN 17/Cr 2/Na 137/K 4.4/Cl 102  2. Nephrology following - Lasix, HD management. Will follow up long term nephrology plan. 3. UOP: 0.3  cc/kg/hr +4x unmeasured     6. GI:  1. Diet: diabetic diet   2. Pepcid 20 mg BID   3. S/p laparoscopic transverse loop colostomy creation 2/17  1. Bowel regimen   2. Wound/ostomy following for assistance in ostomy care   3. 750cc/24h from ostomy     7. ID:  1. Afebrile   2. WBC: 7.4  3. Completed course of abx for necrotizing soft tissue infection     8. MSK:  1. Necrotizing soft tissue infection of the buttocks and groin   1. S/p incision and debridement 2/9, and further debridement later 2/9  2. S/p irrigation and debridement of wound 2/10  3. S/p irrigation and debridement, partial closure, and bilateral wound vac placement 2/11  4. S/p irrigation and debridement, partial closure, L wound vac application 1/01  5.  S/p irrigating wound vac placement, laparoscopic loop colostomy creation   6. S/p debridement and irrigating wound vac change \  7. Plan for vac change at bedside today        9. Endo:  1. BG: goal <180   2. HDSSI: no needs/24h  3. Lantus 30 BID, continue - will adjust PRN based on insulin requirements     10. Lines:  1. PIV    Dispo planning     SUBJECTIVE    Sheldon Jaramillo was seen and examined at bedside. No acute events overnight. Hemodynamically stable, and afebrile. Pain controlled this AM. Denies nausea, emesis except some nausea with ensure shakes. UOP adequate. Plan for vac change at bedside today. OBJECTIVE  VITALS: Temp: Temp: 97.2 °F (36.2 °C)Temp  Av.6 °F (36.4 °C)  Min: 97.2 °F (36.2 °C)  Max: 98.2 °F (24.6 °C) BP Systolic (92UTR), XHU:713 , Min:121 , CRP:687   Diastolic (18NSF), ZAS:24, Min:68, Max:92   Pulse Pulse  Av.8  Min: 77  Max: 85 Resp Resp  Av  Min: 17  Max: 22 Pulse ox SpO2  Av.9 %  Min: 92 %  Max: 98 %    GENERAL: awake, alert, no apparent distress   NEURO: awake, alert, following commands, moving all extremities, no focal deficits   HEENT: NCAT   : wound vac intact with good seal    LUNGS: no acute respiratory distress or accessory muscle use   HEART: regular rate and rhythm   ABDOMEN: soft, non-distended, ostomy pink and viable - stool output in ostomy appliance, incisions clean and dry with skin glue intact   EXTERMITY: no cyanosis, clubbing or edema    LAB:  CBC:   Recent Labs     22  0645 22  0616   WBC 12.1* 9.0 7.4   HGB 7.8* 7.8* 8.4*   HCT 24.9* 25.3* 28.4*   MCV 93.3 92.7 100.7    363 333     BMP:   Recent Labs     22  03122  0645 22  0616   * 133* 137   K 4.0 3.9 4.4   CL 98 95* 102   CO2 22 25 24   BUN 19 14 17   CREATININE 2.24* 1.90* 2.00*   GLUCOSE 145* 85 97       RADIOLOGY:  No new imaging.        Abigail Chavez DO

## 2022-02-21 NOTE — CONSULTS
Physical Medicine & Rehabilitation  Consult Note      Admitting Physician:   Jimmy Monae MD    Primary Care Provider:   Arden Devlin MD     Reason for Consult:  Acute Inpatient Rehabilitation    Chief Complaint: Soft tissue infection R buttock    History of Present Illness:  Referring Provider is requesting an evaluation for appropriate placement upon discharge from acute care. History from chart review and patient. Michelle Gill is a 42 y.o.  female admitted to [place] [address] on 2/8/2022. Patient admitted after failing treatment with doxycycline for soft tissue infection from 1/31/22. Symptoms developed on L side. 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 She was managed in ICU for hypotension and treated with Rocephin for UTI. She was transferred out of ICU 2/20/22. General surgery performed multiple debridements with wound vac placement and a colostomy formation. Debridements performed 2/9, 2/10, 2/11, 2/14, 2/16 and 2/18. Colostomy was created 2/16. Nephrology managed MEREDITH on CKD III with hemodialysis.      Review of Systems:  Constitutional: negative for anorexia, chills, fatigue, fevers, sweats and weight loss  Eyes: negative for redness and visual disturbance  Ears, nose, mouth, throat, and face: negative for earaches, sore throat and tinnitus  Respiratory: negative for cough and shortness of breath  Cardiovascular: negative for chest pain, dyspnea and palpitations  Gastrointestinal: negative for abdominal pain, change in bowel habits, constipation, nausea and vomiting  Genitourinary:negative for dysuria, frequency, hesitancy and urinary incontinence  Integument/breast: negative for pruritus and rash  Musculoskeletal:negative for stiff joints  Neurological: negative for dizziness, headaches   Behavioral/Psych: negative for decreased appetite, depression and fatigue       Premorbid function:  Independent    Current function:    PT:  Restrictions/Precautions: General Precautions,Fall Risk,Surgical Protocols  Other position/activity restrictions: s/p I &D 2/9 twice, 2/10, 2/11, 2/14, 2/17 with wound vac and colostomy, 2/18 and planned vac change 2/21. Pt extubated 2/17. Transfers  Sit to Stand: Contact guard assistance  Stand to sit: Contact guard assistance  Comment: initial stand from bed pt states dizziness that's severe, BP wfl, it resolved. some when standing from commode but less. also resolved  Ambulation 1  Surface: level tile  Device: Rolling Walker  Assistance: Contact guard assistance  Quality of Gait: slowed, no LOB or buckling, unsteady, c/o dizziness with resolves with breaks and time. Distance: 2', toileted, 4'    Transfers  Sit to Stand: Contact guard assistance  Stand to sit: Contact guard assistance  Comment: initial stand from bed pt states dizziness that's severe, BP wfl, it resolved. some when standing from commode but less. also resolved  Ambulation  Ambulation?: Yes  Ambulation 1  Surface: level tile  Device: Rolling Walker  Assistance: Contact guard assistance  Quality of Gait: slowed, no LOB or buckling, unsteady, c/o dizziness with resolves with breaks and time. Distance: 2', toileted, 4'    Surface: level tile  Ambulation 1  Surface: level tile  Device: Rolling Walker  Assistance: Contact guard assistance  Quality of Gait: slowed, no LOB or buckling, unsteady, c/o dizziness with resolves with breaks and time.   Distance: 2', toileted, 4'      OT:   ADL  Feeding: Setup,Increased time to complete  Grooming: Increased time to complete,Minimal assistance  UE Bathing: Increased time to complete,Moderate assistance  LE Bathing: Increased time to complete,Maximum assistance  UE Dressing: Increased time to complete,Moderate assistance  LE Dressing: Maximum assistance,Increased time to complete (seated EOB to don socks; pt unable to lift BLE up when attempting figure four technique and with increased abdominal pain and reports of dizziness/fatigue with attempted trunk flexion forward while seated EOB to reach BLE)  Toileting: Increased time to complete,Moderate assistance (CGA with use of RW for stand step transfer to [facility], mod A for pericare while standing with unilateral UE support on RW)         Balance  Sitting Balance: Stand by assistance  Standing Balance: Contact guard assistance   Standing Balance  Time: ~15 seconds; ~30 seconds  Activity: static standing at EOB, stand step transfer from bed > BSC; static standing at [facility] during pericare, functional mobility from [facility] > chair  Comment: Close CGA as pt reporting significant dizziness during both bouts of standing/mobility and requiring mod verbal cues for sequencing/RW mngt and to incorporate breathing techniques; mildly unsteady however no true LOB; noted fatigue and dizziness with minimal exertion        Bed mobility  Supine to Sit: Moderate assistance,2 Person assistance (HOB raised ~45* and use of bed rail; assistance required for trunk progression)  Sit to Supine:  (pt retired up to chair)  Scooting: Minimal assistance  Comment: Increased time/effort to perform; significant use of bed rail; mod VCs for breathing techniques and initiation/sequencing  Transfers  Stand Step Transfers: Contact guard assistance  Sit to stand: Contact guard assistance  Stand to sit: Contact guard assistance  Transfer Comments:  Mod VCs for proper hand placement/sequencing/safety awareness with use of RW during functional transfers; increased time/effort to perform; pt noted to hold breath with positional changes and required cues to incorporate breathing techniques and to keep eyes open/head up throughout   Toilet Transfers  Toilet - Technique: Stand step  Equipment Used: Extra wide bedside commode  Toilet Transfer: Contact guard assistance (with use fo RW)             SLP:      Past Medical History:        Diagnosis Date    Clinical trial participant 02/09/2022    Protocol [id number]  Study completion 11/FEB/2022       Past Surgical History:        Procedure Laterality Date    ABDOMEN SURGERY Bilateral 2/9/2022    incision and drainage of bilateral necrotizing soft tissue infection of groin and buttocks  performed by Barbara Oneill MD at [address] Left 2/18/2022    LEFT GROIN, BUTTOCK  WOUND EXPLORATION AND DEBRIDEMENT,  WOUND VAC EXCHANGE, ([address]) performed by Ignacio Jimenez MD at [facility] 2/16/2022    DEBRIDEMENT BUTTOCK AND GROIN, WOUND VAC EXCHANGE, LAPAROSCOPIC COLOSTOMY CREATION ([address]) performed by Ignacio Jimenez MD at [address] [place] [address] 2/8/2022    *ADD ON, ASAP* INCISION AND DRAINAGE OF PERINEUM, BRIAN KNIFE PRONE performed by Sky Darnell MD at [address] Bilateral 2/10/2022    INCISION AND DRAINAGE BUTTOCK, GROIN  (LITHOTOMY POSITION) performed by Barbara Oneill MD at [address] [place] [address] 2/11/2022    INCISION AND DRAINAGE AND DEBRIDEMENT BUTTOCK AND LABIAL ABSCESS , PLACEMENT OF WOUND VAC performed by Costa Baez DO at [address] [facility] [facility] Left 2/14/2022    [address] OF BILATERAL GROINS AND LEFT BUTTOCK WOUND WITH PARTIAL CLOSURE AND WOUND VAC PLACEMENT performed by Ignacio Jimenez MD at [address]:     Allergies   Allergen Reactions    Aspirin      Other reaction(s): due to kidney function    Ibuprofen      CHRONIC KIDNEY DISEASE STAGE 3   Other reaction(s): due to kidney function          Current Medications:   Current Facility-Administered Medications: lisinopril (PRINIVIL;ZESTRIL) tablet 40 mg, 40 mg, Oral, Daily  DULoxetine (CYMBALTA) extended release capsule 60 mg, 60 mg, Oral, Daily  ondansetron (ZOFRAN) injection 4 mg, 4 mg, IntraVENous, Q6H PRN  hydrALAZINE (APRESOLINE) injection 5 mg, 5 mg, IntraVENous, Q6H PRN  melatonin tablet 10 mg, 10 mg, Oral, Nightly PRN  docusate (COLACE) 50 MG/5ML liquid 100 mg, 100 mg, Per G Tube, Daily  polyethylene glycol (GLYCOLAX) packet 17 g, 17 g, Per G Tube, Daily  insulin glargine (LANTUS) injection vial 30 Units, 30 Units, SubCUTAneous, BID  insulin lispro (HUMALOG) injection vial 0-18 Units, 0-18 Units, SubCUTAneous, Q4H  albumin human 25 % IV solution 25 g, 25 g, IntraVENous, PRN  oxyCODONE (ROXICODONE) immediate release tablet 5 mg, 5 mg, Oral, Q6H PRN  sodium hypochlorite (DAKINS) 0.125 % external solution, , Irrigation, BID  0.9 % sodium chloride infusion, , IntraVENous, Continuous  famotidine (PEPCID) 40 MG/5ML suspension 20 mg, 20 mg, Oral, Daily  heparin (porcine) injection 1,900 Units, 1,900 Units, IntraCATHeter, PRN  heparin (porcine) injection 1,600 Units, 1,600 Units, IntraCATHeter, PRN  fluticasone (FLONASE) 50 MCG/ACT nasal spray 1 spray, 1 spray, Each Nostril, Daily  budesonide-formoterol (SYMBICORT) 80-4.5 MCG/ACT inhaler 2 puff, 2 puff, Inhalation, BID  albuterol sulfate  (90 Base) MCG/ACT inhaler 1 puff, 1 puff, Inhalation, Q4H PRN  montelukast (SINGULAIR) tablet 10 mg, 10 mg, Oral, Daily  rOPINIRole (REQUIP) tablet 4 mg, 4 mg, Oral, Daily  gabapentin (NEURONTIN) capsule 100 mg, 100 mg, Oral, Q8H  heparin (porcine) injection 5,000 Units, 5,000 Units, SubCUTAneous, 3 times per day  acetaminophen (TYLENOL) tablet 1,000 mg, 1,000 mg, Oral, 3 times per day    Social History:  Lives With:  (kids (15, 12))  Type of Home: Apartment (1st floor)  Home Layout: One level  Home Access: Level entry  Bathroom Shower/Tub: Tub/Shower unit  Bathroom Toilet: Standard  Bathroom Equipment: Grab bars in [place] [facility] chair (not used chair recently)  Bathroom Accessibility: Accessible  Home Equipment: Rolling walker,Cane (rollator)  Receives Help From: Family (mother able to come assist)  ADL Assistance: Independent  Homemaking Assistance: Independent  Homemaking Responsibilities: Yes  Ambulation Assistance: Independent  Transfer Assistance: Independent  Active : Yes  Mode of Transportation: Car  Occupation: On disability  Leisure & Hobbies: music, drive, hang out with kids  Additional Comments: HD new with admission, no 02 baseline  Social History     Socioeconomic History    Marital status:      Spouse name: Not on file    Number of children: Not on file    Years of education: Not on file    Highest education level: Not on file   Occupational History    Not on file   Tobacco Use    Smoking status: Not on file    Smokeless tobacco: Not on file   Substance and Sexual Activity    Alcohol use: Not on file    Drug use: Not on file    Sexual activity: Not on file   Other Topics Concern    Not on file   Social History Narrative    Not on file     Social Determinants of Health     Financial Resource Strain:     Difficulty of Paying Living Expenses: Not on file   Food Insecurity:     Worried About Running Out of Food in the Last Year: Not on file    [place]zie of Food in the Last Year: Not on file   Transportation Needs:     Lack of Transportation (Medical): Not on file    Lack of Transportation (Non-Medical):  Not on file   Physical Activity:     Days of Exercise per Week: Not on file    Minutes of Exercise per Session: Not on file   Stress:     Feeling of Stress : Not on file   Social Connections:     Frequency of Communication with Friends and Family: Not on file    Frequency of Social Gatherings with Friends and Family: Not on file    Attends Confucianism Services: Not on file    Active Member of [address] [place] [address] or Organizations: Not on file    Attends Club or Organization Meetings: Not on file    Marital Status: Not on file   Intimate Partner Violence:     Fear of Current or Ex-Partner: Not on file    Emotionally Abused: Not on file    Physically Abused: Not on file    Sexually Abused: Not on file   Housing Stability:     Unable to Pay for Housing in the Last Year: Not on file    Number of [place] in the Last Year: Not on file    Unstable Housing in the Last Year: Not on file       Family History:   No family history on file.    Diagnostics:    XR CHEST PORTABLE     Result Date: 2/8/2022  FINDINGS: Cardiomediastinal silhouette is normal in size. Suspected small bilateral pleural effusions. No pneumothorax. There is faint interstitial prominence bilaterally. No pulmonary consolidation. No acute osseous abnormality.      1. Faint interstitial prominence, which could be seen with interstitial edema. 2.  Suspected small bilateral pleural effusions. CBC:   Recent Labs     02/19/22 0319 02/20/22  0645 02/21/22  0616   WBC 12.1* 9.0 7.4   RBC 2.67* 2.73* 2.82*   HGB 7.8* 7.8* 8.4*   HCT 24.9* 25.3* 28.4*   MCV 93.3 92.7 100.7   RDW 15.6* 15.9* 15.9*    363 333     BMP:   Recent Labs     02/19/22 0319 02/20/22  0645 02/21/22  0616   * 133* 137   K 4.0 3.9 4.4   CL 98 95* 102   CO2 22 25 24   BUN 19 14 17   CREATININE 2.24* 1.90* 2.00*   GLUCOSE 145* 85 97      HbA1c: No results found for: LABA1C  BNP: No results for input(s): BNP in the last 72 hours. PT/INR: No results for input(s): PROTIME, INR in the last 72 hours. APTT: No results for input(s): APTT in the last 72 hours. CARDIAC ENZYMES: No results for input(s): CKMB, CKMBINDEX, TROPONINT in the last 72 hours. Invalid input(s): CKTOTAL;3 troponins    FASTING LIPID PANEL:No results found for: CHOL, HDL, TRIG  LIVER PROFILE: No results for input(s): AST, ALT, ALB, BILIDIR, BILITOT, ALKPHOS in the last 72 hours. Physical Exam:  BP (!) 165/79   Pulse 78   Temp 97.4 °F (36.3 °C) (Oral)   Resp 22   Ht 5' 3\" (1.6 m)   Wt (!) 302 lb 0.5 oz (137 kg)   SpO2 93%   BMI 53.50 kg/m²     GEN: Well developed, obese, no acute distress. HEENT: NCAT, PERRL, EOMI, mucous membranes pink and moist.  RESP: Lungs clear to auscultation. No rales or rhonchi. Respirations WNL and unlabored. CV: Regular rate rhythm. No murmurs, rubs, or gallops. ABD: soft, non-distended, non-tender. BS+ and equal. Colostomy RLQ draining soft brown stool. NEURO: A&O x 3.  Sensation intact to light touch. DTRs 2+  MSK: Functional ROM. Muscle tone and bulk are normal bilaterally. Strength 4+/5 key muscles all extremities  EXT: No calf tenderness to palpation or edema BLEs. SKIN: Warm dry and intact with good turgor. Open wound L buttock to wound vac with serosanguinous drainage. Sutured incisions R buttock and L labia. PSYCH: Mood WNL. Affect WNL. Appropriately interactive. Impression:  1. Leonel gangrene: s/p I&D 2/9, 2/10, 2/11, 2/14, 2/16, and 2/18 with wound vac exchange . Was treated with vanco, clindamycin, zosyn. 2. Colostomy: created 2/16  3. Uncontrolled DM II  4. MEREDITH on CKD: on HD  5. Hypokalemia  6. Hyponatremia  7. Anemia  8. Asthma:   9. Morbid obesity: BMI 53.5. Recommendations:    1. Diagnosis:  Debility secondary to gangrenous infection  2. Therapy: Has PT/OT needs  3. Medical Necessity: As above- will require wound care, HD, diabetic management for poorly controlled glucose, new colostomy management  4. Support: has supportive mother who can provide assistance  5. Rehab Recommendation: The patient will benefit from acute inpatient rehabilitation once medically stable per primary and consulting services. Anticipate she will be able to tolerate 3 hours of therapy per day or 900 minutes per week in rehabilitation. The patient requires multidisciplinary rehabilitation treatment including medical management by a PM&R physician, 24 hour rehabilitation nursing, Physical/Occupational therapy, rehabilitation social work, and nutrition services. Patient and family also require education in post-hospital precautions and home exercise routine, adaptive techniques and deficit compensation strategies, strengthening and conditioning, equipment prescription and instructions in use. 6. DVT Prophylaxis: on heparin    It was my pleasure to evaluate Dyann Stains today. Please call with questions.     Horacio Mak MD        This note is created with the assistance of a speech recognition program.  While intending to generate a document that actually reflects the content of the visit, the document can still have some errors including those of syntax and sound a like substitutions which may escape proof reading. In such instances, actual meaning can be extrapolated by contextual diversion.

## 2022-02-21 NOTE — PROGRESS NOTES
Hx: diverting transverse loop colostomy created on 2/16/2022    Patient observed appliance change and emptying. Reviewed anatomy, surgical procedure, purpose of the bridge, stool consistency, obtaining supplies and diet. 02/21/22 1205   Colostomy LUQ Transverse   Placement Date/Time: 02/16/22 1110   Pre-existing: No  Location: LUQ  Colostomy Type: Transverse   Stomal Appliance Changed   Flange Size (inches) 4 Inches   Stoma  Assessment Moist;Pink;Edema  (60 mm circular)   Mucocutaneous Junction Intact  (hard bridge present)   Peristomal Assessment Clean; Intact   Treatment Bag change; Liquid skin barrier;Site care  (ring barrier over bridge and around stoma)   Stool Appearance Loose   Stool Color Brown   Output (mL) 100 ml       Cut barrier to fit stoma with no more than 1/8\" of exposed skin. Currently 60 mm circular. Apply protective/barrier rings to the peristomal skin over the bridge. Empty the pouch when 1/3 to 1/2 full. Change the pouching system twice weekly and prn  Our goal is to keep the skin around the stoma intact and to have a dependable pouching system without leaks.     Call the [address] [place] [address] at [phone] or via [address] [place] [address] by searching \"wound\" and selecting the on-call clinician with questions or concerns.

## 2022-02-21 NOTE — PLAN OF CARE
Problem: Pain:  Goal: Recognizes and communicates pain  Description: Recognizes and communicates pain  2/21/2022 0059 by Jeana Alvarez RN  Outcome: Met This Shift  2/20/2022 1549 by Nadiya Johns RN  Outcome: Ongoing     Problem: Serum Glucose Level - Abnormal:  Goal: Ability to maintain appropriate glucose levels will improve to within specified parameters  Description: Ability to maintain appropriate glucose levels will improve to within specified parameters  2/21/2022 0059 by Jeana Alvarez RN  Outcome: Met This Shift  2/20/2022 1549 by Nadiya Johns RN  Outcome: Ongoing     Problem: OXYGENATION/RESPIRATORY FUNCTION  Goal: Patient will maintain patent airway  2/21/2022 0059 by Jeana Alvarez RN  Outcome: Met This Shift  2/20/2022 1549 by Nadiya oJhns RN  Outcome: Ongoing     Problem: Confusion - Acute:  Goal: Absence of continued neurological deterioration signs and symptoms  Description: Absence of continued neurological deterioration signs and symptoms  2/21/2022 0059 by Jeana Alvarez RN  Outcome: Met This Shift  2/20/2022 1549 by Nadiya Johns RN  Outcome: Ongoing  Goal: Mental status will be restored to baseline  Description: Mental status will be restored to baseline  2/21/2022 0059 by Jeana Alvaerz RN  Outcome: Met This Shift  2/20/2022 1549 by Nadiya Johns RN  Outcome: Ongoing     Problem: Discharge Planning:  Goal: Ability to perform activities of daily living will improve  Description: Ability to perform activities of daily living will improve  2/21/2022 0059 by Jeana Alvarez RN  Outcome: Met This Shift  2/20/2022 1549 by Nadiya Johns RN  Outcome: Ongoing  Goal: Participates in care planning  Description: Participates in care planning  2/21/2022 0059 by Jeana Alvarez RN  Outcome: Met This Shift  2/20/2022 1549 by Nadiya Johns RN  Outcome: Ongoing     Problem: Injury - Risk of, Physical Injury:  Goal: Absence of physical injury  Description: Absence of physical injury  2/21/2022 0059 by Kayleen Duron RN  Outcome: Met This Shift  2/20/2022 1549 by Johana Alan RN  Outcome: Ongoing  Goal: Will remain free from falls  Description: Will remain free from falls  2/21/2022 0059 by Kayleen Duron RN  Outcome: Met This Shift  2/20/2022 1549 by Johana Alan RN  Outcome: Ongoing     Problem: Mood - Altered:  Goal: Mood stable  Description: Mood stable  2/21/2022 0059 by Kayleen Duron RN  Outcome: Met This Shift  2/20/2022 1549 by Johana Alan RN  Outcome: Ongoing  Goal: Absence of abusive behavior  Description: Absence of abusive behavior  2/21/2022 0059 by Kayleen Duron RN  Outcome: Met This Shift  2/20/2022 1549 by Johana Alan RN  Outcome: Ongoing  Goal: Verbalizations of feeling emotionally comfortable while being cared for will increase  Description: Verbalizations of feeling emotionally comfortable while being cared for will increase  2/21/2022 0059 by Kayleen Duron RN  Outcome: Met This Shift  2/20/2022 1549 by Johana Alan RN  Outcome: Ongoing     Problem: Psychomotor Activity - Altered:  Goal: Absence of psychomotor disturbance signs and symptoms  Description: Absence of psychomotor disturbance signs and symptoms  2/21/2022 0059 by Kayleen Duron RN  Outcome: Met This Shift  2/20/2022 1549 by Johana Alan RN  Outcome: Ongoing     Problem: Sleep Pattern Disturbance:  Goal: Appears well-rested  Description: Appears well-rested  2/21/2022 0059 by Kayleen Duron RN  Outcome: Met This Shift  2/20/2022 1549 by Johana Alan RN  Outcome: Ongoing     Problem: Skin Integrity:  Goal: Will show no infection signs and symptoms  Description: Will show no infection signs and symptoms  2/21/2022 0059 by Kayleen Duron RN  Outcome: Ongoing  2/20/2022 1549 by Johana Alan RN  Outcome: Ongoing  Goal: Absence of new skin breakdown  Description: Absence of new skin breakdown  2/21/2022 0059 by Kayleen Duron RN  Outcome: Ongoing  2/20/2022 1549 by Johana Alan RN  Outcome: Ongoing     Problem: Fluid Volume - Imbalance:  Goal: Absence of imbalanced fluid volume signs and symptoms  Description: Absence of imbalanced fluid volume signs and symptoms  2/21/2022 0059 by Kayleen Duron RN  Outcome: Ongoing  2/20/2022 1549 by Johana Alan RN  Outcome: Ongoing     Problem: Pain:  Goal: Pain level will decrease  Description: Pain level will decrease  2/21/2022 0059 by Kayleen Duron RN  Outcome: Ongoing  2/20/2022 1549 by Johana Alan RN  Outcome: Ongoing  Goal: Control of acute pain  Description: Control of acute pain  2/21/2022 0059 by Kayleen Duron RN  Outcome: Ongoing  2/20/2022 1549 by Johana Alan RN  Outcome: Ongoing  Goal: Control of chronic pain  Description: Control of chronic pain  2/21/2022 0059 by Kayleen Duron RN  Outcome: Ongoing  2/20/2022 1549 by Johana Alan RN  Outcome: Ongoing     Problem: Skin Integrity - Impaired:  Goal: Will show no infection signs and symptoms  Description: Will show no infection signs and symptoms  2/21/2022 0059 by Kayleen Duron RN  Outcome: Ongoing  2/20/2022 1549 by Johana Alan RN  Outcome: Ongoing  Goal: Absence of new skin breakdown  Description: Absence of new skin breakdown  2/21/2022 0059 by Kayleen Duron RN  Outcome: Ongoing  2/20/2022 1549 by Johana Alan RN  Outcome: Ongoing     Problem: Nutrition  Goal: Optimal nutrition therapy  2/21/2022 0059 by Kayleen Duron RN  Outcome: Ongoing  2/20/2022 1549 by Johana Alan RN  Outcome: Ongoing     Problem: OXYGENATION/RESPIRATORY FUNCTION  Goal: Patient will achieve/maintain normal respiratory rate/effort  Description: Respiratory rate and effort will be within normal limits for the patient  2/21/2022 0059 by Kayleen Duron RN  Outcome: Ongoing  2/20/2022 1549 by Johana Alan RN  Outcome: Ongoing     Problem: SKIN INTEGRITY  Goal: Skin integrity is maintained or improved  2/21/2022 0059 by Kayleen Duron RN  Outcome: Ongoing  2/20/2022 1549 by Johana Alan RN  Outcome: Ongoing     Problem: Sensory Perception - Impaired:  Goal: Demonstrations of improved sensory functioning will increase  Description: Demonstrations of improved sensory functioning will increase  2/21/2022 0059 by Morenita Parnell RN  Outcome: Ongoing  2/20/2022 1549 by Belen Giron RN  Outcome: Ongoing  Goal: Decrease in sensory misperception frequency  Description: Decrease in sensory misperception frequency  2/21/2022 0059 by Morenita Parnell RN  Outcome: Ongoing  2/20/2022 1549 by Belen Giron RN  Outcome: Ongoing  Goal: Able to refrain from responding to false sensory perceptions  Description: Able to refrain from responding to false sensory perceptions  2/21/2022 0059 by Morenita Parnell RN  Outcome: Ongoing  2/20/2022 1549 by Belen Giron RN  Outcome: Ongoing  Goal: Demonstrates accurate environmental perceptions  Description: Demonstrates accurate environmental perceptions  2/21/2022 0059 by Morenita Parnell RN  Outcome: Ongoing  2/20/2022 1549 by Belen Giron RN  Outcome: Ongoing  Goal: Able to distinguish between reality-based and nonreality-based thinking  Description: Able to distinguish between reality-based and nonreality-based thinking  2/21/2022 0059 by Morenita Parnell RN  Outcome: Ongoing  2/20/2022 1549 by Belen Giron RN  Outcome: Ongoing  Goal: Able to interrupt nonreality-based thinking  Description: Able to interrupt nonreality-based thinking  2/21/2022 0059 by Morenita Parnell RN  Outcome: Ongoing  2/20/2022 1549 by Belen Giron RN  Outcome: Ongoing     Problem: Pain:  Goal: Pain level will decrease  Description: Pain level will decrease  2/21/2022 0059 by Morenita Panrell RN  Outcome: Ongoing  2/20/2022 1549 by Belen Giron RN  Outcome: Ongoing  Goal: Control of acute pain  Description: Control of acute pain  2/21/2022 0059 by Morenita Parnell RN  Outcome: Ongoing  2/20/2022 1549 by Belen Giron RN  Outcome: Ongoing  Goal: Control of chronic pain  Description: Control of chronic pain  2/21/2022 0059 by Morenita Parnell RN  Outcome: Ongoing  2/20/2022 1549 by Amy Bloom RN  Outcome: Ongoing

## 2022-02-21 NOTE — PROGRESS NOTES
Nephrology Progress Note      SUBJECTIVE    Patient seen and examined. Patient was lying flat. She was alert and awake. Vascular surgery bladder dilations changing wound VAC in room. Pt hypertensive, /92 pt getting Lisinopril 40 mg daily. Sat well on 2 L NC  Urine output documented as about 900 cc and times 4 more in the last 24 hours. Started on Coreg 25 MG once a day by primary. Labs reviewed, Na 137, K 4.4, Calcium 8.1, glucose 113. WBC 7.4, Hb 8.4. Patient was dialyzed 2022 and ultrafiltrate with removal of 3 L and she tolerated well    OBJECTIVE      CURRENT TEMPERATURE:  Temp: 97.2 °F (36.2 °C)  MAXIMUM TEMPERATURE OVER 24HRS:  Temp (24hrs), Av.6 °F (36.4 °C), Min:97.2 °F (36.2 °C), Max:98.2 °F (36.8 °C)    CURRENT RESPIRATORY RATE:  Resp: 22  CURRENT PULSE:    CURRENT BLOOD PRESSURE:  BP: (!) 191/92  24HR BLOOD PRESSURE RANGE:  Systolic (75DCX), NDQ:203 , Min:121 , SXD:927   ; Diastolic (84MZE), NMV:70, Min:68, Max:92    24HR INTAKE/OUTPUT:      Intake/Output Summary (Last 24 hours) at 2022 1118  Last data filed at 2022 9984  Gross per 24 hour   Intake 500 ml   Output 2475 ml   Net -1975 ml     WEIGHT :  Patient Vitals for the past 96 hrs (Last 3 readings):   Weight   22 0519 (!) 302 lb 0.5 oz (137 kg)   22 0600 (!) 311 lb 15.2 oz (141.5 kg)   22 1245 298 lb 4.5 oz (135.3 kg)     PHYSICAL EXAM      GENERAL APPEARANCE: Alert and awake x3   SKIN: To touch and no erythema  NECK: No lymphadenopathy  PULMONARY: Bilateral air entry and clear  CARDIOVASCULAR: Regular rate and rhythm positive S1 and S2 no rubs.    ABDOMEN: Obese, divergent colostomy was present  EXTREMITIES: 1-2+ extremity edema  CURRENT MEDICATIONS      lisinopril (PRINIVIL;ZESTRIL) tablet 40 mg, Daily  DULoxetine (CYMBALTA) extended release capsule 60 mg, Daily  ondansetron (ZOFRAN) injection 4 mg, Q6H PRN  hydrALAZINE (APRESOLINE) injection 5 mg, Q6H PRN  melatonin tablet 10 mg, Nightly PRN  docusate (COLACE) 50 MG/5ML liquid 100 mg, Daily  polyethylene glycol (GLYCOLAX) packet 17 g, Daily  insulin glargine (LANTUS) injection vial 30 Units, BID  insulin lispro (HUMALOG) injection vial 0-18 Units, Q4H  albumin human 25 % IV solution 25 g, PRN  oxyCODONE (ROXICODONE) immediate release tablet 5 mg, Q6H PRN  sodium hypochlorite (DAKINS) 0.125 % external solution, BID  0.9 % sodium chloride infusion, Continuous  famotidine (PEPCID) 40 MG/5ML suspension 20 mg, Daily  heparin (porcine) injection 1,900 Units, PRN  heparin (porcine) injection 1,600 Units, PRN  fluticasone (FLONASE) 50 MCG/ACT nasal spray 1 spray, Daily  budesonide-formoterol (SYMBICORT) 80-4.5 MCG/ACT inhaler 2 puff, BID  albuterol sulfate  (90 Base) MCG/ACT inhaler 1 puff, Q4H PRN  montelukast (SINGULAIR) tablet 10 mg, Daily  rOPINIRole (REQUIP) tablet 4 mg, Daily  gabapentin (NEURONTIN) capsule 100 mg, Q8H  heparin (porcine) injection 5,000 Units, 3 times per day  acetaminophen (TYLENOL) tablet 1,000 mg, 3 times per day          LABS      CBC:   Recent Labs     02/19/22  0319 02/20/22  0645 02/21/22  0616   WBC 12.1* 9.0 7.4   RBC 2.67* 2.73* 2.82*   HGB 7.8* 7.8* 8.4*   HCT 24.9* 25.3* 28.4*   MCV 93.3 92.7 100.7   MCH 29.2 28.6 29.8   MCHC 31.3 30.8 29.6   RDW 15.6* 15.9* 15.9*    363 333   MPV 10.5 10.5 9.9      BMP:   Recent Labs     02/19/22  0319 02/20/22  0645 02/21/22  0616   * 133* 137   K 4.0 3.9 4.4   CL 98 95* 102   CO2 22 25 24   BUN 19 14 17   CREATININE 2.24* 1.90* 2.00*   GLUCOSE 145* 85 97   CALCIUM 7.8* 8.0* 8.1*      MAGNESIUM:   Recent Labs     02/19/22  0319 02/20/22  0645 02/21/22  0616   MG 2.4 1.9 2.0     SPEP:   Lab Results   Component Value Date    PROT 5.7 02/09/2022     C3:   Lab Results   Component Value Date    C3 143 02/09/2022     C4:   Lab Results   Component Value Date    C4 19 02/09/2022     URINE SODIUM:    Lab Results   Component Value Date    JORGE 26 02/09/2022      URINE CREATININE:    Lab Results   Component Value Date    LABCREA 142.3 02/09/2022         ASSESSMENT    1. Acute kidney injury so far patient is dialysis dependent, mostly due to volume overload. Will evaluate her in a.m. for potential dialysis. 2.  Significant volume overload improving with dialysis and ultrafiltration   3. Chronic kidney disease stage IIIa due to diabetic nephrosclerosis her baseline 1.5-1.6 proteinuria about 1 to 2 g from diabetic nephrosclerosis follows up with Dr. Miguel William  4. Longstanding type 2 diabetes   5. Morbid obesity  6. Leonel's gangrene requiring debridements, patient on antibiotics including clindamycin and Zosyn. 7.  Metabolic acidosis: Resolved    PLAN      1. No need for dialysis today. Will evaluate for the need of dialysis in a.m. again. 2.  Adequate BP control, on Lisinopril and Coreg 25. Will switch to 12.5 BID. Will also add Bumex 2 mg once a day. 3.  Monitor strict I's and O's and renal function. 4.  Will get a tunneled catheter placed in a.m. by IR if dialysis is still required. 5.  BMP in AM.  6.  Will follow. Roberth Jason MD  Internal Medicine Resident, PGY-2  [id number] [facility]         2/21/2022, 11:20 AM    Attending Physician Statement  I have discussed the care of this patient, including pertinent history and exam findings, with the Resident/CNP. I have reviewed and edited the key elements of all parts of the encounter with the Resident/CNP. I agree with the assessment, plan and orders as documented by the Resident/CNP. Eleuterio Arango MD   Nephrology [address] [place] [address] Drive    This note is created with the assistance of a speech-recognition program. While intending to generate a document that actually reflects the content of the visit, no guarantees can be provided that every mistake has been identified and corrected by editing.

## 2022-02-21 NOTE — CARE COORDINATION
Transitional Planning    [facility] and they are submitting precert. States they will have a bed upon receiving precert.

## 2022-02-22 ENCOUNTER — APPOINTMENT (OUTPATIENT)
Dept: DIALYSIS | Age: 42
DRG: 853 | End: 2022-02-22
Payer: COMMERCIAL

## 2022-02-22 ENCOUNTER — APPOINTMENT (OUTPATIENT)
Dept: INTERVENTIONAL RADIOLOGY/VASCULAR | Age: 42
DRG: 853 | End: 2022-02-22
Payer: COMMERCIAL

## 2022-02-22 LAB
ANION GAP SERPL CALCULATED.3IONS-SCNC: 10 MMOL/L (ref 9–17)
BUN BLDV-MCNC: 18 MG/DL (ref 6–20)
CALCIUM SERPL-MCNC: 8.2 MG/DL (ref 8.6–10.4)
CHLORIDE BLD-SCNC: 104 MMOL/L (ref 98–107)
CO2: 26 MMOL/L (ref 20–31)
CREAT SERPL-MCNC: 2.02 MG/DL (ref 0.5–0.9)
GFR AFRICAN AMERICAN: 33 ML/MIN
GFR NON-AFRICAN AMERICAN: 27 ML/MIN
GFR SERPL CREATININE-BSD FRML MDRD: ABNORMAL ML/MIN/{1.73_M2}
GLUCOSE BLD-MCNC: 154 MG/DL (ref 65–105)
GLUCOSE BLD-MCNC: 83 MG/DL (ref 65–105)
GLUCOSE BLD-MCNC: 83 MG/DL (ref 65–105)
GLUCOSE BLD-MCNC: 86 MG/DL (ref 70–99)
GLUCOSE BLD-MCNC: 89 MG/DL (ref 65–105)
POTASSIUM SERPL-SCNC: 4.6 MMOL/L (ref 3.7–5.3)
SODIUM BLD-SCNC: 140 MMOL/L (ref 135–144)

## 2022-02-22 PROCEDURE — C1894 INTRO/SHEATH, NON-LASER: HCPCS

## 2022-02-22 PROCEDURE — 6370000000 HC RX 637 (ALT 250 FOR IP): Performed by: INTERNAL MEDICINE

## 2022-02-22 PROCEDURE — 77001 FLUOROGUIDE FOR VEIN DEVICE: CPT

## 2022-02-22 PROCEDURE — 6370000000 HC RX 637 (ALT 250 FOR IP): Performed by: STUDENT IN AN ORGANIZED HEALTH CARE EDUCATION/TRAINING PROGRAM

## 2022-02-22 PROCEDURE — 94640 AIRWAY INHALATION TREATMENT: CPT

## 2022-02-22 PROCEDURE — 36415 COLL VENOUS BLD VENIPUNCTURE: CPT

## 2022-02-22 PROCEDURE — 1200000000 HC SEMI PRIVATE

## 2022-02-22 PROCEDURE — 90935 HEMODIALYSIS ONE EVALUATION: CPT

## 2022-02-22 PROCEDURE — 97535 SELF CARE MNGMENT TRAINING: CPT

## 2022-02-22 PROCEDURE — 6360000002 HC RX W HCPCS: Performed by: RADIOLOGY

## 2022-02-22 PROCEDURE — C1750 CATH, HEMODIALYSIS,LONG-TERM: HCPCS

## 2022-02-22 PROCEDURE — 6360000002 HC RX W HCPCS: Performed by: STUDENT IN AN ORGANIZED HEALTH CARE EDUCATION/TRAINING PROGRAM

## 2022-02-22 PROCEDURE — 80048 BASIC METABOLIC PNL TOTAL CA: CPT

## 2022-02-22 PROCEDURE — 97116 GAIT TRAINING THERAPY: CPT

## 2022-02-22 PROCEDURE — 0JH63XZ INSERTION OF TUNNELED VASCULAR ACCESS DEVICE INTO CHEST SUBCUTANEOUS TISSUE AND FASCIA, PERCUTANEOUS APPROACH: ICD-10-PCS | Performed by: RADIOLOGY

## 2022-02-22 PROCEDURE — 02H633Z INSERTION OF INFUSION DEVICE INTO RIGHT ATRIUM, PERCUTANEOUS APPROACH: ICD-10-PCS | Performed by: RADIOLOGY

## 2022-02-22 PROCEDURE — 99232 SBSQ HOSP IP/OBS MODERATE 35: CPT | Performed by: INTERNAL MEDICINE

## 2022-02-22 PROCEDURE — 82947 ASSAY GLUCOSE BLOOD QUANT: CPT

## 2022-02-22 PROCEDURE — 76937 US GUIDE VASCULAR ACCESS: CPT

## 2022-02-22 PROCEDURE — 6360000002 HC RX W HCPCS: Performed by: PHYSICIAN ASSISTANT

## 2022-02-22 PROCEDURE — 6360000002 HC RX W HCPCS: Performed by: INTERNAL MEDICINE

## 2022-02-22 PROCEDURE — 2709999900 HC NON-CHARGEABLE SUPPLY

## 2022-02-22 PROCEDURE — 97530 THERAPEUTIC ACTIVITIES: CPT

## 2022-02-22 PROCEDURE — 2580000003 HC RX 258: Performed by: RADIOLOGY

## 2022-02-22 PROCEDURE — 36558 INSERT TUNNELED CV CATH: CPT

## 2022-02-22 PROCEDURE — C1769 GUIDE WIRE: HCPCS

## 2022-02-22 RX ORDER — HEPARIN SODIUM 5000 [USP'U]/ML
INJECTION, SOLUTION INTRAVENOUS; SUBCUTANEOUS
Status: COMPLETED | OUTPATIENT
Start: 2022-02-22 | End: 2022-02-22

## 2022-02-22 RX ORDER — HEPARIN SODIUM 1000 [USP'U]/ML
1600 INJECTION, SOLUTION INTRAVENOUS; SUBCUTANEOUS PRN
Status: DISCONTINUED | OUTPATIENT
Start: 2022-02-22 | End: 2022-02-26 | Stop reason: HOSPADM

## 2022-02-22 RX ORDER — FENTANYL CITRATE 50 UG/ML
INJECTION, SOLUTION INTRAMUSCULAR; INTRAVENOUS
Status: COMPLETED | OUTPATIENT
Start: 2022-02-22 | End: 2022-02-22

## 2022-02-22 RX ORDER — BUMETANIDE 1 MG/1
2 TABLET ORAL 2 TIMES DAILY
Status: DISCONTINUED | OUTPATIENT
Start: 2022-02-22 | End: 2022-02-24

## 2022-02-22 RX ORDER — AMLODIPINE BESYLATE 5 MG/1
5 TABLET ORAL DAILY
Status: DISCONTINUED | OUTPATIENT
Start: 2022-02-22 | End: 2022-02-23

## 2022-02-22 RX ADMIN — HEPARIN SODIUM 5000 UNITS: 5000 INJECTION INTRAVENOUS; SUBCUTANEOUS at 22:13

## 2022-02-22 RX ADMIN — HEPARIN SODIUM 1600 UNITS: 1000 INJECTION, SOLUTION INTRAVENOUS; SUBCUTANEOUS at 19:10

## 2022-02-22 RX ADMIN — GABAPENTIN 100 MG: 300 CAPSULE ORAL at 06:31

## 2022-02-22 RX ADMIN — AMLODIPINE BESYLATE 5 MG: 5 TABLET ORAL at 13:08

## 2022-02-22 RX ADMIN — DEXTROSE MONOHYDRATE 3000 MG: 50 INJECTION, SOLUTION INTRAVENOUS at 11:14

## 2022-02-22 RX ADMIN — HEPARIN SODIUM 1.6 ML: 5000 INJECTION INTRAVENOUS; SUBCUTANEOUS at 11:54

## 2022-02-22 RX ADMIN — ACETAMINOPHEN 1000 MG: 500 TABLET ORAL at 16:02

## 2022-02-22 RX ADMIN — LISINOPRIL 40 MG: 20 TABLET ORAL at 08:52

## 2022-02-22 RX ADMIN — HYDRALAZINE HYDROCHLORIDE 5 MG: 20 INJECTION INTRAMUSCULAR; INTRAVENOUS at 18:18

## 2022-02-22 RX ADMIN — FENTANYL CITRATE 50 MCG: 50 INJECTION, SOLUTION INTRAMUSCULAR; INTRAVENOUS at 11:42

## 2022-02-22 RX ADMIN — GABAPENTIN 100 MG: 300 CAPSULE ORAL at 22:13

## 2022-02-22 RX ADMIN — BUDESONIDE AND FORMOTEROL FUMARATE DIHYDRATE 2 PUFF: 80; 4.5 AEROSOL RESPIRATORY (INHALATION) at 21:24

## 2022-02-22 RX ADMIN — INSULIN GLARGINE 30 UNITS: 100 INJECTION, SOLUTION SUBCUTANEOUS at 22:13

## 2022-02-22 RX ADMIN — HEPARIN SODIUM 1.6 ML: 5000 INJECTION INTRAVENOUS; SUBCUTANEOUS at 11:55

## 2022-02-22 RX ADMIN — INSULIN LISPRO 3 UNITS: 100 INJECTION, SOLUTION INTRAVENOUS; SUBCUTANEOUS at 22:14

## 2022-02-22 RX ADMIN — CARVEDILOL 12.5 MG: 25 TABLET, FILM COATED ORAL at 22:13

## 2022-02-22 RX ADMIN — ACETAMINOPHEN 1000 MG: 500 TABLET ORAL at 22:13

## 2022-02-22 RX ADMIN — BUDESONIDE AND FORMOTEROL FUMARATE DIHYDRATE 2 PUFF: 80; 4.5 AEROSOL RESPIRATORY (INHALATION) at 08:37

## 2022-02-22 RX ADMIN — BUMETANIDE 2 MG: 1 TABLET ORAL at 22:13

## 2022-02-22 RX ADMIN — MONTELUKAST SODIUM 10 MG: 10 TABLET, FILM COATED ORAL at 08:52

## 2022-02-22 RX ADMIN — CARVEDILOL 12.5 MG: 25 TABLET, FILM COATED ORAL at 08:52

## 2022-02-22 RX ADMIN — BUMETANIDE 2 MG: 1 TABLET ORAL at 13:09

## 2022-02-22 RX ADMIN — EPOETIN ALFA-EPBX 8000 UNITS: 4000 INJECTION, SOLUTION INTRAVENOUS; SUBCUTANEOUS at 18:24

## 2022-02-22 RX ADMIN — ACETAMINOPHEN 1000 MG: 500 TABLET ORAL at 06:31

## 2022-02-22 ASSESSMENT — PAIN SCALES - GENERAL
PAINLEVEL_OUTOF10: 0
PAINLEVEL_OUTOF10: 0
PAINLEVEL_OUTOF10: 4
PAINLEVEL_OUTOF10: 0
PAINLEVEL_OUTOF10: 3
PAINLEVEL_OUTOF10: 5

## 2022-02-22 ASSESSMENT — PAIN DESCRIPTION - DESCRIPTORS: DESCRIPTORS: SORE;THROBBING

## 2022-02-22 ASSESSMENT — PAIN DESCRIPTION - FREQUENCY: FREQUENCY: CONTINUOUS

## 2022-02-22 ASSESSMENT — PAIN DESCRIPTION - LOCATION
LOCATION: BACK;BUTTOCKS
LOCATION: BACK;BUTTOCKS

## 2022-02-22 ASSESSMENT — PAIN DESCRIPTION - PAIN TYPE: TYPE: ACUTE PAIN;SURGICAL PAIN

## 2022-02-22 ASSESSMENT — PAIN - FUNCTIONAL ASSESSMENT: PAIN_FUNCTIONAL_ASSESSMENT: ACTIVITIES ARE NOT PREVENTED

## 2022-02-22 NOTE — PROGRESS NOTES
Nephrology Progress Note      SUBJECTIVE    Patient was seen and examined. No new issues overnight. Has +ve wound VAC around sacral area. .    Has been having hypertension with systolics in the 235G to 592G. Patient was initiated on Bumex 2 mg p.o. daily yesterday. Urine output documented as about 450 cc and x4 more than the last 24 hours. Has positive lower extremity along with dependent edema. BMP results reviewed creatinine 2.02, BUN 18. Patient was dialyzed 2022 and ultrafiltrate with removal of 3 L and she tolerated well    OBJECTIVE      CURRENT TEMPERATURE:  Temp: 98.5 °F (36.9 °C)  MAXIMUM TEMPERATURE OVER 24HRS:  Temp (24hrs), Av.8 °F (36.6 °C), Min:97.4 °F (36.3 °C), Max:98.5 °F (36.9 °C)    CURRENT RESPIRATORY RATE:  Resp: 20  CURRENT PULSE:    CURRENT BLOOD PRESSURE:  BP: (!) 180/89  24HR BLOOD PRESSURE RANGE:  Systolic (09ULQ), AEA:639 , Min:142 , CRC:087   ; Diastolic (68MRW), RMJ:46, Min:65, Max:89    24HR INTAKE/OUTPUT:      Intake/Output Summary (Last 24 hours) at 2022 1134  Last data filed at 2022 0800  Gross per 24 hour   Intake 600 ml   Output 1285 ml   Net -685 ml     WEIGHT :  Patient Vitals for the past 96 hrs (Last 3 readings):   Weight   22 0519 (!) 302 lb 0.5 oz (137 kg)   22 0600 (!) 311 lb 15.2 oz (141.5 kg)   22 1245 298 lb 4.5 oz (135.3 kg)     PHYSICAL EXAM      GENERAL APPEARANCE: Alert and awake x3  SKIN: Warm to touch and no erythema  NECK: No lymphadenopathy  PULMONARY: Bilateral air entry and clear  CARDIOVASCULAR: Regular rate and rhythm positive S1 and S2 no rubs.    ABDOMEN: Obese, divergent colostomy was present  EXTREMITIES: +ve 1-2+ extremity edema, positive wound VAC sacral area  CURRENT MEDICATIONS      bumetanide (BUMEX) tablet 2 mg, BID  amLODIPine (NORVASC) tablet 5 mg, Daily  carvedilol (COREG) tablet 12.5 mg, BID  lactated ringers infusion, Continuous  ceFAZolin (ANCEF) 3000 mg in dextrose 5 % 100 mL IVPB, Once  epoetin billy-epbx (RETACRIT) injection 8,000 Units, Once per day on Mon Wed Fri  lisinopril (PRINIVIL;ZESTRIL) tablet 40 mg, Daily  DULoxetine (CYMBALTA) extended release capsule 60 mg, Daily  ondansetron (ZOFRAN) injection 4 mg, Q6H PRN  hydrALAZINE (APRESOLINE) injection 5 mg, Q6H PRN  melatonin tablet 10 mg, Nightly PRN  docusate (COLACE) 50 MG/5ML liquid 100 mg, Daily  polyethylene glycol (GLYCOLAX) packet 17 g, Daily  insulin glargine (LANTUS) injection vial 30 Units, BID  insulin lispro (HUMALOG) injection vial 0-18 Units, Q4H  albumin human 25 % IV solution 25 g, PRN  oxyCODONE (ROXICODONE) immediate release tablet 5 mg, Q6H PRN  0.9 % sodium chloride infusion, Continuous  famotidine (PEPCID) 40 MG/5ML suspension 20 mg, Daily  heparin (porcine) injection 1,900 Units, PRN  heparin (porcine) injection 1,600 Units, PRN  fluticasone (FLONASE) 50 MCG/ACT nasal spray 1 spray, Daily  budesonide-formoterol (SYMBICORT) 80-4.5 MCG/ACT inhaler 2 puff, BID  albuterol sulfate  (90 Base) MCG/ACT inhaler 1 puff, Q4H PRN  montelukast (SINGULAIR) tablet 10 mg, Daily  rOPINIRole (REQUIP) tablet 4 mg, Daily  gabapentin (NEURONTIN) capsule 100 mg, Q8H  heparin (porcine) injection 5,000 Units, 3 times per day  acetaminophen (TYLENOL) tablet 1,000 mg, 3 times per day          LABS      CBC:   Recent Labs     02/20/22  0645 02/21/22  0616   WBC 9.0 7.4   RBC 2.73* 2.82*   HGB 7.8* 8.4*   HCT 25.3* 28.4*   MCV 92.7 100.7   MCH 28.6 29.8   MCHC 30.8 29.6   RDW 15.9* 15.9*    333   MPV 10.5 9.9      BMP:   Recent Labs     02/20/22  0645 02/21/22  0616 02/22/22  0506   * 137 140   K 3.9 4.4 4.6   CL 95* 102 104   CO2 25 24 26   BUN 14 17 18   CREATININE 1.90* 2.00* 2.02*   GLUCOSE 85 97 86   CALCIUM 8.0* 8.1* 8.2*      MAGNESIUM:   Recent Labs     02/20/22  0645 02/21/22  0616   MG 1.9 2.0     SPEP:   Lab Results   Component Value Date    PROT 5.7 02/09/2022     C3:   Lab Results   Component Value Date    C3 143 02/09/2022 C4:   Lab Results   Component Value Date    C4 19 02/09/2022     URINE SODIUM:    Lab Results   Component Value Date    JORGE 26 02/09/2022      URINE CREATININE:    Lab Results   Component Value Date    LABCREA 142.3 02/09/2022         ASSESSMENT    1. Acute kidney injury so far patient is dialysis dependent, mostly due to volume overload. Getting dialysis mostly for volume overload. 2.  Significant volume overload improving with dialysis and ultrafiltration   3. Chronic kidney disease stage IIIa due to diabetic nephrosclerosis her baseline 1.5-1.6 proteinuria about 1 to 2 g from diabetic nephrosclerosis follows up with Dr. Tamiko Peña  4. Longstanding type 2 diabetes   5. Morbid obesity  6. Leonel's gangrene requiring debridements, patient on antibiotics including clindamycin and Zosyn. 7.  Metabolic acidosis: Resolved    PLAN      1. Will get UF done today. Orders were confirmed with the dialysis nurse. 2.  IR to place in a tunneled catheter for potential discharge soon. 3.  Blood pressure medications adjusted, Bumex changed to 2 mg p.o. twice daily and Norvasc 5 mg daily added. Continue Coreg and lisinopril. 4.  Monitor strict I's and O's and renal function. 5.  BMP in AM.  6.   [facility] and will get dialysis in-house at rehab if needed. 7.  Will follow. Case was discussed with trauma physician. Eleuterio Shirley MD   Nephrology [address] [place] [address] Drive    This note is created with the assistance of a speech-recognition program. While intending to generate a document that actually reflects the content of the visit, no guarantees can be provided that every mistake has been identified and corrected by editing.

## 2022-02-22 NOTE — PLAN OF CARE
Problem: Skin Integrity:  Goal: Will show no infection signs and symptoms  Description: Will show no infection signs and symptoms  Outcome: Ongoing  Goal: Absence of new skin breakdown  Description: Absence of new skin breakdown  Outcome: Ongoing   Uneventful night. Afebrile. Transferred to unit from , well tolerated. Pt aaox4. Able to verbalize needs. Denies pain/distress. SBA to bedside commonde well tollerated. No BM noted. NPO since midnight for tunnel HD cath placement. Throughout the shift pt turned/repositioned self ,bed in the lowest and call light within reach.

## 2022-02-22 NOTE — PROGRESS NOTES
Physical Therapy  Facility/Department: [facility] RENAL//MED SURG  Daily Treatment Note  NAME: Max Jordan  : 1980  MRN: [id number]    Date of Service: 2022    Discharge Recommendations:  Patient would benefit from continued therapy after discharge    Assessment   Body structures, Functions, Activity limitations: Decreased functional mobility ; Decreased strength;Decreased endurance  Assessment: The pt able to amb 25ft x2 with RW CGA ffor safety. Limited weaklness to BLE and decrease endurance . Recomending continue therapy to address deficits and return to PLOF. 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 has a past medical history of Clinical trial participant.   has a past surgical history that includes Rectal surgery (N/A, 2022); Abdomen surgery (Bilateral, 2022); Rectal surgery (Bilateral, 2/10/2022); Rectal surgery (N/A, 2022); Wound Exploration (Left, 2022); colostomy (N/A, 2022); and Abdomen surgery (Left, 2022). Restrictions  Restrictions/Precautions  Restrictions/Precautions: General Precautions,Fall Risk,Surgical Protocols  Required Braces or Orthoses?: No  Position Activity Restriction  Other position/activity restrictions: s/p I &D  twice, 2/10, , ,  with wound vac and colostomy,  and planned vac change . Pt extubated . Subjective   General  Response To Previous Treatment: Patient with no complaints from previous session. Family / Caregiver Present: Yes (mom)  General Comment  Comments: RN and pt agreeable to PT.  Pt alert in bed upon arrival. denies pain, pleasant and cooperative  Pain Screening  Patient Currently in Pain: No  Vital Signs  Patient Currently in Pain: No       Orientation  Orientation  Overall Orientation Status: Within Functional Limits  Cognition      Objective   Bed mobility  Supine to Sit: Contact guard assistance  Sit to Supine: Contact guard assistance  Scooting: Contact guard assistance  Transfers  Sit to Stand: Contact guard assistance  Stand to sit: Contact guard assistance  Bed to Chair: Contact guard assistance  Comment: Transfer with Rw. wound vac attached to RW. Ambulation  Ambulation?: Yes  Ambulation 1  Surface: level tile  Device: Rolling Walker  Assistance: Contact guard assistance  Quality of Gait: Slow and cautious, no LOB or buckling noted. Distance: 25ft x2  Comments: Limited by pt c/o of BLE weakness and fatigue . Stairs/Curb  Stairs?: No     Balance  Posture: Fair  Sitting - Static: Good  Sitting - Dynamic: Good;-  Standing - Static: Fair;+  Standing - Dynamic: Fair  Comments: RW used while assessing standing balance  Exercises  Hip Flexion: 10 reps  Ankle Pumps: 10 reps  Comments: Session limited by transport picking pt up for a procedure. AM-PAC Score  AM-PAC Inpatient Mobility Raw Score : 17 (02/22/22 1135)  AM-PAC Inpatient T-Scale Score : 42.13 (02/22/22 1135)  Mobility Inpatient CMS 0-100% Score: 50.57 (02/22/22 1135)  Mobility Inpatient CMS G-Code Modifier : CK (02/22/22 1135)   Goals  Short term goals  Time Frame for Short term goals: 14 visits  Short term goal 1: Pt will be Fabiola bed mobility  Short term goal 2: Pt will be Fabiola transfers  Short term goal 3: Pt will be Fabiola amb 240' RW or least restrictive ADA  Short term goal 4: Pt will navigate 2 steps Fabiola for safe curb navigation.     Plan    Plan  Times per week: 5-6x/wk  Current Treatment Recommendations: Strengthening,Balance Training,Endurance Training,Functional Mobility Training,Transfer Training,Gait Training,Stair training,Home Exercise Program,Patient/Caregiver Education & Training,Safety Education & Training,Equipment Evaluation, Education, & procurement  Safety Devices  Type of devices: Call light within reach,Nurse notified,Gait belt,Patient at risk for falls,Left in chair,All fall risk precautions in place  Restraints  Initially in place: No     Therapy Time   Individual Concurrent Group Co-treatment   Time In 1050         Time Out 1116         Minutes 26         Timed Code Treatment Minutes: [address] [facility], [facility]

## 2022-02-22 NOTE — PROGRESS NOTES
PROGRESS NOTE    PATIENT NAME: Clemencia Smith  MEDICAL RECORD NO. [id number]  DATE: 2/22/2022    PRIMARY CARE PHYSICIAN: Pao Campos MD    HD: # 14    ASSESSMENT    Patient Active Problem List   Diagnosis    Leonel gangrene    [facility]controlled type [facility] mellitus with hyperglycemia [facility] [facility]    MEREDITH (acute kidney injury) ([facility] [address] [facility]    Hypokalemia    Hyponatremia    Anemia    Morbid obesity ([facility] [facility]    Type 1 diabetes mellitus with stage 3a chronic kidney disease ([facility] [facility]    Metabolic acidosis    Hypervolemia       MEDICAL DECISION MAKING AND PLAN    1. Neuro:  1. Tylenol 1000 mg q8H, gabapentin 100 mg q8, roxicodone 5mg q 4H PRN   2. Melatonin PRN for sleep     2. CV:  1. HR: 70's  2. Systolic 628'N-569'H Resumed home lisinopril and coreg. PRN IV antihypertensives. Will discuss adding additional PO antihypertensives if needed    3. Heme:  1. Hgb: 8.4 (7.8), labs yesterday   2. DVT ppx: heparin     4. Pulm:  1. Extubated 2/17  2. RA this morning, encourage IS/deep breathing  3. Continue symbicort, singulair     5. Renal/lytes:  1. BUN 18/2.02/Na 140/K 4.6/Cl 104   2. Nephrology following - Lasix, HD management  3. Plan for tunneled dialysis catheter placement today   4. UOP: 0.1 cc/kg/hr +4x unmeasured     6. GI:  1. Diet: diabetic diet   2. Pepcid 20 mg BID   3. S/p laparoscopic transverse loop colostomy creation 2/17  1. Bowel regimen   2. Wound/ostomy following for assistance in ostomy care   3. 550cc/24h from ostomy, continue to monitor     7. ID:  1. Afebrile   2. WBC: 7.4  3. Completed course of abx for necrotizing soft tissue infection     8. MSK:  1. Necrotizing soft tissue infection of the buttocks and groin   1. S/p incision and debridement 2/9, and further debridement later 2/9  2. S/p irrigation and debridement of wound 2/10  3. S/p irrigation and debridement, partial closure, and bilateral wound vac placement 2/11  4. S/p irrigation and debridement, partial closure, L wound vac application 7/17  5.  S/p irrigating wound vac placement, laparoscopic loop colostomy creation   6. S/p debridement and irrigating wound vac change \  7. Wound vac change at bedside , next change        9. Endo:  1. BG: goal <180   2. HDSSI  3. Lantus 30 BID, continue - will adjust PRN based on insulin requirements     10. Lines:  1. PIV    Dispo planning     SUBJECTIVE    Lissa Gab was seen and examined at bedside. No acute events overnight. Hemodynamically stable, and afebrile. Pain controlled this AM. Denies nausea, emesis. Vac reinforced this AM to achieve good seal. 550cc from ostomy/24h. OBJECTIVE  VITALS: Temp: Temp: 98.5 °F (36.9 °C)Temp  Av.7 °F (36.5 °C)  Min: 97.2 °F (36.2 °C)  Max: 98.5 °F (29.1 °C) BP Systolic (71MWT), ZQN:922 , Min:142 , AXT:699   Diastolic (53QLH), BOF:85, Min:65, Max:92   Pulse Pulse  Av.8  Min: 70  Max: 85 Resp Resp  Av.9  Min: 9  Max: 22 Pulse ox SpO2  Av.9 %  Min: 91 %  Max: 95 %    GENERAL: awake, alert, no apparent distress   NEURO: awake, alert, following commands, moving all extremities, no focal deficits   HEENT: NCAT   : wound vac intact with good seal    LUNGS: no acute respiratory distress or accessory muscle use   HEART: regular rate and rhythm   ABDOMEN: soft, non-distended, ostomy pink and viable - stool output in ostomy appliance, incisions clean and dry with skin glue intact   EXTERMITY: no cyanosis, clubbing or edema    LAB:  CBC:   Recent Labs     22  0645 22  0616   WBC 9.0 7.4   HGB 7.8* 8.4*   HCT 25.3* 28.4*   MCV 92.7 100.7    333     BMP:   Recent Labs     22  0645 22  0616 22  0506   * 137 140   K 3.9 4.4 4.6   CL 95* 102 104   CO2 25 24 26   BUN 14 17 18   CREATININE 1.90* 2.00* 2.02*   GLUCOSE 85 97 86       RADIOLOGY:  No new imaging.        Abigail Chavez DO        Attending Note      I have reviewed the above [facility] note(s) and I either performed the key elements of the medical history and physical exam or was present with the surgery resident when the key elements of the medical history and physical exam were performed. I have discussed the findings, established the care plan and recommendations with the surgical team.  In good spirits. For tunneled catheter today. Will reconfirm with nephrology. Home wound Vac planning.     Sky Darnell MD  2/22/2022  8:14 AM

## 2022-02-22 NOTE — PROGRESS NOTES
Occupational Therapy  Facility/Department: [facility] RENAL//MED SURG  Daily Treatment Note  NAME: Joanna Salter  : 1980  MRN: [id number]    Date of Service: 2022    Discharge Recommendations: Pt. Would benefit from 3 hours of therapy per day or 15 hours over a 7 day period, to enhance functional outcomes. Patient would benefit from continued therapy after discharge  OT Equipment Recommendations  Equipment Needed: Yes  Mobility Devices: Chanda Dragon: Rolling  Other: Shower chair with back,    Assessment   Performance deficits / Impairments: Decreased functional mobility ; Decreased ADL status; Decreased strength;Decreased safe awareness;Decreased cognition;Decreased endurance;Decreased coordination;Decreased balance;Decreased fine motor control;Decreased high-level IADLs  Prognosis: Good  Decision Making: Medium Complexity  OT Education: OT Role;Plan of Care;Precautions;Transfer Training;Energy Conservation;Equipment  Patient Education: Pt. elissa G carryover. REQUIRES OT FOLLOW UP: Yes  Activity Tolerance  Activity Tolerance: Patient Tolerated treatment well  Safety Devices  Safety Devices in place: Yes  Type of devices: Nurse notified;Call light within reach; Left in bed;Gait belt (Pt. wanting to get into bedside chair, however MD present to address wound vac, pt. 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 has a past medical history of Clinical trial participant.   has a past surgical history that includes Rectal surgery (N/A, 2022); Abdomen surgery (Bilateral, 2022); Rectal surgery (Bilateral, 2/10/2022); Rectal surgery (N/A, 2022); Wound Exploration (Left, 2022); colostomy (N/A, 2022); and Abdomen surgery (Left, 2022).     Restrictions  Restrictions/Precautions  Restrictions/Precautions: General Precautions,Fall Risk,Surgical Protocols  Required Braces or Orthoses?: No  Position Activity Restriction  Other position/activity restrictions: s/p I &D 2/9 twice, 2/10, 2/11, 2/14, 2/17 with wound vac and colostomy, 2/18 and planned vac change 2/21. Pt extubated 2/17. Subjective   General  Patient assessed for rehabilitation services?: Yes  Family / Caregiver Present: No  General Comment  Comments: RN ok'd for therapy this PM. Pt agreeable to participate in session and pleasant/cooperative throughout. Pain Assessment  Pain Level: 3  Pain Type: Acute pain;Surgical pain  Pain Location: Back;Buttocks  Vital Signs  Patient Currently in Pain: Yes   Orientation  Orientation  Overall Orientation Status: Within Functional Limits  Objective    ADL  Grooming: Setup;Supervision (Sitting EOB, S to ensure stability + set up, (brush teeth/wash face))  UE Bathing: Increased time to complete;Stand by assistance;Setup (SBA + set up, min verbal cues to bath around IV line.)  LE Bathing: Increased time to complete;Contact guard assistance;Setup (CGA + 1 UE support of RW during standing portions.)  UE Dressing: Increased time to complete;Setup;Minimal assistance (Doff/don gown- min A sitting EOB d/t difficulty with lines.)  LE Dressing: Contact guard assistance;Verbal cueing;Setup (Min verbal cues for 4 figure tech, pt. elissa CHAU carryover to don/doff B socks sitting EOB)  Additional Comments: Pt. transferred to EOB, set up on tray table. Balance  Sitting Balance: Supervision (S sitting EOB while engaged in self care activity, ~25 minutes)  Standing Balance: Contact guard assistance  Standing Balance  Time: ~2 minutes, one sitting rest break. Activity: static/dynamic  Comment: CGA, RW, no c/o dizziness. Bed mobility  Supine to Sit: Contact guard assistance  Sit to Supine: Contact guard assistance  Scooting: Contact guard assistance  Comment: Bed rail, increased time and effort.   Transfers  Sit to stand: Contact guard assistance  Stand to sit: Contact guard assistance  Transfer Comments: CGA, EOB<->stand with RW 2 times. Cognition  Overall Cognitive Status: WFL  Following Commands: Follows all commands without difficulty                                         Plan   Plan  Times per week: 3-5x/wk  Times per day: Daily  Current Treatment Recommendations: Balance Training,Functional Mobility Training,Endurance Training,Safety Education & Training,Self-Care / ADL,Home Management Training,Patient/Caregiver Education & Training,Equipment Evaluation, Education, & procurement,Strengthening                                                    AM-PAC Score        AM-PAC Inpatient Daily Activity Raw Score: 21 (02/22/22 1025)  AM-PAC Inpatient ADL T-Scale Score : 44.27 (02/22/22 1025)  ADL Inpatient CMS 0-100% Score: 32.79 (02/22/22 1025)  ADL Inpatient CMS G-Code Modifier : Laurence Gen (02/22/22 1025)    Goals  Short term goals  Time Frame for Short term goals: Pt will, by discharge:  Short term goal 1: Perform functional transfers/functional mobility with mod IND using least restrictive AD  Short term goal 2: Perform grooming/UB ADL tasks independently  Short term goal 3: Perform toileting/LB ADL tasks with SBA including setup and use of AE/DME PRN  Short term goal 4:  Independently demo good safety awareness during engagement in all ADLs and functional transfers/functional mobility  Short term goal 5: Demo 8+ minutes standing tolerance with use of least restrictive AD for increased participation in ADL/IADL tasks       Therapy Time   Individual Concurrent Group Co-treatment   Time In 0845         Time Out 0930         Minutes 45         Timed Code Treatment Minutes: [address], FRANCES/L

## 2022-02-22 NOTE — PROGRESS NOTES
Comprehensive Nutrition Assessment    Type and Reason for Visit:  Reassess    Nutrition Recommendations/Plan: Continue current diet with Ensure (low jesse, high pro) oral supplements with all meals. Encourage/monitor PO intakes as tolerated. Monitor labs, weights, and plan of care. Nutrition Assessment:  Pt NPO this morning for HD tunneled catheter placement. Pt reports having a fairly good appetite and eating 75% of meals. Pt has been drinking some of the Ensure supplements as well. +Colostomy output. Weight fluctuations noted. Labs/Meds reviewed. Malnutrition Assessment:  Malnutrition Status: At risk for malnutrition    Context:  Acute Illness     Findings of the 6 clinical characteristics of malnutrition:  Energy Intake:  1 - 75% or less of estimated energy requirements for 7 or more days  Weight Loss:  No significant weight loss     Body Fat Loss:  No significant body fat loss   Muscle Mass Loss:  No significant muscle mass loss  Fluid Accumulation:  1 - Mild Extremities,Generalized   Strength:  Not Performed    Estimated Daily Nutrient Needs:  Energy (kcal):  8466-7470 kcal/day; Weight Used for Energy Requirements:  Current     Protein (g):  105-130 g pro/day; Weight Used for Protein Requirements:  Ideal (2-2.5)        Fluid (ml/day):  per MD    Nutrition Related Findings:  labs/meds reviewed. +colostomy with output. Wounds:  Surgical Incision,Wound Vac (necrotizing soft tissue infection of groin and buttocks- s/p I&D)       Current Nutrition Therapies:    ADULT DIET; Regular; 4 carb choices (60 gm/meal)  ADULT ORAL NUTRITION SUPPLEMENT; Breakfast, Lunch, Dinner;  Low Calorie/High Protein Oral Supplement    Anthropometric Measures:  · Height: 5' 3\" (160 cm)  · Current Body Weight: 302 lb 0.5 oz (137 kg)   · Admission Body Weight: 276 lb 6.4 oz (125.4 kg)    · Ideal Body Weight: 115 lbs; % Ideal Body Weight 262.6 %   · BMI: 53.5  · BMI Categories: Obese Class 3 (BMI 40.0 or greater) Nutrition Diagnosis:   · Increased nutrient needs related to  (healing) as evidenced by  (necrotizing soft tissue infection of groin and buttocks)    · Inadequate oral intake related to  (current condition; recent surgery) as evidenced by intake 51-75% (improving PO intakes; need for ONS)    Nutrition Interventions:   Food and/or Nutrient Delivery:  Continue Current Diet,Continue Oral Nutrition Supplement  Nutrition Education/Counseling:  No recommendation at this time   Coordination of Nutrition Care:  Continue to monitor while inpatient    Goals:  meet % of estimated nutrient needs       Nutrition Monitoring and Evaluation:   Behavioral-Environmental Outcomes:  None Identified   Food/Nutrient Intake Outcomes:  Food and Nutrient Intake,Supplement Intake  Physical Signs/Symptoms Outcomes:  Biochemical Data,GI Status,Nutrition Focused Physical Findings,Skin,Weight,Fluid Status or Edema     Discharge Planning:     Too soon to determine     Electronically signed by Brenda Lam RD, LD on 2/22/22 at 3:59 PM EST    Contact: [phone]

## 2022-02-22 NOTE — PROGRESS NOTES
Dialysis Time Out  To be done by RN and tech or 2 RNs  Staff Names Snow SHIPMAN and Sukhwinder Boston   [x]  Identity of the patient using 2 patient identifiers  [x]  Consent for treatment  [x]  Equipment-proper machine and dialyzer  [x]  B-Hep B status  [x]  Orders- to include bath, blood flow, dialyzer, time and fluid removal  [x]  Access-Correct site and in working order  [x]  Time for patient to ask questions.

## 2022-02-22 NOTE — CARE COORDINATION
SW following for HD needs. Patient awaiting pre-cert to [facility] that provides HD on-site. No OP HD referral needed at this time.

## 2022-02-22 NOTE — PROGRESS NOTES
Report called to 3B nurse, all questions answered, patients belongings collected and will transport.     Electronically signed by Prateek Smith RN on 2/21/2022 at 10:40 PM

## 2022-02-22 NOTE — BRIEF OP NOTE
Brief Postoperative Note    Robert Nelson  YOB: 1980  [id number]    Pre-operative Diagnosis: Acute Renal Failure      Post-operative Diagnosis: Same    Procedure: Tunneled Dialysis Catheter    Medication Given: fentanyl    Anesthesia: 1%Lidocaine     Surgeons/Assistants: FRANCES Adhikari and Ly Byrd MD    Estimated Blood Loss: Minimal    Complications: none    14 Fr x 19 cm tip to cuff palindrome tunneled HD Catheter placed successfully via the Site:  Right Internal Jugular Vein. Catheter secured to skin, dressing applied. Catheter locked with Heparin. May use HD cath for dialysis.     Electronically signed by FRANCES Adhikari on 2/22/2022 at 12:11 PM

## 2022-02-23 LAB
ABSOLUTE EOS #: 0.42 K/UL (ref 0–0.44)
ABSOLUTE IMMATURE GRANULOCYTE: 0.1 K/UL (ref 0–0.3)
ABSOLUTE LYMPH #: 1.93 K/UL (ref 1.1–3.7)
ABSOLUTE MONO #: 0.81 K/UL (ref 0.1–1.2)
ANION GAP SERPL CALCULATED.3IONS-SCNC: 11 MMOL/L (ref 9–17)
BASOPHILS # BLD: 1 % (ref 0–2)
BASOPHILS ABSOLUTE: 0.1 K/UL (ref 0–0.2)
BUN BLDV-MCNC: 20 MG/DL (ref 6–20)
CALCIUM SERPL-MCNC: 8.6 MG/DL (ref 8.6–10.4)
CHLORIDE BLD-SCNC: 103 MMOL/L (ref 98–107)
CO2: 27 MMOL/L (ref 20–31)
CREAT SERPL-MCNC: 2.34 MG/DL (ref 0.5–0.9)
EOSINOPHILS RELATIVE PERCENT: 5 % (ref 1–4)
GFR AFRICAN AMERICAN: 28 ML/MIN
GFR NON-AFRICAN AMERICAN: 23 ML/MIN
GFR SERPL CREATININE-BSD FRML MDRD: ABNORMAL ML/MIN/{1.73_M2}
GLUCOSE BLD-MCNC: 106 MG/DL (ref 65–105)
GLUCOSE BLD-MCNC: 119 MG/DL (ref 65–105)
GLUCOSE BLD-MCNC: 127 MG/DL (ref 65–105)
GLUCOSE BLD-MCNC: 86 MG/DL (ref 65–105)
GLUCOSE BLD-MCNC: 90 MG/DL (ref 65–105)
GLUCOSE BLD-MCNC: 92 MG/DL (ref 65–105)
GLUCOSE BLD-MCNC: 93 MG/DL (ref 70–99)
HCT VFR BLD CALC: 29.7 % (ref 36.3–47.1)
HEMOGLOBIN: 9.2 G/DL (ref 11.9–15.1)
IMMATURE GRANULOCYTES: 1 %
LYMPHOCYTES # BLD: 21 % (ref 24–43)
MCH RBC QN AUTO: 28.7 PG (ref 25.2–33.5)
MCHC RBC AUTO-ENTMCNC: 31 G/DL (ref 28.4–34.8)
MCV RBC AUTO: 92.5 FL (ref 82.6–102.9)
MONOCYTES # BLD: 9 % (ref 3–12)
NRBC AUTOMATED: 0 PER 100 WBC
PDW BLD-RTO: 15.9 % (ref 11.8–14.4)
PLATELET # BLD: 407 K/UL (ref 138–453)
PMV BLD AUTO: 9.8 FL (ref 8.1–13.5)
POTASSIUM SERPL-SCNC: 4.7 MMOL/L (ref 3.7–5.3)
RBC # BLD: 3.21 M/UL (ref 3.95–5.11)
RBC # BLD: ABNORMAL 10*6/UL
SEG NEUTROPHILS: 63 % (ref 36–65)
SEGMENTED NEUTROPHILS ABSOLUTE COUNT: 5.78 K/UL (ref 1.5–8.1)
SODIUM BLD-SCNC: 141 MMOL/L (ref 135–144)
WBC # BLD: 9.1 K/UL (ref 3.5–11.3)

## 2022-02-23 PROCEDURE — 6370000000 HC RX 637 (ALT 250 FOR IP): Performed by: INTERNAL MEDICINE

## 2022-02-23 PROCEDURE — 6370000000 HC RX 637 (ALT 250 FOR IP): Performed by: STUDENT IN AN ORGANIZED HEALTH CARE EDUCATION/TRAINING PROGRAM

## 2022-02-23 PROCEDURE — 1200000000 HC SEMI PRIVATE

## 2022-02-23 PROCEDURE — 80048 BASIC METABOLIC PNL TOTAL CA: CPT

## 2022-02-23 PROCEDURE — 36415 COLL VENOUS BLD VENIPUNCTURE: CPT

## 2022-02-23 PROCEDURE — 99232 SBSQ HOSP IP/OBS MODERATE 35: CPT | Performed by: INTERNAL MEDICINE

## 2022-02-23 PROCEDURE — 82947 ASSAY GLUCOSE BLOOD QUANT: CPT

## 2022-02-23 PROCEDURE — 94640 AIRWAY INHALATION TREATMENT: CPT

## 2022-02-23 PROCEDURE — 6360000002 HC RX W HCPCS: Performed by: STUDENT IN AN ORGANIZED HEALTH CARE EDUCATION/TRAINING PROGRAM

## 2022-02-23 PROCEDURE — 85025 COMPLETE CBC W/AUTO DIFF WBC: CPT

## 2022-02-23 RX ORDER — CARVEDILOL 25 MG/1
25 TABLET ORAL 2 TIMES DAILY
Status: DISCONTINUED | OUTPATIENT
Start: 2022-02-23 | End: 2022-02-23

## 2022-02-23 RX ORDER — CARVEDILOL 12.5 MG/1
12.5 TABLET ORAL 2 TIMES DAILY
Status: DISCONTINUED | OUTPATIENT
Start: 2022-02-23 | End: 2022-02-26 | Stop reason: HOSPADM

## 2022-02-23 RX ORDER — AMLODIPINE BESYLATE 10 MG/1
10 TABLET ORAL DAILY
Status: DISCONTINUED | OUTPATIENT
Start: 2022-02-24 | End: 2022-02-26 | Stop reason: HOSPADM

## 2022-02-23 RX ADMIN — GABAPENTIN 100 MG: 300 CAPSULE ORAL at 12:01

## 2022-02-23 RX ADMIN — HEPARIN SODIUM 5000 UNITS: 5000 INJECTION INTRAVENOUS; SUBCUTANEOUS at 06:16

## 2022-02-23 RX ADMIN — INSULIN GLARGINE 30 UNITS: 100 INJECTION, SOLUTION SUBCUTANEOUS at 08:37

## 2022-02-23 RX ADMIN — FAMOTIDINE 20 MG: 40 POWDER, FOR SUSPENSION ORAL at 08:35

## 2022-02-23 RX ADMIN — CARVEDILOL 12.5 MG: 25 TABLET, FILM COATED ORAL at 08:36

## 2022-02-23 RX ADMIN — GABAPENTIN 100 MG: 300 CAPSULE ORAL at 21:44

## 2022-02-23 RX ADMIN — AMLODIPINE BESYLATE 5 MG: 5 TABLET ORAL at 08:36

## 2022-02-23 RX ADMIN — BUMETANIDE 2 MG: 1 TABLET ORAL at 08:36

## 2022-02-23 RX ADMIN — DULOXETINE HYDROCHLORIDE 60 MG: 30 CAPSULE, DELAYED RELEASE ORAL at 08:36

## 2022-02-23 RX ADMIN — CARVEDILOL 12.5 MG: 12.5 TABLET, FILM COATED ORAL at 21:44

## 2022-02-23 RX ADMIN — BUDESONIDE AND FORMOTEROL FUMARATE DIHYDRATE 2 PUFF: 80; 4.5 AEROSOL RESPIRATORY (INHALATION) at 07:54

## 2022-02-23 RX ADMIN — LISINOPRIL 40 MG: 20 TABLET ORAL at 08:35

## 2022-02-23 RX ADMIN — ACETAMINOPHEN 1000 MG: 500 TABLET ORAL at 21:41

## 2022-02-23 RX ADMIN — HEPARIN SODIUM 5000 UNITS: 5000 INJECTION INTRAVENOUS; SUBCUTANEOUS at 21:42

## 2022-02-23 RX ADMIN — MONTELUKAST SODIUM 10 MG: 10 TABLET, FILM COATED ORAL at 08:36

## 2022-02-23 RX ADMIN — HEPARIN SODIUM 5000 UNITS: 5000 INJECTION INTRAVENOUS; SUBCUTANEOUS at 14:00

## 2022-02-23 RX ADMIN — DOCUSATE SODIUM LIQUID 100 MG: 100 LIQUID ORAL at 08:35

## 2022-02-23 RX ADMIN — ACETAMINOPHEN 1000 MG: 500 TABLET ORAL at 14:00

## 2022-02-23 RX ADMIN — OXYCODONE 5 MG: 5 TABLET ORAL at 00:13

## 2022-02-23 RX ADMIN — ROPINIROLE HYDROCHLORIDE 4 MG: 1 TABLET, FILM COATED ORAL at 08:35

## 2022-02-23 RX ADMIN — ACETAMINOPHEN 1000 MG: 500 TABLET ORAL at 06:16

## 2022-02-23 RX ADMIN — POLYETHYLENE GLYCOL 3350 17 G: 17 POWDER, FOR SOLUTION ORAL at 08:35

## 2022-02-23 RX ADMIN — BUMETANIDE 2 MG: 1 TABLET ORAL at 21:44

## 2022-02-23 RX ADMIN — GABAPENTIN 100 MG: 300 CAPSULE ORAL at 06:16

## 2022-02-23 ASSESSMENT — PAIN SCALES - GENERAL
PAINLEVEL_OUTOF10: 8
PAINLEVEL_OUTOF10: 0
PAINLEVEL_OUTOF10: 1
PAINLEVEL_OUTOF10: 0

## 2022-02-23 NOTE — PROGRESS NOTES
PROGRESS NOTE    PATIENT NAME: Antoine Waters  MEDICAL RECORD NO. [id number]  DATE: 2/23/2022    PRIMARY CARE PHYSICIAN: Fernando Allen MD    HD: # 15    ASSESSMENT    Patient Active Problem List   Diagnosis    Leonel gangrene    Uncontrolled type 2 diabetes mellitus with hyperglycemia ([facility] [address]    MEREDITH (acute kidney injury) ([facility] [address] [facility]    Hypokalemia    Hyponatremia    Anemia    Morbid obesity ([facility] [address] [facility]    Type 1 diabetes mellitus with stage 3a chronic kidney disease ([facility])    Metabolic acidosis    Hypervolemia       MEDICAL DECISION MAKING AND PLAN    1. Neuro:  1. Tylenol 1000 mg q8H, gabapentin 100 mg q8, roxicodone 5mg q 4H PRN   2. Melatonin PRN for sleep     2. CV:  1. HR: 70's  2. Systolic 490'R-024'S Resumed home lisinopril and coreg. PRN IV antihypertensives. Norvasc PO added to regimen yesterday    3. Heme:  1. Hgb:stable  2. DVT ppx: heparin     4. Pulm:  1. Extubated 2/17  2. RA this morning, encourage IS/deep breathing  3. Continue symbicort, singulair     5. Renal/lytes:  1. BUN 18/2.02/Na 140/K 4.6/Cl 104   2. Nephrology following - Lasix, HD management  3. Tunneled dialysis catheter placed 2/22, celeste fish     6. GI:  1. Diet: diabetic diet   2. Pepcid 20 mg BID   3. S/p laparoscopic transverse loop colostomy creation 2/17  1. Bowel regimen   2. Wound/ostomy following for assistance in ostomy care   3. Ostomy functioning     7. ID:  1. Afebrile, no leukocytosis   2. Completed course of abx for necrotizing soft tissue infection     8. MSK:  1. Necrotizing soft tissue infection of the buttocks and groin   1. S/p incision and debridement 2/9, and further debridement later 2/9  2. S/p irrigation and debridement of wound 2/10  3. S/p irrigation and debridement, partial closure, and bilateral wound vac placement 2/11  4. S/p irrigation and debridement, partial closure, L wound vac application 4/80  5. S/p irrigating wound vac placement, laparoscopic loop colostomy creation 2/17  6.  S/p debridement and irrigating wound vac change \  7. Wound vac change at bedside , next change        9. Endo:  1. BG: goal <180   2. HDSSI  3. Lantus 30 BID, continue - will adjust PRN based on insulin requirements     10. Lines:  1. PIV    Dispo planning     SUBJECTIVE    Anne Childers was seen and examined at bedside. No acute events overnight. Hemodynamically stable, and afebrile. No pain today. Patient has no complaints. Ostomy in place, no leakage. OBJECTIVE  VITALS: Temp: Temp: 98.5 °F (36.9 °C)Temp  Av.4 °F (36.9 °C)  Min: 97.9 °F (36.6 °C)  Max: 98.6 °F (37 °C) BP Systolic (29YIX), BAZ:847 , Min:156 , IVB:467   Diastolic (81KPM), FGI:41, Min:81, Max:119   Pulse Pulse  Av.6  Min: 72  Max: 88 Resp Resp  Av.2  Min: 10  Max: 23 Pulse ox SpO2  Av.7 %  Min: 92 %  Max: 95 %    I/O last 3 completed shifts: In: 875.9 [I.V.:775.9; IV Piggyback:100]  Out: 9749 [Urine:625; Drains:110; Stool:150]  No intake/output data recorded. GENERAL: awake, alert, no apparent distress   NEURO: awake, alert, following commands, moving all extremities, no focal deficits   HEENT: NCAT   : wound vac intact with good seal    LUNGS: no acute respiratory distress or accessory muscle use   HEART: regular rate and rhythm   ABDOMEN: soft, non-distended, ostomy pink and viable - stool output in ostomy appliance, incisions clean and dry with skin glue intact   EXTERMITY: no cyanosis, clubbing or edema    LAB:  CBC:   Recent Labs     22  0616   WBC 7.4   HGB 8.4*   HCT 28.4*   .7        BMP:   Recent Labs     22  0616 22  0506    140   K 4.4 4.6    104   CO2 24 26   BUN 17 18   CREATININE 2.00* 2.02*   GLUCOSE 97 86       RADIOLOGY:  No new imaging.          Carson Rodriguez DO  2022  7:30 AM

## 2022-02-23 NOTE — PROGRESS NOTES
Nephrology Progress Note      SUBJECTIVE    Patient was seen and examined. No new issues overnight. Pt got tunneled dialysis cath by IR 22  S/p UF only on 22 with net removal 3 L through right PermCath for 3 hours. Patient still has temporary dialysis catheter right femoral vein which needs to be taken out. Spoke to RN regarding the same. Patient will get the wound VAC changed today. Blood pressure 167/81, on Coreg 12.5 twice daily and lisinopril 40 daily. Was started on Norvasc 5 mg p.o. daily yesterday. Patient on Bumex 2 mg p.o. daily . As per chart urine output 175 mL last 24 hours with net -1.8 L; urine output not being documented properly. Labs reviewed. Edema seems to be improving. Awaiting pre-[facility] rehab that provides hemodialysis on site,  following. OBJECTIVE      CURRENT TEMPERATURE:  Temp: 98.3 °F (36.8 °C)  MAXIMUM TEMPERATURE OVER 24HRS:  Temp (24hrs), Av.4 °F (36.9 °C), Min:97.9 °F (36.6 °C), Max:98.6 °F (37 °C)    CURRENT RESPIRATORY RATE:  Resp: 16  CURRENT PULSE:    CURRENT BLOOD PRESSURE:  BP: (!) 167/81  24HR BLOOD PRESSURE RANGE:  Systolic (66STX), KVM:298 , Min:156 , WXO:980   ; Diastolic (80PLA), AGN:14, Min:81, Max:119    24HR INTAKE/OUTPUT:      Intake/Output Summary (Last 24 hours) at 2022 1030  Last data filed at 2022 1772  Gross per 24 hour   Intake 875.9 ml   Output 3225 ml   Net -2349.1 ml     WEIGHT :  Patient Vitals for the past 96 hrs (Last 3 readings):   Weight   22 1926 279 lb 1.6 oz (126.6 kg)   22 1610 (!) 308 lb 13.8 oz (140.1 kg)   22 0519 (!) 302 lb 0.5 oz (137 kg)     PHYSICAL EXAM      GENERAL APPEARANCE: Alert and awake x3. Patient has right IJ tunnel catheter. SKIN: Warm to touch and no erythema  NECK: No lymphadenopathy. PULMONARY: Bilateral air entry and clear  CARDIOVASCULAR: Regular rate and rhythm positive S1 and S2 no rubs.    ABDOMEN: Obese, divergent colostomy was present  EXTREMITIES: +ve trace-1+ extremity edema with positive dependent edema, positive wound VAC sacral area  CURRENT MEDICATIONS      bumetanide (BUMEX) tablet 2 mg, BID  amLODIPine (NORVASC) tablet 5 mg, Daily  heparin (porcine) injection 1,600 Units, PRN  heparin (porcine) injection 1,600 Units, PRN  epoetin billy-epbx (RETACRIT) injection 8,000 Units, Once per day on Tue Thu Sat  carvedilol (COREG) tablet 12.5 mg, BID  lactated ringers infusion, Continuous  lisinopril (PRINIVIL;ZESTRIL) tablet 40 mg, Daily  DULoxetine (CYMBALTA) extended release capsule 60 mg, Daily  ondansetron (ZOFRAN) injection 4 mg, Q6H PRN  hydrALAZINE (APRESOLINE) injection 5 mg, Q6H PRN  melatonin tablet 10 mg, Nightly PRN  docusate (COLACE) 50 MG/5ML liquid 100 mg, Daily  polyethylene glycol (GLYCOLAX) packet 17 g, Daily  insulin glargine (LANTUS) injection vial 30 Units, BID  insulin lispro (HUMALOG) injection vial 0-18 Units, Q4H  albumin human 25 % IV solution 25 g, PRN  oxyCODONE (ROXICODONE) immediate release tablet 5 mg, Q6H PRN  0.9 % sodium chloride infusion, Continuous  famotidine (PEPCID) 40 MG/5ML suspension 20 mg, Daily  fluticasone (FLONASE) 50 MCG/ACT nasal spray 1 spray, Daily  budesonide-formoterol (SYMBICORT) 80-4.5 MCG/ACT inhaler 2 puff, BID  albuterol sulfate  (90 Base) MCG/ACT inhaler 1 puff, Q4H PRN  montelukast (SINGULAIR) tablet 10 mg, Daily  rOPINIRole (REQUIP) tablet 4 mg, Daily  gabapentin (NEURONTIN) capsule 100 mg, Q8H  heparin (porcine) injection 5,000 Units, 3 times per day  acetaminophen (TYLENOL) tablet 1,000 mg, 3 times per day          LABS      CBC:   Recent Labs     02/21/22  0616 02/23/22  0702   WBC 7.4 9.1   RBC 2.82* 3.21*   HGB 8.4* 9.2*   HCT 28.4* 29.7*   .7 92.5   MCH 29.8 28.7   MCHC 29.6 31.0   RDW 15.9* 15.9*    407   MPV 9.9 9.8      BMP:   Recent Labs     02/21/22  0616 02/22/22  0506 02/23/22  0702    140 141   K 4.4 4.6 4.7    104 103   CO2 24 26 27 BUN 17 18 20   CREATININE 2.00* 2.02* 2.34*   GLUCOSE 97 86 93   CALCIUM 8.1* 8.2* 8.6      MAGNESIUM:   Recent Labs     02/21/22  0616   MG 2.0     SPEP:   Lab Results   Component Value Date    PROT 5.7 02/09/2022     C3:   Lab Results   Component Value Date    C3 143 02/09/2022     C4:   Lab Results   Component Value Date    C4 19 02/09/2022     URINE SODIUM:    Lab Results   Component Value Date    JORGE 26 02/09/2022      URINE CREATININE:    Lab Results   Component Value Date    LABCREA 142.3 02/09/2022         ASSESSMENT    1. Acute kidney injury so far patient is dialysis dependent, mostly due to volume overload. Getting dialysis mostly for volume overload. 2.  Significant volume overload improving with dialysis and ultrafiltration   3. Chronic kidney disease stage IIIa due to diabetic nephrosclerosis her baseline 1.5-1.6 proteinuria about 1 to 2 g from diabetic nephrosclerosis follows up with Dr. Jackeline Camarena  4. Longstanding type 2 diabetes   5. Morbid obesity  6. Leonel's gangrene requiring debridements, patient on antibiotics including clindamycin and Zosyn. 7.  Metabolic acidosis: Resolved    PLAN      1. No acute need for dialysis today. Will evaluate for the need of repeat UF in AM.  Her swelling seems to be improving some. 2.  S/p  tunneled catheter by IR 2/22/22  3. Increase Norvasc to 10 mg daily. Continue Bumex 2 mg p.o. twice daily, Coreg and lisinopril. 4.  Monitor strict I's and O's and renal function. 5.  BMP in AM.  6.  Okay to DC Earnest catheter. 7.   working towards discharge possibly to [facility] rehab and will get dialysis/UF in-house at rehab if needed. 8.  Will follow. This note is created with the assistance of a speech-recognition program. While intending to generate a document that actually reflects the content of the visit, no guarantees can be provided that every mistake has been identified and corrected by editing.     Eber Lesch, MD  Internal Medicine Resident, PGY-2  [facility]         2/23/2022, 10:46 AM    Attending Physician Statement  I have discussed the care of this patient, including pertinent history and exam findings, with the Resident/CNP. I have reviewed and edited the key elements of all parts of the encounter with the Resident/CNP. I agree with the assessment, plan and orders as documented by the Resident/CNP. Eleuterio Busch Aas, MD   Nephrology [address] [place] [address] Drive    This note is created with the assistance of a speech-recognition program. While intending to generate a document that actually reflects the content of the visit, no guarantees can be provided that every mistake has been identified and corrected by editing.

## 2022-02-23 NOTE — CARE COORDINATION
Transitional planning. Called [facility] [phone] and left VM regarding precert. [facility] calls and they are still waiting for precert.

## 2022-02-23 NOTE — PROGRESS NOTES
Dialysis Post Treatment Note  Vitals:    02/22/22 1921   BP: (!) 205/106   Pulse: 82   Resp:    Temp:    SpO2:      Pre-Weight =140. 1KG  Post-weight = Weight: (!) 302 lb 0.5 oz (137 kg)  Total Liters Processed = Total Liters Processed (l/min): 65.1 l/min  Rinseback Volume (mL) = Rinseback Volume (ml): 300 ml  Net Removal (mL) = NET Removed (ml): 3000 ml  Type of access used=RT perm cath  Length of treatment=3 hours    Pt tolerated UF tx. No issues noted.

## 2022-02-23 NOTE — PLAN OF CARE
Problem: Skin Integrity:  Goal: Will show no infection signs and symptoms  Description: Will show no infection signs and symptoms  Outcome: Ongoing  Goal: Absence of new skin breakdown  Description: Absence of new skin breakdown  Outcome: Ongoing   Uneventful night. Afebrile. Aaox4, pt able to verbalize needs. PRN pain given x 1. Ostomy intact in place, no output to claim on this shift. Pt made frequent trips to commode urine output. Wound vac intact in place on cont suction. Bilateral groin drsg applied, well tolerated. Throughout the shift pt frequently turned/repositioned, bed in the lowest and call light in place.

## 2022-02-23 NOTE — PLAN OF CARE
Problem: Skin Integrity:  Goal: Will show no infection signs and symptoms  Description: Will show no infection signs and symptoms  2/23/2022 1747 by Tere Gordon RN  Outcome: Ongoing  2/23/2022 0613 by Marion Flanagan RN  Outcome: Ongoing  Goal: Absence of new skin breakdown  Description: Absence of new skin breakdown  2/23/2022 1747 by Tere Gordon RN  Outcome: Ongoing  2/23/2022 0613 by Marion Flanagan RN  Outcome: Ongoing     Problem: Fluid Volume - Imbalance:  Goal: Absence of imbalanced fluid volume signs and symptoms  Description: Absence of imbalanced fluid volume signs and symptoms  Outcome: Ongoing     Problem: Pain:  Goal: Pain level will decrease  Description: Pain level will decrease  Outcome: Ongoing  Goal: Recognizes and communicates pain  Description: Recognizes and communicates pain  Outcome: Ongoing  Goal: Control of acute pain  Description: Control of acute pain  Outcome: Ongoing  Goal: Control of chronic pain  Description: Control of chronic pain  Outcome: Ongoing     Problem: Serum Glucose Level - Abnormal:  Goal: Ability to maintain appropriate glucose levels will improve to within specified parameters  Description: Ability to maintain appropriate glucose levels will improve to within specified parameters  Outcome: Ongoing     Problem: Skin Integrity - Impaired:  Goal: Will show no infection signs and symptoms  Description: Will show no infection signs and symptoms  Outcome: Ongoing  Goal: Absence of new skin breakdown  Description: Absence of new skin breakdown  Outcome: Ongoing     Problem: Nutrition  Goal: Optimal nutrition therapy  Outcome: Ongoing

## 2022-02-24 LAB
ANION GAP SERPL CALCULATED.3IONS-SCNC: 15 MMOL/L (ref 9–17)
BUN BLDV-MCNC: 19 MG/DL (ref 6–20)
CALCIUM SERPL-MCNC: 9 MG/DL (ref 8.6–10.4)
CHLORIDE BLD-SCNC: 96 MMOL/L (ref 98–107)
CO2: 27 MMOL/L (ref 20–31)
CREAT SERPL-MCNC: 2.18 MG/DL (ref 0.5–0.9)
GFR AFRICAN AMERICAN: 30 ML/MIN
GFR NON-AFRICAN AMERICAN: 25 ML/MIN
GFR SERPL CREATININE-BSD FRML MDRD: ABNORMAL ML/MIN/{1.73_M2}
GLUCOSE BLD-MCNC: 109 MG/DL (ref 65–105)
GLUCOSE BLD-MCNC: 115 MG/DL (ref 65–105)
GLUCOSE BLD-MCNC: 123 MG/DL (ref 65–105)
GLUCOSE BLD-MCNC: 129 MG/DL (ref 65–105)
GLUCOSE BLD-MCNC: 137 MG/DL (ref 65–105)
GLUCOSE BLD-MCNC: 140 MG/DL (ref 70–99)
GLUCOSE BLD-MCNC: 89 MG/DL (ref 65–105)
POTASSIUM SERPL-SCNC: 4.7 MMOL/L (ref 3.7–5.3)
SODIUM BLD-SCNC: 138 MMOL/L (ref 135–144)

## 2022-02-24 PROCEDURE — 6370000000 HC RX 637 (ALT 250 FOR IP): Performed by: STUDENT IN AN ORGANIZED HEALTH CARE EDUCATION/TRAINING PROGRAM

## 2022-02-24 PROCEDURE — 94640 AIRWAY INHALATION TREATMENT: CPT

## 2022-02-24 PROCEDURE — 99232 SBSQ HOSP IP/OBS MODERATE 35: CPT | Performed by: INTERNAL MEDICINE

## 2022-02-24 PROCEDURE — 6370000000 HC RX 637 (ALT 250 FOR IP): Performed by: INTERNAL MEDICINE

## 2022-02-24 PROCEDURE — 1200000000 HC SEMI PRIVATE

## 2022-02-24 PROCEDURE — 82947 ASSAY GLUCOSE BLOOD QUANT: CPT

## 2022-02-24 PROCEDURE — 97535 SELF CARE MNGMENT TRAINING: CPT

## 2022-02-24 PROCEDURE — 80048 BASIC METABOLIC PNL TOTAL CA: CPT

## 2022-02-24 PROCEDURE — 36415 COLL VENOUS BLD VENIPUNCTURE: CPT

## 2022-02-24 PROCEDURE — 6360000002 HC RX W HCPCS: Performed by: STUDENT IN AN ORGANIZED HEALTH CARE EDUCATION/TRAINING PROGRAM

## 2022-02-24 PROCEDURE — 97116 GAIT TRAINING THERAPY: CPT

## 2022-02-24 PROCEDURE — 99212 OFFICE O/P EST SF 10 MIN: CPT

## 2022-02-24 RX ORDER — FAMOTIDINE 20 MG/1
20 TABLET, FILM COATED ORAL DAILY
Status: DISCONTINUED | OUTPATIENT
Start: 2022-02-25 | End: 2022-02-26 | Stop reason: HOSPADM

## 2022-02-24 RX ORDER — DOCUSATE SODIUM 100 MG/1
100 CAPSULE, LIQUID FILLED ORAL DAILY
Status: DISCONTINUED | OUTPATIENT
Start: 2022-02-25 | End: 2022-02-26 | Stop reason: HOSPADM

## 2022-02-24 RX ORDER — BUMETANIDE 1 MG/1
1 TABLET ORAL 2 TIMES DAILY
Status: DISCONTINUED | OUTPATIENT
Start: 2022-02-24 | End: 2022-02-26

## 2022-02-24 RX ADMIN — GABAPENTIN 100 MG: 300 CAPSULE ORAL at 05:31

## 2022-02-24 RX ADMIN — GABAPENTIN 100 MG: 300 CAPSULE ORAL at 11:25

## 2022-02-24 RX ADMIN — ROPINIROLE HYDROCHLORIDE 4 MG: 1 TABLET, FILM COATED ORAL at 08:16

## 2022-02-24 RX ADMIN — INSULIN GLARGINE 30 UNITS: 100 INJECTION, SOLUTION SUBCUTANEOUS at 20:30

## 2022-02-24 RX ADMIN — FAMOTIDINE 20 MG: 40 POWDER, FOR SUSPENSION ORAL at 08:16

## 2022-02-24 RX ADMIN — BUMETANIDE 1 MG: 1 TABLET ORAL at 20:27

## 2022-02-24 RX ADMIN — HEPARIN SODIUM 5000 UNITS: 5000 INJECTION INTRAVENOUS; SUBCUTANEOUS at 13:21

## 2022-02-24 RX ADMIN — ACETAMINOPHEN 1000 MG: 500 TABLET ORAL at 05:31

## 2022-02-24 RX ADMIN — HEPARIN SODIUM 5000 UNITS: 5000 INJECTION INTRAVENOUS; SUBCUTANEOUS at 20:29

## 2022-02-24 RX ADMIN — HEPARIN SODIUM 5000 UNITS: 5000 INJECTION INTRAVENOUS; SUBCUTANEOUS at 05:31

## 2022-02-24 RX ADMIN — DOCUSATE SODIUM LIQUID 100 MG: 100 LIQUID ORAL at 08:16

## 2022-02-24 RX ADMIN — BUDESONIDE AND FORMOTEROL FUMARATE DIHYDRATE 2 PUFF: 80; 4.5 AEROSOL RESPIRATORY (INHALATION) at 19:55

## 2022-02-24 RX ADMIN — ACETAMINOPHEN 1000 MG: 500 TABLET ORAL at 13:22

## 2022-02-24 RX ADMIN — MONTELUKAST SODIUM 10 MG: 10 TABLET, FILM COATED ORAL at 08:16

## 2022-02-24 RX ADMIN — ACETAMINOPHEN 1000 MG: 500 TABLET ORAL at 20:26

## 2022-02-24 RX ADMIN — GABAPENTIN 100 MG: 300 CAPSULE ORAL at 20:28

## 2022-02-24 RX ADMIN — LISINOPRIL 40 MG: 20 TABLET ORAL at 08:16

## 2022-02-24 RX ADMIN — DULOXETINE HYDROCHLORIDE 60 MG: 30 CAPSULE, DELAYED RELEASE ORAL at 08:16

## 2022-02-24 RX ADMIN — BUMETANIDE 2 MG: 1 TABLET ORAL at 08:16

## 2022-02-24 RX ADMIN — BUDESONIDE AND FORMOTEROL FUMARATE DIHYDRATE 2 PUFF: 80; 4.5 AEROSOL RESPIRATORY (INHALATION) at 07:55

## 2022-02-24 RX ADMIN — CARVEDILOL 12.5 MG: 12.5 TABLET, FILM COATED ORAL at 20:27

## 2022-02-24 RX ADMIN — INSULIN GLARGINE 30 UNITS: 100 INJECTION, SOLUTION SUBCUTANEOUS at 08:22

## 2022-02-24 RX ADMIN — AMLODIPINE BESYLATE 10 MG: 10 TABLET ORAL at 08:16

## 2022-02-24 RX ADMIN — CARVEDILOL 12.5 MG: 12.5 TABLET, FILM COATED ORAL at 08:16

## 2022-02-24 ASSESSMENT — PAIN SCALES - GENERAL
PAINLEVEL_OUTOF10: 3
PAINLEVEL_OUTOF10: 0
PAINLEVEL_OUTOF10: 0
PAINLEVEL_OUTOF10: 3
PAINLEVEL_OUTOF10: 0

## 2022-02-24 ASSESSMENT — PAIN DESCRIPTION - FREQUENCY: FREQUENCY: CONTINUOUS

## 2022-02-24 ASSESSMENT — PAIN DESCRIPTION - PAIN TYPE
TYPE: ACUTE PAIN;SURGICAL PAIN
TYPE: ACUTE PAIN;SURGICAL PAIN

## 2022-02-24 ASSESSMENT — PAIN DESCRIPTION - LOCATION
LOCATION: BUTTOCKS
LOCATION: BUTTOCKS

## 2022-02-24 ASSESSMENT — PAIN DESCRIPTION - PROGRESSION
CLINICAL_PROGRESSION: NOT CHANGED
CLINICAL_PROGRESSION: NOT CHANGED

## 2022-02-24 ASSESSMENT — PAIN DESCRIPTION - DESCRIPTORS
DESCRIPTORS: SORE
DESCRIPTORS: SORE

## 2022-02-24 ASSESSMENT — PAIN - FUNCTIONAL ASSESSMENT: PAIN_FUNCTIONAL_ASSESSMENT: PREVENTS OR INTERFERES SOME ACTIVE ACTIVITIES AND ADLS

## 2022-02-24 NOTE — PROGRESS NOTES
Consulted for Loop Colostomy Care and Teaching          History:   Hx: diverting transverse loop colostomy created on 2/16/2022     Patient participated in pouch removal and site care. Demonstrated stoma measurement and how to cut the barrier to fit. Bridge was removed; some peristomal skin maceration present; barrier film applied. 2 small ?traumatic hematomas noted on the apex of stoma. Reviewed written material with patient and mom and discussed potential need for 2 3/4\" barrier and/orconvexity. OSTOMY ASSESSMENT:     02/24/22 1215   Colostomy LUQ Transverse   Placement Date/Time: 02/16/22 1110   Pre-existing: No  Location: LUQ  Colostomy Type: Transverse   Stomal Appliance 1 piece   Flange Size (inches) 2.25 Inches   Stoma  Assessment Red;Moist;Protrudes  (55 mm; 2 small hematomas noted)   Mucocutaneous Junction Intact   Peristomal Assessment Blanchable erythema;Pink  (Bridge removed by Dr. Harpreet Toledo)   Treatment Bag change;Site care; Liquid skin barrier  (Brava seal)   Stool Appearance Loose   Stool Color Brown     Cut barrier to fit stoma with no more than 1/8\" of exposed skin. Currently 55 mm circular. Apply protective/barrier rings to the cut edge of pouch. Empty the pouch when 1/3 to 1/2 full. Change the pouching system twice weekly and prn  Our goal is to keep the skin around the stoma intact and to have a dependable pouching system without leaks.     Call the [address] [place] [address] at [phone] or via [facility] by searching \"wound\" and selecting the on-call clinician with questions or concerns. Equipment used    [employer] #[id number]   CTF drainable ostomy pouch and Brava protective seal.    Plan of care:   Plans to discharge to SNF. Agreeable to [employer] customer support; will enroll prior to discharge. Will monitor for possible need for 2 3/4\" barrier and possible need for convexity, stoma has modest height. Discussed DME needs once discharged to home.  Provided list for consideration. Addendum:  Upon return visit, patient side-lying in bed sleeping; pouch noted to be full and leaking distally. 2 3/4\" 2 piece CTF appliance applied with brava seal to cut edge to give additional barrier coverage around the stoma of 55 mm circumference.

## 2022-02-24 NOTE — PROGRESS NOTES
RN at bedside to remove femoral polina catheter. Pressure applied for 10 minutes. Drsg intact. Floor nurse aware.

## 2022-02-24 NOTE — PLAN OF CARE
Problem: Pain:  Goal: Pain level will decrease  Description: Pain level will decrease  Outcome: Met This Shift  Goal: Recognizes and communicates pain  Description: Recognizes and communicates pain  Outcome: Met This Shift  Goal: Control of acute pain  Description: Control of acute pain  Outcome: Met This Shift  Goal: Control of chronic pain  Description: Control of chronic pain  Outcome: Met This Shift     Problem: Serum Glucose Level - Abnormal:  Goal: Ability to maintain appropriate glucose levels will improve to within specified parameters  Description: Ability to maintain appropriate glucose levels will improve to within specified parameters  Outcome: Met This Shift     Problem: Skin Integrity - Impaired:  Goal: Will show no infection signs and symptoms  Description: Will show no infection signs and symptoms  Outcome: Ongoing  Goal: Absence of new skin breakdown  Description: Absence of new skin breakdown  Outcome: Ongoing     Problem: Nutrition  Goal: Optimal nutrition therapy  Outcome: Completed     Problem: OXYGENATION/RESPIRATORY FUNCTION  Goal: Patient will maintain patent airway  Outcome: Completed     Problem: SKIN INTEGRITY  Goal: Skin integrity is maintained or improved  Outcome: Ongoing     Problem: Confusion - Acute:  Goal: Absence of continued neurological deterioration signs and symptoms  Description: Absence of continued neurological deterioration signs and symptoms  Outcome: Completed  Goal: Mental status will be restored to baseline  Description: Mental status will be restored to baseline  Outcome: Completed     Problem: Discharge Planning:  Goal: Ability to perform activities of daily living will improve  Description: Ability to perform activities of daily living will improve  Outcome: Ongoing  Goal: Participates in care planning  Description: Participates in care planning  Outcome: Ongoing     Problem: Injury - Risk of, Physical Injury:  Goal: Absence of physical injury  Description: Absence of physical injury  Outcome: Completed  Goal: Will remain free from falls  Description: Will remain free from falls  Outcome: Completed     Problem: Mood - Altered:  Goal: Mood stable  Description: Mood stable  Outcome: Completed  Goal: Absence of abusive behavior  Description: Absence of abusive behavior  Outcome: Completed  Goal: Verbalizations of feeling emotionally comfortable while being cared for will increase  Description: Verbalizations of feeling emotionally comfortable while being cared for will increase  Outcome: Completed     Problem: Psychomotor Activity - Altered:  Goal: Absence of psychomotor disturbance signs and symptoms  Description: Absence of psychomotor disturbance signs and symptoms  Outcome: Completed     Problem: Sensory Perception - Impaired:  Goal: Demonstrations of improved sensory functioning will increase  Description: Demonstrations of improved sensory functioning will increase  Outcome: Completed  Goal: Decrease in sensory misperception frequency  Description: Decrease in sensory misperception frequency  Outcome: Completed  Goal: Able to refrain from responding to false sensory perceptions  Description: Able to refrain from responding to false sensory perceptions  Outcome: Completed  Goal: Demonstrates accurate environmental perceptions  Description: Demonstrates accurate environmental perceptions  Outcome: Completed  Goal: Able to distinguish between reality-based and nonreality-based thinking  Description: Able to distinguish between reality-based and nonreality-based thinking  Outcome: Completed  Goal: Able to interrupt nonreality-based thinking  Description: Able to interrupt nonreality-based thinking  Outcome: Completed     Problem: Sleep Pattern Disturbance:  Goal: Appears well-rested  Description: Appears well-rested  Outcome: Completed     Problem: Pain:  Goal: Pain level will decrease  Description: Pain level will decrease  Outcome: Met This Shift  Goal: Control of acute pain  Description: Control of acute pain  Outcome: Met This Shift  Goal: Control of chronic pain  Description: Control of chronic pain  Outcome: Met This Shift

## 2022-02-24 NOTE — CARE COORDINATION
Call to [facility]. They have not gotten precert yet which was started on 2/21. She will call and check on it and return my call. 1600 Voice mail from [facility] [place]. Insurance has denied the admission, information for available peer to peer as follows:   Phone # [phone]  Reference # [id number]  Must call to set up within 5 business days. [employer] sent to Dr. Maurilio Palumbo letting him know there was a peer to peer available or I can pursue SNF and he said to pursue SNF. 1630 Spoke to patient at bedside and obtained alternate choices for SNF. Patient chose 1. [facility] 2. [facility] 3. [facility]. Referrals sent.

## 2022-02-24 NOTE — PROGRESS NOTES
NEPHROLOGY PROGRESS NOTE      SUBJECTIVE     Follow-up of [facility]ED[facility] on hemodialysis. Last dialysis was 2 days back. Urine output is improving 2.6 L last 24 hours. On diuretics. Booker catheter has been discontinued. Creatinine stable. Blood pressure suboptimally controlled. Patient is asymptomatic. OBJECTIVE     Vitals:    02/23/22 2058 02/23/22 2101 02/24/22 0505 02/24/22 0805   BP: (!) 180/77 (!) 172/78  (!) 156/70   Pulse: 82 82  91   Resp: 16   17   Temp: 98 °F (36.7 °C)   97.2 °F (36.2 °C)   TempSrc: Oral   Temporal   SpO2: 95%   99%   Weight:   281 lb 3.2 oz (127.6 kg)    Height:         24HR INTAKE/OUTPUT:      Intake/Output Summary (Last 24 hours) at 2/24/2022 1130  Last data filed at 2/24/2022 6028  Gross per 24 hour   Intake 920 ml   Output 3850 ml   Net -2930 ml       General appearance:Awake, alert, in no acute distress  HEENT: PERRLA  Respiratory::vesicular breath sounds,no wheeze/crackles  Cardiovascular:S1 S2 normal,no gallop or organic murmur. Abdomen:Non tender/non distended. Bowel sounds present  Extremities: No Cyanosis or Clubbing, present lower extremity edema  Neurological:Alert and oriented. No abnormalities of mood, affect, memory, mentation, or behavior are noted      MEDICATIONS     Scheduled Meds:    [START ON 2/25/2022] docusate sodium  100 mg Oral Daily    [START ON 2/25/2022] famotidine  20 mg Oral Daily    carvedilol  12.5 mg Oral BID    amLODIPine  10 mg Oral Daily    bumetanide  2 mg Oral BID    epoetin billy-epbx  8,000 Units IntraVENous Once per day on Tue Thu Sat    lisinopril  40 mg Oral Daily    DULoxetine  60 mg Oral Daily    polyethylene glycol  17 g Per G Tube Daily    insulin glargine  30 Units SubCUTAneous BID    fluticasone  1 spray Each Nostril Daily    budesonide-formoterol  2 puff Inhalation BID    montelukast  10 mg Oral Daily    rOPINIRole  4 mg Oral Daily    gabapentin  100 mg Oral Q8H    heparin (porcine)  5,000 Units SubCUTAneous 3 times per day  acetaminophen  1,000 mg Oral 3 times per day     Continuous Infusions:    lactated ringers Stopped (02/22/22 0515)    sodium chloride Stopped (02/17/22 2512)     PRN Meds:  heparin (porcine), heparin (porcine), ondansetron, hydrALAZINE, melatonin, albumin human, oxyCODONE, albuterol sulfate HFA  Home Meds:                Medications Prior to Admission: PRENATAL VIT-DOCUSATE-IRON-FA PO, Take 1 tablet by mouth daily  cyanocobalamin 1000 MCG/ML injection, Inject 1,000 mcg into the muscle every 30 days  fluticasone-salmeterol (ADVAIR HFA) 45-21 MCG/ACT inhaler, Inhale 2 puffs into the lungs 2 times daily  Multiple Vitamins-Minerals (MULTIVITAMIN ADULT EXTRA C PO), Take 1 tablet by mouth daily  vitamin D (ERGOCALCIFEROL) 1.25 MG (34726 UT) CAPS capsule, Take 50,000 Units by mouth once a week  albuterol sulfate HFA (PROAIR HFA) 108 (90 Base) MCG/ACT inhaler, Inhale 1 puff into the lungs as needed  carvedilol (COREG) 25 MG tablet, Take 25 mg by mouth daily  vitamin D (CHOLECALCIFEROL) 125 MCG (5000 UT) CAPS capsule, Take 5,000 Units by mouth daily  DULoxetine (CYMBALTA) 60 MG extended release capsule, Take 60 mg by mouth daily  fenofibrate (TRIGLIDE) 160 MG tablet, Take 160 mg by mouth daily  fluticasone (FLONASE) 50 MCG/ACT nasal spray, 1 spray by Each Nostril route  glucagon 1 MG injection, Inject 1 mg into the muscle as needed  lisinopril (PRINIVIL;ZESTRIL) 40 MG tablet, Take 40 mg by mouth daily  montelukast (SINGULAIR) 10 MG tablet, Take 10 mg by mouth daily  omeprazole (PRILOSEC) 20 MG delayed release capsule, Take 20 mg by mouth daily  rOPINIRole (REQUIP) 4 MG tablet, Take 4 mg by mouth daily    INVESTIGATIONS     Last 3 CMP:    Recent Labs     02/22/22  0506 02/23/22  0702 02/24/22  0806    141 138   K 4.6 4.7 4.7    103 96*   CO2 26 27 27   BUN 18 20 19   CREATININE 2.02* 2.34* 2.18*   CALCIUM 8.2* 8.6 9.0       Last 3 CBC:  Recent Labs     02/23/22  0702   WBC 9.1   RBC 3.21*   HGB 9.2*   HCT 29.7*   MCV 92.5   MCH 28.7   MCHC 31.0   RDW 15.9*      MPV 9.8       ASSESSMENT     #1 acute kidney injury nonoliguric secondary to ischemic (shock/infection) and nephrotoxic (Vanco) ATN - HD. Has tunneled catheter in place. Showing signs of recovery  #2 chronic kidney disease stage III secondary diabetic nephrosclerosis with baseline creatinine 1.6-1.7  #3 Fourniers gangrene S/p debridement  #4 type 2 diabetes  #5 obesity    PLAN     #1 hold hemodialysis. Renal function showing signs of improvement. Continue diuretics.   #2 we will discontinue tunnel catheter tomorrow if renal function stable/improving    Please do not hesitate to call with questions    This note is created with the assistance of a speech-recognition program. While intending to generate a document that actually reflects the content of the visit, no guarantees can be provided that every mistake has been identified and corrected by editing    Matthew Sanford MD MD, [facility], [address]   2/24/2022 11:30 AM  [facility]

## 2022-02-24 NOTE — PROGRESS NOTES
Nephrology Progress Note      SUBJECTIVE    Patient was seen and examined. family at bedside. No new issues overnight. Pt got tunneled dialysis cath by IR 22  S/p UF only on 22 with net removal 3 L through right PermCath for 3 hours. Earnest cath removed yesterday 22. Pt reported feeling better, leg swelling has gone down. Blood pressure 156/70, on Coreg 12.5 twice daily, Norvasc 10 mg and lisinopril 40 daily  Patient on Bumex 2 mg p.o. twice daily. Pt had 2.6 L / 24 hrs UO with 3 unmeasured occurences. Labs reviewed. Na 138, K 4.7, Cr 2.18. Hb 9.2 stable. Awaiting pre[facility] rehab that provides hemodialysis on site,  following. OBJECTIVE      CURRENT TEMPERATURE:  Temp: 97.2 °F (36.2 °C)  MAXIMUM TEMPERATURE OVER 24HRS:  Temp (24hrs), Av.6 °F (36.4 °C), Min:97.2 °F (36.2 °C), Max:98 °F (36.7 °C)    CURRENT RESPIRATORY RATE:  Resp: 17  CURRENT PULSE:    CURRENT BLOOD PRESSURE:  BP: (!) 156/70  24HR BLOOD PRESSURE RANGE:  Systolic (47TEC), [place] , Min:156 , YFD:848   ; Diastolic (38DWY), QGW:40, Min:70, Max:78    24HR INTAKE/OUTPUT:      Intake/Output Summary (Last 24 hours) at 2022 1116  Last data filed at 2022 0826  Gross per 24 hour   Intake 920 ml   Output 3850 ml   Net -2930 ml     WEIGHT :  Patient Vitals for the past 96 hrs (Last 3 readings):   Weight   22 0505 281 lb 3.2 oz (127.6 kg)   22 1926 279 lb 1.6 oz (126.6 kg)   22 1610 (!) 308 lb 13.8 oz (140.1 kg)     PHYSICAL EXAM      GENERAL APPEARANCE: Alert and awake x3. Patient has right IJ tunnel catheter. SKIN: Warm to touch and no erythema  NECK: No lymphadenopathy. PULMONARY: Bilateral air entry and clear  CARDIOVASCULAR: Regular rate and rhythm positive S1 and S2 no rubs.    ABDOMEN: Obese, divergent colostomy was present  EXTREMITIES: +ve trace-1+ extremity edema with positive dependent edema, positive wound VAC sacral area  CURRENT MEDICATIONS      carvedilol (COREG) tablet 12.5 mg, BID  amLODIPine (NORVASC) tablet 10 mg, Daily  bumetanide (BUMEX) tablet 2 mg, BID  heparin (porcine) injection 1,600 Units, PRN  heparin (porcine) injection 1,600 Units, PRN  epoetin billy-epbx (RETACRIT) injection 8,000 Units, Once per day on Tue Thu Sat  lactated ringers infusion, Continuous  lisinopril (PRINIVIL;ZESTRIL) tablet 40 mg, Daily  DULoxetine (CYMBALTA) extended release capsule 60 mg, Daily  ondansetron (ZOFRAN) injection 4 mg, Q6H PRN  hydrALAZINE (APRESOLINE) injection 5 mg, Q6H PRN  melatonin tablet 10 mg, Nightly PRN  docusate (COLACE) 50 MG/5ML liquid 100 mg, Daily  polyethylene glycol (GLYCOLAX) packet 17 g, Daily  insulin glargine (LANTUS) injection vial 30 Units, BID  albumin human 25 % IV solution 25 g, PRN  oxyCODONE (ROXICODONE) immediate release tablet 5 mg, Q6H PRN  0.9 % sodium chloride infusion, Continuous  famotidine (PEPCID) 40 MG/5ML suspension 20 mg, Daily  fluticasone (FLONASE) 50 MCG/ACT nasal spray 1 spray, Daily  budesonide-formoterol (SYMBICORT) 80-4.5 MCG/ACT inhaler 2 puff, BID  albuterol sulfate  (90 Base) MCG/ACT inhaler 1 puff, Q4H PRN  montelukast (SINGULAIR) tablet 10 mg, Daily  rOPINIRole (REQUIP) tablet 4 mg, Daily  gabapentin (NEURONTIN) capsule 100 mg, Q8H  heparin (porcine) injection 5,000 Units, 3 times per day  acetaminophen (TYLENOL) tablet 1,000 mg, 3 times per day          LABS      CBC:   Recent Labs     02/23/22  0702   WBC 9.1   RBC 3.21*   HGB 9.2*   HCT 29.7*   MCV 92.5   MCH 28.7   MCHC 31.0   RDW 15.9*      MPV 9.8      BMP:   Recent Labs     02/22/22  0506 02/23/22  0702 02/24/22  0806    141 138   K 4.6 4.7 4.7    103 96*   CO2 26 27 27   BUN 18 20 19   CREATININE 2.02* 2.34* 2.18*   GLUCOSE 86 93 140*   CALCIUM 8.2* 8.6 9.0      MAGNESIUM:   No results for input(s): MG in the last 72 hours.   SPEP:   Lab Results   Component Value Date    PROT 5.7 02/09/2022     C3:   Lab Results   Component Value Date C3 143 02/09/2022     C4:   Lab Results   Component Value Date    C4 19 02/09/2022     URINE SODIUM:    Lab Results   Component Value Date    JORGE 26 02/09/2022      URINE CREATININE:    Lab Results   Component Value Date    LABCREA 142.3 02/09/2022         ASSESSMENT        #1 acute kidney injury nonoliguric secondary to ischemic (shock/infection) and nephrotoxic (Vanco) ATN - HD. Has tunneled catheter in place. Showing signs of recovery  #2 chronic kidney disease stage III secondary diabetic nephrosclerosis with baseline creatinine 1.6-1.7  #3 Fourniers gangrene S/p debridement  #4 type 2 diabetes  #5 obesity     PLAN      #1 hold hemodialysis. Renal function showing signs of improvement. Continue diuretics (will reduce the dose)  AND ACE -I. Agree to Norvasc  #2 we will discontinue tunnel catheter tomorrow if renal function stable/improving      This note is created with the assistance of a speech-recognition program. While intending to generate a document that actually reflects the content of the visit, no guarantees can be provided that every mistake has been identified and corrected by editing. Romina Cedillo MD  Internal Medicine Resident, PGY-2  [facility] [id number]          2/24/2022, 11:16 AM  Attending Physician Statement  I have discussed the care of Brenda Singh, including pertinent history and exam findings,  with the Fellow/Residentt. I have reviewed the key elements of all parts of the encounter with the Fellow/ Resident. I agree with the assessment, plan and orders as documented by the resident.     Francia Paget, MD MD, [facility], [facility]   2/24/2022 11:37 AM    Nephrology [address] [place] [address]

## 2022-02-24 NOTE — PROGRESS NOTES
PROGRESS NOTE    PATIENT NAME: Brayan Meza  MEDICAL RECORD NO. [id number]  DATE: 2/24/2022    PRIMARY CARE PHYSICIAN: Jenaro Majano MD    HD: # 16    ASSESSMENT    Patient Active Problem List   Diagnosis    Leonel gangrene    Uncontrolled type 2 diabetes mellitus with hyperglycemia ([facility] [address]    MEREDITH (acute kidney injury) ([facility] [address]    Hypokalemia    Hyponatremia    Anemia    Morbid obesity ([facility] [address]    Type 1 diabetes mellitus with stage 3a chronic kidney disease (HCC)    Metabolic acidosis    Hypervolemia       MEDICAL DECISION MAKING AND PLAN    Neuro:  Tylenol 1000 mg q8H, gabapentin 100 mg q8, roxicodone 5mg q 4H PRN   Melatonin PRN for sleep     CV:  HR: 08'F  Systolic 282'A-284'J Resumed home lisinopril and coreg. PRN IV antihypertensives. Norvasc PO added to regimen    Heme:  DVT ppx: heparin     Pulm:  Extubated 2/17  RA this morning, encourage IS/deep breathing  Continue symbicort, singulair     Renal/lytes:  BMP pending today   Nephrology following - Lasix, HD management.  Possible dialysis today pending BMP  Tunneled dialysis catheter placed 2/22, celeste fish     GI:  Diet: diabetic diet   Pepcid 20 mg BID   S/p laparoscopic transverse loop colostomy creation 2/17  Bowel regimen   Wound/ostomy following for assistance in ostomy care   Ostomy functioning - 725cc/24h    ID:  Afebrile  Completed course of abx for necrotizing soft tissue infection     MSK:  Necrotizing soft tissue infection of the buttocks and groin   S/p incision and debridement 2/9, and further debridement later 2/9  S/p irrigation and debridement of wound 2/10  S/p irrigation and debridement, partial closure, and bilateral wound vac placement 2/11  S/p irrigation and debridement, partial closure, L wound vac application 6/76  S/p irrigating wound vac placement, laparoscopic loop colostomy creation 2/17  S/p debridement and irrigating wound vac change 2/18\  Wound vac change at bedside 2/21, 2/23       Endo:  BG: goal <180   HDSSI-no requirements/24h, discontinue   Lantus 30 BID, continue - will adjust PRN based on insulin requirements     Lines:  PIV    Dispo planning     SUBJECTIVE    David Alegria was seen and examined at bedside. No acute events overnight. Hemodynamically stable, and afebrile. Pain controlled. Ostomy functioning. Wound vac with good seal.     OBJECTIVE  VITALS: Temp: Temp: 97.2 °F (36.2 °C)Temp  Av.6 °F (36.4 °C)  Min: 97.2 °F (36.2 °C)  Max: 98 °F (04.1 °C) BP Systolic (92FYZ), LGT:489 , Min:156 , DBF:159   Diastolic (49XYF), VWH:78, Min:70, Max:78   Pulse Pulse  Av  Min: 82  Max: 91 Resp Resp  Av.5  Min: 16  Max: 17 Pulse ox SpO2  Av %  Min: 95 %  Max: 99 %    I/O last 3 completed shifts: In: 36 [P.O.:920]  Out: 5 [Urine:2650; Stool:725]  I/O this shift:  In: -   Out: 550 [Urine:550]        GENERAL: awake, alert, no apparent distress   NEURO: awake, alert, following commands, moving all extremities, no focal deficits   HEENT: NCAT   : wound vac intact with good seal    LUNGS: no acute respiratory distress or accessory muscle use   HEART: regular rate and rhythm   ABDOMEN: soft, non-distended, ostomy pink and viable - stool output in ostomy appliance, incisions clean and dry with skin glue intact   EXTERMITY: no cyanosis, clubbing or edema    LAB:  CBC:   Recent Labs     22  0702   WBC 9.1   HGB 9.2*   HCT 29.7*   MCV 92.5        BMP:   Recent Labs     22  0506 22  0702    141   K 4.6 4.7    103   CO2 26 27   BUN 18 20   CREATININE 2.02* 2.34*   GLUCOSE 86 93       RADIOLOGY:  No new imaging. Abigail Chavez DO        Attending Note      I have reviewed the above TECSS note(s) and I either performed the key elements of the medical history and physical exam or was present with the resident when the key elements of the medical history and physical exam were performed.  I have discussed the findings, established the care plan and recommendations with Resident, TECSS RN, bedside nurse.     Baldo Soulier, MD  2/24/2022  11:21 AM

## 2022-02-24 NOTE — PROGRESS NOTES
Physical Therapy  Facility/Department: [facility] RENAL//MED SURG  Daily Treatment Note  NAME: Naresh Lobato  : 1980  MRN: [id number]    Date of Service: 2022    Discharge Recommendations:  Patient would benefit from continued therapy after discharge   PT Equipment Recommendations  Other: CTA, pt requires RW to safetly amb with assist at this time. Assessment   Body structures, Functions, Activity limitations: Decreased functional mobility ; Decreased strength;Decreased endurance  Assessment: Pt amb 75ft x2 with RW, CGA, and one standing rest break. Pt is limited by decreased endurance, strength, and reporting dizziness with exertion. Would benefit from continued PT to address deficits and improve functional independence. Prognosis: Good  PT Education: Plan of Care;PT Role;General Safety; Functional Mobility Training;Transfer Training  Patient Education: Pt educated on importance of therapy and safety. REQUIRES PT FOLLOW UP: Yes  Activity Tolerance  Activity Tolerance: Patient Tolerated treatment well;Patient limited by fatigue;Patient limited by endurance  Activity Tolerance: Pt limited by dizziness which subsides with rest.     Patient Diagnosis(es): The encounter diagnosis was Leonel gangrene. has a past medical history of Clinical trial participant.   has a past surgical history that includes Rectal surgery (N/A, 2022); Abdomen surgery (Bilateral, 2022); Rectal surgery (Bilateral, 2/10/2022); Rectal surgery (N/A, 2022); Wound Exploration (Left, 2022); colostomy (N/A, 2022); Abdomen surgery (Left, 2022); and IR TUNNELED CVC PLACE WO SQ PORT/PUMP > 5 YEARS (2022).     Restrictions  Restrictions/Precautions  Restrictions/Precautions: General Precautions,Fall Risk,Surgical Protocols  Required Braces or Orthoses?: No  Position Activity Restriction  Other position/activity restrictions: s/p I &D  twice, 2/10, , ,  with wound vac and colostomy,  and exercsises performed with RW. Deferred further exercises per pt request d/t reporting dizziness. AM-PAC Score  AM-PAC Inpatient Mobility Raw Score : 20 (02/24/22 1549)  AM-PAC Inpatient T-Scale Score : 47.67 (02/24/22 1549)  Mobility Inpatient CMS 0-100% Score: 35.83 (02/24/22 1549)  Mobility Inpatient CMS G-Code Modifier : CJ (02/24/22 1549)          Goals  Short term goals  Time Frame for Short term goals: 14 visits  Short term goal 1: Pt will be Fabiola bed mobility  Short term goal 2: Pt will be Fabiola transfers  Short term goal 3: Pt will be Fabiola amb 240' RW or least restrictive ADA  Short term goal 4: Pt will navigate 2 steps Fabiola for safe curb navigation. Plan    Plan  Times per week: 5-6x/wk  Current Treatment Recommendations: Strengthening,Balance Training,Endurance Training,Functional Mobility Training,Transfer Training,Gait Training,Stair training,Home Exercise Program,Patient/Caregiver Education & Training,Safety Education & Training,Equipment Evaluation, Education, & procurement  Safety Devices  Type of devices: Call light within reach,Nurse notified,Gait belt,Patient at risk for falls,Left in chair,All fall risk precautions in place  Restraints  Initially in place: No     Therapy Time   Individual Concurrent Group Co-treatment   Time In 1516         Time Out 1534         Minutes 18         Timed Code Treatment Minutes: [place]    Treatment performed by Student PTA under the supervision of co-signing PTA who agrees with all treatment and documentation.    Tracy Ziegler PTA

## 2022-02-24 NOTE — PLAN OF CARE
Problem: Skin Integrity:  Goal: Will show no infection signs and symptoms  Description: Will show no infection signs and symptoms  2/24/2022 0019 by Maria Esther Parrish RN  Outcome: Ongoing  2/23/2022 1747 by Jana Arthur RN  Outcome: Ongoing  Goal: Absence of new skin breakdown  Description: Absence of new skin breakdown  2/24/2022 0019 by Maria Esther Parrish RN  Outcome: Ongoing  2/23/2022 1747 by Jana Arthur RN  Outcome: Ongoing     Problem: Fluid Volume - Imbalance:  Goal: Absence of imbalanced fluid volume signs and symptoms  Description: Absence of imbalanced fluid volume signs and symptoms  2/23/2022 1747 by Jana Arthur RN  Outcome: Ongoing     Problem: Pain:  Goal: Pain level will decrease  Description: Pain level will decrease  2/24/2022 0019 by Maria Esther Parrish RN  Outcome: Ongoing  2/23/2022 1747 by Jana Arthur RN  Outcome: Ongoing  Goal: Recognizes and communicates pain  Description: Recognizes and communicates pain  2/24/2022 0019 by Maria Esther Parrish RN  Outcome: Ongoing  2/23/2022 1747 by Jana Arthur RN  Outcome: Ongoing  Goal: Control of acute pain  Description: Control of acute pain  2/24/2022 0019 by Maria Esther Parrish RN  Outcome: Ongoing  2/23/2022 1747 by Jana Arthur RN  Outcome: Ongoing  Goal: Control of chronic pain  Description: Control of chronic pain  2/24/2022 0019 by Maria Esther Parrish RN  Outcome: Ongoing  2/23/2022 1747 by Jana Arthur RN  Outcome: Ongoing     Problem: Serum Glucose Level - Abnormal:  Goal: Ability to maintain appropriate glucose levels will improve to within specified parameters  Description: Ability to maintain appropriate glucose levels will improve to within specified parameters  2/23/2022 1747 by Jana Arthur RN  Outcome: Ongoing     Problem: Skin Integrity - Impaired:  Goal: Will show no infection signs and symptoms  Description: Will show no infection signs and symptoms  2/23/2022 1747 by Jana Arthur RN  Outcome: Ongoing  Goal: Absence of new skin breakdown  Description: Absence of new skin breakdown  2/23/2022 1747 by Aleksandr Lovelace RN  Outcome: Ongoing     Problem: Nutrition  Goal: Optimal nutrition therapy  2/23/2022 1747 by Aleksandr Lovelace RN  Outcome: Ongoing     Problem: OXYGENATION/RESPIRATORY FUNCTION  Goal: Patient will maintain patent airway  2/23/2022 1747 by Aleksandr Lovelace RN  Outcome: Ongoing  Goal: Patient will achieve/maintain normal respiratory rate/effort  Description: Respiratory rate and effort will be within normal limits for the patient  2/23/2022 1747 by Aleksandr Lovelace RN  Outcome: Ongoing     Problem: SKIN INTEGRITY  Goal: Skin integrity is maintained or improved  2/23/2022 1747 by Aleksandr Lovelace RN  Outcome: Ongoing     Problem: Confusion - Acute:  Goal: Absence of continued neurological deterioration signs and symptoms  Description: Absence of continued neurological deterioration signs and symptoms  2/23/2022 1747 by Aleksandr Lovelace RN  Outcome: Ongoing  Goal: Mental status will be restored to baseline  Description: Mental status will be restored to baseline  2/23/2022 1747 by Aleksandr Lovelace RN  Outcome: Ongoing     Problem: Discharge Planning:  Goal: Ability to perform activities of daily living will improve  Description: Ability to perform activities of daily living will improve  2/23/2022 1747 by Aleksandr Lovelace RN  Outcome: Ongoing  Goal: Participates in care planning  Description: Participates in care planning  2/23/2022 1747 by Aleksandr Lovelace RN  Outcome: Ongoing     Problem: Injury - Risk of, Physical Injury:  Goal: Absence of physical injury  Description: Absence of physical injury  2/23/2022 1747 by Aleksandr Lovelace RN  Outcome: Ongoing  Goal: Will remain free from falls  Description: Will remain free from falls  2/23/2022 1747 by Aleksandr Lovelace RN  Outcome: Ongoing     Problem: Mood - Altered:  Goal: Mood stable  Description: Mood stable  2/23/2022 1747 by Aleksandr Lovelace RN  Outcome: Ongoing  Goal: Absence of abusive behavior  Description: Absence of abusive behavior  2/23/2022 1747 by Karina Hayes RN  Outcome: Ongoing  Goal: Verbalizations of feeling emotionally comfortable while being cared for will increase  Description: Verbalizations of feeling emotionally comfortable while being cared for will increase  2/23/2022 1747 by Karina Hayes RN  Outcome: Ongoing     Problem: Psychomotor Activity - Altered:  Goal: Absence of psychomotor disturbance signs and symptoms  Description: Absence of psychomotor disturbance signs and symptoms  2/23/2022 1747 by Karina Hayes RN  Outcome: Ongoing     Problem: Sensory Perception - Impaired:  Goal: Demonstrations of improved sensory functioning will increase  Description: Demonstrations of improved sensory functioning will increase  2/23/2022 1747 by Karina Hayes RN  Outcome: Ongoing  Goal: Decrease in sensory misperception frequency  Description: Decrease in sensory misperception frequency  2/23/2022 1747 by Karina Hayes RN  Outcome: Ongoing  Goal: Able to refrain from responding to false sensory perceptions  Description: Able to refrain from responding to false sensory perceptions  2/23/2022 1747 by Karina Hayes RN  Outcome: Ongoing  Goal: Demonstrates accurate environmental perceptions  Description: Demonstrates accurate environmental perceptions  2/23/2022 1747 by Karina Hayes RN  Outcome: Ongoing  Goal: Able to distinguish between reality-based and nonreality-based thinking  Description: Able to distinguish between reality-based and nonreality-based thinking  2/23/2022 1747 by Karina Hayes RN  Outcome: Ongoing  Goal: Able to interrupt nonreality-based thinking  Description: Able to interrupt nonreality-based thinking  2/23/2022 1747 by Karina Hayes RN  Outcome: Ongoing     Problem: Sleep Pattern Disturbance:  Goal: Appears well-rested  Description: Appears well-rested  2/23/2022 1747 by Karina Hayes RN  Outcome: Ongoing     Problem: Pain:  Goal: Pain level will decrease  Description: Pain level will decrease  2/24/2022 0019 by Juancarlos Ji RN  Outcome: Ongoing  2/23/2022 1747 by Rodo Ortiz RN  Outcome: Ongoing  Goal: Control of acute pain  Description: Control of acute pain  2/23/2022 1747 by Rodo Ortiz RN  Outcome: Ongoing  Goal: Control of chronic pain  Description: Control of chronic pain  2/23/2022 1747 by Rodo Ortiz RN  Outcome: Ongoing   Uneventful night. Afebrile. Pt aaox 4. Able to verbalize needs. Pt denied pain/distress; currently under pain management; well tolerated. Ostomy intact and patent. Wound care well tolerated. Wound vac intact and patent. Throughout the shift pt frequently turned/reposition, bed in the lowest and call light within reach. No new skin issues to report.

## 2022-02-25 ENCOUNTER — APPOINTMENT (OUTPATIENT)
Dept: INTERVENTIONAL RADIOLOGY/VASCULAR | Age: 42
DRG: 853 | End: 2022-02-25
Payer: COMMERCIAL

## 2022-02-25 LAB
ANION GAP SERPL CALCULATED.3IONS-SCNC: 11 MMOL/L (ref 9–17)
BUN BLDV-MCNC: 20 MG/DL (ref 6–20)
CALCIUM SERPL-MCNC: 8.7 MG/DL (ref 8.6–10.4)
CHLORIDE BLD-SCNC: 100 MMOL/L (ref 98–107)
CO2: 30 MMOL/L (ref 20–31)
CREAT SERPL-MCNC: 2.45 MG/DL (ref 0.5–0.9)
GFR AFRICAN AMERICAN: 26 ML/MIN
GFR NON-AFRICAN AMERICAN: 22 ML/MIN
GFR SERPL CREATININE-BSD FRML MDRD: ABNORMAL ML/MIN/{1.73_M2}
GLUCOSE BLD-MCNC: 119 MG/DL (ref 65–105)
GLUCOSE BLD-MCNC: 124 MG/DL (ref 65–105)
GLUCOSE BLD-MCNC: 125 MG/DL (ref 65–105)
GLUCOSE BLD-MCNC: 172 MG/DL (ref 65–105)
GLUCOSE BLD-MCNC: 89 MG/DL (ref 70–99)
POTASSIUM SERPL-SCNC: 4.1 MMOL/L (ref 3.7–5.3)
SODIUM BLD-SCNC: 141 MMOL/L (ref 135–144)

## 2022-02-25 PROCEDURE — 99232 SBSQ HOSP IP/OBS MODERATE 35: CPT | Performed by: INTERNAL MEDICINE

## 2022-02-25 PROCEDURE — 80048 BASIC METABOLIC PNL TOTAL CA: CPT

## 2022-02-25 PROCEDURE — 02PAX3Z REMOVAL OF INFUSION DEVICE FROM HEART, EXTERNAL APPROACH: ICD-10-PCS | Performed by: RADIOLOGY

## 2022-02-25 PROCEDURE — 0JPT3XZ REMOVAL OF TUNNELED VASCULAR ACCESS DEVICE FROM TRUNK SUBCUTANEOUS TISSUE AND FASCIA, PERCUTANEOUS APPROACH: ICD-10-PCS | Performed by: RADIOLOGY

## 2022-02-25 PROCEDURE — 6370000000 HC RX 637 (ALT 250 FOR IP): Performed by: STUDENT IN AN ORGANIZED HEALTH CARE EDUCATION/TRAINING PROGRAM

## 2022-02-25 PROCEDURE — 36589 REMOVAL TUNNELED CV CATH: CPT

## 2022-02-25 PROCEDURE — 6370000000 HC RX 637 (ALT 250 FOR IP): Performed by: INTERNAL MEDICINE

## 2022-02-25 PROCEDURE — 94640 AIRWAY INHALATION TREATMENT: CPT

## 2022-02-25 PROCEDURE — 2709999900 HC NON-CHARGEABLE SUPPLY

## 2022-02-25 PROCEDURE — 6360000002 HC RX W HCPCS: Performed by: STUDENT IN AN ORGANIZED HEALTH CARE EDUCATION/TRAINING PROGRAM

## 2022-02-25 PROCEDURE — 1200000000 HC SEMI PRIVATE

## 2022-02-25 PROCEDURE — 36415 COLL VENOUS BLD VENIPUNCTURE: CPT

## 2022-02-25 PROCEDURE — 94760 N-INVAS EAR/PLS OXIMETRY 1: CPT

## 2022-02-25 PROCEDURE — 82947 ASSAY GLUCOSE BLOOD QUANT: CPT

## 2022-02-25 RX ORDER — AMLODIPINE BESYLATE 10 MG/1
10 TABLET ORAL DAILY
Qty: 30 TABLET | Refills: 0 | Status: SHIPPED | OUTPATIENT
Start: 2022-02-26 | End: 2022-02-26

## 2022-02-25 RX ORDER — DOCUSATE SODIUM 100 MG/1
100 CAPSULE, LIQUID FILLED ORAL DAILY
Qty: 30 CAPSULE | Refills: 0 | Status: SHIPPED | OUTPATIENT
Start: 2022-02-26 | End: 2022-02-26

## 2022-02-25 RX ORDER — CARVEDILOL 12.5 MG/1
12.5 TABLET ORAL 2 TIMES DAILY
Qty: 60 TABLET | Refills: 0 | Status: SHIPPED | OUTPATIENT
Start: 2022-02-25 | End: 2022-02-26

## 2022-02-25 RX ORDER — BUMETANIDE 1 MG/1
1 TABLET ORAL 2 TIMES DAILY
Qty: 30 TABLET | Refills: 0 | Status: SHIPPED | OUTPATIENT
Start: 2022-02-25 | End: 2022-02-26 | Stop reason: HOSPADM

## 2022-02-25 RX ORDER — POLYETHYLENE GLYCOL 3350 17 G/17G
17 POWDER, FOR SOLUTION ORAL DAILY
Qty: 30 EACH | Refills: 0 | Status: SHIPPED | OUTPATIENT
Start: 2022-02-26 | End: 2022-02-26

## 2022-02-25 RX ADMIN — LISINOPRIL 40 MG: 20 TABLET ORAL at 09:10

## 2022-02-25 RX ADMIN — INSULIN GLARGINE 30 UNITS: 100 INJECTION, SOLUTION SUBCUTANEOUS at 21:17

## 2022-02-25 RX ADMIN — MONTELUKAST SODIUM 10 MG: 10 TABLET, FILM COATED ORAL at 09:10

## 2022-02-25 RX ADMIN — HEPARIN SODIUM 5000 UNITS: 5000 INJECTION INTRAVENOUS; SUBCUTANEOUS at 21:17

## 2022-02-25 RX ADMIN — INSULIN GLARGINE 30 UNITS: 100 INJECTION, SOLUTION SUBCUTANEOUS at 09:09

## 2022-02-25 RX ADMIN — ACETAMINOPHEN 1000 MG: 500 TABLET ORAL at 05:06

## 2022-02-25 RX ADMIN — DULOXETINE HYDROCHLORIDE 60 MG: 30 CAPSULE, DELAYED RELEASE ORAL at 09:10

## 2022-02-25 RX ADMIN — HYDRALAZINE HYDROCHLORIDE 5 MG: 20 INJECTION INTRAMUSCULAR; INTRAVENOUS at 01:56

## 2022-02-25 RX ADMIN — BUMETANIDE 1 MG: 1 TABLET ORAL at 09:10

## 2022-02-25 RX ADMIN — BUDESONIDE AND FORMOTEROL FUMARATE DIHYDRATE 2 PUFF: 80; 4.5 AEROSOL RESPIRATORY (INHALATION) at 20:06

## 2022-02-25 RX ADMIN — ROPINIROLE HYDROCHLORIDE 4 MG: 1 TABLET, FILM COATED ORAL at 09:09

## 2022-02-25 RX ADMIN — FAMOTIDINE 20 MG: 20 TABLET, FILM COATED ORAL at 09:10

## 2022-02-25 RX ADMIN — CARVEDILOL 12.5 MG: 12.5 TABLET, FILM COATED ORAL at 09:10

## 2022-02-25 RX ADMIN — HEPARIN SODIUM 5000 UNITS: 5000 INJECTION INTRAVENOUS; SUBCUTANEOUS at 05:06

## 2022-02-25 RX ADMIN — ACETAMINOPHEN 1000 MG: 500 TABLET ORAL at 14:14

## 2022-02-25 RX ADMIN — FLUTICASONE PROPIONATE 1 SPRAY: 50 SPRAY, METERED NASAL at 09:09

## 2022-02-25 RX ADMIN — BUDESONIDE AND FORMOTEROL FUMARATE DIHYDRATE 2 PUFF: 80; 4.5 AEROSOL RESPIRATORY (INHALATION) at 10:05

## 2022-02-25 RX ADMIN — CARVEDILOL 12.5 MG: 12.5 TABLET, FILM COATED ORAL at 21:16

## 2022-02-25 RX ADMIN — HEPARIN SODIUM 5000 UNITS: 5000 INJECTION INTRAVENOUS; SUBCUTANEOUS at 14:14

## 2022-02-25 RX ADMIN — ACETAMINOPHEN 1000 MG: 500 TABLET ORAL at 21:16

## 2022-02-25 RX ADMIN — AMLODIPINE BESYLATE 10 MG: 10 TABLET ORAL at 09:10

## 2022-02-25 RX ADMIN — BUMETANIDE 1 MG: 1 TABLET ORAL at 21:16

## 2022-02-25 RX ADMIN — GABAPENTIN 100 MG: 300 CAPSULE ORAL at 05:06

## 2022-02-25 RX ADMIN — DOCUSATE SODIUM 100 MG: 100 CAPSULE ORAL at 09:10

## 2022-02-25 ASSESSMENT — PAIN SCALES - GENERAL
PAINLEVEL_OUTOF10: 3
PAINLEVEL_OUTOF10: 0
PAINLEVEL_OUTOF10: 1

## 2022-02-25 NOTE — CARE COORDINATION
YAHIR following for HD needs. Patient was planning discharge to [facility], now looking at [facility]. Showing signs of renal recovery.  Will continue to monitor need for OP HD referral

## 2022-02-25 NOTE — PROGRESS NOTES
Physical Therapy        Physical Therapy Cancel Note      DATE: 2022    NAME: David Alegria  MRN: [id number]   : 1980      Patient not seen this date for Physical Therapy due to: Other: Pt expecting to discharge later today, stating she doesn't need therapy and feels comfortable/safe to go home. RN aware.       Electronically signed by Chintan Barrera PTA on 2022 at 1:48 PM

## 2022-02-25 NOTE — PROGRESS NOTES
Daily  heparin (porcine) injection 1,600 Units, PRN  heparin (porcine) injection 1,600 Units, PRN  epoetin billy-epbx (RETACRIT) injection 8,000 Units, Once per day on Tue Thu Sat  lisinopril (PRINIVIL;ZESTRIL) tablet 40 mg, Daily  DULoxetine (CYMBALTA) extended release capsule 60 mg, Daily  ondansetron (ZOFRAN) injection 4 mg, Q6H PRN  hydrALAZINE (APRESOLINE) injection 5 mg, Q6H PRN  melatonin tablet 10 mg, Nightly PRN  polyethylene glycol (GLYCOLAX) packet 17 g, Daily  insulin glargine (LANTUS) injection vial 30 Units, BID  albumin human 25 % IV solution 25 g, PRN  fluticasone (FLONASE) 50 MCG/ACT nasal spray 1 spray, Daily  budesonide-formoterol (SYMBICORT) 80-4.5 MCG/ACT inhaler 2 puff, BID  albuterol sulfate  (90 Base) MCG/ACT inhaler 1 puff, Q4H PRN  montelukast (SINGULAIR) tablet 10 mg, Daily  rOPINIRole (REQUIP) tablet 4 mg, Daily  heparin (porcine) injection 5,000 Units, 3 times per day  acetaminophen (TYLENOL) tablet 1,000 mg, 3 times per day          LABS      CBC:   Recent Labs     02/23/22  0702   WBC 9.1   RBC 3.21*   HGB 9.2*   HCT 29.7*   MCV 92.5   MCH 28.7   MCHC 31.0   RDW 15.9*      MPV 9.8      BMP:   Recent Labs     02/23/22  0702 02/24/22  0806 02/25/22  0417    138 141   K 4.7 4.7 4.1    96* 100   CO2 27 27 30   BUN 20 19 20   CREATININE 2.34* 2.18* 2.45*   GLUCOSE 93 140* 89   CALCIUM 8.6 9.0 8.7      SPEP:   Lab Results   Component Value Date    PROT 5.7 02/09/2022     C3:   Lab Results   Component Value Date    C3 143 02/09/2022     C4:   Lab Results   Component Value Date    C4 19 02/09/2022     URINE SODIUM:    Lab Results   Component Value Date    JORGE 26 02/09/2022      URINE CREATININE:    Lab Results   Component Value Date    LABCREA 142.3 02/09/2022         ASSESSMENT    1. Acute kidney injury so far patient is dialysis dependent, mostly due to volume overload now seems to have much improved.   Might have established new baseline of creatinine around mid 2's.  2.  Significant volume overload improving with dialysis/ultrafiltration and diuretics. 3.  Chronic kidney disease stage IIIa due to diabetic nephrosclerosis her baseline 1.5-1.6 proteinuria about 1 to 2 g from diabetic nephrosclerosis follows up with Dr. Ligia Reyes  4. Longstanding type 2 diabetes   5. Morbid obesity  6. Leonel's gangrene requiring debridements, patient on antibiotics including clindamycin and Zosyn. 7.  Metabolic acidosis: Resolved    PLAN      1. No acute need for dialysis. 2.  Her renal function and volume status seems to have much improved. Will get the tunneled catheter removed. 3.  Continue Bumex 1 mg p.o. twice daily. 4.  Monitor strict I's and O's and renal function. 5.  BMP in AM.  6.   now seems to be working towards finding SNF for discharge. Eleuterio Maxwell MD   Nephrology [address] [place] [address] Drive    This note is created with the assistance of a speech-recognition program. While intending to generate a document that actually reflects the content of the visit, no guarantees can be provided that every mistake has been identified and corrected by editing.

## 2022-02-25 NOTE — PROGRESS NOTES
Consulted for Loop Colostomy Care and Teaching      Notified by nursing unit of multiple pouch leaks overnight using 2 3/4\" flat CTF Coloplast barrier with and without ring barriers; effluent travels at crease that forms at lower edge that intensifies when in side-lying position. Applied a 2 3/4\" 2 piece Convex Light barrier with ring barrier to cut edge; obvious gap forms at the lower aspect of the stoma and intensifies with upright postion. A Belt added did not improve fit due to body habitus. Applied a flat Convatec ActiveLife 1-piece pouch with Ostoform with FlowAsssit attachment. 1/2 sheet of premium wafer applied to the peristomal skin inferiorly. OSTOMY ASSESSMENT:     02/25/22 1030   Colostomy LUQ Transverse   Placement Date/Time: 02/16/22 1110   Pre-existing: No  Location: LUQ  Colostomy Type: Transverse   Stomal Appliance Changed;1 piece  (Convatce #[id number])   Stoma  Assessment Red;Moist;Flush  (michael forms)   Mucocutaneous Junction Intact   Peristomal Assessment Maceration  (pink and irritated; small ulcerations from bridge sutures)   Treatment Bag change;Site care; Liquid skin barrier  (1/2 premium wafer distal; Ostoform ring)   Stool Appearance Watery   Stool Color Brown       Plan of care:   Observed pouch at intervals throughout the day; pouch remains intact as of 5 pm.,    List of recommended supplies compiled and faxed to [facility] as requested by patient. See Plan of Care note. Enrolled in conv[employer]c [employer] customer support program and samples requested. Requested sample of Ostoform to be sent to patient's home.

## 2022-02-25 NOTE — PLAN OF CARE
Problem: Skin Integrity:  Goal: Will show no infection signs and symptoms  Description: Will show no infection signs and symptoms  2/25/2022 1214 by Mohan Armenta RN  Outcome: Ongoing  2/25/2022 0313 by Marilu Bazzi RN  Outcome: Ongoing  Goal: Absence of new skin breakdown  Description: Absence of new skin breakdown  2/25/2022 1214 by Mohan Armenta RN  Outcome: Ongoing  2/25/2022 0313 by Marilu Bazzi RN  Outcome: Ongoing     Problem: Fluid Volume - Imbalance:  Goal: Absence of imbalanced fluid volume signs and symptoms  Description: Absence of imbalanced fluid volume signs and symptoms  2/25/2022 1214 by Mohan Armenta RN  Outcome: Ongoing  2/25/2022 0313 by Marilu Bazzi RN  Outcome: Ongoing     Problem: Pain:  Goal: Pain level will decrease  Description: Pain level will decrease  2/25/2022 1214 by Mohan Armenta RN  Outcome: Ongoing  2/25/2022 0313 by Marilu Bazzi RN  Outcome: Ongoing  Goal: Recognizes and communicates pain  Description: Recognizes and communicates pain  2/25/2022 1214 by Mohan Armenta RN  Outcome: Ongoing  2/25/2022 0313 by Marilu Bazzi RN  Outcome: Ongoing  Goal: Control of acute pain  Description: Control of acute pain  2/25/2022 1214 by Mohan Armenta RN  Outcome: Ongoing  2/25/2022 0313 by Marilu Bazzi RN  Outcome: Ongoing  Goal: Control of chronic pain  Description: Control of chronic pain  2/25/2022 1214 by Mohan Armenta RN  Outcome: Ongoing  2/25/2022 0313 by Marilu Bazzi RN  Outcome: Ongoing     Problem: Serum Glucose Level - Abnormal:  Goal: Ability to maintain appropriate glucose levels will improve to within specified parameters  Description: Ability to maintain appropriate glucose levels will improve to within specified parameters  2/25/2022 1214 by Mohan Armenta RN  Outcome: Ongoing  2/25/2022 0313 by Marilu Bazzi RN  Outcome: Ongoing     Problem: Skin Integrity - Impaired:  Goal: Will show no infection signs and symptoms  Description: Will show no infection signs and symptoms  2/25/2022 1214 by Farheen James RN  Outcome: Ongoing  2/25/2022 0313 by Nora Vallejo RN  Outcome: Ongoing  Goal: Absence of new skin breakdown  Description: Absence of new skin breakdown  2/25/2022 1214 by Farheen James RN  Outcome: Ongoing  2/25/2022 0313 by Nora Vallejo RN  Outcome: Ongoing     Problem: OXYGENATION/RESPIRATORY FUNCTION  Goal: Patient will achieve/maintain normal respiratory rate/effort  Description: Respiratory rate and effort will be within normal limits for the patient  2/25/2022 1214 by Farheen James RN  Outcome: Ongoing  2/25/2022 0313 by Nora Vallejo RN  Outcome: Ongoing     Problem: SKIN INTEGRITY  Goal: Skin integrity is maintained or improved  2/25/2022 1214 by Farheen James RN  Outcome: Ongoing  2/25/2022 0313 by Nora Vallejo RN  Outcome: Ongoing     Problem: Discharge Planning:  Goal: Ability to perform activities of daily living will improve  Description: Ability to perform activities of daily living will improve  2/25/2022 1214 by Farheen James RN  Outcome: Ongoing  2/25/2022 0313 by Nora Vallejo RN  Outcome: Ongoing  Goal: Participates in care planning  Description: Participates in care planning  2/25/2022 1214 by Farheen James RN  Outcome: Ongoing  2/25/2022 0313 by Nora Vallejo RN  Outcome: Ongoing     Problem: Pain:  Goal: Pain level will decrease  Description: Pain level will decrease  2/25/2022 1214 by Farheen James RN  Outcome: Ongoing  2/25/2022 0313 by Nora Vallejo RN  Outcome: Ongoing  Goal: Control of acute pain  Description: Control of acute pain  2/25/2022 1214 by Farheen James RN  Outcome: Ongoing  2/25/2022 0313 by Nora Vallejo RN  Outcome: Ongoing  Goal: Control of chronic pain  Description: Control of chronic pain  2/25/2022 1214 by Farheen James RN  Outcome: Ongoing  2/25/2022 0313 by Nora Vallejo RN  Outcome: Ongoing

## 2022-02-25 NOTE — PLAN OF CARE
Problem: Skin Integrity:  Goal: Will show no infection signs and symptoms  Description: Will show no infection signs and symptoms  Outcome: Ongoing  Goal: Absence of new skin breakdown  Description: Absence of new skin breakdown  Outcome: Ongoing     Problem: Fluid Volume - Imbalance:  Goal: Absence of imbalanced fluid volume signs and symptoms  Description: Absence of imbalanced fluid volume signs and symptoms  Outcome: Ongoing     Problem: Pain:  Goal: Pain level will decrease  Description: Pain level will decrease  2/25/2022 0313 by Camilo Smith RN  Outcome: Ongoing  2/24/2022 1742 by Becky Esteves RN  Outcome: Met This Shift  Goal: Recognizes and communicates pain  Description: Recognizes and communicates pain  2/25/2022 0313 by Camilo Smith RN  Outcome: Ongoing  2/24/2022 1742 by Becky Esteves RN  Outcome: Met This Shift  Goal: Control of acute pain  Description: Control of acute pain  2/25/2022 0313 by Camilo Smith RN  Outcome: Ongoing  2/24/2022 1742 by Becky Esteves RN  Outcome: Met This Shift  Goal: Control of chronic pain  Description: Control of chronic pain  2/25/2022 0313 by Camilo Smith RN  Outcome: Ongoing  2/24/2022 1742 by Becky Esteves RN  Outcome: Met This Shift     Problem: Serum Glucose Level - Abnormal:  Goal: Ability to maintain appropriate glucose levels will improve to within specified parameters  Description: Ability to maintain appropriate glucose levels will improve to within specified parameters  2/25/2022 0313 by Camilo Smith RN  Outcome: Ongoing  2/24/2022 1742 by Becky Esteves RN  Outcome: Met This Shift     Problem: Skin Integrity - Impaired:  Goal: Will show no infection signs and symptoms  Description: Will show no infection signs and symptoms  2/25/2022 0313 by Camilo Smith RN  Outcome: Ongoing  2/24/2022 1742 by Becky Esteves RN  Outcome: Ongoing  Goal: Absence of new skin breakdown  Description: Absence of new skin breakdown  2/25/2022 0313 by Camilo Smith RN  Outcome: Ongoing  2/24/2022 1742 by Kehinde Dejesus RN  Outcome: Ongoing     Problem: OXYGENATION/RESPIRATORY FUNCTION  Goal: Patient will achieve/maintain normal respiratory rate/effort  Description: Respiratory rate and effort will be within normal limits for the patient  Outcome: Ongoing     Problem: SKIN INTEGRITY  Goal: Skin integrity is maintained or improved  2/25/2022 0313 by Bonny Guerra RN  Outcome: Ongoing  2/24/2022 1742 by Kehinde Dejesus RN  Outcome: Ongoing     Problem: Discharge Planning:  Goal: Ability to perform activities of daily living will improve  Description: Ability to perform activities of daily living will improve  2/25/2022 0313 by Bonny Guerra RN  Outcome: Ongoing  2/24/2022 1742 by Kehinde Dejesus RN  Outcome: Ongoing  Goal: Participates in care planning  Description: Participates in care planning  2/25/2022 0313 by Bonny Guerra RN  Outcome: Ongoing  2/24/2022 1742 by Kehinde Dejesus RN  Outcome: Ongoing     Problem: Pain:  Goal: Pain level will decrease  Description: Pain level will decrease  2/25/2022 0313 by Bonny Guerra RN  Outcome: Ongoing  2/24/2022 1742 by Kehinde Dejesus RN  Outcome: Met This Shift  Goal: Control of acute pain  Description: Control of acute pain  2/25/2022 0313 by Bonny Guerra RN  Outcome: Ongoing  2/24/2022 1742 by Kehinde Dejesus RN  Outcome: Met This Shift  Goal: Control of chronic pain  Description: Control of chronic pain  2/25/2022 0313 by Bonny Guerra RN  Outcome: Ongoing  2/24/2022 1742 by Kehinde Dejesus RN  Outcome: Met This Shift

## 2022-02-25 NOTE — PLAN OF CARE
Surgeon:  Dr. Du Leos MD      Ostomy appliance order:    Type of Surgery: Diverting transverse loop colostomy  Type of stoma:  colostomy  ICD-10-CM:  Z93.3    Brand:  Convatec  Product Number: [id number]  Product Description: Esteem + Durahesive Plus pouch  Hcpcs:   Amount per month: 20 per month     Brand:  Convatec  Product Number: [id number]  Product Description: Dominga Adan paste  Hcpcs:   Amount per month: 2 tubes per month     Brand:  Convatec  Product Number: [id number]  Product Description: Zeyad cohesive skin barrier 4x4  Hcpcs:   Amount per month: 20 per month     Brand:  Convatec  Product Number: [id number]  Product Description: Sting free barrier spray  Hcpcs:   Amount per month: (2)  28 oz bottles per month

## 2022-02-26 VITALS
SYSTOLIC BLOOD PRESSURE: 151 MMHG | DIASTOLIC BLOOD PRESSURE: 60 MMHG | TEMPERATURE: 98.4 F | WEIGHT: 264.7 LBS | OXYGEN SATURATION: 97 % | RESPIRATION RATE: 18 BRPM | HEIGHT: 63 IN | HEART RATE: 76 BPM | BODY MASS INDEX: 46.9 KG/M2

## 2022-02-26 LAB
ANION GAP SERPL CALCULATED.3IONS-SCNC: 13 MMOL/L (ref 9–17)
BUN BLDV-MCNC: 22 MG/DL (ref 6–20)
CALCIUM SERPL-MCNC: 8.5 MG/DL (ref 8.6–10.4)
CHLORIDE BLD-SCNC: 102 MMOL/L (ref 98–107)
CO2: 26 MMOL/L (ref 20–31)
CREAT SERPL-MCNC: 2.17 MG/DL (ref 0.5–0.9)
GFR AFRICAN AMERICAN: 30 ML/MIN
GFR NON-AFRICAN AMERICAN: 25 ML/MIN
GFR SERPL CREATININE-BSD FRML MDRD: ABNORMAL ML/MIN/{1.73_M2}
GLUCOSE BLD-MCNC: 106 MG/DL (ref 65–105)
GLUCOSE BLD-MCNC: 207 MG/DL (ref 65–105)
GLUCOSE BLD-MCNC: 93 MG/DL (ref 70–99)
GLUCOSE BLD-MCNC: 95 MG/DL (ref 65–105)
POTASSIUM SERPL-SCNC: 4.3 MMOL/L (ref 3.7–5.3)
SODIUM BLD-SCNC: 141 MMOL/L (ref 135–144)

## 2022-02-26 PROCEDURE — 6360000002 HC RX W HCPCS: Performed by: STUDENT IN AN ORGANIZED HEALTH CARE EDUCATION/TRAINING PROGRAM

## 2022-02-26 PROCEDURE — 6370000000 HC RX 637 (ALT 250 FOR IP): Performed by: STUDENT IN AN ORGANIZED HEALTH CARE EDUCATION/TRAINING PROGRAM

## 2022-02-26 PROCEDURE — 6370000000 HC RX 637 (ALT 250 FOR IP): Performed by: INTERNAL MEDICINE

## 2022-02-26 PROCEDURE — 99232 SBSQ HOSP IP/OBS MODERATE 35: CPT | Performed by: INTERNAL MEDICINE

## 2022-02-26 PROCEDURE — 36415 COLL VENOUS BLD VENIPUNCTURE: CPT

## 2022-02-26 PROCEDURE — 80048 BASIC METABOLIC PNL TOTAL CA: CPT

## 2022-02-26 PROCEDURE — 82947 ASSAY GLUCOSE BLOOD QUANT: CPT

## 2022-02-26 PROCEDURE — 94640 AIRWAY INHALATION TREATMENT: CPT

## 2022-02-26 RX ORDER — DOCUSATE SODIUM 100 MG/1
100 CAPSULE, LIQUID FILLED ORAL DAILY
Qty: 30 CAPSULE | Refills: 0 | Status: SHIPPED | OUTPATIENT
Start: 2022-02-26 | End: 2022-03-28

## 2022-02-26 RX ORDER — BUMETANIDE 1 MG/1
1 TABLET ORAL DAILY
Qty: 30 TABLET | Refills: 3 | Status: SHIPPED | OUTPATIENT
Start: 2022-02-27 | End: 2022-02-26

## 2022-02-26 RX ORDER — BUMETANIDE 1 MG/1
1 TABLET ORAL DAILY
Status: DISCONTINUED | OUTPATIENT
Start: 2022-02-27 | End: 2022-02-26 | Stop reason: HOSPADM

## 2022-02-26 RX ORDER — POLYETHYLENE GLYCOL 3350 17 G/17G
17 POWDER, FOR SOLUTION ORAL DAILY
Qty: 30 EACH | Refills: 0 | Status: SHIPPED | OUTPATIENT
Start: 2022-02-26 | End: 2022-03-28

## 2022-02-26 RX ORDER — BUMETANIDE 1 MG/1
1 TABLET ORAL DAILY
Qty: 30 TABLET | Refills: 0 | Status: SHIPPED | OUTPATIENT
Start: 2022-02-27

## 2022-02-26 RX ORDER — AMLODIPINE BESYLATE 10 MG/1
10 TABLET ORAL DAILY
Qty: 30 TABLET | Refills: 0 | Status: SHIPPED | OUTPATIENT
Start: 2022-02-26

## 2022-02-26 RX ORDER — CARVEDILOL 12.5 MG/1
12.5 TABLET ORAL 2 TIMES DAILY
Qty: 60 TABLET | Refills: 0 | Status: SHIPPED | OUTPATIENT
Start: 2022-02-26 | End: 2022-05-17

## 2022-02-26 RX ADMIN — BUMETANIDE 1 MG: 1 TABLET ORAL at 08:48

## 2022-02-26 RX ADMIN — FLUTICASONE PROPIONATE 1 SPRAY: 50 SPRAY, METERED NASAL at 08:50

## 2022-02-26 RX ADMIN — MONTELUKAST SODIUM 10 MG: 10 TABLET, FILM COATED ORAL at 08:48

## 2022-02-26 RX ADMIN — HEPARIN SODIUM 5000 UNITS: 5000 INJECTION INTRAVENOUS; SUBCUTANEOUS at 05:26

## 2022-02-26 RX ADMIN — CARVEDILOL 12.5 MG: 12.5 TABLET, FILM COATED ORAL at 08:48

## 2022-02-26 RX ADMIN — FAMOTIDINE 20 MG: 20 TABLET, FILM COATED ORAL at 08:48

## 2022-02-26 RX ADMIN — DULOXETINE HYDROCHLORIDE 60 MG: 30 CAPSULE, DELAYED RELEASE ORAL at 08:48

## 2022-02-26 RX ADMIN — BUDESONIDE AND FORMOTEROL FUMARATE DIHYDRATE 2 PUFF: 80; 4.5 AEROSOL RESPIRATORY (INHALATION) at 07:36

## 2022-02-26 RX ADMIN — ROPINIROLE HYDROCHLORIDE 4 MG: 1 TABLET, FILM COATED ORAL at 08:47

## 2022-02-26 RX ADMIN — AMLODIPINE BESYLATE 10 MG: 10 TABLET ORAL at 08:48

## 2022-02-26 RX ADMIN — ACETAMINOPHEN 1000 MG: 500 TABLET ORAL at 05:26

## 2022-02-26 RX ADMIN — LISINOPRIL 40 MG: 20 TABLET ORAL at 08:48

## 2022-02-26 ASSESSMENT — PAIN SCALES - GENERAL: PAINLEVEL_OUTOF10: 1

## 2022-02-26 NOTE — PROGRESS NOTES
PROGRESS NOTE    PATIENT NAME: Bay Almonte  MEDICAL RECORD NO. [id number]  DATE: 2/26/2022  PRIMARY CARE PHYSICIAN: Jaycob Shahid, ROMEO - CNP    HD: # 18    ASSESSMENT    Patient Active Problem List   Diagnosis    Leonel gangrene    Uncontrolled type 2 [facility] mellitus with hyperglycemia [facility] [address]    MEREDITH (acute kidney injury) ([facility] [address]    Hypokalemia    Hyponatremia    Anemia    Morbid obesity ([facility] [address]    Type 1 diabetes mellitus with stage 3a chronic kidney disease (HCC)    Metabolic acidosis    Hypervolemia       MEDICAL DECISION MAKING AND PLAN    1. Neuro:  1. Tylenol 1000 mg q8H  2. Melatonin PRN for sleep   3. Cymbalta     2. CV:  1. HR: 80's  2. Systolic  [id number]  3. Amlodipine, bumex, coreg, lisinopril      3. Heme:  1. DVT ppx: heparin      4. Pulm:  1. Extubated 2/17  2. RA this morning  3. IS 2000, encourage IS/deep breathing  4. Continue symbicort, singulair      5. Renal/lytes:  1. BUN 22/Creat 2.17 (2.45)  2. Nephrology following - Bumex 1mg BID. Tunneled catheter removed     6. GI:  1. Diet: diabetic diet   2. Pepcid 20 mg BID   3. S/p laparoscopic transverse loop colostomy creation 2/17  1. Bowel regimen   2. Wound/ostomy following for assistance in ostomy care   3. Ostomy functioning - 600cc/24h     7. ID:  1. Afebrile  2. Completed course of abx for necrotizing soft tissue infection      8. MSK:  1. Necrotizing soft tissue infection of the buttocks and groin   1. S/p incision and debridement 2/9, and further debridement later 2/9  2. S/p irrigation and debridement of wound 2/10  3. S/p irrigation and debridement, partial closure, and bilateral wound vac placement 2/11  4. S/p irrigation and debridement, partial closure, L wound vac application 9/61  5. S/p irrigating wound vac placement, laparoscopic loop colostomy creation 2/17  6. S/p debridement and irrigating wound vac change 2/18  7. Wound vac change at bedside 2/21, 2/23.  Plan for home health to assist with vac changes at discharge     9. Endo:  1. BG: goal <180   2. Glucose 95  3. Lantus 30 BID     10. Lines:  1. PIV     Dispo planning - home with [facility]      Chief Complaint: \"none\"    SUBJECTIVE    Ave Martinez was seen and examined at bedside. No acute events overnight. Hemodynamically stable, and afebrile. Pain controlled. Ostomy functioning. Wound vac with good seal.        OBJECTIVE  VITALS: Temp: Temp: 98.5 °F (36.9 °C)Temp  Av.4 °F (36.9 °C)  Min: 98.3 °F (36.8 °C)  Max: 98.5 °F (14.9 °C) BP Systolic (74FQK), CVR:277 , Min:148 , ZGQ:565   Diastolic (35RRM), VMT:31, Min:66, Max:76   Pulse Pulse  Av.8  Min: 79  Max: 91 Resp Resp  Av.3  Min: 17  Max: 20 Pulse ox SpO2  Av.3 %  Min: 96 %  Max: 98 %  GENERAL: awake, alert, no apparent distress   NEURO: awake, alert, following commands, moving all extremities, no focal deficits   HEENT: NCAT   : wound vac intact with good seal    LUNGS: no acute respiratory distress or accessory muscle use   HEART: regular rate and rhythm   ABDOMEN: soft, non-distended, ostomy pink and viable - stool output in ostomy appliance, incisions clean and dry with skin glue intact   EXTERMITY: no cyanosis, clubbing or edema    I/O last 3 completed shifts:  In: -   Out: 2100 [Urine:1500; Stool:600]    Drain/tube output: In: -   Out: 600     LAB:  CBC: No results for input(s): WBC, HGB, HCT, MCV, PLT in the last 72 hours. BMP:   Recent Labs     22  0806 22  0417 22  0633    141 141   K 4.7 4.1 4.3   CL 96* 100 102   CO2 27 30 26   BUN 19 20 22*   CREATININE 2.18* 2.45* 2.17*   GLUCOSE 140* 89 93     COAGS: No results for input(s): APTT, PROT, INR in the last 72 hours.     RADIOLOGY:  IR REMOVE TUNNELED CVAD WO SQ PORT/PUMP    Result Date: 2022  PROCEDURE: IR REMOVAL TUNNELED CVC WO PORT PUMP 2022 HISTORY: ORDERING SYSTEM PROVIDED HISTORY: no longer needs dialysis TECHNOLOGIST PROVIDED HISTORY: no longer needs dialysis Is the patient pregnant?->No Renal function recovered CONTRAST: None SEDATION: None FLUOROSCOPY DOSE AND TYPE OR TIME AND EXPOSURES: None DESCRIPTION OF PROCEDURE: This procedure was performed by Mouna Ibarra PA-C under indirect supervision of Dr. Shaggy Holland. Informed consent was obtained after a detailed explanation of the procedure including risks, benefits, and alternatives. Universal protocol was observed. The right neck and chest was prepped and draped in standard sterile fashion using maximum sterile barrier technique. The retention suture holding the catheter in place was cut releasing catheter from the skin. Using manual traction the catheter cuff was exposed and the catheter was removed in its entirety. Hemostasis was achieved with approximately 5 minutes of manual pressure. Sterile dressings were applied and patient tolerated procedure well. Estimated blood loss was less than 1 mL. Successful uneventful removal of right IJ tunneled dialysis catheter.          Jermaine Laguerre DO  2/26/22, 8:00 AM

## 2022-02-26 NOTE — FLOWSHEET NOTE
Assessment: Patient was awake and oriented when  visited. Family was present and receptive to spiritual care. Patient remained hopeful and seemed to appreciate the treatment and care she was receiving. When asked how she was feeling, patient responded; \"I am feeling better. \" Patient was open to prayer. Intervention:  offered support and prayed with patient and family. Outcome: Patient and family expressed appreciation for the spiritual support they received. Plan: Chaplains would continue to remain available for more prayers and support.         02/26/22 1518   Encounter Summary   Services provided to: Patient and family together   Support System Family members   Continue Visiting   (02/26/2022)   Complexity of Encounter Moderate   Length of Encounter 15 minutes   Spiritual Assessment Completed Yes   Routine   Type Follow up   Spiritual/Sikh   Type Spiritual support

## 2022-02-26 NOTE — PROGRESS NOTES
Wound vac changed at bedside with patient's home wound vac equipment. Tolerated well. Discharged home. Follow up in clinic.     Precious Glass D.O. PGY-4  Department of Surgery  2/26/2022, 5:10 PM

## 2022-02-26 NOTE — PROGRESS NOTES
PROGRESS NOTE    PATIENT NAME: Brayan Meza  MEDICAL RECORD NO. [id number]  DATE: 2/25/2022    PRIMARY CARE PHYSICIAN: Addison Castro, ROMEO - CNP    HD: # 16    ASSESSMENT    Patient Active Problem List   Diagnosis    Leonel gangrene    Uncontrolled type 2 diabetes mellitus with hyperglycemia ([facility] [address]    MEREDITH (acute kidney injury) ([facility] [address]    Hypokalemia    Hyponatremia    Anemia    Morbid obesity ([facility] [address]    Type 1 diabetes mellitus with stage 3a chronic kidney disease (HCC)    Metabolic acidosis    Hypervolemia       MEDICAL DECISION MAKING AND PLAN    1. Neuro:  1. Tylenol 1000 mg q8H  2. Melatonin PRN for sleep     2. CV:  1. HR: 80's  2. Systolic 469'E, continue antiHTN regimen     3. Heme:  1. DVT ppx: heparin     4. Pulm:  1. Extubated 2/17  2. RA this morning, encourage IS/deep breathing  3. Continue symbicort, singulair     5. Renal/lytes:  1. BUN 20/Creat 2.45 (2.18)  2. Nephrology following - Lasix, HD management. Plan for dialysis catheter removal today     6. GI:  1. Diet: diabetic diet   2. Pepcid 20 mg BID   3. S/p laparoscopic transverse loop colostomy creation 2/17  1. Bowel regimen   2. Wound/ostomy following for assistance in ostomy care   3. Ostomy functioning - 750cc/24h    7. ID:  1. Afebrile  2. Completed course of abx for necrotizing soft tissue infection     8. MSK:  1. Necrotizing soft tissue infection of the buttocks and groin   1. S/p incision and debridement 2/9, and further debridement later 2/9  2. S/p irrigation and debridement of wound 2/10  3. S/p irrigation and debridement, partial closure, and bilateral wound vac placement 2/11  4. S/p irrigation and debridement, partial closure, L wound vac application 4/32  5. S/p irrigating wound vac placement, laparoscopic loop colostomy creation 2/17  6. S/p debridement and irrigating wound vac change 2/18  7. Wound vac change at bedside 2/21, 2/23. Plan for home health to assist with vac changes at discharge    9. Endo:  1.  BG: goal <180 2. Lantus 30 BID    10. Lines:  1. PIV    Dispo planning - home with [address] was seen and examined at bedside. No acute events overnight. Hemodynamically stable, and afebrile. Pain controlled. Ostomy functioning. Wound vac with good seal. UOP 1.2cc/kg/h. OBJECTIVE  VITALS: Temp: Temp: 98.4 °F (36.9 °C)Temp  Av.1 °F (36.7 °C)  Min: 97.2 °F (36.2 °C)  Max: 98.4 °F (96.0 °C) BP Systolic (91KZX), UVM:450 , Min:153 , MDE:657   Diastolic (23INR), GPP:78, Min:64, Max:82   Pulse Pulse  Av.5  Min: 72  Max: 91 Resp Resp  Av.8  Min: 17  Max: 18 Pulse ox SpO2  Av.3 %  Min: 96 %  Max: 98 %    I/O last 3 completed shifts: In: 36 [P.O.:920]  Out: 4909 [Urine:3400; Drains:75; JDGY]  No intake/output data recorded. GENERAL: awake, alert, no apparent distress   NEURO: awake, alert, following commands, moving all extremities, no focal deficits   HEENT: NCAT   : wound vac intact with good seal    LUNGS: no acute respiratory distress or accessory muscle use   HEART: regular rate and rhythm   ABDOMEN: soft, non-distended, ostomy pink and viable - stool output in ostomy appliance, incisions clean and dry with skin glue intact   EXTERMITY: no cyanosis, clubbing or edema    LAB:  CBC:   Recent Labs     22  0702   WBC 9.1   HGB 9.2*   HCT 29.7*   MCV 92.5        BMP:   Recent Labs     22  0702 22  0806 22  0417    138 141   K 4.7 4.7 4.1    96* 100   CO2 27 27 30   BUN 20 19 20   CREATININE 2.34* 2.18* 2.45*   GLUCOSE 93 140* 89       RADIOLOGY:  No new imaging.          Abigail Chavez DO

## 2022-02-26 NOTE — PLAN OF CARE
Problem: Skin Integrity:  Goal: Will show no infection signs and symptoms  Description: Will show no infection signs and symptoms  Outcome: Ongoing  Goal: Absence of new skin breakdown  Description: Absence of new skin breakdown  Outcome: Ongoing     Problem: Fluid Volume - Imbalance:  Goal: Absence of imbalanced fluid volume signs and symptoms  Description: Absence of imbalanced fluid volume signs and symptoms  Outcome: Ongoing     Problem: Pain:  Goal: Pain level will decrease  Description: Pain level will decrease  Outcome: Ongoing  Goal: Recognizes and communicates pain  Description: Recognizes and communicates pain  Outcome: Ongoing  Goal: Control of acute pain  Description: Control of acute pain  Outcome: Ongoing  Goal: Control of chronic pain  Description: Control of chronic pain  Outcome: Ongoing     Problem: Serum Glucose Level - Abnormal:  Goal: Ability to maintain appropriate glucose levels will improve to within specified parameters  Description: Ability to maintain appropriate glucose levels will improve to within specified parameters  Outcome: Ongoing     Problem: Skin Integrity - Impaired:  Goal: Will show no infection signs and symptoms  Description: Will show no infection signs and symptoms  Outcome: Ongoing  Goal: Absence of new skin breakdown  Description: Absence of new skin breakdown  Outcome: Ongoing     Problem: OXYGENATION/RESPIRATORY FUNCTION  Goal: Patient will achieve/maintain normal respiratory rate/effort  Description: Respiratory rate and effort will be within normal limits for the patient  Outcome: Ongoing     Problem: SKIN INTEGRITY  Goal: Skin integrity is maintained or improved  Outcome: Ongoing     Problem: Discharge Planning:  Goal: Ability to perform activities of daily living will improve  Description: Ability to perform activities of daily living will improve  Outcome: Ongoing  Goal: Participates in care planning  Description: Participates in care planning  Outcome: Ongoing Problem: Pain:  Goal: Pain level will decrease  Description: Pain level will decrease  Outcome: Ongoing  Goal: Control of acute pain  Description: Control of acute pain  Outcome: Ongoing  Goal: Control of chronic pain  Description: Control of chronic pain  Outcome: Ongoing

## 2022-02-26 NOTE — DISCHARGE INSTRUCTIONS
No lifting above 10lbs for next 2 weeks. No soaking in bathtubs for 4 weeks  Resume activity as tolerated  Wash incision gently with soap and water, pat dry. If steri-strips or surgical glue in place wash gently and leave in place until the glue or strips fall off. (Do not pull/tug)  No operating heavy equipment while using narcotics  F/u in trauma/acute care surgery clinic in 1-2 weeks  Call your doctor for the following:   Chills   Temperature greater than 101   Pain that is not tolerable despite taking pain medicine as ordered   There is increased swelling, redness or warmth at surgical site   There is increased drainage or bleeding from surgical site  No alcoholic beverages, no driving or operating machinery, no making important decisions for 24 hours. General questions or concerns may be called to the trauma nurse line at [phone] and please leave a message.

## 2022-02-26 NOTE — FLOWSHEET NOTE
Physical Therapy Cancel Note      DATE: 2022    NAME: Michelle Gill  MRN: [id number]   : 1980      Patient not seen this date for Physical Therapy due to:    Patient independent with functional mobility. Will defer PT treatment at this time. Patient states is discharging home today and is ambulating around room independently. Please reorder PT if future needs arise.        Electronically signed by Favio Duron PT on 2022 at 10:34 AM

## 2022-02-26 NOTE — PROGRESS NOTES
Pt's insurance approved for NPWT for home care. Pts home vac supplies were delivered at bedside. Per discussion with pt and nurse home health care is planning to visit pt tomorrow at her home. Will plan as of now for hospital wound vac removal tomorrow am with placement wet to dry dressings for travel home and then placement of home wound vac by home health care team tomorrow once she arrives home. Discussed with Dr. Anastacia Ambriz.      Thank you,    Electronically signed by Wayne Chaudhary DO on 2/25/2022 at 7:40 PM

## 2022-02-26 NOTE — CARE COORDINATION
Transitional Planning. Spoke to Lizeth Ojeda with [facility], they have nurse in [facility] who can see pt today to place wound vac and complete dressing change. Call when pt is discharged. [address] with [facility] called, they do not have a nurse that can see pt today. They can start care tomorrow. He asked that wound vac dressing be changed by surgeon today rather that sending pt home with wet to dry dressing and having HC apply wound vac dressing. 0821 informed [facility] RN, She will call CM back after rounds. [address] [facility] [address] informed CM that surgery will follow pt for [facility] [employer] [facility] she asked if [facility], [facility]. could take pictures of pt's wound and send to following physician. Bola Chong with [facility] who confirmed that they can do take pictures and send to physician. Information concerning this placed on Home Care order    1342 informed Lizeth Ojeda with [facility] that pt will be discharged today. They will gather what they need in [facility] and call pt for start of care. He confirmed they will see pt tomorrow for wound dressing change. 1345 Provided pt with 2nd IMM notice, she does not want to wait the 4 hours from time of notice to be discharged if her wound vac is changed and here transportation is here.

## 2022-02-27 NOTE — DISCHARGE SUMMARY
DISCHARGE SUMMARY:    PATIENT NAME:  Joanna Salter  YOB: 1980  MEDICAL RECORD NO. [id number]  DATE: 02/27/22  PRIMARY CARE PHYSICIAN: ROMEO Carvalho CNP  ADMIT DATE: 2/9/22  DISPOSITION:  Home  DISCHARGE DATE:   2/26/22  ADMITTING DIAGNOSIS:   1. Leonel gangrene    2. MEREDITH (acute kidney injury) ([facility]      DIAGNOSIS:   Patient Active Problem List   Diagnosis    Leonel gangrene    Uncontrolled type 2 diabetes mellitus with hyperglycemia ([address]    MEREDITH (acute kidney injury) ([address]    Hypokalemia    Hyponatremia    Anemia    Morbid obesity ([facility]    Type 1 diabetes mellitus with stage 3a chronic kidney disease ([facility] [address] [facility]    Metabolic acidosis    Hypervolemia       CONSULTANTS:  nephrology and critical care    PROCEDURES:   2/9: INCISION AND DRAINAGE OF PERINEUM  2/9:incision and drainage of bilateral necrotizing soft tissue infection of groin and buttocks  2/10:  INCISION AND DRAINAGE BUTTOCK, GROIN  (LITHOTOMY POSITION  2/11: INCISION AND DRAINAGE AND DEBRIDEMENT BUTTOCK AND LABIAL ABSCESS , PLACEMENT OF WOUND VAC  2/14: DEBREIDMENT OF BILATERAL GROINS AND LEFT BUTTOCK WOUND WITH PARTIAL CLOSURE AND WOUND VAC PLACEMENT  2/16: DEBRIDEMENT BUTTOCK AND GROIN, WOUND VAC EXCHANGE, LAPAROSCOPIC COLOSTOMY CREATION ([address]), wound [id number]  2/18:LEFT GROIN, BUTTOCK  WOUND EXPLORATION AND DEBRIDEMENT,  WOUND VAC EXCHANGE, ([address])  2/22: Tunneled Dialysis Catheter    HOSPITAL COURSE:   Joanna Salter is a 42 y.o. female who was admitted on 2/9/22 with necrotizing infection of the perineum. Patient underwent multiple procedures regarding the incision, drainage, and debridement of her necrotizing wound and labial abscess as well as colostomy creation. Labs and imaging were followed daily.       At time of discharge, Jonana Salter was tolerating a regular diet, having bowel movements through her ostomy, ambulating on her own accord, had adequate analgesia on oral pain medications, and had no signs of symptoms of complications. She was deemed medically stable and discharged to home on 2/26/22 with instructions to follow up with home health and return to the trauma clinic on 3/8/22. Pt is also to follow up with nephrology outpatient. Pt expressed understanding of and agreement with DC plans. PHYSICAL EXAMINATION:        Discharge Vitals:  height is 5' 3\" (1.6 m) and weight is 264 lb 11.2 oz (120.1 kg). Her oral temperature is 98.4 °F (36.9 °C). Her blood pressure is 151/60 (abnormal) and her pulse is 76. Her respiration is 18 and oxygen saturation is 97%. General appearance - alert, well appearing, and in no distress  Chest - clear to ausculation  Heart - normal rate and regular rhythm  Abdomen - soft, non tender, non distended, bowel sounds present  Neurological - motor and sensory grossly normal bilaterally  Musculoskeletal - full range of motion without pain  Extremities - peripheral pulses normal, no pedal edema, no clubbing or cyanosis    LABS:     Recent Labs     02/25/22  0417 02/26/22  0633    141   K 4.1 4.3    102   CO2 30 26   BUN 20 22*   CREATININE 2.45* 2.17*       DIAGNOSTIC TESTS:    XR CHEST PORTABLE    Result Date: 2/9/2022  EXAMINATION: ONE XRAY VIEW OF THE CHEST 2/9/2022 4:06 pm COMPARISON: AP chest from 02/08/2022 HISTORY: ORDERING SYSTEM PROVIDED HISTORY: post-op TECHNOLOGIST PROVIDED HISTORY: post-op Reason for Exam: post op History of rectal surgery 02/08/2022 FINDINGS: Right IJ central line tip position stable in the upper SVC. Enteric tube tip and side hole project below the left hemidiaphragm. ETT tip position satisfactory the sternoclavicular joint level, 2 cm above the henrietta. Mildly enlarged but stable appearing cardiac silhouette. Enlarged mediastinal shadow, likely due to supine hypoventilatory technique. Low lung volumes with possible mild central vascular congestion and bibasilar atelectasis.   Developing infiltrate or aspiration also a consideration, but no consolidation or sizable pleural effusion. No pneumothorax. Bones stable. Support tubes right IJ line appears satisfactory. Low lung volumes accentuating findings; mild central vascular congestion with scattered mostly basilar atelectasis suspected. Minimal infiltrate/aspiration also a consideration. No pneumothorax. XR CHEST PORTABLE    Result Date: 2/8/2022  EXAMINATION: ONE XRAY VIEW OF THE CHEST 2/8/2022 11:23 pm COMPARISON: None. HISTORY: ORDERING SYSTEM PROVIDED HISTORY: pre op, SOB TECHNOLOGIST PROVIDED HISTORY: pre op, SOB Reason for Exam: pre-op   upright port FINDINGS: Cardiomediastinal silhouette is normal in size. Suspected small bilateral pleural effusions. No pneumothorax. There is faint interstitial prominence bilaterally. No pulmonary consolidation. No acute osseous abnormality. 1.  Faint interstitial prominence, which could be seen with interstitial edema. 2.  Suspected small bilateral pleural effusions.              DISCHARGE INSTRUCTIONS     Discharge Medications:        Medication List        START taking these medications      amLODIPine 10 MG tablet  Commonly known as: NORVASC  Take 1 tablet by mouth daily     bumetanide 1 MG tablet  Commonly known as: BUMEX  Take 1 tablet by mouth daily     docusate sodium 100 MG capsule  Commonly known as: COLACE  Take 1 capsule by mouth daily     polyethylene glycol 17 g packet  Commonly known as: GLYCOLAX  17 g by Per G Tube route daily            CHANGE how you take these medications      carvedilol 12.5 MG tablet  Commonly known as: COREG  Take 1 tablet by mouth 2 times daily  What changed:   medication strength  how much to take  when to take this            CONTINUE taking these medications      Advair HFA 45-21 MCG/ACT inhaler  Generic drug: fluticasone-salmeterol     cyanocobalamin 1000 MCG/ML injection     DULoxetine 60 MG extended release capsule  Commonly known as: CYMBALTA     fenofibrate 160 MG tablet  Commonly known as: TRIGLIDE     fluticasone 50 MCG/ACT nasal spray  Commonly known as: FLONASE     glucagon 1 MG injection     lisinopril 40 MG tablet  Commonly known as: PRINIVIL;ZESTRIL     MULTIVITAMIN ADULT EXTRA C PO     omeprazole 20 MG delayed release capsule  Commonly known as: PRILOSEC     ProAir  (90 Base) MCG/ACT inhaler  Generic drug: albuterol sulfate HFA     rOPINIRole 4 MG tablet  Commonly known as: REQUIP     Singulair 10 MG tablet  Generic drug: montelukast     vitamin D 1.25 MG (88626 UT) Caps capsule  Commonly known as: ERGOCALCIFEROL     vitamin D 125 MCG (5000 UT) Caps capsule  Commonly known as: CHOLECALCIFEROL            STOP taking these medications      PRENATAL VIT-DOCUSATE-IRON-FA PO               Where to Get Your Medications        These medications were sent to [address], [address] [facility] Reynaldo Marte [phone]  [address] [place] [address]      Phone: [phone]   amLODIPine 10 MG tablet  bumetanide 1 MG tablet  carvedilol 12.5 MG tablet  docusate sodium 100 MG capsule  polyethylene glycol 17 g packet       Diet: No diet orders on file diet as tolerated  Activity: - Avoid strenuous activity or exercise until cleared during follow-up appointment  - No driving or operating heavy machinery while taking narcotics   Wound Care: Daily and as needed  Follow-up:   Follow up in the trauma clinic on 3/8/22  Follow up in the next few weeks with PCP: ROMEO Rubio CNP    Time Spent for discharge: 30 minutes    Edison Suarez DO  2/27/2022, 10:44 AM            Trauma Attending Lopez Yarbrough      I have reviewed the above GCS note(s) and confirmed the key elements of the medical history and physical exam. I have seen and examined the pt. I have discussed the findings, established the care plan and recommendations with Resident, GCS RN, bedside nurse.         Davida Ford DO  2/27/2022  7:10 PM

## 2022-02-28 ENCOUNTER — CARE COORDINATION (OUTPATIENT)
Dept: CASE MANAGEMENT | Age: 42
End: 2022-02-28

## 2022-02-28 NOTE — CARE COORDINATION
[facility] Transitions Initial one time Follow Up Call - per [facility] PCP protocol for care transition    Call within 2 business days of discharge: Yes    Patient: Christ Cruz   Patient : 1980   MRN: [id number]    Reason for Admission:   Leonel gangrene     Discharge Date: 22   RARS: Readmission Risk Score: 16.4 ( )      Last Discharge [facility]       Complaint Diagnosis Description Type Department Provider    22 Abscess Leonel gangrene . .. ED to Hosp-Admission (Discharged) (ADMITTED) JANNA 3B Bandar Perez MD; Sol Chase... Spoke with: patient - reports doing OK at home after 18 day hospital stay - [facility] [employer] [facility]. started yesterday & has plans to see patient 3 times weekly. Wound vac in place - home care nurse taught patient's mother to do dressing changes as well. Patient confirms she is taking all new medications as prescribed. All appts are scheduled - PCP next week - [facility] clinic for wound assessment - 3/8, nephrology - [facility] next week. Patient has CTN contact number if any issue or question arises - Currently, patient thinks all is being handled. CT episode resolved. Facility: [facility]  Non-face-to-face services provided:  Scheduled appointment with PCP-3/7  Scheduled appointment with Specialist-3/8 [facility] specialty clinic  Obtained and reviewed discharge summary and/or continuity of care documents      Was this an external facility discharge? No     Challenges to be reviewed by the provider   Additional needs identified to be addressed with provider: No  none             Method of communication with provider : none      Advance Care Planning:   Does patient have an Advance Directive: not on file. Was this a readmission?  No  Patient stated reason for admission: perineal gangrene  Patients top risk factors for readmission: medical condition-gangrene - skin grafts - wound vac    Care Transition Nurse (CTN) contacted the patient by telephone to perform post hospital discharge assessment. Verified name and  with patient as identifiers. Provided introduction to self, and explanation of the CTN role. CTN reviewed discharge instructions, medical action plan and red flags with patient who verbalized understanding. Patient given an opportunity to ask questions and does not have any further questions or concerns at this time. Were discharge instructions available to patient? Yes. Reviewed appropriate site of care based on symptoms and resources available to patient including: PCP, Specialist, Home health and CTN. The patient agrees to contact the PCP office for questions related to their healthcare. Medication reconciliation was performed with patient, who verbalizes understanding of administration of home medications. Covid Risk Education - had vaccine + booster   Educated patient about risk for severe COVID-19 due to risk factors according to CDC guidelines. CTN reviewed discharge instructions, medical action plan and red flag symptoms with the patient who verbalized understanding. Discussed COVID vaccination status: Yes. Education provided on COVID-19 vaccination as appropriate. Discussed exposure protocols and quarantine with CDC Guidelines. Patient was given an opportunity to verbalize any questions and concerns and agrees to contact CTN or health care provider for questions related to their healthcare. Reviewed and educated patient on any new and changed medications related to discharge diagnosis. Was patient discharged with a pulse oximeter? No     CTN provided contact information.  No further follow-up call indicated per CT protocol for [facility] PCP patient  Plan for next call: CT episode resolved - patient denied any new issues that she needed assistance with - appts scheduled - [facility]. in place        Follow Up  Future Appointments   Date Time Provider Maddie Bermudez   3/8/2022 12:50 PM SCHEDULE, [facility] TRAUMA [facility] Trauma TOP Obi Musa RN

## 2022-03-08 ENCOUNTER — OFFICE VISIT (OUTPATIENT)
Dept: SURGERY | Age: 42
End: 2022-03-08

## 2022-03-08 VITALS
SYSTOLIC BLOOD PRESSURE: 151 MMHG | WEIGHT: 270 LBS | BODY MASS INDEX: 47.84 KG/M2 | DIASTOLIC BLOOD PRESSURE: 81 MMHG | HEART RATE: 82 BPM | HEIGHT: 63 IN

## 2022-03-08 DIAGNOSIS — Z87.438 HISTORY OF FOURNIER'S GANGRENE: Primary | ICD-10-CM

## 2022-03-08 PROCEDURE — 99024 POSTOP FOLLOW-UP VISIT: CPT | Performed by: SPECIALIST

## 2022-03-08 RX ORDER — DULAGLUTIDE 4.5 MG/.5ML
INJECTION, SOLUTION SUBCUTANEOUS
COMMUNITY
Start: 2022-02-28

## 2022-03-08 RX ORDER — HYDRALAZINE HYDROCHLORIDE 50 MG/1
TABLET, FILM COATED ORAL
COMMUNITY
Start: 2022-02-28

## 2022-03-08 RX ORDER — GABAPENTIN 300 MG/1
CAPSULE ORAL
Status: ON HOLD | COMMUNITY
Start: 2022-03-02 | End: 2022-07-29 | Stop reason: HOSPADM

## 2022-03-08 RX ORDER — ACETAMINOPHEN 160 MG
TABLET,DISINTEGRATING ORAL
COMMUNITY
Start: 2022-02-28

## 2022-03-08 RX ORDER — OLANZAPINE 10 MG/1
TABLET ORAL
COMMUNITY
Start: 2022-02-01

## 2022-03-08 NOTE — PROGRESS NOTES
Trauma and [address]      Patient's Name/ Date of Birth/ Gender: Lissa De Guzman / 1980 (42 y.o.) / female     MRN/ACCOUNT #: [de-identified]    History of present Illness: Lissa De Guzman is a 42 y.o. female, who was admitted for necrotizing soft tissue infection of the perineum on 2/9/2022. Patient underwent multiple incision and drainage of the perineum on 2/9, 2/10, 2/11, 2/14, 2/16 where she underwent laparoscopic diverting colostomy, and then again for debridement on 2/18. Patient was discharged on 2/26 home with home care and a black foam wound VAC. She has been undergoing wound VAC changes Monday Wednesday Friday since discharge. Patient states overall she is doing well but over the last several days had increased pain to the area. Ostomy is functioning well. She denies fever chills nausea vomiting and shortness of breath. Past Medical History:  has a past medical history of Clinical trial participant.     Past Surgical History:   Past Surgical History:   Procedure Laterality Date    ABDOMEN SURGERY Bilateral 2/9/2022    incision and drainage of bilateral necrotizing soft tissue infection of groin and buttocks  performed by Betsy Nunez MD at [address] [employer] [facility] [facility] Left 2/18/2022    LEFT GROIN, BUTTOCK  WOUND EXPLORATION AND DEBRIDEMENT,  WOUND VAC EXCHANGE, ([address]) performed by Geo Tabor MD at [facility] 2/16/2022    DEBRIDEMENT BUTTOCK AND GROIN, WOUND VAC EXCHANGE, LAPAROSCOPIC COLOSTOMY CREATION ([address]) performed by Geo Tabor MD at [place] [address] 5 YEARS  2/22/2022    IR TUNNELED CATHETER PLACEMENT GREATER THAN 5 YEARS 2/22/2022 [facility]    RECTAL SURGERY N/A 2/8/2022    *ADD ON, ASAP* INCISION AND DRAINAGE OF PERINEUM, BRIAN KNIFE PRONE performed by German Wetzel MD [address] Bilateral 2/10/2022 INCISION AND DRAINAGE BUTTOCK, GROIN  (LITHOTOMY POSITION) performed by Alexy Weaver MD at [address] [place] [address] 2/11/2022    INCISION AND DRAINAGE AND DEBRIDEMENT BUTTOCK AND LABIAL ABSCESS , PLACEMENT OF WOUND VAC performed by Radha Ba DO at [address] Left 2/14/2022    [address] OF BILATERAL GROINS AND LEFT BUTTOCK WOUND WITH PARTIAL CLOSURE AND WOUND VAC PLACEMENT performed by Concha Mora MD at [facility] History:  reports that she quit smoking about 14 months ago. Her smoking use included cigarettes. She started smoking about 2 years ago. She smoked 0.50 packs per day. She has never used smokeless tobacco.    Family History: family history is not on file. Review of Systems:   A comprehensive review of systems was negative.     Allergies: Aspirin and Ibuprofen    Current Meds:  Current Outpatient Medications:     Cholecalciferol (VITAMIN D3) 50 MCG (2000 UT) CAPS, TAKE 1 CAPSULE BY MOUTH ONCE DAILY, Disp: , Rfl:     TRULICITY 4.5 OP/3.7EU SOPN, INJECT 1 SYRINGE SUBCUTANEOUSLY ONCE A WEEK, Disp: , Rfl:     gabapentin (NEURONTIN) 300 MG capsule, TAKE ONE CAPSULE BY MOUTH THREE TIMES DAILY, Disp: , Rfl:     hydrALAZINE (APRESOLINE) 50 MG tablet, TAKE 1 TABLET BY MOUTH TWICE DAILY WITH FOOD, Disp: , Rfl:     NOVOLOG 100 UNIT/ML injection vial, , Disp: , Rfl:     magnesium oxide (MAG-OX) 400 (241.3 Mg) MG TABS tablet, , Disp: , Rfl:     OLANZapine (ZYPREXA) 10 MG tablet, TAKE ONE TABLET BY MOUTH ONCE DAILY, Disp: , Rfl:     carvedilol (COREG) 12.5 MG tablet, Take 1 tablet by mouth 2 times daily, Disp: 60 tablet, Rfl: 0    amLODIPine (NORVASC) 10 MG tablet, Take 1 tablet by mouth daily, Disp: 30 tablet, Rfl: 0    bumetanide (BUMEX) 1 MG tablet, Take 1 tablet by mouth daily, Disp: 30 tablet, Rfl: 0    docusate sodium (COLACE) 100 MG capsule, Take 1 capsule by mouth daily, Disp: 30 capsule, Rfl: 0    polyethylene glycol (GLYCOLAX) 17 g packet, 17 g by Per G Tube route daily, Disp: 30 each, Rfl: 0    fluticasone-salmeterol (ADVAIR HFA) 45-21 MCG/ACT inhaler, Inhale 2 puffs into the lungs 2 times daily, Disp: , Rfl:     Multiple Vitamins-Minerals (MULTIVITAMIN ADULT EXTRA C PO), Take 1 tablet by mouth daily, Disp: , Rfl:     vitamin D (ERGOCALCIFEROL) 1.25 MG (65041 UT) CAPS capsule, Take 50,000 Units by mouth once a week, Disp: , Rfl:     albuterol sulfate HFA (PROAIR HFA) 108 (90 Base) MCG/ACT inhaler, Inhale 1 puff into the lungs as needed, Disp: , Rfl:     DULoxetine (CYMBALTA) 60 MG extended release capsule, Take 60 mg by mouth daily, Disp: , Rfl:     cyanocobalamin 1000 MCG/ML injection, Inject 1,000 mcg into the muscle every 30 days, Disp: , Rfl:     fenofibrate (TRIGLIDE) 160 MG tablet, Take 160 mg by mouth daily, Disp: , Rfl:     fluticasone (FLONASE) 50 MCG/ACT nasal spray, 1 spray by Each Nostril route, Disp: , Rfl:     glucagon 1 MG injection, Inject 1 mg into the muscle as needed, Disp: , Rfl:     lisinopril (PRINIVIL;ZESTRIL) 40 MG tablet, Take 40 mg by mouth daily, Disp: , Rfl:     montelukast (SINGULAIR) 10 MG tablet, Take 10 mg by mouth daily, Disp: , Rfl:     omeprazole (PRILOSEC) 20 MG delayed release capsule, Take 20 mg by mouth daily, Disp: , Rfl:     rOPINIRole (REQUIP) 4 MG tablet, Take 4 mg by mouth daily, Disp: , Rfl:     Vital Signs:  Vitals:    03/08/22 1252   BP: (!) 151/81   Pulse: 82       Physical Exam:  Vital signs and Nurse's note reviewed  Gen:  A&Ox3, NAD  HEENT: Normocephalic atraumatic  Neck: Supple  Chest: Symmetric rise with inhalation  CVS: Regular rate and rhythm  Resp: Good bilateral air entry, clear to auscultation b/l, no wheeze or rhonchi  Abd: soft, non-tender, non-distended, ostomy functioning well.   Ext: No clubbing, cyanosis, edema  CNS: Moves all extremities, no gross focal motor deficits  Skin: Bilateral inguinal crease incisions with Prolene suture, no erythema no palpable areas of fluctuance. Left posterior buttock wound exudates, with tenderness exudate, foul-smelling drainage, no signs of cellulitis no fluctuant areas          Labs:   CBC: No results for input(s): WBC, HGB, PLT in the last 72 hours. BMP:  No results for input(s): NA, K, CL, CO2, BUN, CREATININE, GLUCOSE in the last 72 hours. Hepatic: No results for input(s): AST, ALT, ALB, ALKPHOS, BILITOT, BILIDIR, LIPASE, AMYLASE in the last 72 hours. Coagulation: No results for input(s): APTT, PROT, INR in the last 72 hours. Problem List:  Patient Active Problem List    Diagnosis Date Noted    Metabolic acidosis     Hypervolemia     Uncontrolled type 2 diabetes mellitus with hyperglycemia ([facility] 02/09/2022    MEREDITH (acute kidney injury) ([facility] 02/09/2022    Hypokalemia 02/09/2022    Hyponatremia 02/09/2022    Anemia 02/09/2022    Morbid obesity ([facility] 02/09/2022    Type 1 diabetes mellitus with stage 3a chronic kidney disease ([facility]     Leonel gangrene 02/08/2022       Impression:    Kwasi Anderson is a 42 y.o. female who presents status post multiple incision and debridement for necrotizing soft tissue infection and diverting colostomy. Recommendation:    1. Wound VAC taken down and replaced at clinic. Inguinal crease sutures removed. Patient tolerated the procedure well. Pictures are updated in chart. 2. Patient showing no signs of active infection, recommend continuation of localized wound care. Continue wound VAC changes Monday Wednesday Friday with home care. 3. Plan to see patient next week in clinic for wound reevaluation.       Electronically signed by Bob Perdue MD  on 3/8/2022 at 1:17 PM

## 2022-03-15 ENCOUNTER — OFFICE VISIT (OUTPATIENT)
Dept: SURGERY | Age: 42
End: 2022-03-15

## 2022-03-15 VITALS
SYSTOLIC BLOOD PRESSURE: 164 MMHG | HEIGHT: 63 IN | BODY MASS INDEX: 46.07 KG/M2 | DIASTOLIC BLOOD PRESSURE: 80 MMHG | HEART RATE: 94 BPM | WEIGHT: 260 LBS

## 2022-03-15 DIAGNOSIS — Z87.438 HISTORY OF FOURNIER'S GANGRENE: Primary | ICD-10-CM

## 2022-03-15 PROCEDURE — 99024 POSTOP FOLLOW-UP VISIT: CPT | Performed by: SPECIALIST

## 2022-03-15 NOTE — PROGRESS NOTES
[facility] Progress Note    TODAY'S DATE: 3/15/2022, 1:59 PM    RAY Jaramillo is a 42 y.o. 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 She underwent multiple I&Ds of the perineum on 2/9, 2/10, 2/11, 2/14, 2/16, 2/18. She also underwent laparoscopic diverting colostomy on 2/16. Patient was discharged on 2/26 home with home care and a black foam wound VAC. Inguinal crease sutures removed in clinic last week. Patient states home care has been doing wet-dry dressing change on Monday, we change the wound vac on Tuesday, and then home care changes wound vac on Friday. She shower prior to each wound vac change and uses soap and water in the wound. Wound continues to have foul odor. Review of Systems - Negative except HPI     OBJECTIVE    VITALS:  BP (!) 164/80 (Site: Left Upper Arm, Position: Sitting, Cuff Size: Medium Adult)   Pulse 94   Ht 5' 3\" (1.6 m)   Wt 260 lb (117.9 kg)   BMI 46.06 kg/m²     General Appearance:    Alert, cooperative, no distress, appears stated age   Head:    Normocephalic, without obvious abnormality, atraumatic   Eyes:    PERRL, conjunctiva/corneas clear, EOM's intact, fundi     benign, both eyes   Ears:    Normal TM's and external ear canals, both ears   Nose:   Nares normal, septum midline, mucosa normal, no drainage    or sinus tenderness   Throat:   Lips, mucosa, and tongue normal; teeth and gums normal   Neck:   Supple, symmetrical, trachea midline, no adenopathy;     thyroid:  no enlargement/tenderness/nodules; no carotid    bruit or JVD   Back:     Symmetric, no curvature, ROM normal, no CVA tenderness. Wound to left buttock that contains fibrinous tissue. No drainage. Foul odor. New picture in chart.     Lungs:     respirations unlabored   Chest Wall:    No tenderness or deformity    Heart:    Regular rate and rhythm   Breast Exam:    No tenderness, masses, or nipple abnormality   Abdomen:     Soft, non-tender Genitalia:    deferred   Rectal:    Perirectal Skin tag noted. Extremities:   Extremities normal, atraumatic, no cyanosis or edema   Pulses:   2+ and symmetric all extremities   Skin:   Skin color, texture, turgor normal, no rashes or lesions   Lymph nodes:   Cervical, supraclavicular, and axillary nodes normal   Neurologic:   CNII-XII intact, normal strength, sensation and reflexes     Throughout                ASSESMENT       Diagnosis Orders   1. History of Leonel's gangrene         PLAN      1. Wound VAC replaced at clinic today with black foam.  2. Patient showing no signs of active infection, recommend continuation of localized wound care with soap and water. Continue wound VAC changes Friday with home care and wet-dry dressing change on Monday. 3. Plan to see patient next week in clinic for wound reevaluation. Jimbo Steele DO  Electronically signed by Jimbo Steele DO  on 3/15/2022 at 1:59 PM     Trauma, Emergency and Critical Surgical Services  Attending Progress Note   I have discussed the care of this patient including pertinent history and exam findings,  with the resident. I have seen and examined the patient and the key elements of all parts of the encounter have been performed by me. I agree with the assessment, plan and orders as documented by the resident. Electronically signed by Crescencio Bee MD on 3/22/2022 at 1:42 PM   Trauma, Emergency and Critical Surgical Services  Attending Progress Note   I have discussed the care of this patient including pertinent history and exam findings,  with the resident. I have seen and examined the patient and the key elements of all parts of the encounter have been performed by me. I agree with the assessment, plan and orders as documented by the resident.      Electronically signed by Crescencio Bee MD on 3/22/2022 at 2:09 PM

## 2022-04-05 ENCOUNTER — OFFICE VISIT (OUTPATIENT)
Dept: SURGERY | Age: 42
End: 2022-04-05

## 2022-04-05 VITALS
DIASTOLIC BLOOD PRESSURE: 74 MMHG | BODY MASS INDEX: 46.07 KG/M2 | HEIGHT: 63 IN | SYSTOLIC BLOOD PRESSURE: 138 MMHG | WEIGHT: 260 LBS

## 2022-04-05 DIAGNOSIS — Z87.39 HISTORY OF NECROTIZING FASCIITIS: Primary | ICD-10-CM

## 2022-04-05 PROCEDURE — 99024 POSTOP FOLLOW-UP VISIT: CPT | Performed by: SPECIALIST

## 2022-04-05 NOTE — PROGRESS NOTES
[facility]    Patient's Name: Mariella Perez  YOB: 1980 (42 y.o.)    Subjective:  Mariella Perez is a 42 y.o. female that presents to the Trauma Surgery Clinic status post amaris gangrene admitted with multiple debridements and drainage performed 2/9, 2/10, 2/11, 2/14, 2/16, 2/18. She was discharged on 2/26 with home care and wound follow up. Patient is afebrile, nontoxic and not in acute distress. She sys she is doing well, no acute concerns. Wound is not draining, see exam below.      Past Medical History:   Diagnosis Date    Clinical trial participant 02/09/2022    Protocol [id number]  Study completion 11/FEB/2022       Past Surgical History:   Procedure Laterality Date    ABDOMEN SURGERY Bilateral 2/9/2022    incision and drainage of bilateral necrotizing soft tissue infection of groin and buttocks  [facility] [place] [facility] Left 2/18/2022    LEFT GROIN, BUTTOCK  WOUND EXPLORATION AND DEBRIDEMENT,  WOUND VAC EXCHANGE, ([address]) performed by Flor Castellano MD at [facility] 2/16/2022    DEBRIDEMENT BUTTOCK AND GROIN, WOUND VAC EXCHANGE, LAPAROSCOPIC COLOSTOMY CREATION ([address]) performed by Flor Castellano MD at [facility] 5 YEARS  2/22/2022    IR TUNNELED CATHETER PLACEMENT GREATER THAN 5 YEARS 2/22/2022 Olu Myers MD [facility] SPECIAL PROCEDURES    RECTAL SURGERY N/A 2/8/2022    *ADD ON, ASAP* INCISION AND DRAINAGE OF PERINEUM, BRIAN KNIFE PRONE performed by Beverly Myers MD at [address] Bilateral 2/10/2022    INCISION AND DRAINAGE BUTTOCK, GROIN  (LITHOTOMY POSITION) performed by Natalia Sawyer MD at [address] [place] [address] 2/11/2022    INCISION AND DRAINAGE AND DEBRIDEMENT BUTTOCK AND LABIAL ABSCESS , PLACEMENT OF WOUND VAC performed by Justin Juarez DO at [address] [facility] Left 2/14/2022 DEBREIDMENT OF BILATERAL GROINS AND LEFT BUTTOCK WOUND WITH PARTIAL CLOSURE AND WOUND VAC PLACEMENT performed by Genie Rosado MD at [place] [address]       Current Outpatient Medications   Medication Sig Dispense Refill    Cholecalciferol (VITAMIN D3) 50 MCG (2000 UT) CAPS TAKE 1 CAPSULE BY MOUTH ONCE DAILY      TRULICITY 4.5 XX/9.0TM SOPN INJECT 1 SYRINGE SUBCUTANEOUSLY ONCE A WEEK      gabapentin (NEURONTIN) 300 MG capsule TAKE ONE CAPSULE BY MOUTH THREE TIMES DAILY      hydrALAZINE (APRESOLINE) 50 MG tablet TAKE 1 TABLET BY MOUTH TWICE DAILY WITH FOOD      NOVOLOG 100 UNIT/ML injection vial       magnesium oxide (MAG-OX) 400 (241.3 Mg) MG TABS tablet       OLANZapine (ZYPREXA) 10 MG tablet TAKE ONE TABLET BY MOUTH ONCE DAILY      carvedilol (COREG) 12.5 MG tablet Take 1 tablet by mouth 2 times daily 60 tablet 0    amLODIPine (NORVASC) 10 MG tablet Take 1 tablet by mouth daily 30 tablet 0    bumetanide (BUMEX) 1 MG tablet Take 1 tablet by mouth daily 30 tablet 0    fluticasone-salmeterol (ADVAIR HFA) 45-21 MCG/ACT inhaler Inhale 2 puffs into the lungs 2 times daily      Multiple Vitamins-Minerals (MULTIVITAMIN ADULT EXTRA C PO) Take 1 tablet by mouth daily      vitamin D (ERGOCALCIFEROL) 1.25 MG (03284 UT) CAPS capsule Take 50,000 Units by mouth once a week      albuterol sulfate HFA (PROAIR HFA) 108 (90 Base) MCG/ACT inhaler Inhale 1 puff into the lungs as needed      DULoxetine (CYMBALTA) 60 MG extended release capsule Take 60 mg by mouth daily      cyanocobalamin 1000 MCG/ML injection Inject 1,000 mcg into the muscle every 30 days      fenofibrate (TRIGLIDE) 160 MG tablet Take 160 mg by mouth daily      fluticasone (FLONASE) 50 MCG/ACT nasal spray 1 spray by Each Nostril route      glucagon 1 MG injection Inject 1 mg into the muscle as needed      lisinopril (PRINIVIL;ZESTRIL) 40 MG tablet Take 40 mg by mouth daily      montelukast (SINGULAIR) 10 MG tablet Take 10 mg by mouth daily      omeprazole (PRILOSEC) 20 MG delayed release capsule Take 20 mg by mouth daily      rOPINIRole (REQUIP) 4 MG tablet Take 4 mg by mouth daily       No current facility-administered medications for this visit. Allergies   Allergen Reactions    Aspirin      Other reaction(s): due to kidney function    Ibuprofen      CHRONIC KIDNEY DISEASE STAGE 3   Other reaction(s): due to kidney function         No family history on file. Social History     Socioeconomic History    Marital status:      Spouse name: Not on file    Number of children: Not on file    Years of education: Not on file    Highest education level: Not on file   Occupational History    Not on file   Tobacco Use    Smoking status: Former Smoker     Packs/day: 0.50     Types: Cigarettes     Start date:      Quit date:      Years since quittin.2    Smokeless tobacco: Never Used   Substance and Sexual Activity    Alcohol use: Not on file    Drug use: Not on file    Sexual activity: Not on file   Other Topics Concern    Not on file   Social History Narrative    Not on file     Social Determinants of Health     Financial Resource Strain:     Difficulty of Paying Living Expenses: Not on file   Food Insecurity:     Worried About [address] in the Last Year: Not on file    [address] in the Last Year: Not on file   Transportation Needs:     Lack of Transportation (Medical): Not on file    Lack of Transportation (Non-Medical):  Not on file   Physical Activity:     Days of Exercise per Week: Not on file    Minutes of Exercise per Session: Not on file   Stress:     Feeling of Stress : Not on file   Social Connections:     Frequency of Communication with Friends and Family: Not on file    Frequency of Social Gatherings with Friends and Family: Not on file    Attends Buddhist Services: Not on file    Active Member of Clubs or Organizations: Not on file    Attends Club or Organization Meetings: Not on file  Marital Status: Not on file   Intimate Partner Violence:     Fear of Current or Ex-Partner: Not on file    Emotionally Abused: Not on file    Physically Abused: Not on file    Sexually Abused: Not on file   Housing Stability:     Unable to Pay for Housing in the Last Year: Not on file    Number of Places Lived in the Last Year: Not on file    Unstable Housing in the Last Year: Not on file         ROS  General: Denies fever, chills, night sweats, weight loss, malaise, fatigue  HEENT: Denies sore throat, sinus problems, allergic rhinosinusitis  Card: Denies chest pain, palpitations, orthopnea/PND. Denies h/o murmurs  Pulm: Denies cough, shortness of breath, WADDELL  GI:  per HPI; denies history of constipation, diarrhea, hematochezia or melena  : Denies polyuria, dysuria, hematuria  Endo: Denies diabetes, thyroid problems. Heme: Denies anemia, h/o bleeding or clotting problems. Neuro: Denies h/o CVA, TIA  Skin: Denies rashes, ulcers  Musculoskeletal: Denies muscle, joint, back pain. Physical Examination:   There were no vitals filed for this visit. Vitals:    04/05/22 1309   BP: 138/74         Gen:  A&Ox3, NAD  HEENT: PERRLA, EOMI, no scleral icterus,  oral mucosa moist  Chest: Symmetric rise with inhalation, no evidence of trauma  CVS: Regular rate and rhythm, no murmurs  Resp: Good bilateral air entry, no resp distress   Abd: rotundsoft, non-tender, non-distended, no hepatosplenomegaly or palpable masses, bowel sounds present. Ext: No clubbing, cyanosis, edema, peripheral pulses 2+ Rad/Fem/DP/PT  CNS: Moves all extremities, no gross focal motor deficits  Skin: No drainage or pus, good margins, foul odor however no obvious infection. See image below            Assessment:  Necrotizing fasciitis, Leonel's gangrene       Plan:  1. Wound vac change  2. Home health care for wound dressing changes/vac change  3. Follow up in clinic in 4 weeks for re-evaluation.      Electronically signed by Petra Pappas Albin Prader, DO  on 4/5/2022 at 1:07 PM

## 2022-04-26 ENCOUNTER — OFFICE VISIT (OUTPATIENT)
Dept: SURGERY | Age: 42
End: 2022-04-26

## 2022-04-26 VITALS
SYSTOLIC BLOOD PRESSURE: 152 MMHG | BODY MASS INDEX: 46.95 KG/M2 | DIASTOLIC BLOOD PRESSURE: 86 MMHG | WEIGHT: 265 LBS | HEIGHT: 63 IN | HEART RATE: 88 BPM

## 2022-04-26 DIAGNOSIS — N49.3 FOURNIER GANGRENE: ICD-10-CM

## 2022-04-26 DIAGNOSIS — Z87.39 HISTORY OF NECROTIZING FASCIITIS: ICD-10-CM

## 2022-04-26 DIAGNOSIS — Z87.438 HISTORY OF FOURNIER'S GANGRENE: Primary | ICD-10-CM

## 2022-04-26 PROCEDURE — 99024 POSTOP FOLLOW-UP VISIT: CPT | Performed by: SPECIALIST

## 2022-04-26 NOTE — PROGRESS NOTES
[facility]    Patient's Name: Miguel Angel Waller  YOB: 1980 (42 y.o.)    Subjective:  Miguel Angel Waller is a 42 y.o. female that presents to the Trauma Surgery Clinic status post amaris gangrene admitted with multiple debridements and drainage performed 2/9, 2/10, 2/11, 2/14, 2/16, 2/18. She was discharged on 2/26 with home care and wound follow up    Reports doing well. White foam being pushed out on own, alarm having difficulty with suction. No fevers or drainage. Diabetes management going well. Eating and toileting without issue.     Past Medical History:   Diagnosis Date    Clinical trial participant 02/09/2022    Protocol [id number]  Study completion 11/FEB/2022       Past Surgical History:   Procedure Laterality Date    ABDOMEN SURGERY Bilateral 2/9/2022    incision and drainage of bilateral necrotizing soft tissue infection of groin and buttocks  performed by Evelyn Tavarez MD at [address] Left 2/18/2022    LEFT GROIN, BUTTOCK  WOUND EXPLORATION AND DEBRIDEMENT,  WOUND VAC EXCHANGE, ([address]) performed by Porsha Pisano MD at [facility] 2/16/2022    DEBRIDEMENT BUTTOCK AND GROIN, WOUND VAC EXCHANGE, LAPAROSCOPIC COLOSTOMY CREATION ([address]) performed by Porsha Pisano MD at [place] [address] 5 YEARS  2/22/2022    IR TUNNELED CATHETER PLACEMENT GREATER THAN 5 YEARS 2/22/2022 Charles Medel MD [facility] SPECIAL PROCEDURES    RECTAL SURGERY N/A 2/8/2022    *ADD ON, ASAP* INCISION AND DRAINAGE OF PERINEUM, BRIAN KNIFE PRONE performed by Jeronimo Penny MD at [address] Bilateral 2/10/2022    INCISION AND DRAINAGE BUTTOCK, GROIN  (LITHOTOMY POSITION) performed by Evelyn Tavarez MD at [address] N/A 2/11/2022    INCISION AND DRAINAGE AND DEBRIDEMENT BUTTOCK AND LABIAL ABSCESS , PLACEMENT OF WOUND VAC performed by Cristian Morales DO at [facility] OR    WOUND EXPLORATION Left 2/14/2022    DEBREIDMENT OF BILATERAL GROINS AND LEFT BUTTOCK WOUND WITH PARTIAL CLOSURE AND WOUND VAC PLACEMENT performed by Tato Pyle MD at [place] [address]       Current Outpatient Medications   Medication Sig Dispense Refill    Cholecalciferol (VITAMIN D3) 50 MCG (2000 UT) CAPS TAKE 1 CAPSULE BY MOUTH ONCE DAILY      TRULICITY 4.5 TZ/6.0LU SOPN INJECT 1 SYRINGE SUBCUTANEOUSLY ONCE A WEEK      gabapentin (NEURONTIN) 300 MG capsule TAKE ONE CAPSULE BY MOUTH THREE TIMES DAILY      hydrALAZINE (APRESOLINE) 50 MG tablet TAKE 1 TABLET BY MOUTH TWICE DAILY WITH FOOD      NOVOLOG 100 UNIT/ML injection vial       magnesium oxide (MAG-OX) 400 (241.3 Mg) MG TABS tablet       OLANZapine (ZYPREXA) 10 MG tablet TAKE ONE TABLET BY MOUTH ONCE DAILY      carvedilol (COREG) 12.5 MG tablet Take 1 tablet by mouth 2 times daily 60 tablet 0    amLODIPine (NORVASC) 10 MG tablet Take 1 tablet by mouth daily 30 tablet 0    bumetanide (BUMEX) 1 MG tablet Take 1 tablet by mouth daily 30 tablet 0    fluticasone-salmeterol (ADVAIR HFA) 45-21 MCG/ACT inhaler Inhale 2 puffs into the lungs 2 times daily      Multiple Vitamins-Minerals (MULTIVITAMIN ADULT EXTRA C PO) Take 1 tablet by mouth daily      vitamin D (ERGOCALCIFEROL) 1.25 MG (72369 UT) CAPS capsule Take 50,000 Units by mouth once a week      albuterol sulfate HFA (PROAIR HFA) 108 (90 Base) MCG/ACT inhaler Inhale 1 puff into the lungs as needed      DULoxetine (CYMBALTA) 60 MG extended release capsule Take 60 mg by mouth daily      cyanocobalamin 1000 MCG/ML injection Inject 1,000 mcg into the muscle every 30 days      fenofibrate (TRIGLIDE) 160 MG tablet Take 160 mg by mouth daily      fluticasone (FLONASE) 50 MCG/ACT nasal spray 1 spray by Each Nostril route      glucagon 1 MG injection Inject 1 mg into the muscle as needed      lisinopril (PRINIVIL;ZESTRIL) 40 MG tablet Take 40 mg by mouth daily      montelukast (SINGULAIR) 10 MG tablet Take 10 mg by mouth daily      omeprazole (PRILOSEC) 20 MG delayed release capsule Take 20 mg by mouth daily      rOPINIRole (REQUIP) 4 MG tablet Take 4 mg by mouth daily       No current facility-administered medications for this visit. Allergies   Allergen Reactions    Aspirin      Other reaction(s): due to kidney function    Ibuprofen      CHRONIC KIDNEY DISEASE STAGE 3   Other reaction(s): due to kidney function         No family history on file. Social History     Socioeconomic History    Marital status:      Spouse name: Not on file    Number of children: Not on file    Years of education: Not on file    Highest education level: Not on file   Occupational History    Not on file   Tobacco Use    Smoking status: Former Smoker     Packs/day: 0.50     Types: Cigarettes     Start date:      Quit date:      Years since quittin.3    Smokeless tobacco: Never Used   Substance and Sexual Activity    Alcohol use: Not on file    Drug use: Not on file    Sexual activity: Not on file   Other Topics Concern    Not on file   Social History Narrative    Not on file     Social Determinants of Health     Financial Resource Strain:     Difficulty of Paying Living Expenses: Not on file   Food Insecurity:     Worried About [address] in the Last Year: Not on file    [address] in the Last Year: Not on file   Transportation Needs:     Lack of Transportation (Medical): Not on file    Lack of Transportation (Non-Medical):  Not on file   Physical Activity:     Days of Exercise per Week: Not on file    Minutes of Exercise per Session: Not on file   Stress:     Feeling of Stress : Not on file   Social Connections:     Frequency of Communication with Friends and Family: Not on file    Frequency of Social Gatherings with Friends and Family: Not on file    Attends Scientologist Services: Not on file    Active Member of Clubs or Organizations: Not on file    Attends Club or Organization Meetings: Not on file    Marital Status: Not on file   Intimate Partner Violence:     Fear of Current or Ex-Partner: Not on file    Emotionally Abused: Not on file    Physically Abused: Not on file    Sexually Abused: Not on file   Housing Stability:     Unable to Pay for Housing in the Last Year: Not on file    Number of [place] in the Last Year: Not on file    Unstable Housing in the Last Year: Not on file         ROS  General: Denies fever, chills, night sweats, weight loss, malaise, fatigue  HEENT: Denies sore throat, sinus problems, allergic rhinosinusitis  Card: Denies chest pain, palpitations, orthopnea/PND. Denies h/o murmurs  Pulm: Denies cough, shortness of breath, WADDELL  GI:  per HPI; denies history of constipation, diarrhea, hematochezia or melena  : Denies polyuria, dysuria, hematuria  Endo: Denies diabetes, thyroid problems. Heme: Denies anemia, h/o bleeding or clotting problems. Neuro: Denies h/o CVA, TIA  Skin: Denies rashes, ulcers  Musculoskeletal: Denies muscle, joint, back pain. Physical Examination:   Vitals:    04/26/22 1244   BP: (!) 152/86   Pulse: 88     Vitals:    04/26/22 1244   BP: (!) 152/86   Pulse: 88             Gen:  A&Ox3, NAD  HEENT: PERRLA, EOMI, no scleral icterus,  oral mucosa moist  Chest: Symmetric rise with inhalation, no evidence of trauma  CVS: Regular rate and rhythm, no murmurs  Resp: Good bilateral air entry, no resp distress   Abd:  soft, non-tender, non-distended, no hepatosplenomegaly or palpable masses, bowel sounds present. Ext: No clubbing, cyanosis, edema, peripheral pulses 2+ Rad/Fem/DP/PT  CNS: Moves all extremities, no gross focal motor deficits  Skin: No drainage or pus, good margins, see image above. Assessment:  Necrotizing fasciitis, Leonel's gangrene       Plan:  1. Continue wound vac with smaller piece of foam, no white foam needed  3. Follow up in clinic in 3 weeks for re-evaluation. Electronically signed by Nayely Downing DO  on 4/26/2022 at 1:04 PM

## 2022-04-26 NOTE — PATIENT INSTRUCTIONS
Please continue wound vac with black foam only with small piece of foam with dressing changes. Follow up in clinic in 3 weeks. See you soon!

## 2022-05-17 ENCOUNTER — OFFICE VISIT (OUTPATIENT)
Dept: SURGERY | Age: 42
End: 2022-05-17
Payer: COMMERCIAL

## 2022-05-17 VITALS
DIASTOLIC BLOOD PRESSURE: 84 MMHG | HEIGHT: 63 IN | WEIGHT: 265 LBS | HEART RATE: 86 BPM | BODY MASS INDEX: 46.95 KG/M2 | SYSTOLIC BLOOD PRESSURE: 148 MMHG

## 2022-05-17 DIAGNOSIS — Z98.890 S/P EXCISIONAL DEBRIDEMENT: Primary | ICD-10-CM

## 2022-05-17 PROCEDURE — 1036F TOBACCO NON-USER: CPT | Performed by: SPECIALIST

## 2022-05-17 PROCEDURE — G8417 CALC BMI ABV UP PARAM F/U: HCPCS | Performed by: SPECIALIST

## 2022-05-17 PROCEDURE — 99214 OFFICE O/P EST MOD 30 MIN: CPT | Performed by: SPECIALIST

## 2022-05-17 PROCEDURE — G8427 DOCREV CUR MEDS BY ELIG CLIN: HCPCS | Performed by: SPECIALIST

## 2022-05-17 RX ORDER — CARVEDILOL 25 MG/1
TABLET ORAL
COMMUNITY
Start: 2022-05-02

## 2022-05-17 RX ORDER — ROSUVASTATIN CALCIUM 5 MG/1
TABLET, COATED ORAL
COMMUNITY
Start: 2022-05-02

## 2022-05-17 NOTE — PROGRESS NOTES
Trauma and [address]      Patient's Name/ Date of Birth/ Gender: Julius Benavides / 1980 (88 y.o.) / female     MRN/ACCOUNT #: [de-identified]    History of present Illness: Julius Benavides is a 42 y.o. female, who comes for rev-evaluation of a perineal wound. Patient underwent incision and drainage of perineal necrotizing fasciitis on 2/9/2022. She underwent subsequent debridements 2/10, 2/11, 2/14, and 2/18, as well as laparoscopic colostomy creation 2/16. Patient reports that she has been feeling well. She states that her wound vac was discontinued last week, and her home health nurses have been applying wet to dry dressings to the area. She denies any erythema, bleeding or drainage. She denies any fevers or chills. She is tolerating a regular diet and urinating without difficulty. She reports that her ostomy is functioning well. She denies any issues with output. Past Medical History:  has a past medical history of Clinical trial participant.     Past Surgical History:   Past Surgical History:   Procedure Laterality Date    ABDOMEN SURGERY Bilateral 2/9/2022    incision and drainage of bilateral necrotizing soft tissue infection of groin and buttocks  performed by Chad Vann MD at [address] [facility] [facility] Left 2/18/2022    LEFT GROIN, BUTTOCK  WOUND EXPLORATION AND DEBRIDEMENT,  WOUND VAC EXCHANGE, ([address]) performed by Moo Oakley MD at [facility] 2/16/2022    DEBRIDEMENT BUTTOCK AND GROIN, WOUND VAC EXCHANGE, LAPAROSCOPIC COLOSTOMY CREATION ([address]) performed by Moo Oakley MD at [facility] 26 5 YEARS  2/22/2022    IR TUNNELED CATHETER PLACEMENT GREATER THAN 5 YEARS 2/22/2022 Sarah Hurd MD [facility] PROCEDURES    RECTAL SURGERY N/A 2/8/2022    *ADD ON, ASAP* INCISION AND DRAINAGE OF PERINEUM, BRIAN KNIFE PRONE performed by Sheree House MD at [facility] SURGERY Bilateral 2/10/2022    INCISION AND DRAINAGE BUTTOCK, GROIN  (LITHOTOMY POSITION) performed by Donald Rodriguez MD at [facility] 2/11/2022    INCISION AND DRAINAGE AND DEBRIDEMENT BUTTOCK AND LABIAL ABSCESS , PLACEMENT OF WOUND VAC performed by Viral Edmonds DO at [address] Left 2/14/2022    [address] [facility] [address] OF BILATERAL GROINS AND LEFT BUTTOCK WOUND WITH PARTIAL CLOSURE AND WOUND VAC PLACEMENT performed by Lon Saenz MD at [address] [facility] History:  reports that she quit smoking about 16 months ago. Her smoking use included cigarettes. She started smoking about 2 years ago. She smoked 0.50 packs per day. She has never used smokeless tobacco.    Family History: family history is not on file.     Review of Systems:   Constitutional: negative for chills, fatigue and fevers  Eyes: negative for visual disturbance  Ears, nose, mouth, throat, and face: negative for nasal congestion and sore throat  Respiratory: negative for cough and shortness of breath  Cardiovascular: negative for chest pain and palpitations  Gastrointestinal: negative for abdominal pain, diarrhea, nausea and vomiting  Musculoskeletal:negative for muscle weakness  Neurological: negative for dizziness and weakness  Endocrine: negative for temperature intolerance    Allergies: Aspirin and Ibuprofen    Current Meds:  Current Outpatient Medications:     Cholecalciferol (VITAMIN D3) 50 MCG (2000 UT) CAPS, TAKE 1 CAPSULE BY MOUTH ONCE DAILY, Disp: , Rfl:     TRULICITY 4.5 ST/0.2EG SOPN, INJECT 1 SYRINGE SUBCUTANEOUSLY ONCE A WEEK, Disp: , Rfl:     gabapentin (NEURONTIN) 300 MG capsule, TAKE ONE CAPSULE BY MOUTH THREE TIMES DAILY, Disp: , Rfl:     hydrALAZINE (APRESOLINE) 50 MG tablet, TAKE 1 TABLET BY MOUTH TWICE DAILY WITH FOOD, Disp: , Rfl:     NOVOLOG 100 UNIT/ML injection vial, , Disp: , Rfl:     magnesium oxide (MAG-OX) 400 (241.3 Mg) MG TABS tablet, , Disp: , Rfl:     OLANZapine (ZYPREXA) 10 MG tablet, TAKE ONE TABLET BY MOUTH ONCE DAILY, Disp: , Rfl:     carvedilol (COREG) 12.5 MG tablet, Take 1 tablet by mouth 2 times daily, Disp: 60 tablet, Rfl: 0    amLODIPine (NORVASC) 10 MG tablet, Take 1 tablet by mouth daily, Disp: 30 tablet, Rfl: 0    bumetanide (BUMEX) 1 MG tablet, Take 1 tablet by mouth daily, Disp: 30 tablet, Rfl: 0    fluticasone-salmeterol (ADVAIR HFA) 45-21 MCG/ACT inhaler, Inhale 2 puffs into the lungs 2 times daily, Disp: , Rfl:     Multiple Vitamins-Minerals (MULTIVITAMIN ADULT EXTRA C PO), Take 1 tablet by mouth daily, Disp: , Rfl:     vitamin D (ERGOCALCIFEROL) 1.25 MG (86693 UT) CAPS capsule, Take 50,000 Units by mouth once a week, Disp: , Rfl:     albuterol sulfate HFA (PROAIR HFA) 108 (90 Base) MCG/ACT inhaler, Inhale 1 puff into the lungs as needed, Disp: , Rfl:     DULoxetine (CYMBALTA) 60 MG extended release capsule, Take 60 mg by mouth daily, Disp: , Rfl:     cyanocobalamin 1000 MCG/ML injection, Inject 1,000 mcg into the muscle every 30 days, Disp: , Rfl:     fenofibrate (TRIGLIDE) 160 MG tablet, Take 160 mg by mouth daily, Disp: , Rfl:     fluticasone (FLONASE) 50 MCG/ACT nasal spray, 1 spray by Each Nostril route, Disp: , Rfl:     glucagon 1 MG injection, Inject 1 mg into the muscle as needed, Disp: , Rfl:     lisinopril (PRINIVIL;ZESTRIL) 40 MG tablet, Take 40 mg by mouth daily, Disp: , Rfl:     montelukast (SINGULAIR) 10 MG tablet, Take 10 mg by mouth daily, Disp: , Rfl:     omeprazole (PRILOSEC) 20 MG delayed release capsule, Take 20 mg by mouth daily, Disp: , Rfl:     rOPINIRole (REQUIP) 4 MG tablet, Take 4 mg by mouth daily, Disp: , Rfl:     Vital Signs: There were no vitals filed for this visit.     Physical Exam:  Vital signs and Nurse's note reviewed  Gen:  A&Ox3, NAD  HEENT: NCAT, EOMI, no scleral icterus, oral mucosa moist  Neck: Supple, trachea midline   Chest: Symmetric rise with inhalation, no evidence of trauma  CVS: Regular rate Detail Level: Detailed Resp: Good bilateral air entry, no distress or accessory muscle use   Abd: soft, non-tender, non-distended, stoma pink and viable, stool in ostomy bag, non-peritoneal   Ext: No clubbing, cyanosis, edema  CNS: Moves all extremities, no gross focal motor deficits  Skin: No erythema or ulcerations, perineal wound with healthy appearing bed of pink granulation tissue. No surrounding erythema, bleeding, or drainage. Labs:   CBC: No results for input(s): WBC, HGB, PLT in the last 72 hours. BMP:  No results for input(s): NA, K, CL, CO2, BUN, CREATININE, GLUCOSE in the last 72 hours. Hepatic: No results for input(s): AST, ALT, ALB, ALKPHOS, BILITOT, BILIDIR, LIPASE, AMYLASE in the last 72 hours. Coagulation: No results for input(s): APTT, PROT, INR in the last 72 hours. Problem List:  Patient Active Problem List    Diagnosis Date Noted    Metabolic acidosis     Hypervolemia     Uncontrolled type 2 diabetes mellitus with hyperglycemia ([facility] 02/09/2022    MEREDITH (acute kidney injury) ([facility] 02/09/2022    Hypokalemia 02/09/2022    Hyponatremia 02/09/2022    Anemia 02/09/2022    Morbid obesity ([facility] 02/09/2022    Type 1 diabetes mellitus with stage 3a chronic kidney disease ([facility]     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 female who presents for follow up of her wound, s/p multiple debridements for necrotizing fasciitis 2/2022    Recommendation:    1. Continue with wet to dry dressings; change daily and PRN to keep clean and dry   2.  Patient to follow up in clinic in one month to re-assess wound and discuss loop colostomy reversal       Electronically signed by Rachel Grayson DO  on 5/17/2022 at 12:54 PM Detail Level: Generalized

## 2022-06-14 ENCOUNTER — OFFICE VISIT (OUTPATIENT)
Dept: SURGERY | Age: 42
End: 2022-06-14
Payer: COMMERCIAL

## 2022-06-14 VITALS
DIASTOLIC BLOOD PRESSURE: 80 MMHG | BODY MASS INDEX: 46.07 KG/M2 | HEART RATE: 88 BPM | WEIGHT: 260 LBS | HEIGHT: 63 IN | SYSTOLIC BLOOD PRESSURE: 166 MMHG

## 2022-06-14 DIAGNOSIS — N49.3 FOURNIER GANGRENE: Primary | ICD-10-CM

## 2022-06-14 PROCEDURE — G8417 CALC BMI ABV UP PARAM F/U: HCPCS | Performed by: NURSE PRACTITIONER

## 2022-06-14 PROCEDURE — 1036F TOBACCO NON-USER: CPT | Performed by: NURSE PRACTITIONER

## 2022-06-14 PROCEDURE — G8427 DOCREV CUR MEDS BY ELIG CLIN: HCPCS | Performed by: NURSE PRACTITIONER

## 2022-06-14 PROCEDURE — 99212 OFFICE O/P EST SF 10 MIN: CPT | Performed by: NURSE PRACTITIONER

## 2022-06-14 NOTE — PROGRESS NOTES
Trauma Clinic Note    S: Pt is a 42 y.o. female being seen for an evaluation of a perineal wound. 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 Patient reports the wound is no longer being packed. She does have a wound care nurse that visits her every week to see how she is doing. Overall she feels she is doing very well. She is here today hoping she can have her colostomy reversed. O: Wound has almost completely healed. There is very small open areas. [place] wound bed. No drainage or odor. Picture placed in chart. Vitals:    06/14/22 1240   BP: (!) 166/80   Pulse: 88     Gen: NAD, cooperative      A/P: 42 y.o. female in clinic for continued evaluation. Overall patient is very pleased with her progress. I have discussed her request for colostomy reversal with surgeon. They do feel the safest wait until she has complete epithelialization of the wound bed. I did discuss this with her. She understands if they do the surgery now there could be increased risk as she has an open wound. We will have patient return in 1 month with hope that she will be completely healed. She understands to continue wound care as directed.     - Keep area clean and dry  - Wash with gentle soap  - F/u one month    Michelle Agrawal, [address] [place] [address]

## 2022-07-12 ENCOUNTER — OFFICE VISIT (OUTPATIENT)
Dept: SURGERY | Age: 42
End: 2022-07-12
Payer: COMMERCIAL

## 2022-07-12 VITALS
WEIGHT: 265 LBS | HEART RATE: 80 BPM | SYSTOLIC BLOOD PRESSURE: 158 MMHG | DIASTOLIC BLOOD PRESSURE: 84 MMHG | HEIGHT: 63 IN | BODY MASS INDEX: 46.95 KG/M2

## 2022-07-12 DIAGNOSIS — Z93.3 COLOSTOMY PRESENT (HCC): Primary | ICD-10-CM

## 2022-07-12 DIAGNOSIS — N49.3 FOURNIER GANGRENE: ICD-10-CM

## 2022-07-12 PROCEDURE — G8427 DOCREV CUR MEDS BY ELIG CLIN: HCPCS | Performed by: NURSE PRACTITIONER

## 2022-07-12 PROCEDURE — 99213 OFFICE O/P EST LOW 20 MIN: CPT | Performed by: NURSE PRACTITIONER

## 2022-07-12 PROCEDURE — 1036F TOBACCO NON-USER: CPT | Performed by: NURSE PRACTITIONER

## 2022-07-12 PROCEDURE — G8417 CALC BMI ABV UP PARAM F/U: HCPCS | Performed by: NURSE PRACTITIONER

## 2022-07-12 NOTE — PROGRESS NOTES
Trauma Clinic Note    S: Pt is a 42 y.o. female being seen for consultation to have her colostomy reversed. 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 Patient is eating well without fevers or chills. She states she is getting a little skin breakdown irritation from the adhesive from her colostomy bag    O: Patient's posterior wound is completely healed without any openings in the skin. She does have some skin breakdown to the superior portion of the colostomy bag from the adhesive  Vitals:    07/12/22 1300   BP: (!) 158/84   Pulse: 80     Gen: NAD, cooperative      A/P: 42 y.o. female patient is here today to discuss reversal of her colostomy. Her wound has completely healed. We will call her to set this up.   I did provide her home # to Dr. Marjorie Pradhan to help facilitate this.    -Office call to schedule surgery    Chrissy Cadena, [address] [place] [address]

## 2022-07-19 ENCOUNTER — TELEPHONE (OUTPATIENT)
Dept: SURGERY | Age: 42
End: 2022-07-19

## 2022-07-19 DIAGNOSIS — Z93.3 COLOSTOMY PRESENT (HCC): Primary | ICD-10-CM

## 2022-07-19 RX ORDER — SODIUM PHOSPHATE, DIBASIC AND SODIUM PHOSPHATE, MONOBASIC 7; 19 G/133ML; G/133ML
1 ENEMA RECTAL ONCE
Qty: 1 EACH | Refills: 0 | Status: SHIPPED | OUTPATIENT
Start: 2022-07-19 | End: 2022-07-19

## 2022-07-19 RX ORDER — POLYETHYLENE GLYCOL 3350 17 G/17G
255 POWDER, FOR SOLUTION ORAL ONCE
Qty: 255 G | Refills: 0 | Status: SHIPPED | OUTPATIENT
Start: 2022-07-19 | End: 2022-07-19

## 2022-07-19 NOTE — TELEPHONE ENCOUNTER
Called patient to discuss colostomy reversal.  Patient would like to have her descending loop colostomy reversed. We will plan for CT abdomen pelvis with oral contrast and have patient be scheduled for surgery on 7/28/2022 -timing TBD. Patient will also receive MiraLAX bowel preparation as well as an enema per rectum. Patient understands instructions and will obtain CT scan.     Torsten Cox D.O. PGY-5  Department of Surgery  7/19/2022, 3:03 PM

## 2022-07-21 ENCOUNTER — HOSPITAL ENCOUNTER (OUTPATIENT)
Dept: PREADMISSION TESTING | Age: 42
Discharge: HOME | End: 2022-07-25
Payer: COMMERCIAL

## 2022-07-21 LAB
ANION GAP SERPL CALCULATED.3IONS-SCNC: 13 MMOL/L (ref 9–17)
BUN BLDV-MCNC: 27 MG/DL (ref 6–20)
CHLORIDE BLD-SCNC: 94 MMOL/L (ref 98–107)
CO2: 24 MMOL/L (ref 20–31)
CREAT SERPL-MCNC: 1.7 MG/DL (ref 0.5–0.9)
GFR AFRICAN AMERICAN: 40 ML/MIN
GFR NON-AFRICAN AMERICAN: 33 ML/MIN
GFR SERPL CREATININE-BSD FRML MDRD: ABNORMAL ML/MIN/{1.73_M2}
GLUCOSE BLD-MCNC: 371 MG/DL (ref 70–99)
HCT VFR BLD CALC: 37.1 % (ref 36.3–47.1)
HEMOGLOBIN: 12.2 G/DL (ref 11.9–15.1)
MCH RBC QN AUTO: 27.1 PG (ref 25.2–33.5)
MCHC RBC AUTO-ENTMCNC: 32.9 G/DL (ref 28.4–34.8)
MCV RBC AUTO: 82.4 FL (ref 82.6–102.9)
NRBC AUTOMATED: 0 PER 100 WBC
PDW BLD-RTO: 15.9 % (ref 11.8–14.4)
PLATELET # BLD: 236 K/UL (ref 138–453)
PMV BLD AUTO: 10.7 FL (ref 8.1–13.5)
POTASSIUM SERPL-SCNC: 3.7 MMOL/L (ref 3.7–5.3)
RBC # BLD: 4.5 M/UL (ref 3.95–5.11)
SODIUM BLD-SCNC: 131 MMOL/L (ref 135–144)
WBC # BLD: 8.4 K/UL (ref 3.5–11.3)

## 2022-07-21 PROCEDURE — 36415 COLL VENOUS BLD VENIPUNCTURE: CPT

## 2022-07-21 PROCEDURE — 84520 ASSAY OF UREA NITROGEN: CPT

## 2022-07-21 PROCEDURE — 82947 ASSAY GLUCOSE BLOOD QUANT: CPT

## 2022-07-21 PROCEDURE — 93005 ELECTROCARDIOGRAM TRACING: CPT | Performed by: ANESTHESIOLOGY

## 2022-07-21 PROCEDURE — 85027 COMPLETE CBC AUTOMATED: CPT

## 2022-07-21 PROCEDURE — 80051 ELECTROLYTE PANEL: CPT

## 2022-07-21 PROCEDURE — 82565 ASSAY OF CREATININE: CPT

## 2022-07-21 RX ORDER — ZOLPIDEM TARTRATE 5 MG/1
TABLET ORAL
COMMUNITY
Start: 2022-06-22

## 2022-07-21 RX ORDER — SODIUM CHLORIDE, SODIUM LACTATE, POTASSIUM CHLORIDE, CALCIUM CHLORIDE 600; 310; 30; 20 MG/100ML; MG/100ML; MG/100ML; MG/100ML
1000 INJECTION, SOLUTION INTRAVENOUS CONTINUOUS
Status: CANCELLED | OUTPATIENT
Start: 2022-07-21

## 2022-07-21 NOTE — DISCHARGE INSTRUCTIONS
Preoperative Instructions:    Stop drinking clear liquids at midnight the night prior to surgery. (Follow bowel prep instructions if instructed by your surgeon.)    Neyda Coronel at the surgery center (Entrance B) by 8:00 on 7/28/2022  (or as directed by your surgeon's office). If you have been given a blood band, you must bring it with you the day of surgery. Please stop any blood thinning medications as directed by your surgeon or prescribing physician. Failure to stop certain medications may interfere with your scheduled surgery. These may include:  Aspirin, Warfarin (Coumadin), Clopidogrel (Plavix), Ibuprofen (Motrin, Advil), Naproxen (Aleve), Meloxicam (Mobic), Celecoxib (Celebrex), Eliquis, Pradaxa, Xarelto, Effient, Fish Oil, Herbal supplements. You may continue the rest of your medications through the night before surgery unless instructed otherwise. Please take only the following medication(s) the day of surgery with a small sip of water:  Coreg/carvedilol, norvasc/amlodipine, lisinopril, prilosec/omeprazole, gabapentin/neurontin    Please use and bring inhalers the day of surgery. Please bring CPAP the day of surgery. ____________________________  ____________________________  Signature (Patient)           Signature/date(Provider)      REMINDERS:  ** If you are going home the day of your procedure, you will need a friend or family member to drive you home after your procedure. Your  must be 25years of age or older and able to sign off on your discharge instructions. Taxi cabs or any form of public transportation is not acceptable. ** It is preferable that the friend or family member stay at the hospital throughout your procedure. ** If you are going home the same day as your procedure, someone must remain with you for the first 24 hours after your surgery if you receive anesthesia or sedation. If you do not have someone to stay with you, your procedure may be cancelled.   ** Please do not wear any jewelry or body piercings the day of surgery. PREPARING FOR YOUR SURGERY:     Before surgery, you can play an important role in your own health. Because skin is not sterile, we need to be sure that your skin is as free of germs as possible before surgery by carefully washing before surgery. Preparing or prepping skin before surgery can reduce the risk of a surgical site infection.   Do not shave the area of your body where your surgery will be performed unless you received specific permission from your physician. You will need to shower at home the night before surgery and the morning of surgery with a special soap called chlorhexidine gluconate (CHG*). *Not to be used by people allergic to Chlorhexidine Gluconate (CHG). Following these instructions will help you be sure that your skin is clean before surgery. Instructions on cleaning your skin before surgery: The night before your surgery: You will need to shower with warm water (not hot) and the CHG soap. Use a clean wash cloth and a clean towel. Have clean clothes available to put on after the shower. First wash your hair with regular shampoo. Rinse your hair and body thoroughly to remove the shampoo. Wash your face with your regular soap or water only. Thoroughly rinse your body with warm water from the neck down. Turn water off to prevent rinsing the soap off too soon. With a clean wet washcloth and half of the CHG soap in the bottle, lather your entire body from the neck down. Do not use CHG soap near your eyes or ears to avoid injury to those areas. Wash thoroughly, paying special attention to the area where your surgery will be performed. Wash your body gently for five (5) minutes. Avoid scrubbing your skin too hard. Turn the water back on and rinse your body thoroughly. Pat yourself dry with a clean, soft towel. Do not apply lotion, cream or powder.     Dress with clean freshly washed clothes. The morning of surgery:    Repeat shower following steps above  - using remaining half of CHG soap in bottle. If you have any questions, call the Pre-Admission Testing Unit at [phone]. Day of Surgery/Procedure    As a patient at [address] [place] [address] you can expect quality medical and nursing care that is centered on your individual needs. Our goal is to make your surgical experience as comfortable as possible  . Directions to the [address] [place] [address] is located at [address] [place], [address] Please pull into the Emergency [facility] parking lot (Entrance B) and park in that lot. We also have additional parking across the street. You will enter the facility following the [address] sign. Please stop at the reception desk where you will be checked in by the staff. If you have any questions please call [phone]. Transportation after your procedure. You will need a friend or family member to drive you home after your procedure. Your  must be 25years of age or older and able to sign off on your discharge instructions. Taxi cabs or any form of public transportation is not acceptable. It is preferable that the friend or family member stay at the hospital throughout your procedure. Someone must remain at home with you for the first 24 hours after your surgery if you receive anesthesia or sedation. If you do not have someone to stay with you, your procedure may be cancelled. Patient Instructions    If you are having any type of anesthesia you are to have nothing to eat or drink after midnight the night before your surgery. This includes gum, mints, water or smoking or chewing tobacco.  The only exception to this is a small sip of water to take with any morning dose of heart, blood pressure, or seizure medications.     Bring a list of all medications you take, along with the dose of the medications and how often you take it. If more convenient bring the pharmacy bottles in a zip lock bag. Please shower the night before and the morning of surgery with an antibacterial soap. Please use the wipes given to you the night before your surgery after your shower. Unless otherwise told by your physician, please do not shave legs or any part of your body below your neck the night before or day of your surgery. You may shave your face or neck. Brush your teeth but do not swallow water. Bring your inhaler if you are currently using one. Bring your eyeglasses and case with you. No contacts are to be worn the day of surgery. You also may bring your hearing aids. Bring your blood band if one has been given to you. Please do not close the clasp. If you are on C-PAP or Bi-PAP at home and plan on staying in the hospital overnight for your surgery please bring the machine with you. Do not wear any jewelry or body piercings day of surgery. Also, NO lotion, perfume or deodorant to be used the day of surgery. Do not bring any valuables, such as jewelry, cash or credit cards. If you are staying overnight with us, please bring a SMALL bag of personal items. We cannot accommodate large items, like suitcases. Please wear loose, comfortable clothing. If you are potentially going to have a cast or brace bring clothing that will fit over them.                                                                                                                           In case of illness - If you have cold or flu like symptoms (high fever, runny nose, sore throat, cough, etc.) rash, nausea, vomiting, loose stools, and/or recent contact with someone who has a contagious disease (chicken pox, measles, etc.) Please call your doctor before coming to the hospital.      If your child is having surgery please make arrangements for any other children to be cared for at home on the day of surgery. Other children are not permitted in recovery room and we want you to be able to spend time with the patient. If other arrangements are not available then we suggest that you have a second adult to stay in the waiting room.       If you have any other questions regarding your procedure or the day of surgery, please call [phone], or [phone]

## 2022-07-21 NOTE — PROGRESS NOTES
Anesthesia Focused Assessment    Has patient ever tested positive for COVID? Yes, 3/2020    STOP-BANG Sleep Apnea Questionnaire    SNORE loudly (heard through closed doors)? No  TIRED, fatigued, sleepy during daytime? No  OBSERVED stopping breathing during sleep? No  High blood PRESSURE being treated? Yes    BMI over 35? Yes  AGE over 48? No  NECK circumference over 16\"? No  GENDER (male)? No             Total 2  High risk 5-8  Intermediate risk 3-4  Low risk 0-2    Obstructive Sleep Apnea: denies  If YES, machine used: no     Type 1 DM:   no  T2DM:  yes, diagnosed at age 7 with type II diabetes per patient. Coronary Artery Disease:  no  Hypertension:  yes    Active smoker: no  Drinks Alcohol:  rarely    Dentition: upper front let tooth broken    Defib / AICD / Pacemaker: no      Renal Failure/dialysis:  no    Patient was evaluated in PAT & anesthesia guidelines were applied. NPO guidelines, medication instructions and scheduled arrival time were reviewed with patient. I advised patient to please contact the surgeon's office, ahead of time if possible, if any new signs or symptoms of illness, infection, rash, etc    Hx of anesthesia complications:  no  Family hx of anesthesia complications:  no                                                                                                                     Anesthesia contacted:   no  Medical or cardiac clearance ordered: no.  Patient is without cardiac or pulmonary complaints.     Guru Whitfield PA-C  7/21/22  3:07 PM

## 2022-07-22 ENCOUNTER — TELEPHONE (OUTPATIENT)
Dept: UROLOGY | Age: 42
End: 2022-07-22

## 2022-07-22 ENCOUNTER — TELEPHONE (OUTPATIENT)
Dept: SURGERY | Age: 42
End: 2022-07-22

## 2022-07-22 VITALS
TEMPERATURE: 97.5 F | SYSTOLIC BLOOD PRESSURE: 116 MMHG | HEART RATE: 75 BPM | DIASTOLIC BLOOD PRESSURE: 66 MMHG | OXYGEN SATURATION: 96 % | RESPIRATION RATE: 18 BRPM

## 2022-07-22 DIAGNOSIS — Z93.3 COLOSTOMY PRESENT (HCC): Primary | ICD-10-CM

## 2022-07-22 LAB
EKG ATRIAL RATE: 69 BPM
EKG P AXIS: 47 DEGREES
EKG P-R INTERVAL: 152 MS
EKG Q-T INTERVAL: 410 MS
EKG QRS DURATION: 94 MS
EKG QTC CALCULATION (BAZETT): 439 MS
EKG R AXIS: 38 DEGREES
EKG T AXIS: 46 DEGREES
EKG VENTRICULAR RATE: 69 BPM

## 2022-07-22 NOTE — TELEPHONE ENCOUNTER
Phone call from [facility] radiology. Per radiology protocol order needs to be changed to WITH contrast. Order pended, please sign.

## 2022-07-22 NOTE — TELEPHONE ENCOUNTER
Patient came in to her PAT appointment yesterday and her lab results came back abnormal.    & Glucose 371   Please advise

## 2022-07-22 NOTE — PROGRESS NOTES
Glucose of 371 and sodium of 131 called and faxed to Lily Alegre at the clinic with please advise request.

## 2022-07-25 ENCOUNTER — HOSPITAL ENCOUNTER (OUTPATIENT)
Dept: CT IMAGING | Age: 42
Discharge: HOME | DRG: 330 | End: 2022-07-27
Payer: COMMERCIAL

## 2022-07-25 DIAGNOSIS — Z93.3 COLOSTOMY PRESENT (HCC): ICD-10-CM

## 2022-07-25 LAB
CREAT SERPL-MCNC: 1.3 MG/DL (ref 0.5–0.9)
GFR AFRICAN AMERICAN: 54 ML/MIN
GFR NON-AFRICAN AMERICAN: 45 ML/MIN
GFR SERPL CREATININE-BSD FRML MDRD: ABNORMAL ML/MIN/{1.73_M2}

## 2022-07-25 PROCEDURE — 6360000004 HC RX CONTRAST MEDICATION: Performed by: NURSE PRACTITIONER

## 2022-07-25 PROCEDURE — 74177 CT ABD & PELVIS W/CONTRAST: CPT

## 2022-07-25 PROCEDURE — 2580000003 HC RX 258: Performed by: NURSE PRACTITIONER

## 2022-07-25 PROCEDURE — 36415 COLL VENOUS BLD VENIPUNCTURE: CPT

## 2022-07-25 PROCEDURE — 82565 ASSAY OF CREATININE: CPT

## 2022-07-25 RX ORDER — 0.9 % SODIUM CHLORIDE 0.9 %
80 INTRAVENOUS SOLUTION INTRAVENOUS ONCE
Status: DISCONTINUED | OUTPATIENT
Start: 2022-07-25 | End: 2022-07-28 | Stop reason: HOSPADM

## 2022-07-25 RX ORDER — SODIUM CHLORIDE 0.9 % (FLUSH) 0.9 %
10 SYRINGE (ML) INJECTION PRN
Status: DISCONTINUED | OUTPATIENT
Start: 2022-07-25 | End: 2022-07-28 | Stop reason: HOSPADM

## 2022-07-25 RX ADMIN — SODIUM CHLORIDE, PRESERVATIVE FREE 10 ML: 5 INJECTION INTRAVENOUS at 14:23

## 2022-07-25 RX ADMIN — Medication 80 ML: at 14:24

## 2022-07-25 RX ADMIN — IOPAMIDOL 75 ML: 755 INJECTION, SOLUTION INTRAVENOUS at 14:23

## 2022-07-28 ENCOUNTER — HOSPITAL ENCOUNTER (INPATIENT)
Age: 42
LOS: 3 days | Discharge: HOME HEALTH | DRG: 330 | End: 2022-07-31
Attending: SURGERY | Admitting: SURGERY
Payer: COMMERCIAL

## 2022-07-28 ENCOUNTER — ANESTHESIA EVENT (OUTPATIENT)
Dept: OPERATING ROOM | Age: 42
DRG: 330 | End: 2022-07-28
Payer: COMMERCIAL

## 2022-07-28 ENCOUNTER — ANESTHESIA (OUTPATIENT)
Dept: OPERATING ROOM | Age: 42
DRG: 330 | End: 2022-07-28
Payer: COMMERCIAL

## 2022-07-28 DIAGNOSIS — N49.3 FOURNIER GANGRENE: ICD-10-CM

## 2022-07-28 DIAGNOSIS — N17.9 AKI (ACUTE KIDNEY INJURY) (HCC): ICD-10-CM

## 2022-07-28 DIAGNOSIS — G89.18 ACUTE POST-OPERATIVE PAIN: Primary | ICD-10-CM

## 2022-07-28 PROBLEM — Z90.49 S/P PARTIAL RESECTION OF COLON: Status: ACTIVE | Noted: 2022-07-28

## 2022-07-28 LAB
ABSOLUTE EOS #: 0 K/UL (ref 0–0.44)
ABSOLUTE IMMATURE GRANULOCYTE: 0.13 K/UL (ref 0–0.3)
ABSOLUTE LYMPH #: 0.64 K/UL (ref 1.1–3.7)
ABSOLUTE MONO #: 0.77 K/UL (ref 0.1–1.2)
ANION GAP SERPL CALCULATED.3IONS-SCNC: 13 MMOL/L (ref 9–17)
BASOPHILS # BLD: 0 % (ref 0–2)
BASOPHILS ABSOLUTE: 0 K/UL (ref 0–0.2)
BUN BLDV-MCNC: 19 MG/DL (ref 6–20)
CALCIUM SERPL-MCNC: 8.8 MG/DL (ref 8.6–10.4)
CHLORIDE BLD-SCNC: 93 MMOL/L (ref 98–107)
CO2: 24 MMOL/L (ref 20–31)
CREAT SERPL-MCNC: 1.54 MG/DL (ref 0.5–0.9)
EOSINOPHILS RELATIVE PERCENT: 0 % (ref 1–4)
GFR AFRICAN AMERICAN: 45 ML/MIN
GFR NON-AFRICAN AMERICAN: 37 ML/MIN
GFR SERPL CREATININE-BSD FRML MDRD: ABNORMAL ML/MIN/{1.73_M2}
GLUCOSE BLD-MCNC: 243 MG/DL (ref 74–100)
GLUCOSE BLD-MCNC: 258 MG/DL (ref 65–105)
GLUCOSE BLD-MCNC: 307 MG/DL (ref 70–99)
GLUCOSE BLD-MCNC: 321 MG/DL (ref 65–105)
GLUCOSE BLD-MCNC: 339 MG/DL (ref 65–105)
GLUCOSE BLD-MCNC: 368 MG/DL (ref 65–105)
HCT VFR BLD CALC: 41.4 % (ref 36.3–47.1)
HEMOGLOBIN: 13.1 G/DL (ref 11.9–15.1)
IMMATURE GRANULOCYTES: 1 %
LYMPHOCYTES # BLD: 5 % (ref 24–43)
MCH RBC QN AUTO: 26.9 PG (ref 25.2–33.5)
MCHC RBC AUTO-ENTMCNC: 31.6 G/DL (ref 28.4–34.8)
MCV RBC AUTO: 85 FL (ref 82.6–102.9)
MONOCYTES # BLD: 6 % (ref 3–12)
MORPHOLOGY: ABNORMAL
NRBC AUTOMATED: 0 PER 100 WBC
PDW BLD-RTO: 16 % (ref 11.8–14.4)
PLATELET # BLD: 182 K/UL (ref 138–453)
PMV BLD AUTO: 10.2 FL (ref 8.1–13.5)
POC POTASSIUM: 3.6 MMOL/L (ref 3.5–4.5)
POC POTASSIUM: 5 MMOL/L (ref 3.5–4.5)
POTASSIUM SERPL-SCNC: 4.3 MMOL/L (ref 3.7–5.3)
RBC # BLD: 4.87 M/UL (ref 3.95–5.11)
SEG NEUTROPHILS: 88 % (ref 36–65)
SEGMENTED NEUTROPHILS ABSOLUTE COUNT: 11.26 K/UL (ref 1.5–8.1)
SODIUM BLD-SCNC: 130 MMOL/L (ref 135–144)
WBC # BLD: 12.8 K/UL (ref 3.5–11.3)

## 2022-07-28 PROCEDURE — 6360000002 HC RX W HCPCS: Performed by: NURSE ANESTHETIST, CERTIFIED REGISTERED

## 2022-07-28 PROCEDURE — 2580000003 HC RX 258: Performed by: NURSE ANESTHETIST, CERTIFIED REGISTERED

## 2022-07-28 PROCEDURE — 2580000003 HC RX 258: Performed by: SURGERY

## 2022-07-28 PROCEDURE — 6370000000 HC RX 637 (ALT 250 FOR IP): Performed by: ANESTHESIOLOGY

## 2022-07-28 PROCEDURE — L3650 SO 8 ABD RESTRAINT PRE OTS: HCPCS | Performed by: SURGERY

## 2022-07-28 PROCEDURE — 6360000002 HC RX W HCPCS: Performed by: ANESTHESIOLOGY

## 2022-07-28 PROCEDURE — 6360000002 HC RX W HCPCS: Performed by: STUDENT IN AN ORGANIZED HEALTH CARE EDUCATION/TRAINING PROGRAM

## 2022-07-28 PROCEDURE — 2580000003 HC RX 258: Performed by: STUDENT IN AN ORGANIZED HEALTH CARE EDUCATION/TRAINING PROGRAM

## 2022-07-28 PROCEDURE — 2720000010 HC SURG SUPPLY STERILE: Performed by: SURGERY

## 2022-07-28 PROCEDURE — 2500000003 HC RX 250 WO HCPCS: Performed by: NURSE ANESTHETIST, CERTIFIED REGISTERED

## 2022-07-28 PROCEDURE — 7100000001 HC PACU RECOVERY - ADDTL 15 MIN: Performed by: SURGERY

## 2022-07-28 PROCEDURE — 3600000004 HC SURGERY LEVEL 4 BASE: Performed by: SURGERY

## 2022-07-28 PROCEDURE — 80048 BASIC METABOLIC PNL TOTAL CA: CPT

## 2022-07-28 PROCEDURE — 2709999900 HC NON-CHARGEABLE SUPPLY: Performed by: SURGERY

## 2022-07-28 PROCEDURE — 3700000001 HC ADD 15 MINUTES (ANESTHESIA): Performed by: SURGERY

## 2022-07-28 PROCEDURE — 7100000000 HC PACU RECOVERY - FIRST 15 MIN: Performed by: SURGERY

## 2022-07-28 PROCEDURE — 84132 ASSAY OF SERUM POTASSIUM: CPT

## 2022-07-28 PROCEDURE — 82947 ASSAY GLUCOSE BLOOD QUANT: CPT

## 2022-07-28 PROCEDURE — 6370000000 HC RX 637 (ALT 250 FOR IP): Performed by: STUDENT IN AN ORGANIZED HEALTH CARE EDUCATION/TRAINING PROGRAM

## 2022-07-28 PROCEDURE — 3600000014 HC SURGERY LEVEL 4 ADDTL 15MIN: Performed by: SURGERY

## 2022-07-28 PROCEDURE — 2060000000 HC ICU INTERMEDIATE R&B

## 2022-07-28 PROCEDURE — 2580000003 HC RX 258: Performed by: ANESTHESIOLOGY

## 2022-07-28 PROCEDURE — 6360000002 HC RX W HCPCS

## 2022-07-28 PROCEDURE — 85025 COMPLETE CBC W/AUTO DIFF WBC: CPT

## 2022-07-28 PROCEDURE — 3700000000 HC ANESTHESIA ATTENDED CARE: Performed by: SURGERY

## 2022-07-28 PROCEDURE — 36415 COLL VENOUS BLD VENIPUNCTURE: CPT

## 2022-07-28 PROCEDURE — 0DBE0ZZ EXCISION OF LARGE INTESTINE, OPEN APPROACH: ICD-10-PCS | Performed by: SURGERY

## 2022-07-28 PROCEDURE — 2500000003 HC RX 250 WO HCPCS: Performed by: STUDENT IN AN ORGANIZED HEALTH CARE EDUCATION/TRAINING PROGRAM

## 2022-07-28 RX ORDER — SODIUM CHLORIDE, SODIUM LACTATE, POTASSIUM CHLORIDE, CALCIUM CHLORIDE 600; 310; 30; 20 MG/100ML; MG/100ML; MG/100ML; MG/100ML
INJECTION, SOLUTION INTRAVENOUS CONTINUOUS
Status: DISCONTINUED | OUTPATIENT
Start: 2022-07-28 | End: 2022-07-28 | Stop reason: HOSPADM

## 2022-07-28 RX ORDER — FENTANYL CITRATE 50 UG/ML
50 INJECTION, SOLUTION INTRAMUSCULAR; INTRAVENOUS EVERY 5 MIN PRN
Status: COMPLETED | OUTPATIENT
Start: 2022-07-28 | End: 2022-07-28

## 2022-07-28 RX ORDER — GLYCOPYRROLATE 0.2 MG/ML
INJECTION INTRAMUSCULAR; INTRAVENOUS PRN
Status: DISCONTINUED | OUTPATIENT
Start: 2022-07-28 | End: 2022-07-28 | Stop reason: SDUPTHER

## 2022-07-28 RX ORDER — PROPOFOL 10 MG/ML
INJECTION, EMULSION INTRAVENOUS PRN
Status: DISCONTINUED | OUTPATIENT
Start: 2022-07-28 | End: 2022-07-28 | Stop reason: SDUPTHER

## 2022-07-28 RX ORDER — ACETAMINOPHEN 500 MG
1000 TABLET ORAL EVERY 8 HOURS SCHEDULED
Status: DISCONTINUED | OUTPATIENT
Start: 2022-07-28 | End: 2022-07-31 | Stop reason: HOSPADM

## 2022-07-28 RX ORDER — METRONIDAZOLE 500 MG/100ML
INJECTION, SOLUTION INTRAVENOUS PRN
Status: DISCONTINUED | OUTPATIENT
Start: 2022-07-28 | End: 2022-07-28 | Stop reason: SDUPTHER

## 2022-07-28 RX ORDER — SODIUM CHLORIDE 0.9 % (FLUSH) 0.9 %
5-40 SYRINGE (ML) INJECTION PRN
Status: DISCONTINUED | OUTPATIENT
Start: 2022-07-28 | End: 2022-07-28 | Stop reason: HOSPADM

## 2022-07-28 RX ORDER — ONDANSETRON 4 MG/1
4 TABLET, ORALLY DISINTEGRATING ORAL EVERY 8 HOURS PRN
Status: DISCONTINUED | OUTPATIENT
Start: 2022-07-28 | End: 2022-07-31 | Stop reason: HOSPADM

## 2022-07-28 RX ORDER — SODIUM CHLORIDE, SODIUM LACTATE, POTASSIUM CHLORIDE, CALCIUM CHLORIDE 600; 310; 30; 20 MG/100ML; MG/100ML; MG/100ML; MG/100ML
INJECTION, SOLUTION INTRAVENOUS CONTINUOUS
Status: DISCONTINUED | OUTPATIENT
Start: 2022-07-28 | End: 2022-07-29

## 2022-07-28 RX ORDER — FENTANYL CITRATE 50 UG/ML
25 INJECTION, SOLUTION INTRAMUSCULAR; INTRAVENOUS EVERY 5 MIN PRN
Status: COMPLETED | OUTPATIENT
Start: 2022-07-28 | End: 2022-07-28

## 2022-07-28 RX ORDER — SODIUM CHLORIDE 0.9 % (FLUSH) 0.9 %
5-40 SYRINGE (ML) INJECTION EVERY 12 HOURS SCHEDULED
Status: DISCONTINUED | OUTPATIENT
Start: 2022-07-28 | End: 2022-07-28 | Stop reason: HOSPADM

## 2022-07-28 RX ORDER — OXYCODONE HYDROCHLORIDE 5 MG/1
5 TABLET ORAL EVERY 6 HOURS PRN
Status: DISCONTINUED | OUTPATIENT
Start: 2022-07-28 | End: 2022-07-31 | Stop reason: HOSPADM

## 2022-07-28 RX ORDER — ROCURONIUM BROMIDE 10 MG/ML
INJECTION, SOLUTION INTRAVENOUS PRN
Status: DISCONTINUED | OUTPATIENT
Start: 2022-07-28 | End: 2022-07-28 | Stop reason: SDUPTHER

## 2022-07-28 RX ORDER — METHOCARBAMOL 750 MG/1
750 TABLET, FILM COATED ORAL EVERY 6 HOURS
Status: DISCONTINUED | OUTPATIENT
Start: 2022-07-28 | End: 2022-07-31 | Stop reason: HOSPADM

## 2022-07-28 RX ORDER — GABAPENTIN 300 MG/1
300 CAPSULE ORAL EVERY 8 HOURS
Status: DISCONTINUED | OUTPATIENT
Start: 2022-07-28 | End: 2022-07-30

## 2022-07-28 RX ORDER — PHENYLEPHRINE HCL IN 0.9% NACL 1 MG/10 ML
SYRINGE (ML) INTRAVENOUS PRN
Status: DISCONTINUED | OUTPATIENT
Start: 2022-07-28 | End: 2022-07-28 | Stop reason: SDUPTHER

## 2022-07-28 RX ORDER — LABETALOL HYDROCHLORIDE 5 MG/ML
10 INJECTION, SOLUTION INTRAVENOUS ONCE
Status: COMPLETED | OUTPATIENT
Start: 2022-07-28 | End: 2022-07-28

## 2022-07-28 RX ORDER — LIDOCAINE HYDROCHLORIDE 10 MG/ML
1 INJECTION, SOLUTION INFILTRATION; PERINEURAL
Status: DISCONTINUED | OUTPATIENT
Start: 2022-07-28 | End: 2022-07-28 | Stop reason: HOSPADM

## 2022-07-28 RX ORDER — SODIUM CHLORIDE 0.9 % (FLUSH) 0.9 %
10 SYRINGE (ML) INJECTION PRN
Status: DISCONTINUED | OUTPATIENT
Start: 2022-07-28 | End: 2022-07-31 | Stop reason: HOSPADM

## 2022-07-28 RX ORDER — EPHEDRINE SULFATE/0.9% NACL/PF 50 MG/5 ML
SYRINGE (ML) INTRAVENOUS PRN
Status: DISCONTINUED | OUTPATIENT
Start: 2022-07-28 | End: 2022-07-28 | Stop reason: SDUPTHER

## 2022-07-28 RX ORDER — OXYCODONE HYDROCHLORIDE 5 MG/1
10 TABLET ORAL PRN
Status: DISCONTINUED | OUTPATIENT
Start: 2022-07-28 | End: 2022-07-28 | Stop reason: HOSPADM

## 2022-07-28 RX ORDER — MIDAZOLAM HYDROCHLORIDE 1 MG/ML
INJECTION INTRAMUSCULAR; INTRAVENOUS PRN
Status: DISCONTINUED | OUTPATIENT
Start: 2022-07-28 | End: 2022-07-28 | Stop reason: SDUPTHER

## 2022-07-28 RX ORDER — PANTOPRAZOLE SODIUM 40 MG/1
40 TABLET, DELAYED RELEASE ORAL
Status: DISCONTINUED | OUTPATIENT
Start: 2022-07-29 | End: 2022-07-30

## 2022-07-28 RX ORDER — ONDANSETRON 2 MG/ML
4 INJECTION INTRAMUSCULAR; INTRAVENOUS
Status: DISCONTINUED | OUTPATIENT
Start: 2022-07-28 | End: 2022-07-28 | Stop reason: HOSPADM

## 2022-07-28 RX ORDER — SODIUM CHLORIDE 0.9 % (FLUSH) 0.9 %
10 SYRINGE (ML) INJECTION EVERY 12 HOURS SCHEDULED
Status: DISCONTINUED | OUTPATIENT
Start: 2022-07-28 | End: 2022-07-31 | Stop reason: HOSPADM

## 2022-07-28 RX ORDER — BUMETANIDE 1 MG/1
1 TABLET ORAL DAILY
Status: DISCONTINUED | OUTPATIENT
Start: 2022-07-28 | End: 2022-07-31 | Stop reason: HOSPADM

## 2022-07-28 RX ORDER — DEXAMETHASONE SODIUM PHOSPHATE 4 MG/ML
INJECTION, SOLUTION INTRA-ARTICULAR; INTRALESIONAL; INTRAMUSCULAR; INTRAVENOUS; SOFT TISSUE PRN
Status: DISCONTINUED | OUTPATIENT
Start: 2022-07-28 | End: 2022-07-28 | Stop reason: SDUPTHER

## 2022-07-28 RX ORDER — SODIUM CHLORIDE, SODIUM LACTATE, POTASSIUM CHLORIDE, CALCIUM CHLORIDE 600; 310; 30; 20 MG/100ML; MG/100ML; MG/100ML; MG/100ML
INJECTION, SOLUTION INTRAVENOUS CONTINUOUS PRN
Status: DISCONTINUED | OUTPATIENT
Start: 2022-07-28 | End: 2022-07-28 | Stop reason: SDUPTHER

## 2022-07-28 RX ORDER — ENOXAPARIN SODIUM 100 MG/ML
30 INJECTION SUBCUTANEOUS 2 TIMES DAILY
Status: DISCONTINUED | OUTPATIENT
Start: 2022-07-28 | End: 2022-07-29

## 2022-07-28 RX ORDER — OLANZAPINE 10 MG/1
10 TABLET ORAL DAILY
Status: DISCONTINUED | OUTPATIENT
Start: 2022-07-28 | End: 2022-07-31 | Stop reason: HOSPADM

## 2022-07-28 RX ORDER — DULOXETIN HYDROCHLORIDE 30 MG/1
60 CAPSULE, DELAYED RELEASE ORAL DAILY
Status: DISCONTINUED | OUTPATIENT
Start: 2022-07-28 | End: 2022-07-31 | Stop reason: HOSPADM

## 2022-07-28 RX ORDER — FENTANYL CITRATE 50 UG/ML
INJECTION, SOLUTION INTRAMUSCULAR; INTRAVENOUS PRN
Status: DISCONTINUED | OUTPATIENT
Start: 2022-07-28 | End: 2022-07-28 | Stop reason: SDUPTHER

## 2022-07-28 RX ORDER — SODIUM CHLORIDE, SODIUM LACTATE, POTASSIUM CHLORIDE, CALCIUM CHLORIDE 600; 310; 30; 20 MG/100ML; MG/100ML; MG/100ML; MG/100ML
1000 INJECTION, SOLUTION INTRAVENOUS CONTINUOUS
Status: DISCONTINUED | OUTPATIENT
Start: 2022-07-28 | End: 2022-07-28 | Stop reason: HOSPADM

## 2022-07-28 RX ORDER — OXYCODONE HYDROCHLORIDE 5 MG/1
5 TABLET ORAL PRN
Status: DISCONTINUED | OUTPATIENT
Start: 2022-07-28 | End: 2022-07-28 | Stop reason: HOSPADM

## 2022-07-28 RX ORDER — FENTANYL CITRATE 50 UG/ML
50 INJECTION, SOLUTION INTRAMUSCULAR; INTRAVENOUS ONCE
Status: COMPLETED | OUTPATIENT
Start: 2022-07-28 | End: 2022-07-28

## 2022-07-28 RX ORDER — CARVEDILOL 25 MG/1
25 TABLET ORAL 2 TIMES DAILY
Status: DISCONTINUED | OUTPATIENT
Start: 2022-07-28 | End: 2022-07-31 | Stop reason: HOSPADM

## 2022-07-28 RX ORDER — MAGNESIUM HYDROXIDE 1200 MG/15ML
LIQUID ORAL CONTINUOUS PRN
Status: DISCONTINUED | OUTPATIENT
Start: 2022-07-28 | End: 2022-07-28 | Stop reason: HOSPADM

## 2022-07-28 RX ORDER — INSULIN LISPRO 100 [IU]/ML
0-16 INJECTION, SOLUTION INTRAVENOUS; SUBCUTANEOUS EVERY 4 HOURS
Status: DISCONTINUED | OUTPATIENT
Start: 2022-07-28 | End: 2022-07-31 | Stop reason: HOSPADM

## 2022-07-28 RX ORDER — SODIUM CHLORIDE 9 MG/ML
INJECTION, SOLUTION INTRAVENOUS PRN
Status: DISCONTINUED | OUTPATIENT
Start: 2022-07-28 | End: 2022-07-28 | Stop reason: HOSPADM

## 2022-07-28 RX ORDER — MEPERIDINE HYDROCHLORIDE 50 MG/ML
12.5 INJECTION INTRAMUSCULAR; INTRAVENOUS; SUBCUTANEOUS EVERY 5 MIN PRN
Status: DISCONTINUED | OUTPATIENT
Start: 2022-07-28 | End: 2022-07-28 | Stop reason: HOSPADM

## 2022-07-28 RX ORDER — DEXTROSE MONOHYDRATE 100 MG/ML
INJECTION, SOLUTION INTRAVENOUS CONTINUOUS PRN
Status: DISCONTINUED | OUTPATIENT
Start: 2022-07-28 | End: 2022-07-31 | Stop reason: HOSPADM

## 2022-07-28 RX ORDER — ROSUVASTATIN CALCIUM 5 MG/1
5 TABLET, COATED ORAL DAILY
Status: DISCONTINUED | OUTPATIENT
Start: 2022-07-28 | End: 2022-07-31 | Stop reason: HOSPADM

## 2022-07-28 RX ORDER — LABETALOL HYDROCHLORIDE 5 MG/ML
INJECTION, SOLUTION INTRAVENOUS
Status: DISPENSED
Start: 2022-07-28 | End: 2022-07-29

## 2022-07-28 RX ORDER — ONDANSETRON 2 MG/ML
4 INJECTION INTRAMUSCULAR; INTRAVENOUS EVERY 6 HOURS PRN
Status: DISCONTINUED | OUTPATIENT
Start: 2022-07-28 | End: 2022-07-31 | Stop reason: HOSPADM

## 2022-07-28 RX ORDER — INSULIN GLARGINE 100 [IU]/ML
10 INJECTION, SOLUTION SUBCUTANEOUS ONCE
Status: COMPLETED | OUTPATIENT
Start: 2022-07-28 | End: 2022-07-28

## 2022-07-28 RX ORDER — LIDOCAINE HYDROCHLORIDE 10 MG/ML
INJECTION, SOLUTION EPIDURAL; INFILTRATION; INTRACAUDAL; PERINEURAL PRN
Status: DISCONTINUED | OUTPATIENT
Start: 2022-07-28 | End: 2022-07-28 | Stop reason: SDUPTHER

## 2022-07-28 RX ORDER — MIDAZOLAM HYDROCHLORIDE 2 MG/2ML
1 INJECTION, SOLUTION INTRAMUSCULAR; INTRAVENOUS EVERY 10 MIN PRN
Status: DISCONTINUED | OUTPATIENT
Start: 2022-07-28 | End: 2022-07-28 | Stop reason: HOSPADM

## 2022-07-28 RX ADMIN — ACETAMINOPHEN 1000 MG: 500 TABLET ORAL at 22:27

## 2022-07-28 RX ADMIN — HYDROMORPHONE HYDROCHLORIDE 0.5 MG: 1 INJECTION, SOLUTION INTRAMUSCULAR; INTRAVENOUS; SUBCUTANEOUS at 16:33

## 2022-07-28 RX ADMIN — ROSUVASTATIN CALCIUM 5 MG: 5 TABLET, COATED ORAL at 19:13

## 2022-07-28 RX ADMIN — SODIUM CHLORIDE, POTASSIUM CHLORIDE, SODIUM LACTATE AND CALCIUM CHLORIDE: 600; 310; 30; 20 INJECTION, SOLUTION INTRAVENOUS at 08:37

## 2022-07-28 RX ADMIN — SODIUM CHLORIDE, POTASSIUM CHLORIDE, SODIUM LACTATE AND CALCIUM CHLORIDE: 600; 310; 30; 20 INJECTION, SOLUTION INTRAVENOUS at 18:18

## 2022-07-28 RX ADMIN — DEXAMETHASONE SODIUM PHOSPHATE 4 MG: 4 INJECTION, SOLUTION INTRAMUSCULAR; INTRAVENOUS at 11:12

## 2022-07-28 RX ADMIN — Medication 10 MG: at 11:27

## 2022-07-28 RX ADMIN — INSULIN HUMAN 10 UNITS: 100 INJECTION, SOLUTION PARENTERAL at 10:57

## 2022-07-28 RX ADMIN — MIDAZOLAM HYDROCHLORIDE 2 MG: 2 INJECTION, SOLUTION INTRAMUSCULAR; INTRAVENOUS at 10:14

## 2022-07-28 RX ADMIN — FENTANYL CITRATE 50 MCG: 50 INJECTION, SOLUTION INTRAMUSCULAR; INTRAVENOUS at 13:01

## 2022-07-28 RX ADMIN — GABAPENTIN 300 MG: 300 CAPSULE ORAL at 20:14

## 2022-07-28 RX ADMIN — ROCURONIUM BROMIDE 30 MG: 10 INJECTION INTRAVENOUS at 11:18

## 2022-07-28 RX ADMIN — ROCURONIUM BROMIDE 50 MG: 10 INJECTION INTRAVENOUS at 10:14

## 2022-07-28 RX ADMIN — LIDOCAINE HYDROCHLORIDE 50 MG: 10 INJECTION, SOLUTION EPIDURAL; INFILTRATION; INTRACAUDAL; PERINEURAL at 10:14

## 2022-07-28 RX ADMIN — METHOCARBAMOL TABLETS 750 MG: 750 TABLET, COATED ORAL at 18:22

## 2022-07-28 RX ADMIN — Medication 20 MG: at 11:21

## 2022-07-28 RX ADMIN — HYDROMORPHONE HYDROCHLORIDE 0.25 MG: 1 INJECTION, SOLUTION INTRAMUSCULAR; INTRAVENOUS; SUBCUTANEOUS at 14:02

## 2022-07-28 RX ADMIN — Medication 100 MCG: at 11:26

## 2022-07-28 RX ADMIN — OLANZAPINE 10 MG: 10 TABLET, FILM COATED ORAL at 18:22

## 2022-07-28 RX ADMIN — INSULIN LISPRO 12 UNITS: 100 INJECTION, SOLUTION INTRAVENOUS; SUBCUTANEOUS at 18:15

## 2022-07-28 RX ADMIN — Medication 100 MCG: at 10:53

## 2022-07-28 RX ADMIN — GLYCOPYRROLATE 0.4 MG: 0.2 INJECTION INTRAMUSCULAR; INTRAVENOUS at 10:55

## 2022-07-28 RX ADMIN — DULOXETINE HYDROCHLORIDE 60 MG: 30 CAPSULE, DELAYED RELEASE ORAL at 18:22

## 2022-07-28 RX ADMIN — OXYCODONE 5 MG: 5 TABLET ORAL at 18:15

## 2022-07-28 RX ADMIN — ENOXAPARIN SODIUM 30 MG: 100 INJECTION SUBCUTANEOUS at 20:14

## 2022-07-28 RX ADMIN — Medication 3000 MG: at 10:22

## 2022-07-28 RX ADMIN — PROPOFOL 200 MG: 10 INJECTION, EMULSION INTRAVENOUS at 10:14

## 2022-07-28 RX ADMIN — SUGAMMADEX 500 MG: 100 INJECTION, SOLUTION INTRAVENOUS at 12:07

## 2022-07-28 RX ADMIN — INSULIN LISPRO 12 UNITS: 100 INJECTION, SOLUTION INTRAVENOUS; SUBCUTANEOUS at 22:23

## 2022-07-28 RX ADMIN — FENTANYL CITRATE 50 MCG: 50 INJECTION, SOLUTION INTRAMUSCULAR; INTRAVENOUS at 12:56

## 2022-07-28 RX ADMIN — SODIUM CHLORIDE, POTASSIUM CHLORIDE, SODIUM LACTATE AND CALCIUM CHLORIDE: 600; 310; 30; 20 INJECTION, SOLUTION INTRAVENOUS at 11:13

## 2022-07-28 RX ADMIN — ROCURONIUM BROMIDE 20 MG: 10 INJECTION INTRAVENOUS at 11:45

## 2022-07-28 RX ADMIN — FENTANYL CITRATE 100 MCG: 50 INJECTION, SOLUTION INTRAMUSCULAR; INTRAVENOUS at 10:14

## 2022-07-28 RX ADMIN — Medication 20 MG: at 11:13

## 2022-07-28 RX ADMIN — INSULIN GLARGINE 10 UNITS: 100 INJECTION, SOLUTION SUBCUTANEOUS at 22:46

## 2022-07-28 RX ADMIN — FENTANYL CITRATE 25 MCG: 50 INJECTION, SOLUTION INTRAMUSCULAR; INTRAVENOUS at 12:30

## 2022-07-28 RX ADMIN — METRONIDAZOLE 500 MG: 500 INJECTION, SOLUTION INTRAVENOUS at 10:38

## 2022-07-28 RX ADMIN — Medication 10 MG: at 19:38

## 2022-07-28 RX ADMIN — SODIUM CHLORIDE, POTASSIUM CHLORIDE, SODIUM LACTATE AND CALCIUM CHLORIDE: 600; 310; 30; 20 INJECTION, SOLUTION INTRAVENOUS at 10:08

## 2022-07-28 RX ADMIN — PHENYLEPHRINE HYDROCHLORIDE 100 MCG: 10 INJECTION INTRAVENOUS at 10:28

## 2022-07-28 RX ADMIN — HYDROMORPHONE HYDROCHLORIDE 0.5 MG: 1 INJECTION, SOLUTION INTRAMUSCULAR; INTRAVENOUS; SUBCUTANEOUS at 16:26

## 2022-07-28 RX ADMIN — FENTANYL CITRATE 50 MCG: 50 INJECTION, SOLUTION INTRAMUSCULAR; INTRAVENOUS at 19:36

## 2022-07-28 RX ADMIN — FENTANYL CITRATE 25 MCG: 50 INJECTION, SOLUTION INTRAMUSCULAR; INTRAVENOUS at 12:35

## 2022-07-28 ASSESSMENT — PAIN DESCRIPTION - FREQUENCY
FREQUENCY: CONTINUOUS

## 2022-07-28 ASSESSMENT — PAIN SCALES - GENERAL
PAINLEVEL_OUTOF10: 8
PAINLEVEL_OUTOF10: 7
PAINLEVEL_OUTOF10: 7
PAINLEVEL_OUTOF10: 6
PAINLEVEL_OUTOF10: 7
PAINLEVEL_OUTOF10: 6
PAINLEVEL_OUTOF10: 7
PAINLEVEL_OUTOF10: 7
PAINLEVEL_OUTOF10: 6
PAINLEVEL_OUTOF10: 4

## 2022-07-28 ASSESSMENT — PAIN DESCRIPTION - ONSET
ONSET: ON-GOING
ONSET: GRADUAL

## 2022-07-28 ASSESSMENT — PAIN DESCRIPTION - DESCRIPTORS
DESCRIPTORS: ACHING;DISCOMFORT
DESCRIPTORS: SORE;DISCOMFORT
DESCRIPTORS: STABBING;THROBBING
DESCRIPTORS: SORE
DESCRIPTORS: DISCOMFORT;SORE
DESCRIPTORS: SHARP;CRAMPING
DESCRIPTORS: SORE;ACHING
DESCRIPTORS: ACHING;DISCOMFORT
DESCRIPTORS: THROBBING;STABBING
DESCRIPTORS: SORE

## 2022-07-28 ASSESSMENT — PAIN DESCRIPTION - ORIENTATION
ORIENTATION: MID

## 2022-07-28 ASSESSMENT — PAIN DESCRIPTION - PAIN TYPE
TYPE: SURGICAL PAIN

## 2022-07-28 ASSESSMENT — PAIN DESCRIPTION - LOCATION
LOCATION: ABDOMEN

## 2022-07-28 ASSESSMENT — LIFESTYLE VARIABLES
HOW MANY STANDARD DRINKS CONTAINING ALCOHOL DO YOU HAVE ON A TYPICAL DAY: 1 OR 2
HOW OFTEN DO YOU HAVE A DRINK CONTAINING ALCOHOL: NEVER

## 2022-07-28 ASSESSMENT — PAIN - FUNCTIONAL ASSESSMENT
PAIN_FUNCTIONAL_ASSESSMENT: PREVENTS OR INTERFERES SOME ACTIVE ACTIVITIES AND ADLS
PAIN_FUNCTIONAL_ASSESSMENT: 0-10

## 2022-07-28 NOTE — ANESTHESIA POSTPROCEDURE EVALUATION
Department of Anesthesiology  Postprocedure Note    Patient: Chris Paul  MRN: [id number]  YOB: 1980  Date of evaluation: 7/28/2022      Procedure Summary     Date: 07/28/22 Room / Location: [address]    Anesthesia Start: 1008 Anesthesia Stop: 1219    Procedure: COLOSTOMY REVERSAL Diagnosis:       Leonel gangrene      (LEONEL GANGRENE)    Surgeons: Mariajose Higgins MD Responsible Provider: Jessica Madison MD    Anesthesia Type: general ASA Status: 3          Anesthesia Type: No value filed.     Cristhian Phase I: Cristhian Score: 9    Cristhian Phase II:        Anesthesia Post Evaluation    Patient location during evaluation: PACU  Patient participation: complete - patient participated  Level of consciousness: awake and alert  Pain score: 3  Airway patency: patent  Nausea & Vomiting: no vomiting and no nausea  Complications: no  Cardiovascular status: hemodynamically stable  Respiratory status: acceptable  Hydration status: stable

## 2022-07-28 NOTE — BRIEF OP NOTE
Brief Postoperative Note      Patient: Jag Day  YOB: 1980  MRN: [id number]    Date of Procedure: 7/28/2022    Pre-Op Diagnosis: TRANSVERSE COLOSTOMY    Post-Op Diagnosis: Same       Procedure(s):  COLOSTOMY REVERSAL    Surgeon(s):  Kehinde De Anda MD    Assistant:  Resident: Sherron Phoenix, DO    Anesthesia: General    Estimated Blood Loss (mL): 25    Complications: None    Specimens:   ID Type Source Tests Collected by Time Destination   A : COLOSTOMY Stool Colostomy SURGICAL PATHOLOGY Kehinde De Anda MD 7/28/2022 1054        Implants:  * No implants in log *      Drains:   Negative Pressure Wound Therapy Buttocks Left (Active)       [REMOVED] Colostomy LUQ Transverse (Removed)       [REMOVED] Urinary Catheter 2 Way (Removed)       Findings: TRANSVERSE COLOSTOMY REVERSAL    Electronically signed by Sherron Phoenix, DO on 7/28/2022 at 12:06 PM

## 2022-07-28 NOTE — ANESTHESIA PRE PROCEDURE
Department of Anesthesiology  Preprocedure Note       Name:  Lior Carey   Age:  42 y.o.  :  1980                                          MRN:  [id number]         Date:  2022      Surgeon: Katerin Mix):  Rafa Coleman MD    Procedure: Procedure(s):  COLOSTOMY REVERSAL    Medications prior to admission:   Prior to Admission medications    Medication Sig Start Date End Date Taking? Authorizing Provider   zolpidem (AMBIEN) 5 MG tablet  22   Historical Provider, MD   rosuvastatin (CRESTOR) 5 MG tablet TAKE 1 TABLET BY MOUTH ONCE DAILY 22   Historical Provider, MD   carvedilol (COREG) 25 MG tablet TAKE ONE TABLET BY MOUTH TWICE DAILY WITH FOOD 22   Historical Provider, MD   Cholecalciferol (VITAMIN D3) 50 MCG (2000) CAPS TAKE 1 CAPSULE BY MOUTH ONCE DAILY 22   Historical Provider, MD   TRULICITY 4.5 BW/2.7NA SOPN INJECT 1 SYRINGE SUBCUTANEOUSLY ONCE A WEEK 22   Historical Provider, MD   gabapentin (NEURONTIN) 300 MG capsule TAKE ONE CAPSULE BY MOUTH THREE TIMES DAILY 3/2/22   Historical Provider, MD   hydrALAZINE (APRESOLINE) 50 MG tablet TAKE 1 TABLET BY MOUTH TWICE DAILY WITH FOOD 22   Historical Provider, MD   NOVOLOG 100 UNIT/ML injection vial  3/7/22   Historical Provider, MD   magnesium oxide (MAG-OX) 400 (241.3 Mg) MG TABS tablet Take 400 mg by mouth in the morning.  22   Historical Provider, MD   OLANZapine (ZYPREXA) 10 MG tablet TAKE ONE TABLET BY MOUTH ONCE DAILY 22   Historical Provider, MD   amLODIPine (NORVASC) 10 MG tablet Take 1 tablet by mouth daily 22   Ashely Joy DO   bumetanide (BUMEX) 1 MG tablet Take 1 tablet by mouth daily 22   Ashely Joy DO   fluticasone-salmeterol (ADVAIR HFA) 12-64 MCG/ACT inhaler Inhale 2 puffs into the lungs 2 times daily    Historical Provider, MD   Multiple Vitamins-Minerals (MULTIVITAMIN ADULT EXTRA C PO) Take 1 tablet by mouth daily    Historical Provider, MD   vitamin D (ERGOCALCIFEROL) 1.25 MG (21676 UT) CAPS capsule Take 50,000 Units by mouth once a week    Historical Provider, MD   albuterol sulfate HFA (PROAIR HFA) 108 (90 Base) MCG/ACT inhaler Inhale 1 puff into the lungs as needed    Historical Provider, MD   DULoxetine (CYMBALTA) 60 MG extended release capsule Take 60 mg by mouth daily    Historical Provider, MD   cyanocobalamin 1000 MCG/ML injection Inject 1,000 mcg into the muscle every 30 days 10/18/21   Historical Provider, MD   fenofibrate (TRIGLIDE) 160 MG tablet Take 160 mg by mouth daily    Historical Provider, MD   fluticasone (FLONASE) 50 MCG/ACT nasal spray 1 spray by Each Nostril route    Historical Provider, MD   glucagon 1 MG injection Inject 1 mg into the muscle as needed    Historical Provider, MD   lisinopril (PRINIVIL;ZESTRIL) 40 MG tablet Take 40 mg by mouth daily    Historical Provider, MD   montelukast (SINGULAIR) 10 MG tablet Take 10 mg by mouth daily    Historical Provider, MD   omeprazole (PRILOSEC) 20 MG delayed release capsule Take 20 mg by mouth daily    Historical Provider, MD       Current medications:    No current facility-administered medications for this encounter. Current Outpatient Medications   Medication Sig Dispense Refill    zolpidem (AMBIEN) 5 MG tablet       rosuvastatin (CRESTOR) 5 MG tablet TAKE 1 TABLET BY MOUTH ONCE DAILY      carvedilol (COREG) 25 MG tablet TAKE ONE TABLET BY MOUTH TWICE DAILY WITH FOOD      Cholecalciferol (VITAMIN D3) 50 MCG (2000 UT) CAPS TAKE 1 CAPSULE BY MOUTH ONCE DAILY      TRULICITY 4.5 EH/0.6AF SOPN INJECT 1 SYRINGE SUBCUTANEOUSLY ONCE A WEEK      gabapentin (NEURONTIN) 300 MG capsule TAKE ONE CAPSULE BY MOUTH THREE TIMES DAILY      hydrALAZINE (APRESOLINE) 50 MG tablet TAKE 1 TABLET BY MOUTH TWICE DAILY WITH FOOD      NOVOLOG 100 UNIT/ML injection vial       magnesium oxide (MAG-OX) 400 (241.3 Mg) MG TABS tablet Take 400 mg by mouth in the morning.       OLANZapine (ZYPREXA) 10 MG tablet TAKE ONE TABLET BY MOUTH ONCE DAILY      amLODIPine (NORVASC) 10 MG tablet Take 1 tablet by mouth daily 30 tablet 0    bumetanide (BUMEX) 1 MG tablet Take 1 tablet by mouth daily 30 tablet 0    fluticasone-salmeterol (ADVAIR HFA) 45-21 MCG/ACT inhaler Inhale 2 puffs into the lungs 2 times daily      Multiple Vitamins-Minerals (MULTIVITAMIN ADULT EXTRA C PO) Take 1 tablet by mouth daily      vitamin D (ERGOCALCIFEROL) 1.25 MG (66715 UT) CAPS capsule Take 50,000 Units by mouth once a week      albuterol sulfate HFA (PROAIR HFA) 108 (90 Base) MCG/ACT inhaler Inhale 1 puff into the lungs as needed      DULoxetine (CYMBALTA) 60 MG extended release capsule Take 60 mg by mouth daily      cyanocobalamin 1000 MCG/ML injection Inject 1,000 mcg into the muscle every 30 days      fenofibrate (TRIGLIDE) 160 MG tablet Take 160 mg by mouth daily      fluticasone (FLONASE) 50 MCG/ACT nasal spray 1 spray by Each Nostril route      glucagon 1 MG injection Inject 1 mg into the muscle as needed      lisinopril (PRINIVIL;ZESTRIL) 40 MG tablet Take 40 mg by mouth daily      montelukast (SINGULAIR) 10 MG tablet Take 10 mg by mouth daily      omeprazole (PRILOSEC) 20 MG delayed release capsule Take 20 mg by mouth daily         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    Uncontrolled type 2 diabetes mellitus with hyperglycemia ([facility] E11.65    MEREDITH (acute kidney injury) ([facility] N17.9    Hypokalemia E87.6    Hyponatremia E87.1    Anemia D64.9    Morbid obesity ([facility] E66.01    Type 1 diabetes mellitus with stage 3a chronic kidney disease (HCC) E10.21, P11.46    Metabolic acidosis S65.5    Hypervolemia E87.70       Past Medical History:        Diagnosis Date  Asthma     Astigmatism     Bipolar disorder ([facility]     Clinical trial participant 02/09/2022    Protocol [id number]  Study completion 11/FEB/2022    COVID-19 03/2020    rhinitis, cough, weakness, fatigue X3 weeks    Diabetes mellitus ([facility] 1989    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 BASS - LAST VISIT 2021 - TO RETURN AS NEEDED    Under care of team     GENERAL SURGERY -  [facility] [place]    Under care of team     ENDOCRINOLOGY - DR. Norman Stephens - LAST VISIT - 4/2022    Under care of team     NEPHROLOGY - DR. GARCIA    Wellness examination     PCP- Todd Gaytan CNP - LAST VISIT - 6/2022 - manages heart meds       Past Surgical History:        Procedure Laterality Date    ABDOMEN SURGERY Bilateral 02/09/2022    incision and drainage of bilateral necrotizing soft tissue infection of groin and buttocks  performed by Grey Benavidez MD at [address] [facility] Left 02/18/2022    LEFT GROIN, BUTTOCK  WOUND EXPLORATION AND DEBRIDEMENT,  WOUND VAC EXCHANGE, ([address]) performed by Kirstie Abarca MD at [address] Bilateral    [address]      X2   2008, 2010    CHOLECYSTECTOMY      COLONOSCOPY      COLOSTOMY N/A 02/16/2022    DEBRIDEMENT BUTTOCK AND GROIN, WOUND VAC EXCHANGE, LAPAROSCOPIC COLOSTOMY CREATION (MISONIX) performed by Kirstie Abarca MD at [address] IR TUNNELED CATHETER PLACEMENT GREATER THAN 5 YEARS  02/22/2022    IR TUNNELED CATHETER PLACEMENT GREATER THAN 5 YEARS 2/22/2022 Argelia Roy MD [facility] SPECIAL PROCEDURES  KNEE ARTHROSCOPY Bilateral     LUMBAR DISC SURGERY      Dr. Katt Walls N/A 2022    *ADD ON, ASAP* INCISION AND DRAINAGE OF PERINEUM, BRIAN KNIFE PRONE performed by Tad Petit MD at [address] Bilateral 02/10/2022    INCISION AND DRAINAGE BUTTOCK, GROIN  (LITHOTOMY POSITION) performed by Ganesh Mendoza MD at [address] [facility] 2022    INCISION AND DRAINAGE AND DEBRIDEMENT BUTTOCK AND LABIAL ABSCESS , PLACEMENT OF WOUND VAC performed by Solitario Mohamud DO at [address] Left     [address]  2019    TUBAL LIGATION      ULNAR NERVE TRANSPOSITION Left     WOUND EXPLORATION Left 2022    [address] OF BILATERAL GROINS AND LEFT BUTTOCK WOUND WITH PARTIAL CLOSURE AND WOUND VAC PLACEMENT performed by Pura Florian MD at [facility] History:    Social History     Tobacco Use    Smoking status: Former     Packs/day: 0.50     Types: Cigarettes     Start date:      Quit date:      Years since quittin.5    Smokeless tobacco: Never   Substance Use Topics    Alcohol use: Not on file     Comment: \"like once a month\"                                Counseling given: Not Answered      Vital Signs (Current): There were no vitals filed for this visit.                                            BP Readings from Last 3 Encounters:   22 116/66   22 (!) 158/84   22 (!) 166/80       NPO Status: Time of last liquid consumption: 2200                        Time of last solid consumption: 2100                                                      BMI:   Wt Readings from Last 3 Encounters:   22 265 lb (120.2 kg)   22 260 lb (117.9 kg)   22 265 lb (120.2 kg)     There is no height or weight on file to calculate BMI.    CBC:   Lab Results   Component Value Date/Time    WBC 8.4 2022 04:11 PM    RBC 4.50 2022 04:11 PM    HGB 12.2 2022 04:11 PM    HCT 37.1 07/21/2022 04:11 PM    MCV 82.4 07/21/2022 04:11 PM    RDW 15.9 07/21/2022 04:11 PM     07/21/2022 04:11 PM       CMP:   Lab Results   Component Value Date/Time     07/21/2022 04:11 PM    K 3.7 07/21/2022 04:11 PM    CL 94 07/21/2022 04:11 PM    CO2 24 07/21/2022 04:11 PM    BUN 27 07/21/2022 04:11 PM    CREATININE 1.30 07/25/2022 01:32 PM    GFRAA 54 07/25/2022 01:32 PM    LABGLOM 45 07/25/2022 01:32 PM    GLUCOSE 371 07/21/2022 04:11 PM    PROT 5.7 02/09/2022 04:12 AM    CALCIUM 8.5 02/26/2022 06:33 AM    BILITOT 0.29 02/09/2022 04:12 AM    ALKPHOS 196 02/09/2022 04:12 AM    AST 48 02/09/2022 04:12 AM    ALT 25 02/09/2022 04:12 AM       POC Tests: No results for input(s): POCGLU, POCNA, POCK, POCCL, POCBUN, POCHEMO, POCHCT in the last 72 hours.     Coags:   Lab Results   Component Value Date/Time    PROTIME 10.4 02/21/2022 03:41 PM    INR 1.0 02/21/2022 03:41 PM       HCG (If Applicable):   Lab Results   Component Value Date    PREGTESTUR NEGATIVE 02/08/2022        ABGs: No results found for: PHART, PO2ART, IVL9CTX, URB5OBB, BEART, S5MAZFFU     Type & Screen (If Applicable):  No results found for: LABABO, LABRH    Drug/Infectious Status (If Applicable):  Lab Results   Component Value Date/Time    HEPCAB NONREACTIVE 02/16/2022 12:56 PM       COVID-19 Screening (If Applicable):   Lab Results   Component Value Date/Time    COVID19 Not Detected 02/09/2022 02:52 AM           Anesthesia Evaluation  Patient summary reviewed no history of anesthetic complications:   Airway: Mallampati: III  TM distance: >3 FB   Neck ROM: full  Mouth opening: > = 3 FB   Dental:          Pulmonary:normal exam    (+) asthma: seasonal asthma,                            Cardiovascular:    (+) hypertension: no interval change,       ECG reviewed  Rhythm: regular  Rate: normal                    Neuro/Psych:   (+) psychiatric history:depression/anxiety             GI/Hepatic/Renal:   (+) GERD: no interval change, morbid obesity Endo/Other:    (+) DiabetesType II DM, no interval change, , .                 Abdominal:   (+) obese,           Vascular: negative vascular ROS. Other Findings:           Anesthesia Plan      general     ASA 3       Induction: intravenous. Anesthetic plan and risks discussed with patient. Plan discussed with CRNA.                     Jose Yun MD   7/28/2022

## 2022-07-28 NOTE — H&P
GENERAL SURGERY [address]        PATIENT NAME: Nimisha Kruger   YOB: 1980  GENDER: female     Procedure Date: 7/28/2022  7:38 AM     Attending Provider: Dr. Angelica Mancini     Chief Complaint: colostomy reversal    Procedure Scheduled: colostomy reversal    History of present Illness: The patient is a 42 y.o. female  who presents to pre-op area prior to scheduled colostomy reversal.      Pt last seen in office on 5/17/2022 w/ c/o colostomy reversal after patient underwent several debridements for [place] gangrene. Pt recommended to undergo colostomy reversal at earliest possible convenience. Pt denies changes to his medical history since he was last seen in office. Denies current nausea, vomiting, chest pain, SOB, fever, and chills. Consent form signed in office reviewed w/ pt. Any/all additional questions/concerns were addressed and consent form updated w/ current date. Pt elected to pursue surgery as scheduled for today. Past Medical History:  has a past medical history of Asthma, Astigmatism, Bipolar disorder ([address] Clinical trial participant, COVID-19, Diabetes mellitus ([address] Leonel gangrene, GERD (gastroesophageal reflux disease), H/O cardiovascular stress test, History of echocardiogram, Hyperlipidemia, Hypertension, Insulin pump in place, Kidney disease, Obesity, On mechanically assisted ventilation ([address] Osteoarthritis, Under care of team, Under care of team, Under care of team, Under care of team, and Wellness examination.     Past Surgical History:   Past Surgical History:   Procedure Laterality Date    ABDOMEN SURGERY Bilateral 02/09/2022    incision and drainage of bilateral necrotizing soft tissue infection of groin and buttocks  performed by Maco Lindquist MD at [address] 02/18/2022    LEFT GROIN, BUTTOCK  WOUND EXPLORATION AND DEBRIDEMENT,  WOUND VAC EXCHANGE, ([address]) performed by Kelle Kwon MD at [place] [address] IntraDERmal, Once PRN, Andi Akins MD    lactated ringers infusion, , IntraVENous, Continuous, Andi Akins MD    sodium chloride flush 0.9 % injection 5-40 mL, 5-40 mL, IntraVENous, 2 times per day, Aditya Hawkins MD    sodium chloride flush 0.9 % injection 5-40 mL, 5-40 mL, IntraVENous, PRN, Aditya Hawkins MD    0.9 % sodium chloride infusion, , IntraVENous, PRN, Andi Akins MD    midazolam PF (VERSED) injection 1 mg, 1 mg, IntraVENous, Q10 Min PRN, Andi Akins MD    lactated ringers infusion 1,000 mL, 1,000 mL, IntraVENous, Continuous, Alhaji Latif MD    Vital Signs:  Vitals:    07/28/22 0813   BP: (!) 142/68   Pulse: 78   Resp: 18   Temp: 96.8 °F (36 °C)   SpO2: 97%       Physical Exam:  Vital signs and Nurse's note reviewed  Gen:  A&Ox3, NAD  HEENT: NCAT, PERRLA, EOMI, no scleral icterus, oral mucosa moist  Neck: Supple, trachea midline  Chest: Symmetric rise with inhalation, no evidence of trauma  CVS: Regular rate and rhythm  Resp: Good bilateral air entry  Abd: soft, non-tender, non-distended.  [place] loop colostomy  Ext: No clubbing, cyanosis, edema, peripheral pulses 2+ Rad/Fem/DP/PT  CNS: Moves all extremities, no gross focal motor deficits  Skin: No erythema or ulcerations     Labs:   Lab Results   Component Value Date/Time    WBC 8.4 07/21/2022 04:11 PM    HGB 12.2 07/21/2022 04:11 PM    HCT 37.1 07/21/2022 04:11 PM    MCV 82.4 07/21/2022 04:11 PM     07/21/2022 04:11 PM     Lab Results   Component Value Date/Time     07/21/2022 04:11 PM    K 3.7 07/21/2022 04:11 PM    CL 94 07/21/2022 04:11 PM    CO2 24 07/21/2022 04:11 PM    BUN 27 07/21/2022 04:11 PM    CREATININE 1.30 07/25/2022 01:32 PM    GLUCOSE 371 07/21/2022 04:11 PM    CALCIUM 8.5 02/26/2022 06:33 AM     Lab Results   Component Value Date/Time    ALKPHOS 196 02/09/2022 04:12 AM    ALT 25 02/09/2022 04:12 AM    AST 48 02/09/2022 04:12 AM    PROT 5.7 02/09/2022 04:12 AM    BILITOT 0.29 02/09/2022 04:12 AM LABALBU 1.9 02/09/2022 04:12 AM     No results found for: LACTA  No results found for: AMYLASE  No results found for: LIPASE  Lab Results   Component Value Date/Time    INR 1.0 02/21/2022 03:41 PM         Radiology:  CT OF THE ABDOMEN AND PELVIS WITH CONTRAST 7/25/2022 2:28 pm       TECHNIQUE:   CT of the abdomen and pelvis was performed with the administration of   intravenous contrast. Multiplanar reformatted images are provided for review. Automated exposure control, iterative reconstruction, and/or weight based   adjustment of the mA/kV was utilized to reduce the radiation dose to as low   as reasonably achievable. COMPARISON:   CT pelvis 2/8/2022       HISTORY:   ORDERING SYSTEM PROVIDED HISTORY: Colostomy present [facility])   TECHNOLOGIST PROVIDED HISTORY:   STAT Creatinine as needed:->No       FINDINGS:   Lower Chest: Visualized lung bases show no acute findings. Small pericardial   effusion inferiorly. Coronary vascular calcifications. Organs: No acute abnormality of the liver, spleen, right adrenal gland, or   pancreas. Spleen is borderline enlarged. 12 mm slightly hypodense left   adrenal gland nodule is likely an adenoma; if there are no remote prior   studies available for comparison, 1 year follow-up is suggested. Both   kidneys enhance without hydronephrosis. Renal contours are lobular. Previous cholecystectomy. GI/Bowel: Evaluation of bowel is limited by lack of oral contrast.   Postoperative changes related to the stomach. Previous appendectomy. Postoperative changes in the anterior abdominal wall with diastasis of the   rectus abdominus/ventral hernia containing a loop colostomy of the transverse   colon with small bowel loops extending into the hernia. No evidence of bowel   obstruction. Pelvis: No free fluid in the pelvis. Scattered bilateral small pelvic and   inguinal lymph nodes without bulky adenopathy.   Some of the pelvic lymph   nodes appear to be fatty replaced. Mild to moderate residual soft tissue   stranding in the left ischial rectal fossa extending to the inferior medial   gluteal region with overlying skin/soft tissue thickening, overall   significantly improved from extensive necrotizing fasciitis seen on the prior   study. No regional gas collections are identified. Uterus is present. An   ovoid fluid attenuation/cystic structure which appears to be partially   exophytic from the right ovary extending superolateral adjacent to the colon   measures approximately 4.5 x 4.8 x 7 cm. Margins are relatively smooth. Similar structure was present on the prior CT. This could represent an   exophytic ovarian cyst/paraovarian cyst versus a peritoneal cyst or seroma. The bladder is underdistended. Peritoneum/Retroperitoneum: Calcific plaque of the aorta and iliac arteries   without evidence of abdominal aortic aneurysm. No bulky retroperitoneal or   upper abdominal adenopathy. Probable medication injection sites in the   anterior abdominal wall. Bones/Soft Tissues: Degenerative changes of the spine with no acute osseous   abnormality. Mild superficial soft tissue stranding adjacent to anterior   abdominal wall musculature. Soft tissue thickening of the umbilicus and mild   skin thickening involving the pannus. Impression   Loop colostomy of the transverse colon with associated ventral abdominal wall   hernia containing small bowel loops. No evidence of bowel obstruction. Mild residual inflammatory changes and skin thickening in the left   perineal/gluteal and ischiorectal region. Right lower quadrant/pelvic cystic structure measuring up to 7 cm which   appears to be adjacent to the right ovary and could represent a paraovarian   cyst versus peritoneal cyst.       12 mm left adrenal gland nodule, likely an adenoma       Assessment:  Active Problems:    * No active hospital problems.  *  Resolved Problems:    * No resolved hospital problems.  *    Hx of transverse loop colostomy      Plan:  Proceed w/ colostomy reversal as scheduled for today  Admit postoperatively      Electronically signed by Renato Esteves DO  on 7/28/2022 at 8:38 AM

## 2022-07-28 NOTE — OP NOTE
Operative Note      Patient: Antony Swartz  YOB: 1980  MRN: [id number]    Date of Procedure: 7/28/2022    PPre-Op Diagnosis: TRANSVERSE COLOSTOMY     Post-Op Diagnosis: Same       Procedure(s):  COLOSTOMY REVERSAL     Surgeon(s):  Andrew Chandra MD     Assistant:  Resident: Luis Alberto Sharpe DO     Anesthesia: General     Estimated Blood Loss (mL): 25    Complications: None    Specimens:   ID Type Source Tests Collected by Time Destination   A : COLOSTOMY Stool Colostomy SURGICAL PATHOLOGY Andrew Chandra MD 7/28/2022 1054        Implants:  * No implants in log *      Drains:   Negative Pressure Wound Therapy Buttocks Left (Active)       [REMOVED] Colostomy LUQ Transverse (Removed)       [REMOVED] Urinary Catheter 2 Way (Removed)       Findings: TRANSVERSE COLOSTOMY REVERSAL    History: Patient is a 50-year-old female who underwent 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 Patient was seen in clinic with complaint resolution of her wound. Decision was made to bring the patient to the operating room for colostomy reversal. This procedure, including risks, benefits, alternatives and complications have been fully reviewed with the patient and informed consent was obtained. Risks discussed include infection, bleeding, injury to surrounding structures, need for more procedures, chronic pain, scarring, risks of anesthesia, including myocardial infarction, stroke, fatal arrhythmias. Patient understands and all questions were answered appropriately. Detailed Description of Procedure: Patient was brought to the operating room placed in a supine position the operating table. Patient went general anesthesia via endotracheal intubation. SCDs were placed as well as a Booker catheter. Patient received 2 g of Ancef for perioperative antibiotics. A timeout was performed to confirm the patient, procedure, allergies and all the concerns.   The abdomen was then prepped with ChloraPrep and draped in the usual sterile fashion. A Betadine soaked gauze was placed over the colostomy and covered with Nicklas Furbish. An incision was made approximately 2 mm from the edge of the colostomy circumferentially. Further excision was carried down using Bovie electrocautery taking care to avoid injuring the colon. Circumferential dissection was completed with blunt dissection and electrocautery. There was a large defect within the fascia at the level of the colostomy. Once the transverse colon was free of all adhesions, a mesenteric window was created in the proximal as well as distal portion of the loop colostomy. The colon was then transected at the proximal and distal end and the colostomy was passed off for specimen. We identified the tenia coli on both ends of the transverse colon and dissected the fat. The staple line at the antimesenteric border was removed at the corner on both ends and a 75 KATHLEEN stapler was then used to create the anastomosis at the tinea. There was no obvious bleeding at the staple line. The common channel was then closed using a TA stapler. The staple line at the anastomosis was then imbricated using 3-0 silk suture. The mesenteric window was then closed in a figure-of-eight fashion. Hemostasis was achieved. The transverse colon was then placed back into the abdominal cavity. The fascia was then closed using 2 0-looped PDS sutures in a running fashion with several 0 Vicryl sutures in an interrupted fashion. The subcutaneous tissue was then irrigated using normal saline solution. The colostomy incisional wound was then closed using 0 Vicryl suture in a pursestring fashion. The colostomy site was then packed using 1 inch iodoform packing and dressed with fluff, ABD and silk tape. Abdominal binder was placed. This concluded the procedure. All sponge, needle and instrument counts were correct in the case.   Patient was extubated without difficulty and transferred to PACU in stable condition. Dr. Prakash Moreau was present for the entire procedure.     Electronically signed by Anneliese Lindquist DO on 7/28/2022 at 5:58 PM

## 2022-07-29 LAB
ABSOLUTE EOS #: 0.35 K/UL (ref 0–0.44)
ABSOLUTE IMMATURE GRANULOCYTE: 0.05 K/UL (ref 0–0.3)
ABSOLUTE LYMPH #: 1.25 K/UL (ref 1.1–3.7)
ABSOLUTE MONO #: 1.11 K/UL (ref 0.1–1.2)
ANION GAP SERPL CALCULATED.3IONS-SCNC: 14 MMOL/L (ref 9–17)
BASOPHILS # BLD: 0 % (ref 0–2)
BASOPHILS ABSOLUTE: 0.05 K/UL (ref 0–0.2)
BUN BLDV-MCNC: 19 MG/DL (ref 6–20)
CALCIUM SERPL-MCNC: 8.3 MG/DL (ref 8.6–10.4)
CHLORIDE BLD-SCNC: 96 MMOL/L (ref 98–107)
CO2: 22 MMOL/L (ref 20–31)
CREAT SERPL-MCNC: 1.77 MG/DL (ref 0.5–0.9)
EOSINOPHILS RELATIVE PERCENT: 3 % (ref 1–4)
GFR AFRICAN AMERICAN: 38 ML/MIN
GFR NON-AFRICAN AMERICAN: 31 ML/MIN
GFR SERPL CREATININE-BSD FRML MDRD: ABNORMAL ML/MIN/{1.73_M2}
GLUCOSE BLD-MCNC: 110 MG/DL (ref 70–99)
GLUCOSE BLD-MCNC: 114 MG/DL (ref 65–105)
GLUCOSE BLD-MCNC: 131 MG/DL (ref 65–105)
GLUCOSE BLD-MCNC: 135 MG/DL (ref 65–105)
GLUCOSE BLD-MCNC: 161 MG/DL (ref 65–105)
GLUCOSE BLD-MCNC: 185 MG/DL (ref 65–105)
GLUCOSE BLD-MCNC: 244 MG/DL (ref 65–105)
GLUCOSE BLD-MCNC: 83 MG/DL (ref 65–105)
HCT VFR BLD CALC: 35.5 % (ref 36.3–47.1)
HEMOGLOBIN: 11.9 G/DL (ref 11.9–15.1)
IMMATURE GRANULOCYTES: 0 %
LYMPHOCYTES # BLD: 10 % (ref 24–43)
MCH RBC QN AUTO: 28.1 PG (ref 25.2–33.5)
MCHC RBC AUTO-ENTMCNC: 33.5 G/DL (ref 28.4–34.8)
MCV RBC AUTO: 83.9 FL (ref 82.6–102.9)
MONOCYTES # BLD: 9 % (ref 3–12)
NRBC AUTOMATED: 0 PER 100 WBC
PDW BLD-RTO: 16 % (ref 11.8–14.4)
PLATELET # BLD: 200 K/UL (ref 138–453)
PMV BLD AUTO: 10.3 FL (ref 8.1–13.5)
POTASSIUM SERPL-SCNC: 3.7 MMOL/L (ref 3.7–5.3)
RBC # BLD: 4.23 M/UL (ref 3.95–5.11)
RBC # BLD: ABNORMAL 10*6/UL
SEG NEUTROPHILS: 78 % (ref 36–65)
SEGMENTED NEUTROPHILS ABSOLUTE COUNT: 10.04 K/UL (ref 1.5–8.1)
SODIUM BLD-SCNC: 132 MMOL/L (ref 135–144)
WBC # BLD: 12.9 K/UL (ref 3.5–11.3)

## 2022-07-29 PROCEDURE — 6360000002 HC RX W HCPCS: Performed by: STUDENT IN AN ORGANIZED HEALTH CARE EDUCATION/TRAINING PROGRAM

## 2022-07-29 PROCEDURE — 2500000003 HC RX 250 WO HCPCS: Performed by: STUDENT IN AN ORGANIZED HEALTH CARE EDUCATION/TRAINING PROGRAM

## 2022-07-29 PROCEDURE — 80048 BASIC METABOLIC PNL TOTAL CA: CPT

## 2022-07-29 PROCEDURE — 6370000000 HC RX 637 (ALT 250 FOR IP): Performed by: STUDENT IN AN ORGANIZED HEALTH CARE EDUCATION/TRAINING PROGRAM

## 2022-07-29 PROCEDURE — 85025 COMPLETE CBC W/AUTO DIFF WBC: CPT

## 2022-07-29 PROCEDURE — 2060000000 HC ICU INTERMEDIATE R&B

## 2022-07-29 PROCEDURE — 88304 TISSUE EXAM BY PATHOLOGIST: CPT

## 2022-07-29 PROCEDURE — 82947 ASSAY GLUCOSE BLOOD QUANT: CPT

## 2022-07-29 PROCEDURE — 2580000003 HC RX 258: Performed by: STUDENT IN AN ORGANIZED HEALTH CARE EDUCATION/TRAINING PROGRAM

## 2022-07-29 PROCEDURE — 36415 COLL VENOUS BLD VENIPUNCTURE: CPT

## 2022-07-29 RX ORDER — SODIUM CHLORIDE, SODIUM LACTATE, POTASSIUM CHLORIDE, CALCIUM CHLORIDE 600; 310; 30; 20 MG/100ML; MG/100ML; MG/100ML; MG/100ML
INJECTION, SOLUTION INTRAVENOUS CONTINUOUS
Status: DISCONTINUED | OUTPATIENT
Start: 2022-07-29 | End: 2022-07-29

## 2022-07-29 RX ORDER — METHOCARBAMOL 750 MG/1
750 TABLET, FILM COATED ORAL EVERY 6 HOURS
Qty: 40 TABLET | Refills: 0 | Status: SHIPPED | OUTPATIENT
Start: 2022-07-29 | End: 2022-07-31 | Stop reason: HOSPADM

## 2022-07-29 RX ORDER — HEPARIN SODIUM 5000 [USP'U]/ML
5000 INJECTION, SOLUTION INTRAVENOUS; SUBCUTANEOUS EVERY 8 HOURS SCHEDULED
Status: DISCONTINUED | OUTPATIENT
Start: 2022-07-29 | End: 2022-07-31 | Stop reason: HOSPADM

## 2022-07-29 RX ORDER — DEXTROSE, SODIUM CHLORIDE, AND POTASSIUM CHLORIDE 5; .45; .15 G/100ML; G/100ML; G/100ML
INJECTION INTRAVENOUS CONTINUOUS
Status: DISCONTINUED | OUTPATIENT
Start: 2022-07-29 | End: 2022-07-29

## 2022-07-29 RX ORDER — TIZANIDINE 2 MG/1
2 TABLET ORAL EVERY 6 HOURS PRN
Qty: 10 TABLET | Refills: 0 | Status: SHIPPED | OUTPATIENT
Start: 2022-07-29 | End: 2022-08-28

## 2022-07-29 RX ORDER — OXYCODONE HYDROCHLORIDE 5 MG/1
5 TABLET ORAL EVERY 6 HOURS PRN
Qty: 28 TABLET | Refills: 0 | Status: SHIPPED | OUTPATIENT
Start: 2022-07-29 | End: 2022-08-05

## 2022-07-29 RX ORDER — GABAPENTIN 300 MG/1
300 CAPSULE ORAL EVERY 8 HOURS
Qty: 30 CAPSULE | Refills: 0 | Status: SHIPPED | OUTPATIENT
Start: 2022-07-29 | End: 2022-07-31 | Stop reason: HOSPADM

## 2022-07-29 RX ADMIN — HEPARIN SODIUM 5000 UNITS: 5000 INJECTION INTRAVENOUS; SUBCUTANEOUS at 14:34

## 2022-07-29 RX ADMIN — ACETAMINOPHEN 1000 MG: 500 TABLET ORAL at 14:33

## 2022-07-29 RX ADMIN — GABAPENTIN 300 MG: 300 CAPSULE ORAL at 20:30

## 2022-07-29 RX ADMIN — METHOCARBAMOL TABLETS 750 MG: 750 TABLET, COATED ORAL at 18:43

## 2022-07-29 RX ADMIN — BUMETANIDE 1 MG: 1 TABLET ORAL at 08:22

## 2022-07-29 RX ADMIN — GABAPENTIN 300 MG: 300 CAPSULE ORAL at 05:54

## 2022-07-29 RX ADMIN — OLANZAPINE 10 MG: 10 TABLET, FILM COATED ORAL at 08:22

## 2022-07-29 RX ADMIN — METHOCARBAMOL TABLETS 750 MG: 750 TABLET, COATED ORAL at 14:33

## 2022-07-29 RX ADMIN — METHOCARBAMOL TABLETS 750 MG: 750 TABLET, COATED ORAL at 00:16

## 2022-07-29 RX ADMIN — HEPARIN SODIUM 5000 UNITS: 5000 INJECTION INTRAVENOUS; SUBCUTANEOUS at 08:46

## 2022-07-29 RX ADMIN — ACETAMINOPHEN 1000 MG: 500 TABLET ORAL at 20:28

## 2022-07-29 RX ADMIN — PANTOPRAZOLE SODIUM 40 MG: 40 TABLET, DELAYED RELEASE ORAL at 06:21

## 2022-07-29 RX ADMIN — SODIUM CHLORIDE, PRESERVATIVE FREE 10 ML: 5 INJECTION INTRAVENOUS at 20:31

## 2022-07-29 RX ADMIN — CARVEDILOL 25 MG: 25 TABLET, FILM COATED ORAL at 08:22

## 2022-07-29 RX ADMIN — OXYCODONE 5 MG: 5 TABLET ORAL at 08:21

## 2022-07-29 RX ADMIN — GABAPENTIN 300 MG: 300 CAPSULE ORAL at 14:33

## 2022-07-29 RX ADMIN — CARVEDILOL 25 MG: 25 TABLET, FILM COATED ORAL at 20:28

## 2022-07-29 RX ADMIN — METHOCARBAMOL TABLETS 750 MG: 750 TABLET, COATED ORAL at 06:21

## 2022-07-29 RX ADMIN — ROSUVASTATIN CALCIUM 5 MG: 5 TABLET, COATED ORAL at 08:23

## 2022-07-29 RX ADMIN — OXYCODONE 5 MG: 5 TABLET ORAL at 20:28

## 2022-07-29 RX ADMIN — DULOXETINE HYDROCHLORIDE 60 MG: 30 CAPSULE, DELAYED RELEASE ORAL at 08:22

## 2022-07-29 RX ADMIN — SODIUM CHLORIDE, POTASSIUM CHLORIDE, SODIUM LACTATE AND CALCIUM CHLORIDE: 600; 310; 30; 20 INJECTION, SOLUTION INTRAVENOUS at 03:48

## 2022-07-29 RX ADMIN — HEPARIN SODIUM 5000 UNITS: 5000 INJECTION INTRAVENOUS; SUBCUTANEOUS at 22:12

## 2022-07-29 RX ADMIN — ACETAMINOPHEN 1000 MG: 500 TABLET ORAL at 05:54

## 2022-07-29 RX ADMIN — POTASSIUM CHLORIDE, DEXTROSE MONOHYDRATE AND SODIUM CHLORIDE: 150; 5; 450 INJECTION, SOLUTION INTRAVENOUS at 08:20

## 2022-07-29 RX ADMIN — INSULIN LISPRO 10.4 UNITS: 100 INJECTION, SOLUTION INTRAVENOUS; SUBCUTANEOUS at 22:07

## 2022-07-29 RX ADMIN — OXYCODONE 5 MG: 5 TABLET ORAL at 14:33

## 2022-07-29 RX ADMIN — OXYCODONE 5 MG: 5 TABLET ORAL at 00:16

## 2022-07-29 ASSESSMENT — PAIN SCALES - GENERAL
PAINLEVEL_OUTOF10: 5
PAINLEVEL_OUTOF10: 6
PAINLEVEL_OUTOF10: 7
PAINLEVEL_OUTOF10: 6
PAINLEVEL_OUTOF10: 6
PAINLEVEL_OUTOF10: 5

## 2022-07-29 ASSESSMENT — PAIN DESCRIPTION - ORIENTATION
ORIENTATION: MID

## 2022-07-29 ASSESSMENT — PAIN DESCRIPTION - LOCATION
LOCATION: ABDOMEN

## 2022-07-29 ASSESSMENT — PAIN DESCRIPTION - DESCRIPTORS
DESCRIPTORS: SHARP;SHOOTING
DESCRIPTORS: STABBING;THROBBING
DESCRIPTORS: THROBBING;STABBING

## 2022-07-29 ASSESSMENT — PAIN DESCRIPTION - PAIN TYPE: TYPE: SURGICAL PAIN

## 2022-07-29 NOTE — PROGRESS NOTES
Physical Therapy        Physical Therapy Cancel Note      DATE: 2022    NAME: Toshia Dasilva  MRN: [id number]   : 1980      Patient not seen this date for Physical Therapy due to:    Patient is already ambulating in hallways, says she doesn't need physical therapy. Nicole perez DC PT.       Electronically signed by Malu Richardson PT on 2022 at 11:00 AM

## 2022-07-29 NOTE — PLAN OF CARE
Problem: Pain  Goal: Verbalizes/displays adequate comfort level or baseline comfort level  Outcome: Progressing     Problem: Safety - Adult  Goal: Free from fall injury  Outcome: Progressing  Flowsheets (Taken 7/28/2022 2000 by Terri Corado RN)  Free From Fall Injury: Instruct family/caregiver on patient safety

## 2022-07-29 NOTE — PROGRESS NOTES
PROGRESS NOTE      PATIENT NAME: Sushma Wilson  MEDICAL RECORD NO. [id number]  DATE: 7/29/2022  SURGEON: Dr. Jason Chaudhry: Remington Donohue, APRN - CNP    HD: # 1    ASSESSMENT    Patient Active Problem List   Diagnosis    Leonel gangrene    Uncontrolled type 2 diabetes mellitus with hyperglycemia ([facility]    MEREDITH (acute kidney injury) ([facility]    Hypokalemia    Hyponatremia    Anemia    Morbid obesity ([facility]    Type 1 diabetes mellitus with stage 3a chronic kidney disease ([facility]    Metabolic acidosis    Hypervolemia    S/P partial resection of colon       Chief Complaint: \"I'm feeling okay\"    S/p transverse loop colostomy reversal 7/28/2022    MEDICAL DECISION MAKING AND PLAN    Neuro:  Pain management-scheduled Tylenol, Robaxin, Neurontin; oxycodone 5 every 6 as needed  Home Zyprexa, Cymbalta  CV  Monitor heart rate and blood pressure  Home Leo Galloway  On room air  I-S use -at least 10 times per hour while awake  GI/Nutrition  Diet -clear liquid diet  Await return of bowel function  Prevacid at home -Protonix inpatient  Renal/lytes  Monitor urine output  Monitor BUN and creatinine -creatinine 1.77  Gentle IV fluid hydration with D5 half-normal  Encourage p.o. intake  Heme  Hemoglobin stable  Heparin 3 times daily for prophylaxis  Endocrine  History diabetes -uncontrolled blood glucose   this AM  Continue home insulin pump and cover with high-dose sliding scale insulin  Q 4 hours glucose checks until consistently less than 180  Musculoskeletal  PT/OT  Out of bed and ambulate  Up in chair for majority of day  Wound care: Postop day 2 dressing change per surgery  Family/dispo  Await return of bowel function prior to advancing diet      SUBJECTIVE    Sushma Wilson was seen and examined at bedside this morning. Afebrile, vitals normal and stable -did receive 10 labetalol for SBP 170s overnight. Patient states that she has minimal pain and controlled with medications.   Tolerated clear

## 2022-07-29 NOTE — DISCHARGE INSTR - COC
Continuity of Care Form    Patient Name: Louisa Tvoar   :  1980  MRN:  [id number]    [facility] date:  2022  Discharge date:  ***    Code Status Order: Full Code   Advance Directives:   Advance Care Flowsheet Documentation       Date/Time Healthcare Directive Type of Healthcare Directive Copy in [address] Agent's Name Healthcare Agent's Phone Number    22 [phone] No, patient does not have an advance directive for healthcare treatment -- -- -- -- --            Admitting Physician:  Cecile Ontiveros MD  PCP: ROMEO Wilkins CNP    Discharging Nurse: Luana Unit/Room#: 4391/9148-71  Discharging Unit Phone Number: ***    Emergency Contact:   Extended Emergency Contact Information  Primary Emergency Contact: Catherine Vegas  Address: [address], [address] [place] [address] Phone: [phone]  Relation: Parent  Secondary Emergency Contact: Carlos Alberto Harry  Address: [address], [address] [place] [address] Phone: [phone]  Relation: Boyfriend    Past Surgical History:  Past Surgical History:   Procedure Laterality Date    ABDOMEN SURGERY Bilateral 2022    incision and drainage of bilateral necrotizing soft tissue infection of groin and buttocks  performed by Kelle Kurtz MD at [address] 2022    LEFT GROIN, BUTTOCK  WOUND EXPLORATION AND DEBRIDEMENT,  WOUND VAC EXCHANGE, ([address] [facility]) performed by Cecile Ontiveros MD at [facility] Bilateral     [address] [place] [address]      X2   ,     CHOLECYSTECTOMY      COLONOSCOPY      COLOSTOMY N/A 2022    DEBRIDEMENT BUTTOCK AND GROIN, WOUND VAC EXCHANGE, LAPAROSCOPIC COLOSTOMY CREATION ([address]) performed by Cecile Ontiveros MD at [address] 2022    COLOSTOMY REVERSAL performed by Cecile Ontiveros MD at [address] [place] [address] TUNNELED CATHETER PLACEMENT GREATER THAN 5 YEARS  02/22/2022    IR TUNNELED CATHETER PLACEMENT GREATER THAN 5 YEARS 2/22/2022 Kim Bell MD [facility]    KNEE ARTHROSCOPY Bilateral     LUMBAR DISC SURGERY  2004    Dr. Catherine Dempsey N/A 02/08/2022    *ADD ON, ASAP* INCISION AND DRAINAGE OF PERINEUM, BRIAN KNIFE PRONE performed by Pete Johnson MD at [address] [place] [address] [place] Bilateral 02/10/2022    INCISION AND DRAINAGE BUTTOCK, GROIN  (LITHOTOMY POSITION) performed by Catarina Karimi MD at [facility] 02/11/2022    INCISION AND DRAINAGE AND DEBRIDEMENT BUTTOCK AND LABIAL ABSCESS , PLACEMENT OF WOUND VAC performed by Aracelis Jung DO at [address] [employer] N/A 07/28/2022    COLOSTOMY REVERSAL    ROTATOR CUFF REPAIR Left     SLEEVE GASTRECTOMY  08/2019    TUBAL LIGATION      ULNAR NERVE TRANSPOSITION Left     WOUND EXPLORATION Left 02/14/2022    [address] [facility] [address] OF BILATERAL GROINS AND LEFT BUTTOCK WOUND WITH PARTIAL CLOSURE AND WOUND VAC PLACEMENT performed by Mariajose Higgins MD at [place] [address]       Immunization History:   Immunization History   Administered Date(s) Administered    COVID-19, J&J, (age 18y+), IM, 0.5 mL 03/06/2021, 11/04/2021       Active Problems:  Patient Active Problem List   Diagnosis Code    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    Uncontrolled type 2 diabetes mellitus with hyperglycemia ([facility] E11.65    MEREDITH (acute kidney injury) ([facility] N17.9    Hypokalemia E87.6    Hyponatremia E87.1    Anemia D64.9    Morbid obesity (HCC) E66.01    Type 1 diabetes mellitus with stage 3a chronic kidney disease (HCC) E10.21, X34.12    Metabolic acidosis J95.0    Hypervolemia E87.70    S/P partial resection of colon Z90.49       Isolation/Infection:   Isolation            No Isolation          Patient Infection Status       None to display            Nurse Assessment:  Last Vital Signs: BP (!) 97/43   Pulse 74   Temp 98.5 °F (36.9 °C) (Oral)   Resp 17   Ht 5' 3\" (1.6 m)   Wt 280 lb (127 kg)   LMP 07/18/2022 (Exact Date) Comment: tubal ligation  SpO2 93%   BMI 49.60 kg/m²     Last documented pain score (0-10 scale): Pain Level: 6  Last Weight:   Wt Readings from Last 1 Encounters:   07/28/22 280 lb (127 kg)     Mental Status:  oriented and alert    IV Access:  - None    Nursing Mobility/ADLs:  Walking   Independent  Transfer  Independent  Bathing  Independent  Dressing  Independent  Toileting  Independent  Feeding  Independent  Med Admin  Independent  Med Delivery   whole    Wound Care Documentation and Therapy:  Negative Pressure Wound Therapy Buttocks Left (Active)   Number of days: 161       Incision 07/28/22 Abdomen Medial (Active)   Dressing Status Clean;Dry; Intact 07/29/22 0820   Dressing/Treatment ABD pad;Tape/Soft cloth adhesive tape 07/28/22 1415   Closure Other (Comment) 07/28/22 1415   Incision Assessment Other (Comment) 07/29/22 0820   Drainage Amount None 07/29/22 0820   Odor None 07/28/22 1415   Vilma-incision Assessment Other (Comment) 07/29/22 0820   Number of days: 1       Incision 02/09/22 Buttocks Left (Active)   Number of days: 170       Incision 02/16/22 Buttocks Left (Active)   Number of days: 163        Elimination:  Continence: Bowel: Yes  Bladder: Yes  Urinary Catheter: None   Colostomy/Ileostomy/Ileal Conduit: No       Date of Last BM: 07/31    Intake/Output Summary (Last 24 hours) at 7/29/2022 1530  Last data filed at 7/29/2022 0940  Gross per 24 hour   Intake 1623.36 ml   Output --   Net 1623.36 ml     I/O last 3 completed shifts: In: 1600 [I.V.:1600]  Out: 125 [Urine:100; Blood:25]    Safety Concerns:     None    Impairments/Disabilities:      None    Nutrition Therapy:  Current Nutrition Therapy:   General diet    Routes of Feeding: Oral  Liquids:  Thin Liquids  Daily Fluid Restriction: no  Last Modified Barium Swallow with Video (Video Swallowing Test): not done    Treatments at the Time of Hospital Discharge:   Respiratory Treatments:

## 2022-07-29 NOTE — PROGRESS NOTES
[facility])  Occupational Therapy Not Seen Note    DATE: 2022    NAME: Louisa Tovar  MRN: [id number]   : 1980      Patient not seen this date for Occupational Therapy due to:    Per patient report, patient independent with ADLs and functional tasks with no acute OT needs. Will defer OT evaluation at this time. Please reorder OT if future needs arise.        Electronically signed by MAURICE Best/OT on 2022 at 11:28 AM

## 2022-07-29 NOTE — DISCHARGE INSTRUCTIONS
Discharge Instructions for General Surgery    No lifting above 10Ibs for next 6-8 weeks. No soaking in bathtubs or submerging yourself in water for 4 weeks  Resume activity as tolerated, No operating heavy equipment while using narcotics Wash incision gently with soap and water, pat dry. Pack your incision every day with 1 inch iodoform or plain packing after washing. You do not need to fill the entire cavity but enough to leave a small tail above the skin    F/u in trauma/acute care surgery clinic in 1-2 weeks Call your doctor for the following:  Chills  Temperature greater than 101  Pain that is not tolerable despite taking pain medicine as ordered There is increased swelling, redness or warmth at surgical site There is increased drainage or bleeding from surgical site    Recommended diet:  low fiber  Depending on your injuries, your doctor may want you to follow a specific diet. Some pain medicine can cause constipation . To avoid this problem:  Drink plenty of fluids especially with your creatinine elevated. If you are still having problems, talk to your doctor about using laxatives or stool softeners. General questions or concerns may be called to the nurse line at [phone] and please leave a message.

## 2022-07-29 NOTE — CARE COORDINATION
07/29/22 0718   Service Assessment   Patient Orientation Alert and Oriented   Cognition Alert   History Provided By Patient   Primary Caregiver Self   Support Systems Spouse/Significant Other   PCP Verified by CM Yes   Last Visit to PCP Within last year   Prior Functional Level Independent in ADLs/IADLs   Current Functional Level Independent in ADLs/IADLs   Can patient return to prior living arrangement Yes   Ability to make needs known: Good   Family able to assist with home care needs: Yes   Financial Resources Medicare   Social/Functional History   Lives With Significant other;Family   Type of [address] One level   Home Access Level entry   Bathroom Shower/Tub Tub/Shower unit   Bathroom Toilet Standard   Bathroom Equipment Grab bars in [facility];Walker, standard; [address] Help From Family;Friend(s)   ADL Assistance Independent   Homemaking Assistance Independent   Ambulation Assistance Independent   Transfer Assistance Independent   Active  Yes   Mode of Transportation Car   Occupation Unemployed   Discharge Planning   Type of [place] Spouse/Significant Other;Children   Current Services Prior To Admission Home Care  (has [address])   DME Ordered? No   Type of Home Care Services Nursing Services  (wound care)   Patient expects to be discharged to: House   History of falls? 0   Services At/After Discharge   Transition of Care Consult (CM Consult) [facility]   Mode of Transport at Discharge Self   Confirm Follow Up Transport Family   Condition of Participation: Discharge Planning   The Plan for Transition of Care is related to the following treatment goals: wound and infection management   The Patient and/or Patient Representative was provided with a Choice of Provider? Patient   The Patient and/Or Patient Representative agree with the Discharge Plan?  Yes       Home with [facility] - current for wound care.  Has transportation home, has DME

## 2022-07-29 NOTE — CARE COORDINATION
General Surgery Care Coordination:     Met with the patient at the bedside. Plan for home with established home care. Will clarifying with GS the wound care for packing.

## 2022-07-30 LAB
ABSOLUTE EOS #: 0.45 K/UL (ref 0–0.44)
ABSOLUTE IMMATURE GRANULOCYTE: 0.08 K/UL (ref 0–0.3)
ABSOLUTE LYMPH #: 1.73 K/UL (ref 1.1–3.7)
ABSOLUTE MONO #: 1.26 K/UL (ref 0.1–1.2)
ANION GAP SERPL CALCULATED.3IONS-SCNC: 13 MMOL/L (ref 9–17)
ANION GAP SERPL CALCULATED.3IONS-SCNC: 15 MMOL/L (ref 9–17)
BASOPHILS # BLD: 0 % (ref 0–2)
BASOPHILS ABSOLUTE: 0.05 K/UL (ref 0–0.2)
BUN BLDV-MCNC: 21 MG/DL (ref 6–20)
BUN BLDV-MCNC: 24 MG/DL (ref 6–20)
CALCIUM SERPL-MCNC: 7.6 MG/DL (ref 8.6–10.4)
CALCIUM SERPL-MCNC: 8 MG/DL (ref 8.6–10.4)
CHLORIDE BLD-SCNC: 94 MMOL/L (ref 98–107)
CHLORIDE BLD-SCNC: 94 MMOL/L (ref 98–107)
CO2: 22 MMOL/L (ref 20–31)
CO2: 22 MMOL/L (ref 20–31)
CREAT SERPL-MCNC: 2.12 MG/DL (ref 0.5–0.9)
CREAT SERPL-MCNC: 2.25 MG/DL (ref 0.5–0.9)
EOSINOPHILS RELATIVE PERCENT: 4 % (ref 1–4)
GFR AFRICAN AMERICAN: 29 ML/MIN
GFR AFRICAN AMERICAN: 31 ML/MIN
GFR NON-AFRICAN AMERICAN: 24 ML/MIN
GFR NON-AFRICAN AMERICAN: 26 ML/MIN
GFR SERPL CREATININE-BSD FRML MDRD: ABNORMAL ML/MIN/{1.73_M2}
GFR SERPL CREATININE-BSD FRML MDRD: ABNORMAL ML/MIN/{1.73_M2}
GLUCOSE BLD-MCNC: 116 MG/DL (ref 65–105)
GLUCOSE BLD-MCNC: 141 MG/DL (ref 65–105)
GLUCOSE BLD-MCNC: 143 MG/DL (ref 65–105)
GLUCOSE BLD-MCNC: 149 MG/DL (ref 65–105)
GLUCOSE BLD-MCNC: 158 MG/DL (ref 70–99)
GLUCOSE BLD-MCNC: 160 MG/DL (ref 70–99)
GLUCOSE BLD-MCNC: 166 MG/DL (ref 65–105)
GLUCOSE BLD-MCNC: 168 MG/DL (ref 65–105)
GLUCOSE BLD-MCNC: 86 MG/DL (ref 65–105)
HCT VFR BLD CALC: 35.7 % (ref 36.3–47.1)
HEMOGLOBIN: 11.7 G/DL (ref 11.9–15.1)
IMMATURE GRANULOCYTES: 1 %
LYMPHOCYTES # BLD: 14 % (ref 24–43)
MCH RBC QN AUTO: 27.9 PG (ref 25.2–33.5)
MCHC RBC AUTO-ENTMCNC: 32.8 G/DL (ref 28.4–34.8)
MCV RBC AUTO: 85 FL (ref 82.6–102.9)
MONOCYTES # BLD: 11 % (ref 3–12)
NRBC AUTOMATED: 0 PER 100 WBC
PDW BLD-RTO: 16.2 % (ref 11.8–14.4)
PLATELET # BLD: 180 K/UL (ref 138–453)
PMV BLD AUTO: 10.2 FL (ref 8.1–13.5)
POTASSIUM SERPL-SCNC: 3.1 MMOL/L (ref 3.7–5.3)
POTASSIUM SERPL-SCNC: 3.8 MMOL/L (ref 3.7–5.3)
RBC # BLD: 4.2 M/UL (ref 3.95–5.11)
RBC # BLD: ABNORMAL 10*6/UL
SEG NEUTROPHILS: 70 % (ref 36–65)
SEGMENTED NEUTROPHILS ABSOLUTE COUNT: 8.47 K/UL (ref 1.5–8.1)
SODIUM BLD-SCNC: 129 MMOL/L (ref 135–144)
SODIUM BLD-SCNC: 131 MMOL/L (ref 135–144)
WBC # BLD: 12 K/UL (ref 3.5–11.3)

## 2022-07-30 PROCEDURE — 85025 COMPLETE CBC W/AUTO DIFF WBC: CPT

## 2022-07-30 PROCEDURE — 2060000000 HC ICU INTERMEDIATE R&B

## 2022-07-30 PROCEDURE — 2580000003 HC RX 258: Performed by: STUDENT IN AN ORGANIZED HEALTH CARE EDUCATION/TRAINING PROGRAM

## 2022-07-30 PROCEDURE — 80048 BASIC METABOLIC PNL TOTAL CA: CPT

## 2022-07-30 PROCEDURE — 82947 ASSAY GLUCOSE BLOOD QUANT: CPT

## 2022-07-30 PROCEDURE — 36415 COLL VENOUS BLD VENIPUNCTURE: CPT

## 2022-07-30 PROCEDURE — 6360000002 HC RX W HCPCS: Performed by: STUDENT IN AN ORGANIZED HEALTH CARE EDUCATION/TRAINING PROGRAM

## 2022-07-30 PROCEDURE — 6370000000 HC RX 637 (ALT 250 FOR IP): Performed by: STUDENT IN AN ORGANIZED HEALTH CARE EDUCATION/TRAINING PROGRAM

## 2022-07-30 RX ORDER — SODIUM CHLORIDE, SODIUM LACTATE, POTASSIUM CHLORIDE, AND CALCIUM CHLORIDE .6; .31; .03; .02 G/100ML; G/100ML; G/100ML; G/100ML
500 INJECTION, SOLUTION INTRAVENOUS ONCE
Status: COMPLETED | OUTPATIENT
Start: 2022-07-30 | End: 2022-07-30

## 2022-07-30 RX ORDER — SODIUM CHLORIDE, SODIUM LACTATE, POTASSIUM CHLORIDE, CALCIUM CHLORIDE 600; 310; 30; 20 MG/100ML; MG/100ML; MG/100ML; MG/100ML
INJECTION, SOLUTION INTRAVENOUS CONTINUOUS
Status: DISCONTINUED | OUTPATIENT
Start: 2022-07-30 | End: 2022-07-31 | Stop reason: HOSPADM

## 2022-07-30 RX ORDER — GABAPENTIN 300 MG/1
300 CAPSULE ORAL 2 TIMES DAILY
Status: DISCONTINUED | OUTPATIENT
Start: 2022-07-30 | End: 2022-07-30

## 2022-07-30 RX ADMIN — GABAPENTIN 300 MG: 300 CAPSULE ORAL at 05:18

## 2022-07-30 RX ADMIN — ROSUVASTATIN CALCIUM 5 MG: 5 TABLET, COATED ORAL at 08:40

## 2022-07-30 RX ADMIN — OXYCODONE 5 MG: 5 TABLET ORAL at 02:11

## 2022-07-30 RX ADMIN — METHOCARBAMOL TABLETS 750 MG: 750 TABLET, COATED ORAL at 06:04

## 2022-07-30 RX ADMIN — OXYCODONE 5 MG: 5 TABLET ORAL at 22:53

## 2022-07-30 RX ADMIN — HEPARIN SODIUM 5000 UNITS: 5000 INJECTION INTRAVENOUS; SUBCUTANEOUS at 13:27

## 2022-07-30 RX ADMIN — ACETAMINOPHEN 1000 MG: 500 TABLET ORAL at 22:53

## 2022-07-30 RX ADMIN — INSULIN LISPRO 8.4 UNITS: 100 INJECTION, SOLUTION INTRAVENOUS; SUBCUTANEOUS at 06:07

## 2022-07-30 RX ADMIN — ACETAMINOPHEN 1000 MG: 500 TABLET ORAL at 05:18

## 2022-07-30 RX ADMIN — SODIUM CHLORIDE, PRESERVATIVE FREE 10 ML: 5 INJECTION INTRAVENOUS at 22:55

## 2022-07-30 RX ADMIN — HEPARIN SODIUM 5000 UNITS: 5000 INJECTION INTRAVENOUS; SUBCUTANEOUS at 22:54

## 2022-07-30 RX ADMIN — POTASSIUM BICARBONATE 40 MEQ: 782 TABLET, EFFERVESCENT ORAL at 16:31

## 2022-07-30 RX ADMIN — PANTOPRAZOLE SODIUM 40 MG: 40 TABLET, DELAYED RELEASE ORAL at 05:18

## 2022-07-30 RX ADMIN — SODIUM CHLORIDE, PRESERVATIVE FREE 10 ML: 5 INJECTION INTRAVENOUS at 08:40

## 2022-07-30 RX ADMIN — METHOCARBAMOL TABLETS 750 MG: 750 TABLET, COATED ORAL at 18:34

## 2022-07-30 RX ADMIN — HEPARIN SODIUM 5000 UNITS: 5000 INJECTION INTRAVENOUS; SUBCUTANEOUS at 06:05

## 2022-07-30 RX ADMIN — CARVEDILOL 25 MG: 25 TABLET, FILM COATED ORAL at 08:40

## 2022-07-30 RX ADMIN — CARVEDILOL 25 MG: 25 TABLET, FILM COATED ORAL at 22:54

## 2022-07-30 RX ADMIN — BUMETANIDE 1 MG: 1 TABLET ORAL at 08:40

## 2022-07-30 RX ADMIN — DULOXETINE HYDROCHLORIDE 60 MG: 30 CAPSULE, DELAYED RELEASE ORAL at 08:40

## 2022-07-30 RX ADMIN — METHOCARBAMOL TABLETS 750 MG: 750 TABLET, COATED ORAL at 13:27

## 2022-07-30 RX ADMIN — SODIUM CHLORIDE, POTASSIUM CHLORIDE, SODIUM LACTATE AND CALCIUM CHLORIDE 500 ML: 600; 310; 30; 20 INJECTION, SOLUTION INTRAVENOUS at 12:25

## 2022-07-30 RX ADMIN — METHOCARBAMOL TABLETS 750 MG: 750 TABLET, COATED ORAL at 00:30

## 2022-07-30 RX ADMIN — ACETAMINOPHEN 1000 MG: 500 TABLET ORAL at 13:27

## 2022-07-30 RX ADMIN — SODIUM CHLORIDE, POTASSIUM CHLORIDE, SODIUM LACTATE AND CALCIUM CHLORIDE: 600; 310; 30; 20 INJECTION, SOLUTION INTRAVENOUS at 16:27

## 2022-07-30 RX ADMIN — OLANZAPINE 10 MG: 10 TABLET, FILM COATED ORAL at 08:40

## 2022-07-30 ASSESSMENT — PAIN DESCRIPTION - DESCRIPTORS
DESCRIPTORS: ACHING
DESCRIPTORS: ACHING
DESCRIPTORS: SHARP
DESCRIPTORS: SHARP

## 2022-07-30 ASSESSMENT — PAIN SCALES - GENERAL
PAINLEVEL_OUTOF10: 7
PAINLEVEL_OUTOF10: 6
PAINLEVEL_OUTOF10: 7
PAINLEVEL_OUTOF10: 7
PAINLEVEL_OUTOF10: 6
PAINLEVEL_OUTOF10: 0

## 2022-07-30 ASSESSMENT — PAIN DESCRIPTION - ORIENTATION
ORIENTATION: MID

## 2022-07-30 ASSESSMENT — PAIN DESCRIPTION - LOCATION
LOCATION: ABDOMEN

## 2022-07-30 NOTE — PROGRESS NOTES
PROGRESS NOTE      PATIENT NAME: Manuel Blanchard  MEDICAL RECORD NO. [id number]  DATE: 2022  SURGEON: Dr. Sarah Iqbal: Daisy Cee, APRN - CNP    HD: # 2    ASSESSMENT    Patient Active Problem List   Diagnosis    Leonel gangrene    Uncontrolled type 2 diabetes mellitus with hyperglycemia ([facility]    MEREDITH (acute kidney injury) ([facility]    Hypokalemia    Hyponatremia    Anemia    Morbid obesity ([facility]    Type 1 diabetes mellitus with stage 3a chronic kidney disease ([facility]    Metabolic acidosis    Hypervolemia    S/P partial resection of colon       Chief Complaint: \"I'm feeling a little sore\"    S/p transverse loop colostomy reversal 2022    MEDICAL DECISION MAKING AND PLAN    Neuro:  Pain management-scheduled Tylenol, Robaxin, Neurontin; oxycodone 5 every 6 as needed  Home Zyprexa, Cymbalta  CV  Monitor heart rate and blood pressure  Home Leo Galloway  On room air  I-S use -at least 10 times per hour while awake  GI/Nutrition  Diet - low fiber diet  Await return of bowel function  Prevacid at home -Protonix inpatient  Renal/lytes  Monitor urine output  Monitor BUN and creatinine -creatinine 2.1  Encourage p.o. intake  Heme  Hemoglobin stable  Heparin 3 times daily for prophylaxis  Endocrine  History diabetes -uncontrolled blood glucose  BG controlled  Continue home insulin pump and cover with high-dose sliding scale insulin  Q 4 hours glucose checks until consistently less than 180  Musculoskeletal  PT/OT  Out of bed and ambulate  Up in chair for majority of day  Wound care: BID packing changes to ostomy wound, cover with ABD and light tape  Family/dispo  Discharge home today      SUBJECTIVE    Manuel Blanchard was seen and examined at bedside this morning. Afebrile, vitals normal and stable. Tolerated low fiber diet last night. No complaints noted. Denies n/v. Voiding.  Passed BM x2    OBJECTIVE  VITALS: Temp: Temp: 99.7 °F (37.6 °C)Temp  Av.8 °F (37.1 °C)  Min: 96.9 °F (36.1 °C) Max: 100.4 °F (38 °C) BP Systolic (50RTN), IKA:251 , Min:97 , MW   Diastolic (22ZEY), VQO:34, Min:43, Max:75   Pulse Pulse  Av.4  Min: 74  Max: 106 Resp Resp  Av.4  Min: 13  Max: 24 Pulse ox SpO2  Av.4 %  Min: 91 %  Max: 100 %    GENERAL: alert, no distress  NEURO: CNII-XII grossly intact; moving all extremities  LUNGS: normal effort; symmetric rise and fall of chest wall  HEART: regular rate and rhythm  ABDOMEN: soft, nondistended, tender to palpation around incision; no guarding, rebound or rigidity. Ostomy wound clean and dry, packing removed, fascia intact  EXTREMITY: no cyanosis, clubbing or edema    I/O last 3 completed shifts: In: 2123.4 [P.O.:740; I.V.:1383.4]  Out: -     Drain/tube output:  In: 2123.4 [P.O.:740; I.V.:1383.4]  Out: -     LAB:  CBC:   Recent Labs     22  1823 22  0553 22  0443   WBC 12.8* 12.9* 12.0*   HGB 13.1 11.9 11.7*   HCT 41.4 35.5* 35.7*   MCV 85.0 83.9 85.0    200 180     BMP:   Recent Labs     22  1823 22  0553 22  0443   * 132* 131*   K 4.3 3.7 3.8   CL 93* 96* 94*   CO2 24 22 22   BUN 19 19 21*   CREATININE 1.54* 1.77* 2.12*   GLUCOSE 307* 110* 160*     COAGS: No results for input(s): APTT, PROT, INR in the last 72 hours. RADIOLOGY:  No new imaging to review      Johnny Sullivan DO  2022, 7:07 AM        Trauma Attending Scott Nelson      I have reviewed the above [facility] note(s) and confirmed the key elements of the medical history and physical exam. I have seen and examined the pt. I have discussed the findings, established the care plan and recommendations with Resident.   Fluid challenge with Cr elevated   Overall doing quite well     [place] DO  2022  10:30 PM

## 2022-07-30 NOTE — CARE COORDINATION
Trauma Care Coordination:     Patient sleeping during the time of my visit. Plan for home with [facility].. HC order enter with updated dressing details.

## 2022-07-30 NOTE — PROGRESS NOTES
Writer was notified the prescription for Robaxin that was sent electronically to [address] in [place] is not covered. Tizanidine is preferred. Dr. Kathy Thorpe, surgery resident, notified via secure message. Per Dr. Merlene Ovalle, the order is in.

## 2022-07-31 VITALS
WEIGHT: 280 LBS | OXYGEN SATURATION: 96 % | HEIGHT: 63 IN | BODY MASS INDEX: 49.61 KG/M2 | TEMPERATURE: 97 F | SYSTOLIC BLOOD PRESSURE: 140 MMHG | HEART RATE: 83 BPM | RESPIRATION RATE: 16 BRPM | DIASTOLIC BLOOD PRESSURE: 70 MMHG

## 2022-07-31 LAB
ABSOLUTE EOS #: 0.26 K/UL (ref 0–0.44)
ABSOLUTE IMMATURE GRANULOCYTE: 0.04 K/UL (ref 0–0.3)
ABSOLUTE LYMPH #: 1.17 K/UL (ref 1.1–3.7)
ABSOLUTE MONO #: 0.74 K/UL (ref 0.1–1.2)
ANION GAP SERPL CALCULATED.3IONS-SCNC: 13 MMOL/L (ref 9–17)
BASOPHILS # BLD: 0 % (ref 0–2)
BASOPHILS ABSOLUTE: 0.03 K/UL (ref 0–0.2)
BUN BLDV-MCNC: 25 MG/DL (ref 6–20)
CALCIUM SERPL-MCNC: 7.4 MG/DL (ref 8.6–10.4)
CHLORIDE BLD-SCNC: 96 MMOL/L (ref 98–107)
CO2: 22 MMOL/L (ref 20–31)
CREAT SERPL-MCNC: 2.23 MG/DL (ref 0.5–0.9)
EOSINOPHILS RELATIVE PERCENT: 3 % (ref 1–4)
GFR AFRICAN AMERICAN: 29 ML/MIN
GFR NON-AFRICAN AMERICAN: 24 ML/MIN
GFR SERPL CREATININE-BSD FRML MDRD: ABNORMAL ML/MIN/{1.73_M2}
GLUCOSE BLD-MCNC: 193 MG/DL (ref 65–105)
GLUCOSE BLD-MCNC: 212 MG/DL (ref 65–105)
GLUCOSE BLD-MCNC: 221 MG/DL (ref 70–99)
GLUCOSE BLD-MCNC: 247 MG/DL (ref 65–105)
HCT VFR BLD CALC: 30.7 % (ref 36.3–47.1)
HEMOGLOBIN: 10.3 G/DL (ref 11.9–15.1)
IMMATURE GRANULOCYTES: 1 %
LYMPHOCYTES # BLD: 14 % (ref 24–43)
MCH RBC QN AUTO: 28.5 PG (ref 25.2–33.5)
MCHC RBC AUTO-ENTMCNC: 33.6 G/DL (ref 28.4–34.8)
MCV RBC AUTO: 84.8 FL (ref 82.6–102.9)
MONOCYTES # BLD: 9 % (ref 3–12)
NRBC AUTOMATED: 0 PER 100 WBC
PDW BLD-RTO: 16.4 % (ref 11.8–14.4)
PLATELET # BLD: 173 K/UL (ref 138–453)
PMV BLD AUTO: 10.7 FL (ref 8.1–13.5)
POTASSIUM SERPL-SCNC: 3.7 MMOL/L (ref 3.7–5.3)
RBC # BLD: 3.62 M/UL (ref 3.95–5.11)
RBC # BLD: ABNORMAL 10*6/UL
SEG NEUTROPHILS: 73 % (ref 36–65)
SEGMENTED NEUTROPHILS ABSOLUTE COUNT: 5.99 K/UL (ref 1.5–8.1)
SODIUM BLD-SCNC: 131 MMOL/L (ref 135–144)
WBC # BLD: 8.2 K/UL (ref 3.5–11.3)

## 2022-07-31 PROCEDURE — 6370000000 HC RX 637 (ALT 250 FOR IP): Performed by: STUDENT IN AN ORGANIZED HEALTH CARE EDUCATION/TRAINING PROGRAM

## 2022-07-31 PROCEDURE — 80048 BASIC METABOLIC PNL TOTAL CA: CPT

## 2022-07-31 PROCEDURE — 6370000000 HC RX 637 (ALT 250 FOR IP)

## 2022-07-31 PROCEDURE — 36415 COLL VENOUS BLD VENIPUNCTURE: CPT

## 2022-07-31 PROCEDURE — 82947 ASSAY GLUCOSE BLOOD QUANT: CPT

## 2022-07-31 PROCEDURE — 2580000003 HC RX 258: Performed by: STUDENT IN AN ORGANIZED HEALTH CARE EDUCATION/TRAINING PROGRAM

## 2022-07-31 PROCEDURE — 6360000002 HC RX W HCPCS: Performed by: STUDENT IN AN ORGANIZED HEALTH CARE EDUCATION/TRAINING PROGRAM

## 2022-07-31 PROCEDURE — 85025 COMPLETE CBC W/AUTO DIFF WBC: CPT

## 2022-07-31 RX ADMIN — ROSUVASTATIN CALCIUM 5 MG: 5 TABLET, COATED ORAL at 08:25

## 2022-07-31 RX ADMIN — METHOCARBAMOL TABLETS 750 MG: 750 TABLET, COATED ORAL at 06:45

## 2022-07-31 RX ADMIN — ACETAMINOPHEN 1000 MG: 500 TABLET ORAL at 06:33

## 2022-07-31 RX ADMIN — BUMETANIDE 1 MG: 1 TABLET ORAL at 08:26

## 2022-07-31 RX ADMIN — DULOXETINE HYDROCHLORIDE 60 MG: 30 CAPSULE, DELAYED RELEASE ORAL at 08:26

## 2022-07-31 RX ADMIN — CARVEDILOL 25 MG: 25 TABLET, FILM COATED ORAL at 08:26

## 2022-07-31 RX ADMIN — ACETAMINOPHEN 1000 MG: 500 TABLET ORAL at 14:01

## 2022-07-31 RX ADMIN — POTASSIUM BICARBONATE 40 MEQ: 782 TABLET, EFFERVESCENT ORAL at 00:45

## 2022-07-31 RX ADMIN — HEPARIN SODIUM 5000 UNITS: 5000 INJECTION INTRAVENOUS; SUBCUTANEOUS at 06:34

## 2022-07-31 RX ADMIN — OXYCODONE 5 MG: 5 TABLET ORAL at 06:34

## 2022-07-31 RX ADMIN — METHOCARBAMOL TABLETS 750 MG: 750 TABLET, COATED ORAL at 00:50

## 2022-07-31 RX ADMIN — OXYCODONE 5 MG: 5 TABLET ORAL at 14:01

## 2022-07-31 RX ADMIN — OLANZAPINE 10 MG: 10 TABLET, FILM COATED ORAL at 08:26

## 2022-07-31 RX ADMIN — SODIUM CHLORIDE, POTASSIUM CHLORIDE, SODIUM LACTATE AND CALCIUM CHLORIDE: 600; 310; 30; 20 INJECTION, SOLUTION INTRAVENOUS at 06:33

## 2022-07-31 ASSESSMENT — PAIN SCALES - GENERAL
PAINLEVEL_OUTOF10: 5
PAINLEVEL_OUTOF10: 7

## 2022-07-31 ASSESSMENT — PAIN DESCRIPTION - LOCATION
LOCATION: ABDOMEN

## 2022-07-31 NOTE — CARE COORDINATION
Transitional Planning:  Confirmed with Nel Mock at [facility] that they can accept pt back. Informed that pt is leaving today.

## 2022-07-31 NOTE — PROGRESS NOTES
PROGRESS NOTE      PATIENT NAME: Kristal Drew  MEDICAL RECORD NO. [id number]  DATE: 7/31/2022  SURGEON: Dr. Desouza Storden: Mateo Borden, APRN - CNP    HD: # 3    ASSESSMENT    Patient Active Problem List   Diagnosis    Leonel gangrene    Uncontrolled type 2 diabetes mellitus with hyperglycemia ([facility]    MEREDITH (acute kidney injury) ([facility]    Hypokalemia    Hyponatremia    Anemia    Morbid obesity ([facility]    Type 1 diabetes mellitus with stage 3a chronic kidney disease ([facility]    Metabolic acidosis    Hypervolemia    S/P partial resection of colon       Chief Complaint: \"I feel good\"    S/p transverse loop colostomy reversal 7/28/2022    MEDICAL DECISION MAKING AND PLAN    Neuro:  Pain management-scheduled Tylenol, Robaxin, Neurontin; oxycodone 5 every 6 as needed  Home Zyprexa, Cymbalta  CV  Monitor heart rate and blood pressure  Home Coreg, Leo  Pulm  On room air  I-S use -at least 10 times per hour while awake  GI/Nutrition  Diet - low fiber diet  Await return of bowel function  Prevacid at home -Protonix inpatient  Renal/lytes  Monitor urine output  Monitor BUN and creatinine -creatinine 2.23 from 2.25 yesterday  Encourage p.o. intake  Heme  Hemoglobin stable  Heparin 3 times daily for prophylaxis  Endocrine  History diabetes -uncontrolled blood glucose  BG controlled  Continue home insulin pump and cover with high-dose sliding scale insulin  Q 4 hours glucose checks until consistently less than 180  Musculoskeletal  PT/OT  Out of bed and ambulate  Up in chair for majority of day  Wound care: BID packing changes to ostomy wound, cover with ABD and light tape  Family/dispo  Discharge home today with follow up with endocrinologist and PCP      SUBJECTIVE    Kristal Drew was seen and examined at bedside this morning. Afebrile, vitals normal and stable. Tolerating food and states that she has been increasing her PO intake. No complaints noted. Denies n/v. Voiding.  And continues to pass flatus and BM.    OBJECTIVE  VITALS: Temp: Temp: 98.5 °F (36.9 °C)Temp  Av.7 °F (37.1 °C)  Min: 98.5 °F (36.9 °C)  Max: 99 °F (74.1 °C) BP Systolic (37QRI), WEF:530 , Min:92 , WYU:778   Diastolic (21ZDH), BBA:29, Min:51, Max:79   Pulse Pulse  Av.4  Min: 80  Max: 110 Resp Resp  Av.9  Min: 14  Max: 23 Pulse ox SpO2  Av.7 %  Min: 91 %  Max: 97 %    GENERAL: alert, no distress  NEURO: CNII-XII grossly intact; moving all extremities  LUNGS: normal effort; symmetric rise and fall of chest wall  HEART: regular rate and rhythm  ABDOMEN: soft, nondistended, tender to palpation around incision; no guarding, rebound or rigidity. Ostomy wound clean and dry, packing removed, fascia intact  EXTREMITY: no cyanosis, clubbing or edema    I/O last 3 completed shifts: In: 2375.1 [P.O.:980; I.V.:900; IV Piggyback:495.1]  Out: -     Drain/tube output: In: 1875.1 [P.O.:480; I.V.:900]  Out: -     LAB:  CBC:   Recent Labs     22  0553 22  0443 22  0509   WBC 12.9* 12.0* 8.2   HGB 11.9 11.7* 10.3*   HCT 35.5* 35.7* 30.7*   MCV 83.9 85.0 84.8    180 173     BMP:   Recent Labs     22  0443 22  1456 22  0509   * 129* 131*   K 3.8 3.1* 3.7   CL 94* 94* 96*   CO2    BUN 21* 24* 25*   CREATININE 2.12* 2.25* 2.23*   GLUCOSE 160* 158* 221*     COAGS: No results for input(s): APTT, PROT, INR in the last 72 hours.     RADIOLOGY:  No new imaging to review      Sydni Ambrosio DO  2022, 7:02 AM

## 2022-08-01 LAB — SURGICAL PATHOLOGY REPORT: NORMAL

## 2022-08-01 NOTE — DISCHARGE SUMMARY
DISCHARGE SUMMARY    DISCHARGE TO HOME    PATIENT NAME: Sherley Andrew  YOB: 1980  MEDICAL RECORD NO. [id number]  DATE: 8/1/2022  PRIMARY CARE PHYSICIAN: ROMEO Avalos CNP  ADMISSION DATE: 7/28/2022  7:38 AM  DISCHARGE DATE:  7/31/2022  4:21 PM  DISPOSITION: to HOME  ADMITTING DIAGNOSIS:   1. Acute post-operative pain    2. Leonel gangrene    3. MEREDITH (acute kidney injury) ([facility]      DISCHARGE DIAGNOSIS:   Patient Active Problem List   Diagnosis Code    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    Uncontrolled type 2 diabetes mellitus with hyperglycemia ([facility] E11.65    MEREDITH (acute kidney injury) ([facility] N17.9    Hypokalemia E87.6    Hyponatremia E87.1    Anemia D64.9    Morbid obesity ([facility] E66.01    Type 1 diabetes mellitus with stage 3a chronic kidney disease (HCC) E10.21, O50.30    Metabolic acidosis Q22.8    Hypervolemia E87.70    S/P partial resection of colon Z90.49     CONSULTANTS:  none  PROCEDURES / DIAGNOSTIC TESTS:   7/28: S/p transverse loop colostomy reversal    [address] originally presented to the hospital and admitted on 7/28/2022  7:38 AM. with Hx Fourniers, loop colostomy in 2/2022.    7/28: S/p transverse loop colostomy reversal, post op +, start home insulin pump, continue HISS, 10u lantus at 10pm  7/29: BM x2, advanced low fiber diet  7/30: BMP 2.1->500c bolus->2.2, replace K  7/31: Cr 2.23    At time of discharge, Sherley Andrew was tolerating a regular diet, having bowel movements, ambulating on HER own accord, had adequate analgesia on oral pain medications, and had no signs of symptoms of complications. She was deemed medically stable and discharged to HOME on 7/31/2022 with instructions to follow up with PCP this week with repeat BMP and follow up in clinic in 1.5 weeks. Pt expressed understanding of and agreement with DC plans. PHYSICAL EXAMINATION        Discharge Vitals:  height is 5' 3\" (1.6 m) and weight is 280 lb (127 kg).  Her temporal temperature is 97 °F (36.1 °C). Her blood pressure is 140/70 (abnormal) and her pulse is 83. Her respiration is 16 and oxygen saturation is 96%. GENERAL: alert, no distress  NEURO: CNII-XII grossly intact; moving all extremities  LUNGS: normal effort; symmetric rise and fall of chest wall  HEART: regular rate and rhythm  ABDOMEN: soft, nondistended, tender to palpation around incision; no guarding, rebound or rigidity. Ostomy wound clean and dry, packing removed, fascia intact  EXTREMITY: no cyanosis, clubbing or edema      LABS     Recent Labs     07/30/22  0443 07/30/22  1456 07/31/22  0509   WBC 12.0*  --  8.2   HGB 11.7*  --  10.3*   HCT 35.7*  --  30.7*     --  173   * 129* 131*   K 3.8 3.1* 3.7   CL 94* 94* 96*   CO2 22 22 22   BUN 21* 24* 25*   CREATININE 2.12* 2.25* 2.23*       DISCHARGE INSTRUCTIONS     Discharge Medications:        Medication List        START taking these medications      oxyCODONE 5 MG immediate release tablet  Commonly known as: ROXICODONE  Take 1 tablet by mouth every 6 hours as needed for Pain for up to 7 days.      tiZANidine 2 MG tablet  Commonly known as: ZANAFLEX  Take 1 tablet by mouth every 6 hours as needed (pain)            CONTINUE taking these medications      amLODIPine 10 MG tablet  Commonly known as: NORVASC  Take 1 tablet by mouth daily     bumetanide 1 MG tablet  Commonly known as: BUMEX  Take 1 tablet by mouth daily     carvedilol 25 MG tablet  Commonly known as: COREG     cyanocobalamin 1000 MCG/ML injection     DULoxetine 60 MG extended release capsule  Commonly known as: CYMBALTA     fenofibrate 160 MG tablet  Commonly known as: TRIGLIDE     fluticasone 50 MCG/ACT nasal spray  Commonly known as: FLONASE     fluticasone-salmeterol 45-21 MCG/ACT inhaler  Commonly known as: ADVAIR HFA     glucagon 1 MG injection     hydrALAZINE 50 MG tablet  Commonly known as: APRESOLINE     magnesium oxide 400 (241.3 Mg) MG Tabs tablet  Commonly known as: MAG-OX

## 2022-08-09 ENCOUNTER — OFFICE VISIT (OUTPATIENT)
Dept: SURGERY | Age: 42
End: 2022-08-09

## 2022-08-09 VITALS
DIASTOLIC BLOOD PRESSURE: 90 MMHG | HEART RATE: 79 BPM | HEIGHT: 63 IN | BODY MASS INDEX: 51.74 KG/M2 | WEIGHT: 292 LBS | SYSTOLIC BLOOD PRESSURE: 193 MMHG

## 2022-08-09 DIAGNOSIS — Z98.890 HISTORY OF COLOSTOMY REVERSAL: Primary | ICD-10-CM

## 2022-08-09 PROCEDURE — 99024 POSTOP FOLLOW-UP VISIT: CPT | Performed by: SPECIALIST

## 2022-08-09 NOTE — PROGRESS NOTES
Trauma and [address]      Patient's Name/ Date of Birth/ Gender: Nimisha Kruger / 1980 (89 y.o.) / female     MRN/ACCOUNT #: [de-identified]    History of present Illness: Nimisha Kruger is a 42 y.o. female who presents for postop visit status post loop transverse colostomy reversal.  She originally had the transverse loop colostomy created on 2/16/2022 in order to diverge her for a necrotizing soft tissue infection. Reversal took place on 7/28/2022, been doing well tolerating diet. Appetite is not the same as it was before but improving. Has some mild nausea but no emesis. Regular near daily bowel movements, no blood in her stool. Denies fever. She has been changing her dressing daily and has had some purulent appearing drainage. Past Medical History:  has a past medical history of Asthma, Astigmatism, Bipolar disorder ([address] Clinical trial participant, COVID-19, Diabetes mellitus ([address] Leonel gangrene, GERD (gastroesophageal reflux disease), H/O cardiovascular stress test, History of echocardiogram, Hyperlipidemia, Hypertension, Insulin pump in place, Kidney disease, Obesity, On mechanically assisted ventilation ([address] Osteoarthritis, Under care of team, Under care of team, Under care of team, Under care of team, and Wellness examination.     Past Surgical History:   Past Surgical History:   Procedure Laterality Date    ABDOMEN SURGERY Bilateral 02/09/2022    incision and drainage of bilateral necrotizing soft tissue infection of groin and buttocks  performed by Maco Lindquist MD at [facility] Left 02/18/2022    LEFT GROIN, BUTTOCK  WOUND EXPLORATION AND DEBRIDEMENT,  WOUND VAC EXCHANGE, ([address]) performed by Kelle Kwon MD at [facility] Bilateral     [address]      X2   2008, 2010    CHOLECYSTECTOMY      COLONOSCOPY      COLOSTOMY N/A 02/16/2022    DEBRIDEMENT BUTTOCK AND GROIN, WOUND VAC EXCHANGE, LAPAROSCOPIC COLOSTOMY CREATION ([address]) performed by Cecile Ontiveros MD at [address] [facility] N/A 7/28/2022    COLOSTOMY REVERSAL performed by Cecile Ontiveros MD at [facility] [address] THAN 5 YEARS  02/22/2022    IR TUNNELED CATHETER PLACEMENT GREATER THAN 5 YEARS 2/22/2022 Arlene Oneal MD [facility] SPECIAL PROCEDURES    KNEE ARTHROSCOPY Bilateral     LUMBAR [address] SURGERY  2004    Dr. Silvia Rose N/A 02/08/2022    *ADD ON, ASAP* INCISION AND DRAINAGE OF PERINEUM, BRIAN KNIFE PRONE performed by Heather Almendarez MD at [address] [facility] [address] Bilateral 02/10/2022    INCISION AND DRAINAGE BUTTOCK, GROIN  (LITHOTOMY POSITION) performed by Kelle Kurtz MD at [address] [place] [address] 02/11/2022    INCISION AND DRAINAGE AND DEBRIDEMENT BUTTOCK AND LABIAL ABSCESS , PLACEMENT OF WOUND VAC performed by Kayla Palmer DO at [address] [place] [address] 07/28/2022    COLOSTOMY REVERSAL    ROTATOR CUFF REPAIR Left     SLEEVE GASTRECTOMY  08/2019    TUBAL LIGATION      ULNAR NERVE TRANSPOSITION Left     WOUND EXPLORATION Left 02/14/2022    DEBREIDMENT OF BILATERAL GROINS AND LEFT BUTTOCK WOUND WITH PARTIAL CLOSURE AND WOUND VAC PLACEMENT performed by Cecile Ontiveros MD at [place] [address] History:  reports that she quit smoking about 19 months ago. Her smoking use included cigarettes. She started smoking about 2 years ago. She smoked an average of .5 packs per day. She has never used smokeless tobacco. She reports current alcohol use. She reports that she does not use drugs. Family History: family history includes Cerebral Aneurysm in her mother; Depression in her mother; Other in her father.     Review of Systems:   Constitutional: negative for fevers, malaise, and sweats  Eyes: negative for icterus and visual disturbance  Respiratory: negative for cough and shortness of breath  Cardiovascular: negative for chest pain, Rfl:     DULoxetine (CYMBALTA) 60 MG extended release capsule, Take 60 mg by mouth daily, Disp: , Rfl:     cyanocobalamin 1000 MCG/ML injection, Inject 1,000 mcg into the muscle every 30 days, Disp: , Rfl:     fenofibrate (TRIGLIDE) 160 MG tablet, Take 160 mg by mouth daily, Disp: , Rfl:     fluticasone (FLONASE) 50 MCG/ACT nasal spray, 1 spray by Each Nostril route, Disp: , Rfl:     glucagon 1 MG injection, Inject 1 mg into the muscle as needed, Disp: , Rfl:     montelukast (SINGULAIR) 10 MG tablet, Take 10 mg by mouth daily, Disp: , Rfl:     omeprazole (PRILOSEC) 20 MG delayed release capsule, Take 20 mg by mouth daily, Disp: , Rfl:     Vital Signs:  Vitals:    08/09/22 1344   BP: (!) 193/90   Pulse: 79       Physical Exam:  Vital signs and Nurse's note reviewed  Gen:  A&Ox3, NAD  HEENT: NCAT, PERRLA, EOMI, no scleral icterus, oral mucosa moist  Neck: Supple, no thyroid enlargement, no cervical or supraclavicular lymphaedenopathy  Chest: Symmetric rise with inhalation, no evidence of trauma  CVS: Regular rate and rhythm, no murmurs, no rubs or gallops  Resp: Good bilateral air entry, clear to auscultation b/l, no wheeze or rhonchi  Abd: soft, non-tender, non-distended, wound open with no surrounding erythema or tenderness, some purulent drainage seen on dressing  Ext: No clubbing, cyanosis, edema, peripheral pulses 2+ Rad/Fem/DP/PT  CNS: Moves all extremities, no gross focal motor deficits  Skin: No erythema or ulcerations     Labs:   CBC: No results for input(s): WBC, HGB, PLT in the last 72 hours. BMP:  No results for input(s): NA, K, CL, CO2, BUN, CREATININE, GLUCOSE in the last 72 hours. Hepatic: No results for input(s): AST, ALT, ALB, ALKPHOS, BILITOT, BILIDIR, LIPASE, AMYLASE in the last 72 hours. Coagulation: No results for input(s): APTT, PROT, INR in the last 72 hours.       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 female status post reversal of her loop transverse colostomy    Recommendation:    Try to increase diet, can take vitamins  Increase dressing changes to 2 times daily  Instructed to wash wound while in shower  Return to clinic in 1 week      Electronically signed by Edgardo Sosa DO  on 8/9/2022 at 2:19 PM

## 2022-08-30 ENCOUNTER — OFFICE VISIT (OUTPATIENT)
Dept: SURGERY | Age: 42
End: 2022-08-30

## 2022-08-30 VITALS
SYSTOLIC BLOOD PRESSURE: 168 MMHG | DIASTOLIC BLOOD PRESSURE: 84 MMHG | HEIGHT: 63 IN | HEART RATE: 88 BPM | WEIGHT: 292 LBS | BODY MASS INDEX: 51.74 KG/M2

## 2022-08-30 DIAGNOSIS — Z98.890 HISTORY OF COLOSTOMY REVERSAL: Primary | ICD-10-CM

## 2022-08-30 PROCEDURE — 99024 POSTOP FOLLOW-UP VISIT: CPT | Performed by: NURSE PRACTITIONER

## 2022-08-30 NOTE — PROGRESS NOTES
Trauma Clinic Note    S: Pt is a 42 y.o. female being seen for post op for reversal of her colostomy that was created in February 16, 2022. This was created for diversion as she did have significant necrotizing fasciitis. Patient's been eating well. She does have loose stools. No fever chills. She does pack the wound daily. She states it does have copious amounts of drainage. Sometimes she has to repack it 2-3 times a day. She does have home services. O: Patient does have purulent type discharge coming from her the local wound. Wound is deep, about 3 inches. Once the debris is removed it does quickly return. There is no surrounding fluctuance to the umbilicus. There is no surrounding erythema. There is no abdominal pain  Vitals:    08/30/22 1330   BP: (!) 168/84   Pulse: 88     Gen: NAD, cooperative      A/P: 42 y.o. female here for postop visit. I did have Dr. Roz Prakash come see her as well. He did gently probed the periumbilical wound with a sterile Q-tip and found is about 3 inches deep. There is no systemic symptoms of infection. We do feel as long as it continues to drain we will pack it daily. We did talk to her about strict return precautions to include abdominal pain, fevers or new concerns. Advised we will see her in 2 weeks but if symptoms do worsen we want to see her in 1 week. She understands she develops abdominal pain or fever though she needs to go directly to the emergency room.   Wound was packed here in clinic with 1 inch iodoform    - Continue to monitor blood sugars  - Pack wound daily  - Keep area clean and dry  - Wash with gentle soap  - F/u two weeks    Chip Cortez, [address] [place] [address]

## 2022-09-01 ENCOUNTER — PATIENT MESSAGE (OUTPATIENT)
Dept: SURGERY | Age: 42
End: 2022-09-01

## 2022-09-01 NOTE — TELEPHONE ENCOUNTER
Received phone call from [address] [facility] He reports on today''s visit pt had a copious amount of tan colored purulent drainage. There was no odor. Vital signs stable with no increased temp., no redness, swelling or s/s infection. He is inquiring about prophylactic antibiotics. Dr. Colin Loo notified of above. He requests pt make appt to be evaluated in Surgery Clinic on Tuesday 9/6/22. No antibiotics at this time. Come to the ED with any increased temp, redness, swelling, pain or s/s infection. Carlos Martinez RN called back at [phone] and relayed instructions from Dr. Colin Loo. He will provide instructions to the pt as he is at the home visit. Informed him if pt has any difficulty scheduling appt to call Trauma Coordinator office at [phone] for assistance. No further questions.

## 2022-09-07 ENCOUNTER — TELEPHONE (OUTPATIENT)
Dept: SURGERY | Age: 42
End: 2022-09-07

## 2022-09-07 NOTE — TELEPHONE ENCOUNTER
LM on VM for pt to CB office to let us know how her wound is, how much drainage is she having, if so, what color is it or is it discolored? ?

## 2022-09-13 ENCOUNTER — HOSPITAL ENCOUNTER (OUTPATIENT)
Dept: GENERAL RADIOLOGY | Age: 42
Discharge: HOME | End: 2022-09-15
Payer: COMMERCIAL

## 2022-09-13 ENCOUNTER — OFFICE VISIT (OUTPATIENT)
Dept: SURGERY | Age: 42
End: 2022-09-13
Payer: COMMERCIAL

## 2022-09-13 VITALS
BODY MASS INDEX: 46.42 KG/M2 | SYSTOLIC BLOOD PRESSURE: 160 MMHG | WEIGHT: 262 LBS | HEART RATE: 94 BPM | DIASTOLIC BLOOD PRESSURE: 80 MMHG | HEIGHT: 63 IN

## 2022-09-13 DIAGNOSIS — L08.9 CHRONIC ABDOMINAL WOUND INFECTION, SEQUELA: Primary | ICD-10-CM

## 2022-09-13 DIAGNOSIS — L08.9 CHRONIC ABDOMINAL WOUND INFECTION, SEQUELA: ICD-10-CM

## 2022-09-13 DIAGNOSIS — S31.109S CHRONIC ABDOMINAL WOUND INFECTION, SEQUELA: Primary | ICD-10-CM

## 2022-09-13 DIAGNOSIS — S31.109S CHRONIC ABDOMINAL WOUND INFECTION, SEQUELA: ICD-10-CM

## 2022-09-13 PROCEDURE — 6360000004 HC RX CONTRAST MEDICATION: Performed by: STUDENT IN AN ORGANIZED HEALTH CARE EDUCATION/TRAINING PROGRAM

## 2022-09-13 PROCEDURE — 1036F TOBACCO NON-USER: CPT | Performed by: SPECIALIST

## 2022-09-13 PROCEDURE — 76080 X-RAY EXAM OF FISTULA: CPT

## 2022-09-13 PROCEDURE — 99212 OFFICE O/P EST SF 10 MIN: CPT | Performed by: SPECIALIST

## 2022-09-13 PROCEDURE — 49424 ASSESS CYST CONTRAST INJECT: CPT

## 2022-09-13 PROCEDURE — G8427 DOCREV CUR MEDS BY ELIG CLIN: HCPCS | Performed by: SPECIALIST

## 2022-09-13 PROCEDURE — G8417 CALC BMI ABV UP PARAM F/U: HCPCS | Performed by: SPECIALIST

## 2022-09-13 RX ADMIN — IOHEXOL 30 ML: 240 INJECTION, SOLUTION INTRATHECAL; INTRAVASCULAR; INTRAVENOUS; ORAL at 15:50

## 2022-09-13 NOTE — PROGRESS NOTES
Trauma Clinic Note     S: Pt is a 42 y.o. female being seen for post op for reversal of her colostomy that was created in February 16, 2022. This was created for diversion as she did have significant necrotizing fasciitis. Continues to have drainage from her wound, continues packing. Patient states output from wound increases with meals. O: Patient does have purulent type discharge coming from her the local wound. Wound is deep, about 8cm. Once the debris is removed it does quickly return. There is no surrounding fluctuance to the umbilicus. There is no surrounding erythema. There is no abdominal pain      Vitals:     08/30/22 1330   BP: (!) 168/84   Pulse: 88      Gen: NAD, cooperative       A/P: 42 y.o. female here for postop visit.        - Obtain fistulagram today in fluoroscopy  - Call with results  - See in one week to discuss management options based on imaging findings  - continue local wound care and packing     Electronically signed by Noé Cornell DO on 9/13/2022 at 3:08 PM

## 2022-09-20 ENCOUNTER — OFFICE VISIT (OUTPATIENT)
Dept: SURGERY | Age: 42
End: 2022-09-20
Payer: COMMERCIAL

## 2022-09-20 VITALS
DIASTOLIC BLOOD PRESSURE: 82 MMHG | WEIGHT: 292 LBS | BODY MASS INDEX: 51.74 KG/M2 | HEIGHT: 63 IN | SYSTOLIC BLOOD PRESSURE: 176 MMHG | HEART RATE: 96 BPM

## 2022-09-20 DIAGNOSIS — S31.109D CHRONIC WOUND INFECTION OF ABDOMEN, SUBSEQUENT ENCOUNTER: Primary | ICD-10-CM

## 2022-09-20 DIAGNOSIS — L08.9 CHRONIC WOUND INFECTION OF ABDOMEN, SUBSEQUENT ENCOUNTER: Primary | ICD-10-CM

## 2022-09-20 PROCEDURE — G8417 CALC BMI ABV UP PARAM F/U: HCPCS | Performed by: NURSE PRACTITIONER

## 2022-09-20 PROCEDURE — 1036F TOBACCO NON-USER: CPT | Performed by: NURSE PRACTITIONER

## 2022-09-20 PROCEDURE — 99213 OFFICE O/P EST LOW 20 MIN: CPT | Performed by: NURSE PRACTITIONER

## 2022-09-20 PROCEDURE — G8427 DOCREV CUR MEDS BY ELIG CLIN: HCPCS | Performed by: NURSE PRACTITIONER

## 2022-09-20 NOTE — PROGRESS NOTES
Trauma Clinic Note    S: Pt is a 42 y.o. female being seen for nonhealing abdominal wound after having reversal of her colostomy July 28. Visiting nurse services once a week. Patient does pack daily. No abdominal pain or fever chills. She feels her diabetes is being reasonably controlled. O: I did remove packing from wound. The external opening has decreased in size since my last exam.  Sterile Q-tip was placed at least 6 cm deep. I did place about a half a bottle of quarter inch iodoform back into the wound. Vitals:    09/20/22 1315   BP: (!) 176/82   Pulse: 96     Gen: NAD, cooperative      A/P: 42 y.o. female in clinic for postop visit. Fistulogram that was done outpatient was reviewed. Wound does continue to actively drain. I did remove packing and then repacked in clinic. Reviewed case with physician. Dr. Marguerite Malave did examine patient as well. He is requesting CT abdomen pelvis with IV and oral contrast.  She will need a creatinine as she does have diabetes. He states if creatinine is too high do oral contrast only. Patient understands if the external wound closes at any time she will have to return back to the clinic immediately or go to the emergency room.   She is to continue packing it daily    -Continue to pack the wound daily  -Have CT abdomen pelvis as outpatient  - Keep area clean and dry  - Wash with gentle soap  - Tylenol/ibuprofen for pain control  - F/u four weeks    Citlaly Weinberg, [address] [place] [address]

## 2022-09-28 ENCOUNTER — HOSPITAL ENCOUNTER (OUTPATIENT)
Dept: CT IMAGING | Age: 42
Discharge: HOME | End: 2022-09-30
Payer: COMMERCIAL

## 2022-09-28 DIAGNOSIS — S31.109D CHRONIC WOUND INFECTION OF ABDOMEN, SUBSEQUENT ENCOUNTER: ICD-10-CM

## 2022-09-28 DIAGNOSIS — L08.9 CHRONIC WOUND INFECTION OF ABDOMEN, SUBSEQUENT ENCOUNTER: ICD-10-CM

## 2022-09-28 LAB
CREAT SERPL-MCNC: 1.23 MG/DL (ref 0.5–0.9)
GFR AFRICAN AMERICAN: 58 ML/MIN
GFR NON-AFRICAN AMERICAN: 48 ML/MIN
GFR SERPL CREATININE-BSD FRML MDRD: ABNORMAL ML/MIN/{1.73_M2}

## 2022-09-28 PROCEDURE — A4641 RADIOPHARM DX AGENT NOC: HCPCS | Performed by: NURSE PRACTITIONER

## 2022-09-28 PROCEDURE — 36415 COLL VENOUS BLD VENIPUNCTURE: CPT

## 2022-09-28 PROCEDURE — 2580000003 HC RX 258: Performed by: NURSE PRACTITIONER

## 2022-09-28 PROCEDURE — 6360000004 HC RX CONTRAST MEDICATION: Performed by: NURSE PRACTITIONER

## 2022-09-28 PROCEDURE — 82565 ASSAY OF CREATININE: CPT

## 2022-09-28 PROCEDURE — 74177 CT ABD & PELVIS W/CONTRAST: CPT

## 2022-09-28 RX ORDER — SODIUM CHLORIDE 0.9 % (FLUSH) 0.9 %
10 SYRINGE (ML) INJECTION ONCE
Status: COMPLETED | OUTPATIENT
Start: 2022-09-28 | End: 2022-09-28

## 2022-09-28 RX ORDER — 0.9 % SODIUM CHLORIDE 0.9 %
80 INTRAVENOUS SOLUTION INTRAVENOUS ONCE
Status: COMPLETED | OUTPATIENT
Start: 2022-09-28 | End: 2022-09-28

## 2022-09-28 RX ADMIN — SODIUM CHLORIDE 80 ML: 9 INJECTION, SOLUTION INTRAVENOUS at 11:38

## 2022-09-28 RX ADMIN — BARIUM SULFATE 450 ML: 20 SUSPENSION ORAL at 11:37

## 2022-09-28 RX ADMIN — SODIUM CHLORIDE, PRESERVATIVE FREE 10 ML: 5 INJECTION INTRAVENOUS at 11:38

## 2022-09-28 RX ADMIN — IOPAMIDOL 75 ML: 755 INJECTION, SOLUTION INTRAVENOUS at 11:34

## 2022-09-29 ENCOUNTER — TELEPHONE (OUTPATIENT)
Dept: SURGERY | Age: 42
End: 2022-09-29

## 2022-09-29 NOTE — TELEPHONE ENCOUNTER
Pt called into the office stating that her wound was closing & it is getting harder to repack daily, pt also stated that she was instructed to call & make an appt ASAP to get wound rechecked. Writer asked if she was still having nurse visits she stated yes. Writer stated to the pt to go the ER to get the wound checked & made her an appt for 10/04.

## 2022-09-30 ENCOUNTER — TELEPHONE (OUTPATIENT)
Dept: BURN CARE | Age: 42
End: 2022-09-30

## 2022-10-04 ENCOUNTER — OFFICE VISIT (OUTPATIENT)
Dept: SURGERY | Age: 42
End: 2022-10-04
Payer: COMMERCIAL

## 2022-10-04 VITALS
HEART RATE: 80 BPM | DIASTOLIC BLOOD PRESSURE: 90 MMHG | SYSTOLIC BLOOD PRESSURE: 194 MMHG | BODY MASS INDEX: 46.42 KG/M2 | HEIGHT: 63 IN | WEIGHT: 262 LBS

## 2022-10-04 DIAGNOSIS — K63.2 ENTEROCUTANEOUS FISTULA: Primary | ICD-10-CM

## 2022-10-04 PROCEDURE — 99214 OFFICE O/P EST MOD 30 MIN: CPT | Performed by: SPECIALIST

## 2022-10-04 PROCEDURE — G8417 CALC BMI ABV UP PARAM F/U: HCPCS | Performed by: SPECIALIST

## 2022-10-04 PROCEDURE — G8484 FLU IMMUNIZE NO ADMIN: HCPCS | Performed by: SPECIALIST

## 2022-10-04 PROCEDURE — G8427 DOCREV CUR MEDS BY ELIG CLIN: HCPCS | Performed by: SPECIALIST

## 2022-10-04 PROCEDURE — 1036F TOBACCO NON-USER: CPT | Performed by: SPECIALIST

## 2022-10-04 NOTE — PROGRESS NOTES
Trauma, Emergency and Critical Surgical Services  Attending Progress Note   I have discussed the care of this patient including pertinent history and exam findings,  with the resident. I have seen and examined the patient and the key elements of all parts of the encounter have been performed by me. I agree with the assessment, plan and orders as documented by the resident.      Electronically signed by Janis Murrieta MD on 10/4/2022 at 3:33 PM

## 2022-10-07 ENCOUNTER — TELEPHONE (OUTPATIENT)
Dept: UROLOGY | Age: 42
End: 2022-10-07

## 2022-10-07 NOTE — TELEPHONE ENCOUNTER
Home health nurse [employer] to advise that the patient has thick purulent drainage from the  enterocutaneous fistula and she also states that the patient has sign of a fever due to having chills and sweating.  Advised to go to the

## 2022-10-07 NOTE — TELEPHONE ENCOUNTER
Home health nurse Cayden Galarza called to advise that the patient has purulent drainage from the enterocutaneous fistula. The patient has chills and sweating. Advised to have patient go to the ED.

## 2022-11-08 ENCOUNTER — OFFICE VISIT (OUTPATIENT)
Dept: SURGERY | Age: 42
End: 2022-11-08
Payer: COMMERCIAL

## 2022-11-08 VITALS
WEIGHT: 262 LBS | HEIGHT: 63 IN | DIASTOLIC BLOOD PRESSURE: 86 MMHG | BODY MASS INDEX: 46.42 KG/M2 | SYSTOLIC BLOOD PRESSURE: 176 MMHG | HEART RATE: 84 BPM

## 2022-11-08 DIAGNOSIS — K63.2 ENTEROCUTANEOUS FISTULA: Primary | ICD-10-CM

## 2022-11-08 PROCEDURE — G8417 CALC BMI ABV UP PARAM F/U: HCPCS | Performed by: SPECIALIST

## 2022-11-08 PROCEDURE — 99213 OFFICE O/P EST LOW 20 MIN: CPT | Performed by: SPECIALIST

## 2022-11-08 PROCEDURE — 1036F TOBACCO NON-USER: CPT | Performed by: SPECIALIST

## 2022-11-08 PROCEDURE — G8427 DOCREV CUR MEDS BY ELIG CLIN: HCPCS | Performed by: SPECIALIST

## 2022-11-08 PROCEDURE — G8484 FLU IMMUNIZE NO ADMIN: HCPCS | Performed by: SPECIALIST

## 2022-11-08 NOTE — PROGRESS NOTES
[facility]ute [facility] Surgery Clinic Progress Note    TODAY'S DATE: 11/8/2022, 3:28 PM    SUBJECTIVE       Pt  presented for follow up for her draining abdominal sinus tract. She had a bowel resection due to necrotizing fasciitis infection in February 2022. Her colostomy reversal surgery was in July 2022. Her ostomy reversal site has continued to heal, but she has a small sinus tract with thick tan drainage remaining. She reports that the amount of drainage has not decreased much at all, and she is interested in pursuing a change in management of the wound. The depth of the wound in September was 6cm, and the depth of the wound is currently 4.9cm. She has some intermittent pain to her abdomen centered on the wound site and spreading laterally. Denies F/C CP SOB N/V. She is eating, drinking, voiding, and stooling appropriately.        OBJECTIVE    VITALS:  BP (!) 176/86   Pulse 84   Ht 5' 3\" (1.6 m)   Wt 262 lb (118.8 kg)   BMI 46.41 kg/m²   CONSTITUTIONAL:  awake and alert  LUNGS:  clear to auscultation  ABDOMEN:   obese, soft, non-distended, non-tender   EXT: extremities normal, atraumatic, no cyanosis or edema    BP (!) 176/86   Pulse 84   Ht 5' 3\" (1.6 m)   Wt 262 lb (118.8 kg)   BMI 46.41 kg/m²     General Appearance:    Alert, cooperative, no distress, appears stated age   Head:    Normocephalic, without obvious abnormality, atraumatic   Eyes:    PERRL, conjunctiva/corneas clear, EOM's intact, fundi     benign, both eyes   Ears:    Normal TM's and external ear canals, both ears   Nose:   Nares normal, septum midline, mucosa normal, no drainage    or sinus tenderness   Throat:   Lips, mucosa, and tongue normal; teeth and gums normal   Neck:   Supple, symmetrical, trachea midline, no adenopathy;     thyroid:  no enlargement/tenderness/nodules; no carotid    bruit or JVD   Back:     Symmetric, no curvature, ROM normal, no CVA tenderness   Lungs:     Clear to auscultation bilaterally, respirations unlabored Chest Wall:    No tenderness or deformity    Heart:    Regular rate and rhythm, S1 and S2 normal, no murmur, rub   or gallop   Breast Exam:    No tenderness, masses, or nipple abnormality   Abdomen:     Small open draining wound in midline abdomen. Soft, non-tender, bowel sounds active all four quadrants,     no masses, no organomegaly   Genitalia:    Normal female without lesion, discharge or tenderness   Rectal:    Normal tone ;guaiac negative stool   Extremities:   Extremities normal, atraumatic, no cyanosis or edema   Pulses:   2+ and symmetric all extremities   Skin:   Skin color, texture, turgor normal, no rashes or lesions   Lymph nodes:   Cervical, supraclavicular, and axillary nodes normal   Neurologic:   CNII-XII intact, normal strength, sensation and reflexes     throughout        ASSESMENT       Diagnosis Orders   1. Enterocutaneous fistula            PLAN      Incision and Drainage of Abdominal Wall Sinus Tract on November 14th at [facility]. [place] Follow Up:postop follow up 10 days after surgery    Jimi Pal DO  Electronically signed by Jimi Pal DO  on 11/8/2022 at 3:28 PM   Trauma, Emergency and Critical Surgical Services  Attending Progress Note   I have discussed the care of this patient including pertinent history and exam findings,  with the resident. I have seen and examined the patient and the key elements of all parts of the encounter have been performed by me. I agree with the assessment, plan and orders as documented by the resident.      Electronically signed by Apryl Mcconnell MD on 11/8/2022 at 3:36 PM

## 2022-11-13 ENCOUNTER — ANESTHESIA EVENT (OUTPATIENT)
Dept: OPERATING ROOM | Age: 42
End: 2022-11-13
Payer: COMMERCIAL

## 2022-11-14 ENCOUNTER — HOSPITAL ENCOUNTER (OUTPATIENT)
Age: 42
Setting detail: OUTPATIENT SURGERY
Discharge: HOME | End: 2022-11-14
Attending: SURGERY | Admitting: SURGERY
Payer: COMMERCIAL

## 2022-11-14 ENCOUNTER — ANESTHESIA (OUTPATIENT)
Dept: OPERATING ROOM | Age: 42
End: 2022-11-14
Payer: COMMERCIAL

## 2022-11-14 VITALS
HEART RATE: 75 BPM | TEMPERATURE: 97.3 F | SYSTOLIC BLOOD PRESSURE: 131 MMHG | RESPIRATION RATE: 19 BRPM | DIASTOLIC BLOOD PRESSURE: 70 MMHG | OXYGEN SATURATION: 96 %

## 2022-11-14 DIAGNOSIS — S31.109A CHRONIC ABDOMINAL WOUND INFECTION, INITIAL ENCOUNTER: ICD-10-CM

## 2022-11-14 DIAGNOSIS — G89.18 ACUTE POSTOPERATIVE PAIN: Primary | ICD-10-CM

## 2022-11-14 DIAGNOSIS — L08.9 CHRONIC ABDOMINAL WOUND INFECTION, INITIAL ENCOUNTER: ICD-10-CM

## 2022-11-14 LAB
ACTION: NORMAL
ANION GAP SERPL CALCULATED.3IONS-SCNC: 12 MMOL/L (ref 9–17)
BUN BLDV-MCNC: 21 MG/DL (ref 6–20)
CALCIUM SERPL-MCNC: 8.4 MG/DL (ref 8.6–10.4)
CHLORIDE BLD-SCNC: 89 MMOL/L (ref 98–107)
CO2: 27 MMOL/L (ref 20–31)
CREAT SERPL-MCNC: 1.38 MG/DL (ref 0.5–0.9)
DATE AND TIME: NORMAL
EGFR, POC: 45 ML/MIN/1.73M2
ESTIMATED AVERAGE GLUCOSE: 278 MG/DL
GFR SERPL CREATININE-BSD FRML MDRD: 49 ML/MIN/1.73M2
GLUCOSE BLD-MCNC: 336 MG/DL (ref 65–105)
GLUCOSE BLD-MCNC: 356 MG/DL (ref 65–105)
GLUCOSE BLD-MCNC: 389 MG/DL (ref 65–105)
GLUCOSE BLD-MCNC: 447 MG/DL (ref 65–105)
GLUCOSE BLD-MCNC: 522 MG/DL (ref 65–105)
GLUCOSE BLD-MCNC: 525 MG/DL (ref 70–99)
GLUCOSE BLD-MCNC: 527 MG/DL (ref 74–100)
GLUCOSE BLD-MCNC: 534 MG/DL (ref 65–105)
HBA1C MFR BLD: 11.3 % (ref 4–6)
HCG, PREGNANCY URINE (POC): NEGATIVE
HCT VFR BLD CALC: 32.3 % (ref 36.3–47.1)
HEMOGLOBIN: 10.7 G/DL (ref 11.9–15.1)
MCH RBC QN AUTO: 26.4 PG (ref 25.2–33.5)
MCHC RBC AUTO-ENTMCNC: 33.1 G/DL (ref 28.4–34.8)
MCV RBC AUTO: 79.8 FL (ref 82.6–102.9)
NRBC AUTOMATED: 0 PER 100 WBC
PDW BLD-RTO: 14.6 % (ref 11.8–14.4)
PLATELET # BLD: 244 K/UL (ref 138–453)
PMV BLD AUTO: 10.4 FL (ref 8.1–13.5)
POC BUN: NORMAL MG/DL (ref 8–26)
POC CREATININE: 1.47 MG/DL (ref 0.51–1.19)
POTASSIUM SERPL-SCNC: 3.8 MMOL/L (ref 3.7–5.3)
RBC # BLD: 4.05 M/UL (ref 3.95–5.11)
READ BACK: YES
SODIUM BLD-SCNC: 128 MMOL/L (ref 135–144)
WBC # BLD: 6.8 K/UL (ref 3.5–11.3)

## 2022-11-14 PROCEDURE — 2709999900 HC NON-CHARGEABLE SUPPLY: Performed by: SURGERY

## 2022-11-14 PROCEDURE — 3700000000 HC ANESTHESIA ATTENDED CARE: Performed by: SURGERY

## 2022-11-14 PROCEDURE — 80048 BASIC METABOLIC PNL TOTAL CA: CPT

## 2022-11-14 PROCEDURE — 7100000041 HC SPAR PHASE II RECOVERY - ADDTL 15 MIN: Performed by: SURGERY

## 2022-11-14 PROCEDURE — 2580000003 HC RX 258: Performed by: SURGERY

## 2022-11-14 PROCEDURE — 6370000000 HC RX 637 (ALT 250 FOR IP): Performed by: NURSE ANESTHETIST, CERTIFIED REGISTERED

## 2022-11-14 PROCEDURE — 85027 COMPLETE CBC AUTOMATED: CPT

## 2022-11-14 PROCEDURE — 6360000002 HC RX W HCPCS: Performed by: NURSE ANESTHETIST, CERTIFIED REGISTERED

## 2022-11-14 PROCEDURE — 88304 TISSUE EXAM BY PATHOLOGIST: CPT

## 2022-11-14 PROCEDURE — 7100000000 HC PACU RECOVERY - FIRST 15 MIN: Performed by: SURGERY

## 2022-11-14 PROCEDURE — 2580000003 HC RX 258: Performed by: ANESTHESIOLOGY

## 2022-11-14 PROCEDURE — 3700000001 HC ADD 15 MINUTES (ANESTHESIA): Performed by: SURGERY

## 2022-11-14 PROCEDURE — 82565 ASSAY OF CREATININE: CPT

## 2022-11-14 PROCEDURE — 2500000003 HC RX 250 WO HCPCS: Performed by: NURSE ANESTHETIST, CERTIFIED REGISTERED

## 2022-11-14 PROCEDURE — 82947 ASSAY GLUCOSE BLOOD QUANT: CPT

## 2022-11-14 PROCEDURE — 7100000001 HC PACU RECOVERY - ADDTL 15 MIN: Performed by: SURGERY

## 2022-11-14 PROCEDURE — 2580000003 HC RX 258: Performed by: NURSE ANESTHETIST, CERTIFIED REGISTERED

## 2022-11-14 PROCEDURE — L3650 SO 8 ABD RESTRAINT PRE OTS: HCPCS | Performed by: SURGERY

## 2022-11-14 PROCEDURE — 6370000000 HC RX 637 (ALT 250 FOR IP): Performed by: ANESTHESIOLOGY

## 2022-11-14 PROCEDURE — 3600000013 HC SURGERY LEVEL 3 ADDTL 15MIN: Performed by: SURGERY

## 2022-11-14 PROCEDURE — 84520 ASSAY OF UREA NITROGEN: CPT

## 2022-11-14 PROCEDURE — 81025 URINE PREGNANCY TEST: CPT

## 2022-11-14 PROCEDURE — 83036 HEMOGLOBIN GLYCOSYLATED A1C: CPT

## 2022-11-14 PROCEDURE — 3600000003 HC SURGERY LEVEL 3 BASE: Performed by: SURGERY

## 2022-11-14 PROCEDURE — 7100000040 HC SPAR PHASE II RECOVERY - FIRST 15 MIN: Performed by: SURGERY

## 2022-11-14 PROCEDURE — 2500000003 HC RX 250 WO HCPCS: Performed by: SURGERY

## 2022-11-14 RX ORDER — ROCURONIUM BROMIDE 10 MG/ML
INJECTION, SOLUTION INTRAVENOUS PRN
Status: DISCONTINUED | OUTPATIENT
Start: 2022-11-14 | End: 2022-11-14 | Stop reason: SDUPTHER

## 2022-11-14 RX ORDER — AMOXICILLIN AND CLAVULANATE POTASSIUM 875; 125 MG/1; MG/1
1 TABLET, FILM COATED ORAL 2 TIMES DAILY
Qty: 14 TABLET | Refills: 0 | Status: SHIPPED | OUTPATIENT
Start: 2022-11-14 | End: 2022-11-21

## 2022-11-14 RX ORDER — SODIUM CHLORIDE 9 MG/ML
INJECTION, SOLUTION INTRAVENOUS PRN
Status: DISCONTINUED | OUTPATIENT
Start: 2022-11-14 | End: 2022-11-14 | Stop reason: HOSPADM

## 2022-11-14 RX ORDER — SODIUM CHLORIDE, SODIUM LACTATE, POTASSIUM CHLORIDE, CALCIUM CHLORIDE 600; 310; 30; 20 MG/100ML; MG/100ML; MG/100ML; MG/100ML
INJECTION, SOLUTION INTRAVENOUS CONTINUOUS PRN
Status: DISCONTINUED | OUTPATIENT
Start: 2022-11-14 | End: 2022-11-14 | Stop reason: SDUPTHER

## 2022-11-14 RX ORDER — ALBUTEROL SULFATE 90 UG/1
AEROSOL, METERED RESPIRATORY (INHALATION) PRN
Status: DISCONTINUED | OUTPATIENT
Start: 2022-11-14 | End: 2022-11-14 | Stop reason: SDUPTHER

## 2022-11-14 RX ORDER — MAGNESIUM HYDROXIDE 1200 MG/15ML
LIQUID ORAL CONTINUOUS PRN
Status: DISCONTINUED | OUTPATIENT
Start: 2022-11-14 | End: 2022-11-14 | Stop reason: HOSPADM

## 2022-11-14 RX ORDER — SODIUM CHLORIDE 0.9 % (FLUSH) 0.9 %
5-40 SYRINGE (ML) INJECTION EVERY 12 HOURS SCHEDULED
Status: DISCONTINUED | OUTPATIENT
Start: 2022-11-14 | End: 2022-11-14 | Stop reason: HOSPADM

## 2022-11-14 RX ORDER — ONDANSETRON 2 MG/ML
INJECTION INTRAMUSCULAR; INTRAVENOUS PRN
Status: DISCONTINUED | OUTPATIENT
Start: 2022-11-14 | End: 2022-11-14 | Stop reason: SDUPTHER

## 2022-11-14 RX ORDER — DEXTROSE MONOHYDRATE 100 MG/ML
INJECTION, SOLUTION INTRAVENOUS CONTINUOUS PRN
Status: DISCONTINUED | OUTPATIENT
Start: 2022-11-14 | End: 2022-11-14 | Stop reason: HOSPADM

## 2022-11-14 RX ORDER — SODIUM CHLORIDE 0.9 % (FLUSH) 0.9 %
5-40 SYRINGE (ML) INJECTION PRN
Status: DISCONTINUED | OUTPATIENT
Start: 2022-11-14 | End: 2022-11-14 | Stop reason: HOSPADM

## 2022-11-14 RX ORDER — LABETALOL HYDROCHLORIDE 5 MG/ML
10 INJECTION, SOLUTION INTRAVENOUS
Status: DISCONTINUED | OUTPATIENT
Start: 2022-11-14 | End: 2022-11-14 | Stop reason: HOSPADM

## 2022-11-14 RX ORDER — PROPOFOL 10 MG/ML
INJECTION, EMULSION INTRAVENOUS PRN
Status: DISCONTINUED | OUTPATIENT
Start: 2022-11-14 | End: 2022-11-14 | Stop reason: SDUPTHER

## 2022-11-14 RX ORDER — HYDRALAZINE HYDROCHLORIDE 20 MG/ML
10 INJECTION INTRAMUSCULAR; INTRAVENOUS
Status: DISCONTINUED | OUTPATIENT
Start: 2022-11-14 | End: 2022-11-14 | Stop reason: HOSPADM

## 2022-11-14 RX ORDER — OXYCODONE HYDROCHLORIDE AND ACETAMINOPHEN 5; 325 MG/1; MG/1
1 TABLET ORAL EVERY 6 HOURS PRN
Qty: 9 TABLET | Refills: 0 | Status: SHIPPED | OUTPATIENT
Start: 2022-11-14 | End: 2022-11-17

## 2022-11-14 RX ORDER — SODIUM CHLORIDE 9 MG/ML
INJECTION, SOLUTION INTRAVENOUS CONTINUOUS PRN
Status: DISCONTINUED | OUTPATIENT
Start: 2022-11-14 | End: 2022-11-14 | Stop reason: SDUPTHER

## 2022-11-14 RX ORDER — ONDANSETRON 2 MG/ML
4 INJECTION INTRAMUSCULAR; INTRAVENOUS
Status: DISCONTINUED | OUTPATIENT
Start: 2022-11-14 | End: 2022-11-14 | Stop reason: HOSPADM

## 2022-11-14 RX ORDER — OXYCODONE HYDROCHLORIDE AND ACETAMINOPHEN 5; 325 MG/1; MG/1
1 TABLET ORAL ONCE
Status: COMPLETED | OUTPATIENT
Start: 2022-11-14 | End: 2022-11-14

## 2022-11-14 RX ORDER — LIDOCAINE HYDROCHLORIDE 10 MG/ML
INJECTION, SOLUTION EPIDURAL; INFILTRATION; INTRACAUDAL; PERINEURAL PRN
Status: DISCONTINUED | OUTPATIENT
Start: 2022-11-14 | End: 2022-11-14 | Stop reason: SDUPTHER

## 2022-11-14 RX ORDER — FENTANYL CITRATE 50 UG/ML
INJECTION, SOLUTION INTRAMUSCULAR; INTRAVENOUS PRN
Status: DISCONTINUED | OUTPATIENT
Start: 2022-11-14 | End: 2022-11-14 | Stop reason: SDUPTHER

## 2022-11-14 RX ORDER — INSULIN LISPRO 100 [IU]/ML
10 INJECTION, SOLUTION INTRAVENOUS; SUBCUTANEOUS ONCE
Status: COMPLETED | OUTPATIENT
Start: 2022-11-14 | End: 2022-11-14

## 2022-11-14 RX ADMIN — SODIUM CHLORIDE, POTASSIUM CHLORIDE, SODIUM LACTATE AND CALCIUM CHLORIDE: 600; 310; 30; 20 INJECTION, SOLUTION INTRAVENOUS at 11:16

## 2022-11-14 RX ADMIN — PHENYLEPHRINE HYDROCHLORIDE 100 MCG: 10 INJECTION INTRAVENOUS at 11:40

## 2022-11-14 RX ADMIN — FENTANYL CITRATE 50 MCG: 50 INJECTION, SOLUTION INTRAMUSCULAR; INTRAVENOUS at 11:21

## 2022-11-14 RX ADMIN — ALBUTEROL SULFATE 2 PUFF: 90 AEROSOL, METERED RESPIRATORY (INHALATION) at 12:26

## 2022-11-14 RX ADMIN — SODIUM CHLORIDE 14.2 UNITS/HR: 9 INJECTION, SOLUTION INTRAVENOUS at 11:07

## 2022-11-14 RX ADMIN — SODIUM CHLORIDE: 9 INJECTION, SOLUTION INTRAVENOUS at 11:16

## 2022-11-14 RX ADMIN — INSULIN LISPRO 10 UNITS: 100 INJECTION, SOLUTION INTRAVENOUS; SUBCUTANEOUS at 10:48

## 2022-11-14 RX ADMIN — PROPOFOL 200 MG: 10 INJECTION, EMULSION INTRAVENOUS at 11:22

## 2022-11-14 RX ADMIN — Medication 2 G: at 11:37

## 2022-11-14 RX ADMIN — SUGAMMADEX 200 MG: 100 INJECTION, SOLUTION INTRAVENOUS at 12:21

## 2022-11-14 RX ADMIN — OXYCODONE HYDROCHLORIDE AND ACETAMINOPHEN 1 TABLET: 5; 325 TABLET ORAL at 13:55

## 2022-11-14 RX ADMIN — LIDOCAINE HYDROCHLORIDE 50 MG: 10 INJECTION, SOLUTION EPIDURAL; INFILTRATION; INTRACAUDAL; PERINEURAL at 11:21

## 2022-11-14 RX ADMIN — PHENYLEPHRINE HYDROCHLORIDE 100 MCG: 10 INJECTION INTRAVENOUS at 11:37

## 2022-11-14 RX ADMIN — PHENYLEPHRINE HYDROCHLORIDE 100 MCG: 10 INJECTION INTRAVENOUS at 12:11

## 2022-11-14 RX ADMIN — ONDANSETRON 4 MG: 2 INJECTION INTRAMUSCULAR; INTRAVENOUS at 12:19

## 2022-11-14 RX ADMIN — FENTANYL CITRATE 50 MCG: 50 INJECTION, SOLUTION INTRAMUSCULAR; INTRAVENOUS at 12:37

## 2022-11-14 RX ADMIN — ALBUTEROL SULFATE 5 PUFF: 90 AEROSOL, METERED RESPIRATORY (INHALATION) at 11:27

## 2022-11-14 RX ADMIN — ROCURONIUM BROMIDE 50 MG: 10 INJECTION, SOLUTION INTRAVENOUS at 11:24

## 2022-11-14 ASSESSMENT — PAIN SCALES - GENERAL
PAINLEVEL_OUTOF10: 1
PAINLEVEL_OUTOF10: 1
PAINLEVEL_OUTOF10: 0
PAINLEVEL_OUTOF10: 1
PAINLEVEL_OUTOF10: 0

## 2022-11-14 ASSESSMENT — PAIN DESCRIPTION - LOCATION
LOCATION: ABDOMEN
LOCATION: ABDOMEN

## 2022-11-14 ASSESSMENT — PAIN DESCRIPTION - DESCRIPTORS
DESCRIPTORS: ACHING
DESCRIPTORS: DULL;ACHING

## 2022-11-14 NOTE — H&P
H&P  General Surgery    Pt Name: Su Castillo  MRN: [id number]  YOB: 1980  Date of evaluation: 11/14/2022      [x] I have examined the patient and reviewed the H&P/Consult completed 11/8/2022, and there are no changes to the patient or plans. History of transverse colostomy due to necrotizing soft tissue infection (2/16/2022) with reversal 7/28/22. She continues to have drainage from a sinus tract from the prior colostomy site. [] I have examined the patient and reviewed the H&P/Consult and have noted the following changes:     Plan:   Patient with elevated glucose in the 500's in preop. Plan to start insulin gtt and then proceed to the operating room for incision and drainage of abdominal wall sinus tract. Madhu Leblanc DO  General Surgery PGY-4        Ac[facility] Surgery Clinic H&P     TODAY'S DATE: 11/8/2022, 3:28 PM     SUBJECTIVE       Pt  presented for follow up for her draining abdominal sinus tract. She had a colostomy due to necrotizing fasciitis infection in February 2022. Her colostomy reversal surgery was in July 2022. Her ostomy reversal site has continued to heal, but she has a small sinus tract with thick tan drainage remaining. She reports that the amount of drainage has not decreased much at all, and she is interested in pursuing a change in management of the wound. The depth of the wound in September was 6cm, and the depth of the wound is currently 4.9cm. She has some intermittent pain to her abdomen centered on the wound site and spreading laterally. Denies F/C CP SOB N/V. She is eating, drinking, voiding, and stooling appropriately.         OBJECTIVE    VITALS:  BP (!) 176/86   Pulse 84   Ht 5' 3\" (1.6 m)   Wt 262 lb (118.8 kg)   BMI 46.41 kg/m²   CONSTITUTIONAL:  awake and alert  LUNGS:  clear to auscultation  ABDOMEN:   obese, soft, non-distended, non-tender   EXT: extremities normal, atraumatic, no cyanosis or edema     BP (!) 176/86   Pulse 84   Ht 5' 3\" (1.6 m)   Wt 262 lb (118.8 kg)   BMI 46.41 kg/m²      General Appearance:    Alert, cooperative, no distress, appears stated age   Head:    Normocephalic, without obvious abnormality, atraumatic   Eyes:    PERRL, conjunctiva/corneas clear, EOM's intact, fundi     benign, both eyes   Ears:    Normal TM's and external ear canals, both ears   Nose:   Nares normal, septum midline, mucosa normal, no drainage    or sinus tenderness   Throat:   Lips, mucosa, and tongue normal; teeth and gums normal   Neck:   Supple, symmetrical, trachea midline, no adenopathy;     thyroid:  no enlargement/tenderness/nodules; no carotid    bruit or JVD   Back:     Symmetric, no curvature, ROM normal, no CVA tenderness   Lungs:     Clear to auscultation bilaterally, respirations unlabored   Chest Wall:    No tenderness or deformity    Heart:    Regular rate and rhythm, S1 and S2 normal, no murmur, rub   or gallop   Breast Exam:    No tenderness, masses, or nipple abnormality   Abdomen:     Small open draining wound in midline abdomen. Soft, non-tender, bowel sounds active all four quadrants,     no masses, no organomegaly   Genitalia:    Normal female without lesion, discharge or tenderness   Rectal:    Normal tone ;guaiac negative stool   Extremities:   Extremities normal, atraumatic, no cyanosis or edema   Pulses:   2+ and symmetric all extremities   Skin:   Skin color, texture, turgor normal, no rashes or lesions   Lymph nodes:   Cervical, supraclavicular, and axillary nodes normal   Neurologic:   CNII-XII intact, normal strength, sensation and reflexes     throughout         ASSESMENT         Diagnosis Orders   1. Enterocutaneous fistula           PLAN       Incision and Drainage of Abdominal Wall Sinus Tract on November 14th at [facility]   Follow Up:postop follow up 10 days after surgery     José Luis Da Silva DO  Electronically signed by José Luis Da Silva DO  on 11/8/2022 at 3:28 PM

## 2022-11-14 NOTE — ANESTHESIA PRE PROCEDURE
Department of Anesthesiology  Preprocedure Note       Name:  Brien Jules   Age:  36 y.o.  :  1980                                          MRN:  [id number]         Date:  2022      Surgeon: Iain Mayes):  Brenda Jaquez MD    Procedure: Procedure(s):  INCISION AND DRAINAGE ABDOMINAL WOUND    Medications prior to admission:   Prior to Admission medications    Medication Sig Start Date End Date Taking? Authorizing Provider   gabapentin (NEURONTIN) 300 MG capsule Take 1 capsule by mouth in the morning and 1 capsule at noon and 1 capsule in the evening. Do all this for 10 days. 22  Ten Fowler,    zolpidem (AMBIEN) 5 MG tablet  22   Historical Provider, MD   rosuvastatin (CRESTOR) 5 MG tablet TAKE 1 TABLET BY MOUTH ONCE DAILY 22   Historical Provider, MD   carvedilol (COREG) 25 MG tablet TAKE ONE TABLET BY MOUTH TWICE DAILY WITH FOOD 22   Historical Provider, MD   Cholecalciferol (VITAMIN D3) 50 MCG ( UT) CAPS TAKE 1 CAPSULE BY MOUTH ONCE DAILY 22   Historical Provider, MD   TRULICITY 4.5 VM/8.1VK SOPN INJECT 1 SYRINGE SUBCUTANEOUSLY ONCE A WEEK 22   Historical Provider, MD   hydrALAZINE (APRESOLINE) 50 MG tablet TAKE 1 TABLET BY MOUTH TWICE DAILY WITH FOOD 22   Historical Provider, MD   NOVOLOG 100 UNIT/ML injection vial  3/7/22   Historical Provider, MD   magnesium oxide (MAG-OX) 400 (241.3 Mg) MG TABS tablet Take 400 mg by mouth in the morning.  22   Historical Provider, MD   OLANZapine (ZYPREXA) 10 MG tablet TAKE ONE TABLET BY MOUTH ONCE DAILY 22   Historical Provider, MD   bumetanide (BUMEX) 1 MG tablet Take 1 tablet by mouth daily 22   Jeremy Ranellone,    fluticasone-salmeterol (ADVAIR HFA) 65-48 MCG/ACT inhaler Inhale 2 puffs into the lungs 2 times daily    Historical Provider, MD   Multiple Vitamins-Minerals (MULTIVITAMIN ADULT EXTRA C PO) Take 1 tablet by mouth daily    Historical Provider, MD   vitamin D (ERGOCALCIFEROL) 1.25 MG (75713 UT) CAPS capsule Take 50,000 Units by mouth once a week    Historical Provider, MD   albuterol sulfate HFA (PROVENTIL;VENTOLIN;PROAIR) 108 (90 Base) MCG/ACT inhaler Inhale 1 puff into the lungs as needed  Patient not taking: Reported on 7/28/2022    Historical Provider, MD   DULoxetine (CYMBALTA) 60 MG extended release capsule Take 60 mg by mouth daily    Historical Provider, MD   cyanocobalamin 1000 MCG/ML injection Inject 1,000 mcg into the muscle every 30 days 10/18/21   Historical Provider, MD   fenofibrate (TRIGLIDE) 160 MG tablet Take 160 mg by mouth daily    Historical Provider, MD   fluticasone (FLONASE) 50 MCG/ACT nasal spray 1 spray by Each Nostril route    Historical Provider, MD   glucagon 1 MG injection Inject 1 mg into the muscle as needed    Historical Provider, MD   montelukast (SINGULAIR) 10 MG tablet Take 10 mg by mouth daily    Historical Provider, MD   omeprazole (PRILOSEC) 20 MG delayed release capsule Take 20 mg by mouth daily    Historical Provider, MD       Current medications:    Current Outpatient Medications   Medication Sig Dispense Refill    zolpidem (AMBIEN) 5 MG tablet       rosuvastatin (CRESTOR) 5 MG tablet TAKE 1 TABLET BY MOUTH ONCE DAILY      carvedilol (COREG) 25 MG tablet TAKE ONE TABLET BY MOUTH TWICE DAILY WITH FOOD      Cholecalciferol (VITAMIN D3) 50 MCG (2000 UT) CAPS TAKE 1 CAPSULE BY MOUTH ONCE DAILY      TRULICITY 4.5 GT/5.2IF SOPN INJECT 1 SYRINGE SUBCUTANEOUSLY ONCE A WEEK      hydrALAZINE (APRESOLINE) 50 MG tablet TAKE 1 TABLET BY MOUTH TWICE DAILY WITH FOOD      NOVOLOG 100 UNIT/ML injection vial       magnesium oxide (MAG-OX) 400 (241.3 Mg) MG TABS tablet Take 400 mg by mouth in the morning.       OLANZapine (ZYPREXA) 10 MG tablet TAKE ONE TABLET BY MOUTH ONCE DAILY      bumetanide (BUMEX) 1 MG tablet Take 1 tablet by mouth daily 30 tablet 0    fluticasone-salmeterol (ADVAIR HFA) 45-21 MCG/ACT inhaler Inhale 2 puffs into the lungs 2 times daily  Multiple Vitamins-Minerals (MULTIVITAMIN ADULT EXTRA C PO) Take 1 tablet by mouth daily      vitamin D (ERGOCALCIFEROL) 1.25 MG (27648 UT) CAPS capsule Take 50,000 Units by mouth once a week      albuterol sulfate HFA (PROVENTIL;VENTOLIN;PROAIR) 108 (90 Base) MCG/ACT inhaler Inhale 1 puff into the lungs as needed (Patient not taking: Reported on 7/28/2022)      DULoxetine (CYMBALTA) 60 MG extended release capsule Take 60 mg by mouth daily      cyanocobalamin 1000 MCG/ML injection Inject 1,000 mcg into the muscle every 30 days      fenofibrate (TRIGLIDE) 160 MG tablet Take 160 mg by mouth daily      fluticasone (FLONASE) 50 MCG/ACT nasal spray 1 spray by Each Nostril route      glucagon 1 MG injection Inject 1 mg into the muscle as needed      montelukast (SINGULAIR) 10 MG tablet Take 10 mg by mouth daily      omeprazole (PRILOSEC) 20 MG delayed release capsule Take 20 mg by mouth daily       No current facility-administered medications for this visit. 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    Uncontrolled type 2 diabetes mellitus with hyperglycemia ([facility] E11.65    MEREDITH (acute kidney injury) ([facility] N17.9    Hypokalemia E87.6    Hyponatremia E87.1    Anemia D64.9    Morbid obesity ([facility] E66.01    Type 1 diabetes mellitus with stage 3a chronic kidney disease ([facility] E10.22, J31.25    Metabolic acidosis T71.11    Hypervolemia E87.70    S/P partial resection of colon Z90.49       Past Medical History:        Diagnosis Date    Asthma     Astigmatism     [facility]     Clinical trial participant 02/09/2022    Protocol [id number]  Study completion 11/FEB/2022    COVID-19 03/2020 rhinitis, cough, weakness, fatigue X3 weeks    Diabetes mellitus ([address] 1989    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 BASS - LAST VISIT 2021 - TO RETURN AS NEEDED    Under care of team     GENERAL SURGERY -  [facility]    Under care of team     ENDOCRINOLOGY - DR. Belinda Myers - LAST VISIT - 4/2022    Under care of team     NEPHROLOGY - DR. GARCIA    Wellness examination     PCP- 62Jake Jenkins CNP - LAST VISIT - 6/2022 - manages heart meds       Past Surgical History:        Procedure Laterality Date    ABDOMEN SURGERY Bilateral 02/09/2022    incision and drainage of bilateral necrotizing soft tissue infection of groin and buttocks  performed by Portia Valdivia MD at [address] [facility] [facility] Left 02/18/2022    LEFT GROIN, BUTTOCK  WOUND EXPLORATION AND DEBRIDEMENT,  WOUND VAC EXCHANGE, ([address]) performed by Megan Altman MD at [address] [facility] Bilateral    [address]      X2   2008, 2010    CHOLECYSTECTOMY      COLONOSCOPY      COLOSTOMY N/A 02/16/2022    DEBRIDEMENT BUTTOCK AND GROIN, WOUND VAC EXCHANGE, LAPAROSCOPIC COLOSTOMY CREATION ([address]) performed by Megan Altman MD at [facility] 07/28/2022    COLOSTOMY REVERSAL performed by Megan Altman MD at [address] [place] [address] IR TUNNELED CATHETER PLACEMENT GREATER THAN 5 YEARS  02/22/2022    IR TUNNELED CATHETER PLACEMENT GREATER THAN 5 YEARS [facility]    KNEE ARTHROSCOPY Bilateral     LUMBAR [facility] SURGERY     Dr. David Multani N/A 2022    *ADD ON, ASAP* INCISION AND DRAINAGE OF PERINEUM, BRIAN KNIFE PRONE performed by Tomer Andujar MD at [address] Bilateral 02/10/2022    INCISION AND DRAINAGE BUTTOCK, GROIN  (LITHOTOMY POSITION) performed by Bailee Veras MD at [address] [facility] 2022    INCISION AND DRAINAGE AND DEBRIDEMENT BUTTOCK AND LABIAL ABSCESS , PLACEMENT OF WOUND VAC performed by Niecy Gomez DO at [facility] Left     [address]  2019    TUBAL LIGATION      ULNAR NERVE TRANSPOSITION Left     WOUND EXPLORATION Left 2022    [address] OF BILATERAL GROINS AND LEFT BUTTOCK WOUND WITH PARTIAL CLOSURE AND WOUND VAC PLACEMENT performed by Gemma Dumont MD at [facility] History:    Social History     Tobacco Use    Smoking status: Former     Packs/day: 0.50     Types: Cigarettes     Start date:      Quit date:      Years since quittin.8    Smokeless tobacco: Never   Substance Use Topics    Alcohol use: Yes     Comment: \"like once a month\"                                Counseling given: Not Answered      Vital Signs (Current): There were no vitals filed for this visit.                                            BP Readings from Last 3 Encounters:   22 (!) 176/86   10/04/22 (!) 194/90   22 (!) 176/82       NPO Status:                                                                                 BMI:   Wt Readings from Last 3 Encounters:   22 262 lb (118.8 kg)   10/04/22 262 lb (118.8 kg)   22 292 lb (132.5 kg)     There is no height or weight on file to calculate BMI.    CBC:   Lab Results   Component Value Date/Time    WBC 8.9 2022 06:01 AM    WBC 8.2 2022 05:09 AM    RBC 3.35 2022 06:01 AM    HGB 9.0 2022 06:01 AM    HCT 27.2 2022 06:01 AM    MCV 81.2 2022 06:01 AM    RDW 15.2 2022 06:01 AM     08/11/2022 06:01 AM       CMP:   Lab Results   Component Value Date/Time     08/11/2022 06:01 AM    K 4.0 08/11/2022 06:01 AM    CL 99 08/11/2022 06:01 AM    CO2 29 08/11/2022 06:01 AM    BUN 13 08/11/2022 06:01 AM    CREATININE 1.23 09/28/2022 10:09 AM    GFRAA 58 09/28/2022 10:09 AM    LABGLOM 48 09/28/2022 10:09 AM    GLUCOSE 239 08/11/2022 06:01 AM    PROT 5.7 02/09/2022 04:12 AM    CALCIUM 8.8 08/11/2022 06:01 AM    BILITOT 0.29 02/09/2022 04:12 AM    ALKPHOS 196 02/09/2022 04:12 AM    AST 48 02/09/2022 04:12 AM    ALT 25 02/09/2022 04:12 AM       POC Tests: No results for input(s): POCGLU, POCNA, POCK, POCCL, POCBUN, POCHEMO, POCHCT in the last 72 hours.     Coags:   Lab Results   Component Value Date/Time    PROTIME 10.4 02/21/2022 03:41 PM    INR 1.0 02/21/2022 03:41 PM       HCG (If Applicable):   Lab Results   Component Value Date    PREGTESTUR NEGATIVE 02/08/2022        ABGs: No results found for: PHART, PO2ART, KPH7NXS, TAW1DDW, BEART, R5TVHBDL     Type & Screen (If Applicable):  No results found for: LABABO, LABRH    Drug/Infectious Status (If Applicable):  Lab Results   Component Value Date/Time    HEPCAB NONREACTIVE 02/16/2022 12:56 PM       COVID-19 Screening (If Applicable):   Lab Results   Component Value Date/Time    COVID19 Not Detected 02/09/2022 02:52 AM           Anesthesia Evaluation  Patient summary reviewed no history of anesthetic complications:   Airway: Mallampati: III  TM distance: >3 FB   Neck ROM: limited  Mouth opening: > = 3 FB   Dental: normal exam         Pulmonary:normal exam    (+) decreased breath sounds asthma: seasonal asthma,                            Cardiovascular:    (+) hypertension: no interval change,       ECG reviewed  Rhythm: regular  Rate: normal                    Neuro/Psych:   (+) psychiatric history:depression/anxiety             GI/Hepatic/Renal:   (+) GERD: no interval change, morbid obesity          Endo/Other:    (+) DiabetesType II DM, no interval change, , .                 Abdominal:   (+) obese,           Vascular: negative vascular ROS. Other Findings:             Anesthesia Plan      general     ASA 3     (ETT [place] Scope)  Induction: intravenous. MIPS: Postoperative opioids intended and Prophylactic antiemetics administered. Anesthetic plan and risks discussed with patient. Use of blood products discussed with patient whom consented to blood products. Plan discussed with CRNA.                     Chago Hernandez MD   11/13/2022

## 2022-11-14 NOTE — OP NOTE
Operative Note      Patient: Aguila Bright  YOB: 1980  MRN: [id number]    Date of Procedure: 11/14/2022    Pre-Op Diagnosis: ABDOMINAL WALL SINUS WOUND    Post-Op Diagnosis: Same       Procedure(s):  WOUND EXPOLORATION AND INCISION AND DRAINAGE OF CHRONIC SINUS WOUND    Surgeon(s):  Krystin Saenz MD    Assistant:   Resident: Brenda Britt DO    Anesthesia: General    Estimated Blood Loss (mL): Less than 05ZP    Complications: None    Specimens:   ID Type Source Tests Collected by Time Destination   A : SINUS TRACT AND SKIN Tissue Tissue SURGICAL PATHOLOGY Krystin Saenz MD 11/14/2022 1213        Implants:  * No implants in log *      Drains:   Negative Pressure Wound Therapy Buttocks Left (Active)     Detailed Description of Procedure:     HISTORY: The patient is a 36 y.o. female with history of transverse colostomy due to necrotizing fasciitis. She is s/p colostomy reversal now with a chronic sinus tract at the site of the previous ostomy that has been drainage. The decision was made to proceed to the operating room for exploration and incision and drainage of the wound. Risks, benefits, expected outcome, and alternatives to the procedure were explained and all questions answered. Patient understands and signed a consent for procedure. PROCEDURE: The patient was brought to the operating room and placed on the operating table in a supine position. A time out was performed to confirm patient, procedure, location, and allergies. General anesthesia was given, and the patient was then positioned, prepped in sterile fashion and draped. The midline abdominal sinus tract was identified in the midabdomen. There was some purulent drainage from the sinus tract. A 6x4 cm elliptical incision was made with a 15 blade. This was carried down through the subcutaneous tissues to the fascia and the entirety of the sinus tract was excised. This was sent for pathology.      Hemostasis was achieved with pressure and one figure of eight vicryl suture was thrown in the fascia to control a small area of bleeding. The cavity was thoroughly irrigated with warm normal saline. Hemostasis was confirmed. The cavity was packed with 1 inch iodoform and covered with fluffs and an ABD. This concluded the procedure. All sponge, needle, instrument, and lap counts were reported as correct. The patient was extubated and taken to PACU in stable condition. Dr. Soledad Linn was scrubbed for the entire case.      Macie Carrero, DO  General Surgery PGY-4

## 2022-11-14 NOTE — DISCHARGE INSTRUCTIONS
Surgery Patient Discharge Instructions    MEDICATIONS:  Tylenol for minor pain. Percocet for more severe pain. Take least amount possible. Do not drive while taking percocet. Do not exceed 4,000mg of tylenol daily. Percocet contains tylenol. Take the antibiotic Augmentin as prescribed    WOUND CARE:   Do not remove top dressing for 24 hours  Beginning 11/15 change the packing and dressing daily     Repack the cavity with 1 inch iodoform packing   Cover with fluffs and an ABD    BATHING:  Ok to shower in 24 hours. Keep cavity covered during showering     DRIVING: No driving for while on pain medication    LIFTING: Avoid lifting objects heavier than 10 lbs for 4 weeks    DIET:   [address] to resume regular diet. SPECIAL INSTRUCTIONS:  After you leave the hospital, call your doctor if any of the following occurs:   Pain or symptoms that worsen   Other new symptoms   Signs of infection, including fever and chills   Nausea and/or vomiting that you can't control with the medications you were given   Pain that you can't control with the medications you've been given   Excessive tenderness or swelling   Changes in bowel or sexual function   Dizziness or lightheadedness   Rash or hives       Watch for signs of infection:    Excessive warmth or bright redness around your incisions    Leakage of bloody or cloudy fluid from you incisions    Fever over 983.4      No alcoholic beverages, no driving or operating machinery, no making important decisions for 24 hours. You may have a normal diet but should eat lightly day of surgery. Drink plenty of fluids.   Urinate within 8 hours after surgery, if unable to urinate call your doctor

## 2022-11-15 LAB — SURGICAL PATHOLOGY REPORT: NORMAL

## 2022-11-15 NOTE — ANESTHESIA POSTPROCEDURE EVALUATION
Department of Anesthesiology  Postprocedure Note    Patient: Carla Clark  MRN: [id number]  [place]: 1980  Date of evaluation: 11/14/2022      Procedure Summary     Date: 11/14/22 Room / Location: [address]    Anesthesia Start: 1115 Anesthesia Stop: 1237    Procedure: WOUND EXPOLORATION AND INCISION AND DRAINAGE OF CHRONIC SINUS WOUND (Abdomen) Diagnosis:       Chronic abdominal wound infection, initial encounter      (ABDOMINAL WALL SINUS WOUND)    Surgeons: Edgard Maldonado MD Responsible Provider: Alexander Suarez MD    Anesthesia Type: General ASA Status: 3          Anesthesia Type: General    Cristhian Phase I: Cristhian Score: 10    Cristhian Phase II: Cristhian Score: 10      Anesthesia Post Evaluation    Patient location during evaluation: PACU  Patient participation: complete - patient participated  Level of consciousness: awake  Airway patency: patent  Nausea & Vomiting: no nausea and no vomiting  Complications: no  Cardiovascular status: blood pressure returned to baseline  Respiratory status: acceptable  Hydration status: euvolemic  Comments: /70   Pulse 75   Temp 97.3 °F (36.3 °C) (Temporal)   Resp 19   LMP 11/01/2022 (Exact Date) Comment: Pt had tubal ligation in 2010  SpO2 96%

## 2022-11-22 ENCOUNTER — OFFICE VISIT (OUTPATIENT)
Dept: SURGERY | Age: 42
End: 2022-11-22

## 2022-11-22 VITALS
BODY MASS INDEX: 46.42 KG/M2 | HEART RATE: 102 BPM | SYSTOLIC BLOOD PRESSURE: 146 MMHG | HEIGHT: 63 IN | DIASTOLIC BLOOD PRESSURE: 80 MMHG | WEIGHT: 262 LBS

## 2022-11-22 DIAGNOSIS — S31.109D CHRONIC WOUND INFECTION OF ABDOMEN, SUBSEQUENT ENCOUNTER: Primary | ICD-10-CM

## 2022-11-22 DIAGNOSIS — L08.9 CHRONIC WOUND INFECTION OF ABDOMEN, SUBSEQUENT ENCOUNTER: Primary | ICD-10-CM

## 2022-11-22 PROCEDURE — 99024 POSTOP FOLLOW-UP VISIT: CPT | Performed by: SPECIALIST

## 2022-11-22 ASSESSMENT — ENCOUNTER SYMPTOMS
DIARRHEA: 0
CONSTIPATION: 0
ABDOMINAL PAIN: 0
VOMITING: 0
SHORTNESS OF BREATH: 0
NAUSEA: 0
CHEST TIGHTNESS: 0

## 2022-11-22 NOTE — PROGRESS NOTES
Moris Muniz (:  1980) is a 36 y.o. female,Established patient, here for evaluation of the following chief complaint(s):  Follow-up (Re-checking open area to mid abdomen)    Pt  presents for her one week follow up s/p debridement and I&D of draining sinus tract. She states the the drainage has drastically decreased. She has been packing the wound with 1\" iodoform. She states that she  has had no redness, drainage, fever, chills, N/V, CP, or SOB. She had presented in clinic two weeks ago for follow up for her draining abdominal sinus tract. She had a bowel resection due to necrotizing fasciitis infection in 2022. Her colostomy reversal surgery was in 2022. Her ostomy reversal site continued to heal, but she has a small sinus tract with thick tan drainage remaining. The depth of the wound in September was 6cm, and the depth of the wound two weeks ago was 4.9cm. Denies F/C CP SOB N/V. She is eating, drinking, voiding, and stooling appropriately. ASSESSMENT/PLAN:  1. Chronic wound infection of abdomen, subsequent encounter    Return in about 2 weeks (around 2022). Subjective   SUBJECTIVE/OBJECTIVE:  HPI    Review of Systems   Constitutional:  Negative for chills and fever. Respiratory:  Negative for chest tightness and shortness of breath. Cardiovascular:  Negative for chest pain. Gastrointestinal:  Negative for abdominal pain, constipation, diarrhea, nausea and vomiting. Skin:  Positive for wound. Midline circular wound        Objective   Physical Exam  Constitutional:       Appearance: Normal appearance. She is obese. HENT:      Head: Normocephalic and atraumatic. Cardiovascular:      Rate and Rhythm: Normal rate and regular rhythm. Pulses: Normal pulses. Heart sounds: Normal heart sounds. Pulmonary:      Effort: Pulmonary effort is normal.      Breath sounds: Normal breath sounds. Abdominal:      General: There is no distension. Palpations: Abdomen is soft. Tenderness: There is no abdominal tenderness. There is no guarding. Comments: Midline circular wound with good granulation tissue, healthy appearing, minimal serous drainage   Skin:     General: Skin is warm and dry. Findings: No erythema. Neurological:      General: No focal deficit present. Mental Status: She is alert. An electronic signature was used to authenticate this note. .    Trauma, Emergency and Critical Surgical Services  Attending Progress Note   I have discussed the care of this patient including pertinent history and exam findings,  with the resident. I have seen and examined the patient and the key elements of all parts of the encounter have been performed by me. I agree with the assessment, plan and orders as documented by the resident.      Electronically signed by Montserrat Patricio MD on 11/22/2022 at 2:39 PM     --Montserrat Patricio MD

## 2022-12-06 ENCOUNTER — OFFICE VISIT (OUTPATIENT)
Dept: SURGERY | Age: 42
End: 2022-12-06
Payer: COMMERCIAL

## 2022-12-06 VITALS
SYSTOLIC BLOOD PRESSURE: 127 MMHG | HEIGHT: 63 IN | BODY MASS INDEX: 46.42 KG/M2 | DIASTOLIC BLOOD PRESSURE: 64 MMHG | WEIGHT: 262 LBS | HEART RATE: 100 BPM

## 2022-12-06 DIAGNOSIS — S31.109D CHRONIC WOUND INFECTION OF ABDOMEN, SUBSEQUENT ENCOUNTER: Primary | ICD-10-CM

## 2022-12-06 DIAGNOSIS — L08.9 CHRONIC WOUND INFECTION OF ABDOMEN, SUBSEQUENT ENCOUNTER: Primary | ICD-10-CM

## 2022-12-06 PROCEDURE — G8484 FLU IMMUNIZE NO ADMIN: HCPCS | Performed by: SPECIALIST

## 2022-12-06 PROCEDURE — 99213 OFFICE O/P EST LOW 20 MIN: CPT | Performed by: SPECIALIST

## 2022-12-06 PROCEDURE — G8417 CALC BMI ABV UP PARAM F/U: HCPCS | Performed by: SPECIALIST

## 2022-12-06 PROCEDURE — 1036F TOBACCO NON-USER: CPT | Performed by: SPECIALIST

## 2022-12-06 PROCEDURE — G8427 DOCREV CUR MEDS BY ELIG CLIN: HCPCS | Performed by: SPECIALIST

## 2022-12-06 NOTE — PROGRESS NOTES
Trauma and [address]      Patient's Name/ Date of Birth/ Gender: Jeff Hernandez / 1980 (65 y.o.) / female     MRN/ACCOUNT #: [de-identified]    History of present Illness: Jeff Hernandez is a 36 y.o. female, who presents for 2-week follow-up for chronic midline wound recheck. She underwent operative incision and drainage on 11/14/2022 for a chronic draining sinus tract at the site of previous ostomy. Since then she has been undergoing regular follow-up appointments to assess for wound healing. Patient states that drainage has stopped. She has no complaints with the wound at this time. Patient has been packing wound with iodoform and covering with gauze. She has wound care nurse that comes to her house to assist with dressing changes and monitoring the wound. She denies any erythema, induration, fluctuance, drainage from the wound. She denies any nausea, vomiting, fever, chills. Patient believes that the wound is healing nicely. Past Medical History:  has a past medical history of Asthma, Astigmatism, Bipolar disorder ([address] Clinical trial participant, COVID-19, Diabetes mellitus ([address] Leonel gangrene, GERD (gastroesophageal reflux disease), H/O cardiovascular stress test, History of echocardiogram, Hyperlipidemia, Hypertension, Insulin pump in place, Kidney disease, Obesity, On mechanically assisted ventilation ([address] Osteoarthritis, Under care of team, Under care of team, Under care of team, Under care of team, and Wellness examination.     Past Surgical History:   Past Surgical History:   Procedure Laterality Date    ABDOMEN SURGERY Bilateral 02/09/2022    incision and drainage of bilateral necrotizing soft tissue infection of groin and buttocks  performed by Niru Mcgarry MD at [address] Left 02/18/2022    LEFT GROIN, BUTTOCK  WOUND EXPLORATION AND DEBRIDEMENT,  WOUND VAC EXCHANGE, ([address]) performed by Mitul Santana MD at [facility] OR    ABDOMEN SURGERY N/A 11/14/2022    WOUND EXPOLORATION AND INCISION AND DRAINAGE OF CHRONIC SINUS WOUND performed by Bettina Salter MD at [facility] Bilateral     [address] [facility] [address]      X2   2008, 2010    CHOLECYSTECTOMY      COLONOSCOPY      COLOSTOMY N/A 02/16/2022    DEBRIDEMENT BUTTOCK AND GROIN, WOUND VAC EXCHANGE, LAPAROSCOPIC COLOSTOMY CREATION ([address]) performed by Bettina Salter MD at [address] [facility] N/A 07/28/2022    COLOSTOMY REVERSAL performed by Bettina Salter MD at [address] THAN 5 YEARS  02/22/2022    IR TUNNELED CATHETER PLACEMENT GREATER THAN 5 YEARS 2/22/2022 Eliza Park MD [facility] SPECIAL PROCEDURES    KNEE ARTHROSCOPY Bilateral     LUMBAR [address] SURGERY  2004    Dr. Sam Walker N/A 02/08/2022    *ADD ON, ASAP* INCISION AND DRAINAGE OF PERINEUM, BRIAN KNIFE PRONE performed by Yogesh Rene MD at [address] [facility] [facility] Bilateral 02/10/2022    INCISION AND DRAINAGE BUTTOCK, GROIN  (LITHOTOMY POSITION) performed by Ceelstino Castillo MD at [address] [facility] 02/11/2022    INCISION AND DRAINAGE AND DEBRIDEMENT BUTTOCK AND LABIAL ABSCESS , PLACEMENT OF WOUND VAC performed by Allen Brown DO at [address]  Left     SLEEVE GASTRECTOMY  08/2019    TUBAL LIGATION      ULNAR NERVE TRANSPOSITION Left     WOUND EXPLORATION Left 02/14/2022    DEBREIDMENT OF BILATERAL GROINS AND LEFT BUTTOCK WOUND WITH PARTIAL CLOSURE AND WOUND VAC PLACEMENT performed by Bettina Salter MD at [address]  11/14/2022    WOUND EXPOLORATION AND INCISION AND DRAINAGE OF CHRONIC SINUS WOUND       Social History:  reports that she quit smoking about 23 months ago. Her smoking use included cigarettes. She started smoking about 2 years ago. She smoked an average of .5 packs per day.  She has never used smokeless tobacco. She reports current alcohol use. She reports that she does not use drugs. Family History: family history includes Cerebral Aneurysm in her mother; Depression in her mother; Other in her father. Review of Systems:   Pertinent items are noted in HPI.     Allergies: Amlodipine, Aspirin, and Ibuprofen    Current Meds:  Current Outpatient Medications:     zolpidem (AMBIEN) 5 MG tablet, , Disp: , Rfl:     rosuvastatin (CRESTOR) 5 MG tablet, TAKE 1 TABLET BY MOUTH ONCE DAILY, Disp: , Rfl:     carvedilol (COREG) 25 MG tablet, TAKE ONE TABLET BY MOUTH TWICE DAILY WITH FOOD, Disp: , Rfl:     Cholecalciferol (VITAMIN D3) 50 MCG (2000 UT) CAPS, TAKE 1 CAPSULE BY MOUTH ONCE DAILY, Disp: , Rfl:     TRULICITY 4.5 RT/3.0JC SOPN, INJECT 1 SYRINGE SUBCUTANEOUSLY ONCE A WEEK, Disp: , Rfl:     hydrALAZINE (APRESOLINE) 50 MG tablet, TAKE 1 TABLET BY MOUTH TWICE DAILY WITH FOOD, Disp: , Rfl:     NOVOLOG 100 UNIT/ML injection vial, , Disp: , Rfl:     magnesium oxide (MAG-OX) 400 (241.3 Mg) MG TABS tablet, Take 400 mg by mouth in the morning., Disp: , Rfl:     OLANZapine (ZYPREXA) 10 MG tablet, TAKE ONE TABLET BY MOUTH ONCE DAILY, Disp: , Rfl:     bumetanide (BUMEX) 1 MG tablet, Take 1 tablet by mouth daily, Disp: 30 tablet, Rfl: 0    fluticasone-salmeterol (ADVAIR HFA) 45-21 MCG/ACT inhaler, Inhale 2 puffs into the lungs 2 times daily, Disp: , Rfl:     Multiple Vitamins-Minerals (MULTIVITAMIN ADULT EXTRA C PO), Take 1 tablet by mouth daily, Disp: , Rfl:     DULoxetine (CYMBALTA) 60 MG extended release capsule, Take 60 mg by mouth daily, Disp: , Rfl:     cyanocobalamin 1000 MCG/ML injection, Inject 1,000 mcg into the muscle every 30 days, Disp: , Rfl:     fenofibrate (TRIGLIDE) 160 MG tablet, Take 160 mg by mouth daily, Disp: , Rfl:     fluticasone (FLONASE) 50 MCG/ACT nasal spray, 1 spray by Each Nostril route, Disp: , Rfl:     glucagon 1 MG injection, Inject 1 mg into the muscle as needed, Disp: , Rfl:     omeprazole (PRILOSEC) 20 MG delayed release 36/05/3053    Metabolic acidosis     Hypervolemia     Uncontrolled type 2 diabetes mellitus with hyperglycemia ([facility] 02/09/2022    MEREDITH (acute kidney injury) ([facility] 02/09/2022    Hypokalemia 02/09/2022    Hyponatremia 02/09/2022    Anemia 02/09/2022    Morbid obesity ([facility] 02/09/2022    Type 1 diabetes mellitus with stage 3a chronic kidney disease ([facility]     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 female with chronic midline abdominal wound. Recommendation:    Wound appears to be healing nicely. Has regular wound care at home. Follow-up in 3 weeks for wound recheck.       Electronically signed by Ema Moreau DO  on 12/6/2022 at 1:10 PM

## 2023-01-03 ENCOUNTER — OFFICE VISIT (OUTPATIENT)
Dept: SURGERY | Age: 43
End: 2023-01-03
Payer: COMMERCIAL

## 2023-01-03 VITALS
WEIGHT: 262 LBS | SYSTOLIC BLOOD PRESSURE: 141 MMHG | HEIGHT: 63 IN | DIASTOLIC BLOOD PRESSURE: 77 MMHG | HEART RATE: 97 BPM | BODY MASS INDEX: 46.42 KG/M2

## 2023-01-03 DIAGNOSIS — S31.109D WOUND, OPEN, ABDOMINAL WALL, ANTERIOR, SUBSEQUENT ENCOUNTER: Primary | ICD-10-CM

## 2023-01-03 PROCEDURE — 1036F TOBACCO NON-USER: CPT | Performed by: SPECIALIST

## 2023-01-03 PROCEDURE — G8417 CALC BMI ABV UP PARAM F/U: HCPCS | Performed by: SPECIALIST

## 2023-01-03 PROCEDURE — G8484 FLU IMMUNIZE NO ADMIN: HCPCS | Performed by: SPECIALIST

## 2023-01-03 PROCEDURE — G8428 CUR MEDS NOT DOCUMENT: HCPCS | Performed by: SPECIALIST

## 2023-01-03 PROCEDURE — 99213 OFFICE O/P EST LOW 20 MIN: CPT | Performed by: SPECIALIST

## 2023-01-03 NOTE — PROGRESS NOTES
Trauma and [address]      Patient's Name/ Date of Birth/ Gender: Mark Brownlee / 1980 (33 y.o.) / female     MRN/ACCOUNT #: [de-identified]    History of present Illness: Mark Brownlee is a 36 y.o. female who presents for routine wound check. Patient underwent an operative incision and drainage on 11/14/2022 for chronic draining sinus tract at the site of her previous ostomy. She states that she has been packing her wound daily as instructed, notes that she feels that it is growing caudally. Denies any bloody stool, fecal discharge from wound, active bleeding from wound, nausea/vomiting, fever, or rash. Past Medical History:  has a past medical history of Asthma, Astigmatism, Bipolar disorder ([address] Clinical trial participant, COVID-19, Diabetes mellitus ([facility] Leonel gangrene, GERD (gastroesophageal reflux disease), H/O cardiovascular stress test, History of echocardiogram, Hyperlipidemia, Hypertension, Insulin pump in place, Kidney disease, Obesity, On mechanically assisted ventilation ([address] Osteoarthritis, Under care of team, Under care of team, Under care of team, Under care of team, and Wellness examination.     Past Surgical History:   Past Surgical History:   Procedure Laterality Date    ABDOMEN SURGERY Bilateral 02/09/2022    incision and drainage of bilateral necrotizing soft tissue infection of groin and buttocks  performed by Norma Sam MD at [facility] Left 02/18/2022    LEFT GROIN, BUTTOCK  WOUND EXPLORATION AND DEBRIDEMENT,  WOUND VAC EXCHANGE, ([address]) performed by Dahiana Torres MD at [address] [place] [address] 217 11/14/2022    WOUND EXPOLORATION AND INCISION AND DRAINAGE OF CHRONIC SINUS WOUND performed by Dahiana Torres MD at [facility] Bilateral     [address] [facility]   2008, 2010    CHOLECYSTECTOMY      COLONOSCOPY      COLOSTOMY N/A 02/16/2022    DEBRIDEMENT BUTTOCK AND GROIN, WOUND VAC EXCHANGE, LAPAROSCOPIC COLOSTOMY CREATION ([facility]) performed by Tamika Valencia MD at [address] [facility] [facility] N/A 07/28/2022    COLOSTOMY REVERSAL performed by Tamika Valencia MD at [address] THAN 5 YEARS  02/22/2022    IR TUNNELED CATHETER PLACEMENT GREATER THAN 5 YEARS 2/22/2022 Jovanny Gupta MD [facility] SPECIAL PROCEDURES    KNEE ARTHROSCOPY Bilateral     LUMBAR [address] SURGERY  2004    Dr. Jay Berrios N/A 02/08/2022    *ADD ON, ASAP* INCISION AND DRAINAGE OF PERINEUM, BRIAN KNIFE PRONE performed by Lavinia De La Rosa MD at [address] Bilateral 02/10/2022    INCISION AND DRAINAGE BUTTOCK, GROIN  (LITHOTOMY POSITION) performed by Javier Lema MD at [address] [place] [address] 02/11/2022    INCISION AND DRAINAGE AND DEBRIDEMENT BUTTOCK AND LABIAL ABSCESS , PLACEMENT OF WOUND VAC performed by Joann Zavaleta DO at [address]  Left     SLEEVE GASTRECTOMY  08/2019    TUBAL LIGATION      ULNAR NERVE TRANSPOSITION Left     WOUND EXPLORATION Left 02/14/2022    DEBREIDMENT OF BILATERAL GROINS AND LEFT BUTTOCK WOUND WITH PARTIAL CLOSURE AND WOUND VAC PLACEMENT performed by Tamika Valencia MD at [address]  11/14/2022    WOUND EXPOLORATION AND INCISION AND DRAINAGE OF CHRONIC SINUS WOUND       Social History:  reports that she quit smoking about 2 years ago. Her smoking use included cigarettes. She started smoking about 3 years ago. She smoked an average of .5 packs per day. She has never used smokeless tobacco. She reports current alcohol use. She reports that she does not use drugs. Family History: family history includes Cerebral Aneurysm in her mother; Depression in her mother; Other in her father. Review of Systems:   Pertinent items are noted in HPI.     Allergies: Amlodipine, Aspirin, and Ibuprofen    Current Meds:  Current Outpatient Medications: Gauze Pads & Dressings (CURITY IODOFORM PACKING STRIP) MISC, 1 each by Does not apply route once as needed (wound care), Disp: 1 each, Rfl: 3    zolpidem (AMBIEN) 5 MG tablet, , Disp: , Rfl:     rosuvastatin (CRESTOR) 5 MG tablet, TAKE 1 TABLET BY MOUTH ONCE DAILY, Disp: , Rfl:     carvedilol (COREG) 25 MG tablet, TAKE ONE TABLET BY MOUTH TWICE DAILY WITH FOOD, Disp: , Rfl:     Cholecalciferol (VITAMIN D3) 50 MCG (2000 UT) CAPS, TAKE 1 CAPSULE BY MOUTH ONCE DAILY, Disp: , Rfl:     TRULICITY 4.5 IO/4.6LF SOPN, INJECT 1 SYRINGE SUBCUTANEOUSLY ONCE A WEEK, Disp: , Rfl:     hydrALAZINE (APRESOLINE) 50 MG tablet, TAKE 1 TABLET BY MOUTH TWICE DAILY WITH FOOD, Disp: , Rfl:     NOVOLOG 100 UNIT/ML injection vial, , Disp: , Rfl:     magnesium oxide (MAG-OX) 400 (241.3 Mg) MG TABS tablet, Take 400 mg by mouth in the morning., Disp: , Rfl:     OLANZapine (ZYPREXA) 10 MG tablet, TAKE ONE TABLET BY MOUTH ONCE DAILY, Disp: , Rfl:     bumetanide (BUMEX) 1 MG tablet, Take 1 tablet by mouth daily, Disp: 30 tablet, Rfl: 0    fluticasone-salmeterol (ADVAIR HFA) 45-21 MCG/ACT inhaler, Inhale 2 puffs into the lungs 2 times daily, Disp: , Rfl:     Multiple Vitamins-Minerals (MULTIVITAMIN ADULT EXTRA C PO), Take 1 tablet by mouth daily, Disp: , Rfl:     vitamin D (ERGOCALCIFEROL) 1.25 MG (99660 UT) CAPS capsule, Take 50,000 Units by mouth once a week (Patient not taking: No sig reported), Disp: , Rfl:     albuterol sulfate HFA (PROVENTIL;VENTOLIN;PROAIR) 108 (90 Base) MCG/ACT inhaler, Inhale 1 puff into the lungs as needed (Patient not taking: No sig reported), Disp: , Rfl:     DULoxetine (CYMBALTA) 60 MG extended release capsule, Take 60 mg by mouth daily, Disp: , Rfl:     cyanocobalamin 1000 MCG/ML injection, Inject 1,000 mcg into the muscle every 30 days, Disp: , Rfl:     fenofibrate (TRIGLIDE) 160 MG tablet, Take 160 mg by mouth daily, Disp: , Rfl:     fluticasone (FLONASE) 50 MCG/ACT nasal spray, 1 spray by Each Nostril route, Disp: , Rfl:     glucagon 1 MG injection, Inject 1 mg into the muscle as needed, Disp: , Rfl:     montelukast (SINGULAIR) 10 MG tablet, Take 10 mg by mouth daily (Patient not taking: No sig reported), Disp: , Rfl:     omeprazole (PRILOSEC) 20 MG delayed release capsule, Take 20 mg by mouth daily, Disp: , Rfl:     Vital Signs: There were no vitals filed for this visit. Physical Exam:  Vital signs and Nurse's note reviewed  Gen:  A&Ox3, NAD  HEENT: NCAT, PERRLA, EOMI, no scleral icterus, oral mucosa moist  Neck: Supple, no thyroid enlargement, no cervical or supraclavicular lymphaedenopathy  Chest: Symmetric rise with inhalation, no evidence of trauma  CVS: Regular rate and rhythm, no murmurs, no rubs or gallops  Resp: Good bilateral air entry, clear to auscultation b/l, no wheeze or rhonchi  Abd: soft, non-tender, non-distended, no hepatosplenomegaly or palpable masses, bowel sounds present. Periumbilical, central wound noted with packing in situ. Removal of packing reveals wound which is approximately 2 cm deep, with serosanguineous/purulent discharge. No fecal discharge appreciable. Ext: No clubbing, cyanosis, edema, peripheral pulses 2+ Rad/Fem/DP/PT  CNS: Moves all extremities, no gross focal motor deficits  Skin: No erythema or ulcerations         Labs:   CBC: No results for input(s): WBC, HGB, PLT in the last 72 hours. BMP:  No results for input(s): NA, K, CL, CO2, BUN, CREATININE, GLUCOSE in the last 72 hours. Hepatic: No results for input(s): AST, ALT, ALB, ALKPHOS, BILITOT, BILIDIR, LIPASE, AMYLASE in the last 72 hours. Coagulation: No results for input(s): APTT, PROT, INR in the last 72 hours.       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 female with chronic wound who presents for wound check. Patient denies any fever, fecal discharge from wound, rash, abdominal pain, or new alteration in bowel movement since last seen. Recommendation:    Continue once daily wound packing as instructed. Return to clinic in 2 weeks for wound re-evaluation. Trauma, Emergency and Critical Surgical Services  Attending Progress Note   I have discussed the care of this patient including pertinent history and exam findings,  with the resident. I have seen and examined the patient and the key elements of all parts of the encounter have been performed by me. I agree with the assessment, plan and orders as documented by the resident.      Electronically signed by Ten Mueller MD on 1/3/2023 at 1:54 PM         Electronically signed by Lopez Gonzalez MD  on 1/3/2023 at 1:00 PM

## 2023-01-17 ENCOUNTER — OFFICE VISIT (OUTPATIENT)
Dept: SURGERY | Age: 43
End: 2023-01-17

## 2023-01-17 VITALS
HEIGHT: 63 IN | BODY MASS INDEX: 46.42 KG/M2 | HEART RATE: 90 BPM | DIASTOLIC BLOOD PRESSURE: 94 MMHG | SYSTOLIC BLOOD PRESSURE: 188 MMHG | WEIGHT: 262 LBS

## 2023-01-17 DIAGNOSIS — Z48.89 ENCOUNTER FOR POST SURGICAL WOUND CHECK: Primary | ICD-10-CM

## 2023-01-17 NOTE — PROGRESS NOTES
Trauma and [address]       Patient's Name/Date of Birth: Remi Hernandez / 1980 (16 y.o.)    Date: January 17, 2023     HPI: Remi Hernandez is a 36 y.o. female who presents for routine wound check. Patient underwent an operative incision and drainage on 11/14/2022 for chronic draining sinus tract at the site of her previous ostomy. Patient has been packing wound with iodoform and covering with gauze 2 times a day, patient stated IT is actually getting better compared to previously. There is still mild drainage from the wound. Denies any bloody stool, fecal discharge from wound, active bleeding from wound, nausea/vomiting, fever, or rash. 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 BASS - LAST VISIT 2021 - TO RETURN AS NEEDED    Under care of team     GENERAL SURGERY -  [facility]    Under care of team     ENDOCRINOLOGY - DR. Keseha Grande - LAST VISIT - 4/2022    Under care of team     NEPHROLOGY - DR. Espitia 28    Wellness examination     PCP- Brenda Lara CNP - LAST VISIT - 6/2022 - manages heart meds       Past Surgical History:   Procedure Laterality Date    ABDOMEN SURGERY Bilateral 02/09/2022    incision and drainage of bilateral necrotizing soft tissue infection of groin and buttocks  performed by Toño Granger MD at [facility] 66 Left 2022    LEFT GROIN, BUTTOCK  WOUND EXPLORATION AND DEBRIDEMENT,  WOUND VAC EXCHANGE, ([address]) performed by Yoseph Black MD at [facility] [address] 2022    WOUND EXPOLORATION AND INCISION AND DRAINAGE OF CHRONIC SINUS WOUND performed by Yoseph Black MD at [facility] Bilateral     APPENDECTOMY       SECTION      X2   , 2010    CHOLECYSTECTOMY      COLONOSCOPY      COLOSTOMY N/A 2022    DEBRIDEMENT BUTTOCK AND GROIN, WOUND VAC EXCHANGE, LAPAROSCOPIC COLOSTOMY CREATION ([address]) performed by Yoseph Black MD at [address] [facility] N/A 2022    COLOSTOMY REVERSAL performed by Yoseph Black MD at [facility] [address] THAN 5 YEARS  2022    IR TUNNELED CATHETER PLACEMENT GREATER THAN 5 YEARS 2022 Osm[facility] Lezama MD [facility] SPECIAL PROCEDURES    KNEE ARTHROSCOPY Bilateral     LUMBAR One [address] SURGERY      Dr. Finn Otero N/A 2022    *ADD ON, ASAP* INCISION AND DRAINAGE OF PERINEUM, [address] performed [facility] Con[facility]ado Angela [facility] [address] [facility] 02/10/2022    INCISION AND DRAINAGE BUTTOCK, GROIN  (LITHOTOMY POSITION) performed by Toño Granger MD at [address] [facility] Floor N/A 2022    INCISION AND DRAINAGE AND DEBRIDEMENT BUTTOCK AND LABIAL ABSCESS , PLACEMENT OF WOUND VAC performed by Pebbles Kauffman DO at [address]  Left     SLEEVE GASTRECTOMY  2019    TUBAL LIGATION      ULNAR NERVE TRANSPOSITION Left     WOUND EXPLORATION Left 2022    DEBREIDMENT OF BILATERAL GROINS AND LEFT BUTTOCK WOUND WITH PARTIAL CLOSURE AND WOUND VAC PLACEMENT performed by Yoseph Black MD at [address]  2022    WOUND EXPOLORATION AND INCISION AND DRAINAGE OF CHRONIC SINUS WOUND       Current Outpatient Medications   Medication Sig Dispense Refill    Gauze Pads & Dressings (CURITY IODOFORM PACKING STRIP) MISC 1 each by Does not apply route once as needed (wound care) 1 each 3    zolpidem (AMBIEN) 5 MG tablet       rosuvastatin (CRESTOR) 5 MG tablet TAKE 1 TABLET BY MOUTH ONCE DAILY      carvedilol (COREG) 25 MG tablet TAKE ONE TABLET BY MOUTH TWICE DAILY WITH FOOD      Cholecalciferol (VITAMIN D3) 50 MCG (2000 UT) CAPS TAKE 1 CAPSULE BY MOUTH ONCE DAILY      TRULICITY 4.5 SY/5.3GP SOPN INJECT 1 SYRINGE SUBCUTANEOUSLY ONCE A WEEK      hydrALAZINE (APRESOLINE) 50 MG tablet TAKE 1 TABLET BY MOUTH TWICE DAILY WITH FOOD      NOVOLOG 100 UNIT/ML injection vial       magnesium oxide (MAG-OX) 400 (241.3 Mg) MG TABS tablet Take 400 mg by mouth in the morning.       OLANZapine (ZYPREXA) 10 MG tablet TAKE ONE TABLET BY MOUTH ONCE DAILY      bumetanide (BUMEX) 1 MG tablet Take 1 tablet by mouth daily 30 tablet 0    fluticasone-salmeterol (ADVAIR HFA) 45-21 MCG/ACT inhaler Inhale 2 puffs into the lungs 2 times daily      Multiple Vitamins-Minerals (MULTIVITAMIN ADULT EXTRA C PO) Take 1 tablet by mouth daily      vitamin D (ERGOCALCIFEROL) 1.25 MG (60503 UT) CAPS capsule Take 50,000 Units by mouth once a week (Patient not taking: No sig reported)      albuterol sulfate HFA (PROVENTIL;VENTOLIN;PROAIR) 108 (90 Base) MCG/ACT inhaler Inhale 1 puff into the lungs as needed (Patient not taking: No sig reported)      DULoxetine (CYMBALTA) 60 MG extended release capsule Take 60 mg by mouth daily      cyanocobalamin 1000 MCG/ML injection Inject 1,000 mcg into the muscle every 30 days      fenofibrate (TRIGLIDE) 160 MG tablet Take 160 mg by mouth daily      fluticasone (FLONASE) 50 MCG/ACT nasal spray 1 spray by Each Nostril route      glucagon 1 MG injection Inject 1 mg into the muscle as needed      montelukast (SINGULAIR) 10 MG tablet Take 10 mg by mouth daily (Patient not taking: No sig reported)      omeprazole (PRILOSEC) 20 MG delayed release capsule Take 20 mg by mouth daily       No current facility-administered medications for this visit.        Allergies   Allergen Reactions    Amlodipine Swelling     LEGS AND FEET    Aspirin      Other reaction(s): due to kidney function    Ibuprofen      CHRONIC KIDNEY DISEASE STAGE 3   Other reaction(s): due to kidney function         Family History   Problem Relation Age of Onset    Depression Mother     Cerebral Aneurysm Mother     Other Father         killed in car accident       Social History     Socioeconomic History    Marital status:      Spouse name: Not on file    Number of children: Not on file    Years of education: Not on file    Highest education level: Not on file   Occupational History    Not on file   Tobacco Use    Smoking status: Former     Packs/day: 0.50     Types: Cigarettes     Start date:      Quit date:      Years since quittin.0    Smokeless tobacco: Never   Vaping Use    Vaping Use: Never used   Substance and Sexual Activity    Alcohol use: Yes     Comment: \"like once a month\"    Drug use: Never    Sexual activity: Yes     Partners: Male   Other Topics Concern    Not on file   Social History Narrative    Not on file     Social Determinants of Health     Financial Resource Strain: Not on file   Food Insecurity: Not on file   Transportation Needs: Not on file   Physical Activity: Not on file   Stress: Not on file   Social Connections: Not on file   Intimate Partner Violence: Not on file   Housing Stability: Not on file       ROS:     General ROS: negative for - chills, fatigue, fever, hot flashes, malaise, night sweats, sleep disturbance, weight gain or weight loss  Psychological ROS: negative for - anxiety or   Ophthalmic ROS: negative for - blurry vision, decreased vision, double vision  ENT ROS: negative for - epistaxis, headaches, hearing change, nasal discharge  Hematological and Lymphatic ROS: negative for - bleeding problems, bruising, fatigue, jaundice, night sweats, swollen lymph nodes   Endocrine ROS: negative for - hair pattern changes, hot flashes, malaise/lethargy, mood swings, palpitations, polydipsia/polyuria,   Respiratory ROS: no cough, shortness of breath, or wheezing  Cardiovascular ROS: no chest pain or dyspnea on exertion  Gastrointestinal ROS: no abdominal pain, change in bowel habits, or black or bloody stools  Genito-Urinary ROS: no dysuria, trouble voiding, or hematuria  Musculoskeletal ROS: negative for - gait disturbance, joint pain, joint stiffness, joint swelling, muscle pain, muscular weakness  Neurological ROS: no TIA or stroke symptoms  Dermatological ROS: negative for - rash or skin lesion changes    Physical Exam:  Vitals:    01/17/23 1252   BP: (!) 188/94   Pulse: 90       General:A & O x3  HEENT:  NCAT, PERRL, EMOI, oral mucus membrane pink and moist, no mass palpated on neck exam  Heart: S1S2  Lungs: bilateral chest rise with normal respiratory effort  Abdomen: Midline circular wound with good granulation tissue and mild serosanguineous drainage from the hole. healthy appearing. Soft, nontender, no guarding, no rebound, no masses  Extremity: No peripheral edema  SKIN: Warm, dry  Neuro: CN II-XII grossly intact. No focal deficits. Assessment      Diagnosis Orders   1. Encounter for post surgical wound check            Plan   Wound pack 3 times a day as instructed. Return to clinic in 2 weeks for wound re-evaluation. Nnamdi Shankar, DO  1/17/2023    Trauma, Emergency and Critical Surgical Services  Attending Progress Note   I have discussed the care of this patient including pertinent history and exam findings,  with the resident. I have seen and examined the patient and the key elements of all parts of the encounter have been performed by me. I agree with the assessment, plan and orders as documented by the resident.      Electronically signed by Rober Cat MD on 1/17/2023 at 1:46 PM

## 2023-01-18 NOTE — PLAN OF CARE
Problem: Skin Integrity:  Goal: Will show no infection signs and symptoms  Description: Will show no infection signs and symptoms  Outcome: Ongoing     Problem: Skin Integrity:  Goal: Absence of new skin breakdown  Description: Absence of new skin breakdown  Outcome: Ongoing     Problem: Fluid Volume - Imbalance:  Goal: Absence of imbalanced fluid volume signs and symptoms  Description: Absence of imbalanced fluid volume signs and symptoms  Outcome: Ongoing     Problem: Fluid Volume - Imbalance:  Goal: Absence of imbalanced fluid volume signs and symptoms  Description: Absence of imbalanced fluid volume signs and symptoms  Outcome: Ongoing     Problem: Pain:  Goal: Pain level will decrease  Description: Pain level will decrease  Outcome: Ongoing     Problem: Pain:  Goal: Recognizes and communicates pain  Description: Recognizes and communicates pain  Outcome: Ongoing     Problem: Pain:  Goal: Control of acute pain  Description: Control of acute pain  Outcome: Ongoing     Problem: Pain:  Goal: Control of chronic pain  Description: Control of chronic pain  Outcome: Ongoing     Problem: Serum Glucose Level - Abnormal:  Goal: Ability to maintain appropriate glucose levels will improve to within specified parameters  Description: Ability to maintain appropriate glucose levels will improve to within specified parameters  Outcome: Ongoing     Problem: Skin Integrity - Impaired:  Goal: Will show no infection signs and symptoms  Description: Will show no infection signs and symptoms  Outcome: Ongoing     Problem: Skin Integrity - Impaired:  Goal: Absence of new skin breakdown  Description: Absence of new skin breakdown  Outcome: Ongoing     Problem: Nutrition  Goal: Optimal nutrition therapy  Outcome: Ongoing     Problem: OXYGENATION/RESPIRATORY FUNCTION  Goal: Patient will maintain patent airway  Outcome: Ongoing     Problem: OXYGENATION/RESPIRATORY FUNCTION  Goal: Patient will achieve/maintain normal respiratory rate/effort  Description: Respiratory rate and effort will be within normal limits for the patient  Outcome: Ongoing     Problem: SKIN INTEGRITY  Goal: Skin integrity is maintained or improved  Outcome: Ongoing     Problem: Confusion - Acute:  Goal: Absence of continued neurological deterioration signs and symptoms  Description: Absence of continued neurological deterioration signs and symptoms  Outcome: Ongoing     Problem: Confusion - Acute:  Goal: Mental status will be restored to baseline  Description: Mental status will be restored to baseline  Outcome: Ongoing     Problem: Discharge Planning:  Goal: Ability to perform activities of daily living will improve  Description: Ability to perform activities of daily living will improve  Outcome: Ongoing     Problem: Discharge Planning:  Goal: Participates in care planning  Description: Participates in care planning  Outcome: Ongoing     Problem: Injury - Risk of, Physical Injury:  Goal: Absence of physical injury  Description: Absence of physical injury  Outcome: Ongoing     Problem: Injury - Risk of, Physical Injury:  Goal: Will remain free from falls  Description: Will remain free from falls  Outcome: Ongoing     Problem: Mood - Altered:  Goal: Mood stable  Description: Mood stable  Outcome: Ongoing     Problem: Mood - Altered:  Goal: Absence of abusive behavior  Description: Absence of abusive behavior  Outcome: Ongoing     Problem: Mood - Altered:  Goal: Verbalizations of feeling emotionally comfortable while being cared for will increase  Description: Verbalizations of feeling emotionally comfortable while being cared for will increase  Outcome: Ongoing     Problem: Psychomotor Activity - Altered:  Goal: Absence of psychomotor disturbance signs and symptoms  Description: Absence of psychomotor disturbance signs and symptoms  Outcome: Ongoing     Problem: Sensory Perception - Impaired:  Goal: Demonstrations of improved sensory functioning will increase  Description: Demonstrations of improved sensory functioning will increase  Outcome: Ongoing     Problem: Sensory Perception - Impaired:  Goal: Decrease in sensory misperception frequency  Description: Decrease in sensory misperception frequency  Outcome: Ongoing     Problem: Sensory Perception - Impaired:  Goal: Able to refrain from responding to false sensory perceptions  Description: Able to refrain from responding to false sensory perceptions  Outcome: Ongoing Cyclophosphamide Pregnancy And Lactation Text: This medication is Pregnancy Category D and it isn't considered safe during pregnancy. This medication is excreted in breast milk.

## 2023-01-31 ENCOUNTER — OFFICE VISIT (OUTPATIENT)
Dept: SURGERY | Age: 43
End: 2023-01-31
Payer: COMMERCIAL

## 2023-01-31 VITALS
SYSTOLIC BLOOD PRESSURE: 200 MMHG | HEIGHT: 63 IN | DIASTOLIC BLOOD PRESSURE: 109 MMHG | BODY MASS INDEX: 46.42 KG/M2 | WEIGHT: 262 LBS | HEART RATE: 87 BPM

## 2023-01-31 DIAGNOSIS — Z48.89 ENCOUNTER FOR POST SURGICAL WOUND CHECK: Primary | ICD-10-CM

## 2023-01-31 PROCEDURE — 99213 OFFICE O/P EST LOW 20 MIN: CPT | Performed by: SPECIALIST

## 2023-01-31 PROCEDURE — G8427 DOCREV CUR MEDS BY ELIG CLIN: HCPCS | Performed by: SPECIALIST

## 2023-01-31 PROCEDURE — 1036F TOBACCO NON-USER: CPT | Performed by: SPECIALIST

## 2023-01-31 PROCEDURE — G8417 CALC BMI ABV UP PARAM F/U: HCPCS | Performed by: SPECIALIST

## 2023-01-31 PROCEDURE — G8484 FLU IMMUNIZE NO ADMIN: HCPCS | Performed by: SPECIALIST

## 2023-01-31 NOTE — PROGRESS NOTES
Trauma and [address]      Patient's Name/ Date of Birth/ Gender: Enoc Alvarez / 1980 (72 y.o.) / female     MRN/ACCOUNT #: [de-identified]    History of present Illness: Enoc Alvarez is a 36 y.o. female who presents for routine wound check. Patient underwent an operative incision and drainage on 11/14/2022 for chronic draining sinus tract at the site of her previous ostomy. Patient has been packing wound with iodoform and covering with gauze 3 times a day, patient stated the drainage size is smaller compared to last visit. There is still mild dark red drainage from the wound. Denies any bloody stool, fecal discharge from wound, active bleeding from wound, nausea/vomiting, fever, or rash. Past Medical History:  has a past medical history of Asthma, Astigmatism, Bipolar disorder ([address] Clinical trial participant, COVID-19, Diabetes mellitus ([address] Leonel gangrene, GERD (gastroesophageal reflux disease), H/O cardiovascular stress test, History of echocardiogram, Hyperlipidemia, Hypertension, Insulin pump in place, Kidney disease, Obesity, On mechanically assisted ventilation ([address] Osteoarthritis, Under care of team, Under care of team, Under care of team, Under care of team, and Wellness examination.     Past Surgical History:   Past Surgical History:   Procedure Laterality Date    ABDOMEN SURGERY Bilateral 02/09/2022    incision and drainage of bilateral necrotizing soft tissue infection of groin and buttocks  performed by Fabiano Brown MD at [facility] 66 Left 02/18/2022    LEFT GROIN, BUTTOCK  WOUND EXPLORATION AND DEBRIDEMENT,  WOUND VAC EXCHANGE, ([address]) [facility] 11/14/2022    WOUND EXPOLORATION AND INCISION AND DRAINAGE OF CHRONIC SINUS WOUND performed by Alek Townsend MD at [address] [place] [address] Bilateral     [address] [address]      X2   2008, 2010    CHOLECYSTECTOMY      COLONOSCOPY      COLOSTOMY N/A 02/16/2022    DEBRIDEMENT BUTTOCK AND GROIN, WOUND VAC EXCHANGE, LAPAROSCOPIC COLOSTOMY CREATION ([address]) performed by Dorian Keys MD at [address] [facility] N/A 07/28/2022    COLOSTOMY REVERSAL performed by Dorian Keys MD at [address] TH[address] 5 YEARS  02/22/2022    IR TUNNELED CATHETER PLACEMENT GREATER THAN 5 YEARS 2/22/2022 Jerardo Matthews MD [facility] SPECIAL PROCEDURES    KNEE ARTHROSCOPY Bilateral     LUMBAR [address] SURGERY  2004    Dr. Nazanin Gamez N/A 02/08/2022    *ADD ON, ASAP* INCISION AND DRAINAGE OF PERINEUM, BRIAN KNIFE PRONE performed by Michaela Coleman MD at [address] Bilateral 02/10/2022    INCISION AND DRAINAGE BUTTOCK, GROIN  (LITHOTOMY POSITION) performed by Greyson Luciano MD at [address] [facility] 02/11/2022    INCISION AND DRAINAGE AND DEBRIDEMENT BUTTOCK AND LABIAL ABSCESS , PLACEMENT OF WOUND VAC performed by Betito Vicente DO at [address]  Left     SLEEVE GASTRECTOMY  08/2019    TUBAL LIGATION      ULNAR NERVE TRANSPOSITION Left     WOUND EXPLORATION Left 02/14/2022    DEBREIDMENT OF BILATERAL GROINS AND LEFT BUTTOCK WOUND WITH PARTIAL CLOSURE AND WOUND VAC PLACEMENT performed by Dorian Keys MD at [address]  11/14/2022    WOUND EXPOLORATION AND INCISION AND DRAINAGE OF CHRONIC SINUS WOUND       Social History:  reports that she quit smoking about 2 years ago. Her smoking use included cigarettes. She started smoking about 3 years ago. She smoked an average of .5 packs per day. She has never used smokeless tobacco. She reports current alcohol use. She reports that she does not use drugs. Family History: family history includes Cerebral Aneurysm in her mother; Depression in her mother; Other in her father.     Review of Systems:   Constitutional: negative  Eyes: negative  Ears, nose, mouth, throat, and face: negative  Respiratory: negative  Cardiovascular: negative  Gastrointestinal: negative  Genitourinary:negative  Integument/breast: negative  Hematologic/lymphatic: negative  Musculoskeletal:negative  Neurological: negative  Behavioral/Psych: negative    Allergies: Amlodipine, Aspirin, and Ibuprofen    Current Meds:  Current Outpatient Medications:     zolpidem (AMBIEN) 5 MG tablet, , Disp: , Rfl:     rosuvastatin (CRESTOR) 5 MG tablet, TAKE 1 TABLET BY MOUTH ONCE DAILY, Disp: , Rfl:     carvedilol (COREG) 25 MG tablet, TAKE ONE TABLET BY MOUTH TWICE DAILY WITH FOOD, Disp: , Rfl:     Cholecalciferol (VITAMIN D3) 50 MCG (2000 UT) CAPS, TAKE 1 CAPSULE BY MOUTH ONCE DAILY, Disp: , Rfl:     TRULICITY 4.5 XI/5.0WX SOPN, INJECT 1 SYRINGE SUBCUTANEOUSLY ONCE A WEEK, Disp: , Rfl:     hydrALAZINE (APRESOLINE) 50 MG tablet, TAKE 1 TABLET BY MOUTH TWICE DAILY WITH FOOD, Disp: , Rfl:     NOVOLOG 100 UNIT/ML injection vial, , Disp: , Rfl:     magnesium oxide (MAG-OX) 400 (241.3 Mg) MG TABS tablet, Take 400 mg by mouth in the morning., Disp: , Rfl:     OLANZapine (ZYPREXA) 10 MG tablet, TAKE ONE TABLET BY MOUTH ONCE DAILY, Disp: , Rfl:     bumetanide (BUMEX) 1 MG tablet, Take 1 tablet by mouth daily, Disp: 30 tablet, Rfl: 0    fluticasone-salmeterol (ADVAIR HFA) 45-21 MCG/ACT inhaler, Inhale 2 puffs into the lungs 2 times daily, Disp: , Rfl:     Multiple Vitamins-Minerals (MULTIVITAMIN ADULT EXTRA C PO), Take 1 tablet by mouth daily, Disp: , Rfl:     vitamin D (ERGOCALCIFEROL) 1.25 MG (08817 UT) CAPS capsule, Take 50,000 Units by mouth once a week, Disp: , Rfl:     DULoxetine (CYMBALTA) 60 MG extended release capsule, Take 60 mg by mouth daily, Disp: , Rfl:     cyanocobalamin 1000 MCG/ML injection, Inject 1,000 mcg into the muscle every 30 days, Disp: , Rfl:     fenofibrate (TRIGLIDE) 160 MG tablet, Take 160 mg by mouth daily, Disp: , Rfl:     fluticasone (FLONASE) 50 MCG/ACT nasal spray, 1 spray by Each Nostril route, Disp: , Rfl:     glucagon 1 MG injection, Inject 1 mg into the muscle as needed, Disp: , Rfl:     omeprazole (PRILOSEC) 20 MG delayed release capsule, Take 20 mg by mouth daily, Disp: , Rfl:     Gauze Pads & Dressings (CURITY IODOFORM PACKING STRIP) MISC, 1 each by Does not apply route once as needed (wound care), Disp: 1 each, Rfl: 3    albuterol sulfate HFA (PROVENTIL;VENTOLIN;PROAIR) 108 (90 Base) MCG/ACT inhaler, Inhale 1 puff into the lungs as needed (Patient not taking: No sig reported), Disp: , Rfl:     montelukast (SINGULAIR) 10 MG tablet, Take 10 mg by mouth daily (Patient not taking: No sig reported), Disp: , Rfl:     Vital Signs:  Vitals:    01/31/23 1242   BP: (!) 200/109   Pulse: 87       Physical Exam:  General:A & O x3  HEENT:  NCAT, PERRL, EMOI, oral mucus membrane pink and moist, no mass palpated on neck exam  Heart: S1S2  Lungs: bilateral chest rise with normal respiratory effort  Abdomen: Midline circular wound with good granulation tissue and mild serosanguineous drainage from the hole. healthy appearing. Soft, nontender, no guarding, no rebound, no masses  Extremity: No peripheral edema  SKIN: Warm, dry  Neuro: CN II-XII grossly intact. No focal deficits.          Labs:   CBC: No results for input(s): WBC, HGB, PLT in the last 72 hours.  BMP:  No results for input(s): NA, K, CL, CO2, BUN, CREATININE, GLUCOSE in the last 72 hours.  Hepatic: No results for input(s): AST, ALT, ALB, ALKPHOS, BILITOT, BILIDIR, LIPASE, AMYLASE in the last 72 hours.  Coagulation: No results for input(s): APTT, PROT, INR in the last 72 hours.      Problem List:  Patient Active Problem List    Diagnosis Date Noted    S/P partial resection of colon 07/28/2022    Metabolic acidosis     Hypervolemia     Uncontrolled type 2 diabetes mellitus with hyperglycemia (HCC) 02/09/2022    MEREDITH (acute kidney injury) (HCC) 02/09/2022    Hypokalemia 02/09/2022    Hyponatremia 02/09/2022    Anemia 02/09/2022    [facility] 02/09/2022    Type 1 diabetes mellitus with stage 3a chronic kidney disease ([facility]     Leonel gangrene 02/08/2022       Impression:    Aguila Bright is a 36 y.o. female with chronic draining sinus tract at the site of previous ostomy 11/14/2022     Recommendation:    Plan   Wound pack 3 times a day as instructed. After a week, patient can stop packing and wash the wound with water and soap. Keep the wound dry and clean. Return to clinic in a month for wound re-evaluation. Electronically signed by Kathia Hickey DO  on 1/31/2023 at 12:55 PM .    Trauma, Emergency and Critical Surgical Services  Attending Progress Note   I have discussed the care of this patient including pertinent history and exam findings,  with the resident. I have seen and examined the patient and the key elements of all parts of the encounter have been performed by me. I agree with the assessment, plan and orders as documented by the resident.      Electronically signed by Luz Ng MD on 1/31/2023 at 1:33 PM   .at

## 2023-02-08 NOTE — PROGRESS NOTES
Acute Care Surgery Clinic Progress Note    TODAY'S DATE: 10/4/2022, 3:38 PM    SUBJECTIVE       Jayson Duffy, 54-year-old female presents with necrotizing fasciitis in February 2022 status post colostomy creation and subsequent colostomy reversal on 7/28/2022. Patient presents now with a chronic draining wound at her previous ostomy site. Patient underwent CT abdomen pelvis with p.o. contrast which demonstrated a fistula between the small bowel and abdominal wall. Patient states that the drainage has significantly decreased over the past several days. Mild amount of cloudy drainage. Patient has been packing the cavity daily with iodoform gauze. Patient denies any fevers, chills nausea or vomiting. She is tolerating regular diet. She is having bowel movements regularly loose in caliber. OBJECTIVE    VITALS:  BP (!) 194/90   Pulse 80   Ht 5' 3\" (1.6 m)   Wt 262 lb (118.8 kg)   BMI 46.41 kg/m²   CONSTITUTIONAL:  awake and alert  LUNGS: Equal chest rise with respirations  ABDOMEN:   Obese, nontender, nondistended, wound at midline half a centimeter in size with murky tan drainage. No surrounding erythema or signs of infection. EXT: extremities normal, atraumatic, no cyanosis or edema  Previous incision and drainage/necrotizing fasciitis site well-healed        ASSESMENT       Diagnosis Orders   1. Enterocutaneous fistula        Jayson Duffy, 54-year-old female who presents with a low output enterocutaneous fistula. PLAN      Overall patient is doing well. Continue local wound care to enterocutaneous fistula. No need to continue daily packing. Follow Up: In 3 weeks for wound evaluation.     Linda Durant MD  Electronically signed by Linda Durant MD  on 10/4/2022 at 3:38 PM PPD Team    Patient has [facility]  ID [id number] Group [id number]  I asked front office to update in Epic.     Thank you

## 2023-02-28 ENCOUNTER — OFFICE VISIT (OUTPATIENT)
Dept: SURGERY | Age: 43
End: 2023-02-28
Payer: COMMERCIAL

## 2023-02-28 VITALS
BODY MASS INDEX: 46.42 KG/M2 | HEIGHT: 63 IN | SYSTOLIC BLOOD PRESSURE: 182 MMHG | DIASTOLIC BLOOD PRESSURE: 90 MMHG | WEIGHT: 262 LBS | HEART RATE: 88 BPM

## 2023-02-28 DIAGNOSIS — Z51.89 ENCOUNTER FOR WOUND CARE: ICD-10-CM

## 2023-02-28 PROCEDURE — 99213 OFFICE O/P EST LOW 20 MIN: CPT | Performed by: SPECIALIST

## 2023-02-28 PROCEDURE — G8417 CALC BMI ABV UP PARAM F/U: HCPCS | Performed by: SPECIALIST

## 2023-02-28 PROCEDURE — G8427 DOCREV CUR MEDS BY ELIG CLIN: HCPCS | Performed by: SPECIALIST

## 2023-02-28 PROCEDURE — 1036F TOBACCO NON-USER: CPT | Performed by: SPECIALIST

## 2023-02-28 PROCEDURE — G8484 FLU IMMUNIZE NO ADMIN: HCPCS | Performed by: SPECIALIST

## 2023-02-28 NOTE — PROGRESS NOTES
Trauma and [address]    Patient's Name/ Date of Birth/ Gender: Al Beltran / 1980 (62 y.o.) / female     MRN/ACCOUNT #: [de-identified]    History of present Illness: Al Beltran is a 36 y.o. female who presents for postoperative wound evaluation. Patient underwent an operative incision and drainage on 11/14/2022 for chronic draining sinus tract at the site of her previous ostomy site s/p reversal. Her original ostomy was due to a necrotizing fasciitis requiring diverting ostomy. She has been continuing to keep the area clean. It is no longer draining at all. She denies any fevers or chills. Tolerating a diet. No abdominal pain. She has no concerns at her visit today. Past Medical History:  has a past medical history of Asthma, Astigmatism, Bipolar disorder ([address] Clinical trial participant, COVID-19, Diabetes mellitus ([address] Leonel gangrene, GERD (gastroesophageal reflux disease), H/O cardiovascular stress test, History of echocardiogram, Hyperlipidemia, Hypertension, Insulin pump in place, Kidney disease, Obesity, On mechanically assisted ventilation ([address] Osteoarthritis, Under care of team, Under care of team, Under care of team, Under care of team, and Wellness examination.     Past Surgical History:   Past Surgical History:   Procedure Laterality Date    ABDOMEN SURGERY Bilateral 02/09/2022    incision and drainage of bilateral necrotizing soft tissue infection of groin and buttocks  performed by Raulito Lewis MD at [place] [address] Left 02/18/2022    LEFT GROIN, BUTTOCK  WOUND EXPLORATION AND DEBRIDEMENT,  WOUND VAC EXCHANGE, ([address]) performed by Jovan Rosales MD at [address] [place] [address] [address] 11/14/2022    WOUND EXPOLORATION AND INCISION AND DRAINAGE OF CHRONIC SINUS WOUND performed by Jovan Rosales MD at [address] [place] [address] Bilateral     [facility] [facility]   2008, 2010 CHOLECYSTECTOMY      COLONOSCOPY      COLOSTOMY N/A 02/16/2022    DEBRIDEMENT BUTTOCK AND GROIN, WOUND VAC EXCHANGE, LAPAROSCOPIC COLOSTOMY CREATION (MISONIX) performed by Radha Pichardo MD at [address] [facility] N/A 07/28/2022    COLOSTOMY REVERSAL performed by Radha Pichardo MD at [address] THAN 5 YEARS  02/22/2022    IR TUNNELED CATHETER PLACEMENT GREATER THAN 5 YEARS 2/22/2022 Katina Moctezuma MD [facility] SPECIAL PROCEDURES    KNEE ARTHROSCOPY Bilateral     LUMBAR [address] SURGERY  2004    Dr. Eva Hernandez N/A 02/08/2022    *ADD ON, ASAP* INCISION AND DRAINAGE OF PERINEUM, BRIAN KNIFE PRONE performed by Mendy Loya MD at [address] Bilateral 02/10/2022    INCISION AND DRAINAGE BUTTOCK, GROIN  (LITHOTOMY POSITION) performed by Tootie Casanova MD at [address] 02/11/2022    INCISION AND DRAINAGE AND DEBRIDEMENT BUTTOCK AND LABIAL ABSCESS , PLACEMENT OF WOUND VAC performed by Olga Chen DO at [address]  Left     SLEEVE GASTRECTOMY  08/2019    TUBAL LIGATION      ULNAR NERVE TRANSPOSITION Left     WOUND EXPLORATION Left 02/14/2022    DEBREIDMENT OF BILATERAL GROINS AND LEFT BUTTOCK WOUND WITH PARTIAL CLOSURE AND WOUND VAC PLACEMENT performed by Radha Pichardo MD at [address]  11/14/2022    WOUND EXPOLORATION AND INCISION AND DRAINAGE OF CHRONIC SINUS WOUND       Social History:  reports that she quit smoking about 2 years ago. Her smoking use included cigarettes. She started smoking about 3 years ago. She smoked an average of .5 packs per day. She has never used smokeless tobacco. She reports current alcohol use. She reports that she does not use drugs. Family History: family history includes Cerebral Aneurysm in her mother; Depression in her mother; Other in her father. Review of Systems:   Pertinent items are noted in HPI.     Allergies: Amlodipine, Aspirin, and Ibuprofen    Current Meds:  Current Outpatient Medications:     Gauze Pads & Dressings (CURITY IODOFORM PACKING STRIP) MISC, 1 each by Does not apply route once as needed (wound care), Disp: 1 each, Rfl: 3    zolpidem (AMBIEN) 5 MG tablet, , Disp: , Rfl:     rosuvastatin (CRESTOR) 5 MG tablet, TAKE 1 TABLET BY MOUTH ONCE DAILY, Disp: , Rfl:     carvedilol (COREG) 25 MG tablet, TAKE ONE TABLET BY MOUTH TWICE DAILY WITH FOOD, Disp: , Rfl:     Cholecalciferol (VITAMIN D3) 50 MCG (2000 UT) CAPS, TAKE 1 CAPSULE BY MOUTH ONCE DAILY, Disp: , Rfl:     TRULICITY 4.5 WP/1.5EY SOPN, INJECT 1 SYRINGE SUBCUTANEOUSLY ONCE A WEEK, Disp: , Rfl:     hydrALAZINE (APRESOLINE) 50 MG tablet, TAKE 1 TABLET BY MOUTH TWICE DAILY WITH FOOD, Disp: , Rfl:     NOVOLOG 100 UNIT/ML injection vial, , Disp: , Rfl:     magnesium oxide (MAG-OX) 400 (241.3 Mg) MG TABS tablet, Take 400 mg by mouth in the morning., Disp: , Rfl:     OLANZapine (ZYPREXA) 10 MG tablet, TAKE ONE TABLET BY MOUTH ONCE DAILY, Disp: , Rfl:     bumetanide (BUMEX) 1 MG tablet, Take 1 tablet by mouth daily, Disp: 30 tablet, Rfl: 0    fluticasone-salmeterol (ADVAIR HFA) 45-21 MCG/ACT inhaler, Inhale 2 puffs into the lungs 2 times daily, Disp: , Rfl:     Multiple Vitamins-Minerals (MULTIVITAMIN ADULT EXTRA C PO), Take 1 tablet by mouth daily, Disp: , Rfl:     vitamin D (ERGOCALCIFEROL) 1.25 MG (11242 UT) CAPS capsule, Take 50,000 Units by mouth once a week, Disp: , Rfl:     albuterol sulfate HFA (PROVENTIL;VENTOLIN;PROAIR) 108 (90 Base) MCG/ACT inhaler, Inhale 1 puff into the lungs as needed (Patient not taking: No sig reported), Disp: , Rfl:     DULoxetine (CYMBALTA) 60 MG extended release capsule, Take 60 mg by mouth daily, Disp: , Rfl:     cyanocobalamin 1000 MCG/ML injection, Inject 1,000 mcg into the muscle every 30 days, Disp: , Rfl:     fenofibrate (TRIGLIDE) 160 MG tablet, Take 160 mg by mouth daily, Disp: , Rfl:     fluticasone (FLONASE) 50 MCG/ACT nasal spray, 1 spray by Each Nostril route, Disp: , Rfl:     glucagon 1 MG injection, Inject 1 mg into the muscle as needed, Disp: , Rfl:     montelukast (SINGULAIR) 10 MG tablet, Take 10 mg by mouth daily (Patient not taking: No sig reported), Disp: , Rfl:     omeprazole (PRILOSEC) 20 MG delayed release capsule, Take 20 mg by mouth daily, Disp: , Rfl:     Vital Signs:  Vitals:    02/28/23 1259   BP: (!) 182/90   Pulse: 88       Physical Exam:  Gen:  A&Ox3, NAD  HEENT: NCAT,EOMI, no scleral icterus, oral mucosa moist  Neck: Supple, no thyroid enlargement, no cervical or supraclavicular lymphaedenopathy  Chest: Symmetric rise with inhalation, no evidence of trauma  CVS: Regular rate and rhythm  Resp: Good bilateral air entry, normal effort, no accessory muscle use   Abd: soft, non-tender, non-distended, midline wound from prior sinus tract with granulation tissue forming, no erythema and no drainage   Ext: No clubbing, cyanosis, edema  CNS: Moves all extremities, no gross focal motor deficits  Skin: Abdominal wound as above. No erythema or ulcerations     Labs:   CBC: No results for input(s): WBC, HGB, PLT in the last 72 hours. BMP:  No results for input(s): NA, K, CL, CO2, BUN, CREATININE, GLUCOSE in the last 72 hours. Hepatic: No results for input(s): AST, ALT, ALB, ALKPHOS, BILITOT, BILIDIR, LIPASE, AMYLASE in the last 72 hours. Coagulation: No results for input(s): APTT, PROT, INR in the last 72 hours.     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 female who presents s/p operative incision and drainage on 11/14/2022 for chronic draining sinus tract at the site of her previous ostomy s/p reversal    Recommendation:    Continue local wound care. She is keeping the area clean and dry. No drainage and no erythema. She may follow up in surgery clinic on an as needed basis. Discussed with her to call and schedule an appointment with us if any concerns or if wound does not continue to heal.       Electronically signed by Golda Osgood, DO  on 2/28/2023    Trauma, Emergency and Critical Surgical Services  Attending Progress Note   I have discussed the care of this patient including pertinent history and exam findings,  with the resident. I have seen and examined the patient and the key elements of all parts of the encounter have been performed by me. I agree with the assessment, plan and orders as documented by the resident.      Electronically signed by Hoa Medrano MD on 2/28/2023 at 3:07 PM

## 2024-04-19 ENCOUNTER — TELEPHONE (OUTPATIENT)
Dept: GASTROENTEROLOGY | Age: 44
End: 2024-04-19

## 2025-02-16 ENCOUNTER — HOSPITAL ENCOUNTER (INPATIENT)
Age: 45
LOS: 1 days | Discharge: HOME | DRG: 190 | End: 2025-02-17
Attending: FAMILY MEDICINE | Admitting: FAMILY MEDICINE
Payer: COMMERCIAL

## 2025-02-16 PROBLEM — Z91.89 HISTORY OF DIFFICULT INTUBATION: Status: ACTIVE | Noted: 2025-02-16

## 2025-02-16 PROBLEM — Z87.438: Status: ACTIVE | Noted: 2025-02-16

## 2025-02-16 PROBLEM — J96.21 ACUTE AND CHRONIC RESPIRATORY FAILURE WITH HYPOXIA (HCC): Status: ACTIVE | Noted: 2025-02-16

## 2025-02-16 PROCEDURE — 2060000000 HC ICU INTERMEDIATE R&B

## 2025-02-17 VITALS
DIASTOLIC BLOOD PRESSURE: 72 MMHG | RESPIRATION RATE: 17 BRPM | BODY MASS INDEX: 53.11 KG/M2 | WEIGHT: 293 LBS | HEART RATE: 89 BPM | OXYGEN SATURATION: 96 % | TEMPERATURE: 98.4 F | SYSTOLIC BLOOD PRESSURE: 172 MMHG

## 2025-02-17 PROBLEM — J44.1 COPD EXACERBATION (HCC): Status: ACTIVE | Noted: 2025-02-17

## 2025-02-17 PROBLEM — R09.02 HYPOXIA: Status: ACTIVE | Noted: 2025-02-17

## 2025-02-17 LAB
ALBUMIN SERPL-MCNC: 3.9 G/DL (ref 3.5–5.2)
ALBUMIN/GLOB SERPL: 1.3 {RATIO} (ref 1–2.5)
ALP SERPL-CCNC: 73 U/L (ref 35–104)
ALT SERPL-CCNC: 12 U/L (ref 10–35)
ANION GAP SERPL CALCULATED.3IONS-SCNC: 16 MMOL/L (ref 9–16)
ANION GAP SERPL CALCULATED.3IONS-SCNC: 16 MMOL/L (ref 9–16)
AST SERPL-CCNC: 12 U/L (ref 10–35)
BASOPHILS # BLD: <0.03 K/UL (ref 0–0.2)
BASOPHILS NFR BLD: 0 % (ref 0–2)
BILIRUB SERPL-MCNC: 0.2 MG/DL (ref 0–1.2)
BUN SERPL-MCNC: 71 MG/DL (ref 6–20)
BUN SERPL-MCNC: 71 MG/DL (ref 6–20)
CALCIUM SERPL-MCNC: 9.2 MG/DL (ref 8.6–10.4)
CALCIUM SERPL-MCNC: 9.3 MG/DL (ref 8.6–10.4)
CHLORIDE SERPL-SCNC: 94 MMOL/L (ref 98–107)
CHLORIDE SERPL-SCNC: 95 MMOL/L (ref 98–107)
CHOLEST SERPL-MCNC: 142 MG/DL (ref 0–199)
CHOLESTEROL/HDL RATIO: 3.1
CO2 SERPL-SCNC: 20 MMOL/L (ref 20–31)
CO2 SERPL-SCNC: 21 MMOL/L (ref 20–31)
CREAT SERPL-MCNC: 4.8 MG/DL (ref 0.5–0.9)
CREAT SERPL-MCNC: 4.8 MG/DL (ref 0.5–0.9)
EOSINOPHIL # BLD: <0.03 K/UL (ref 0–0.44)
EOSINOPHILS RELATIVE PERCENT: 0 % (ref 1–4)
ERYTHROCYTE [DISTWIDTH] IN BLOOD BY AUTOMATED COUNT: 14.7 % (ref 11.8–14.4)
GFR, ESTIMATED: 11 ML/MIN/1.73M2
GFR, ESTIMATED: 11 ML/MIN/1.73M2
GLUCOSE BLD-MCNC: 399 MG/DL (ref 65–105)
GLUCOSE BLD-MCNC: 415 MG/DL (ref 65–105)
GLUCOSE BLD-MCNC: 460 MG/DL (ref 65–105)
GLUCOSE SERPL-MCNC: 397 MG/DL (ref 74–99)
GLUCOSE SERPL-MCNC: 431 MG/DL (ref 74–99)
HCT VFR BLD AUTO: 33.4 % (ref 36.3–47.1)
HDLC SERPL-MCNC: 46 MG/DL
HGB BLD-MCNC: 11.2 G/DL (ref 11.9–15.1)
IMM GRANULOCYTES # BLD AUTO: 0.07 K/UL (ref 0–0.3)
IMM GRANULOCYTES NFR BLD: 1 %
LDLC SERPL CALC-MCNC: 59 MG/DL (ref 0–100)
LYMPHOCYTES NFR BLD: 0.74 K/UL (ref 1.1–3.7)
LYMPHOCYTES RELATIVE PERCENT: 6 % (ref 24–43)
MCH RBC QN AUTO: 30.6 PG (ref 25.2–33.5)
MCHC RBC AUTO-ENTMCNC: 33.5 G/DL (ref 28.4–34.8)
MCV RBC AUTO: 91.3 FL (ref 82.6–102.9)
MONOCYTES NFR BLD: 0.22 K/UL (ref 0.1–1.2)
MONOCYTES NFR BLD: 2 % (ref 3–12)
NEUTROPHILS NFR BLD: 91 % (ref 36–65)
NEUTS SEG NFR BLD: 11.29 K/UL (ref 1.5–8.1)
NRBC BLD-RTO: 0 PER 100 WBC
PLATELET # BLD AUTO: 151 K/UL (ref 138–453)
PMV BLD AUTO: 11.1 FL (ref 8.1–13.5)
POTASSIUM SERPL-SCNC: 5.3 MMOL/L (ref 3.7–5.3)
POTASSIUM SERPL-SCNC: 5.3 MMOL/L (ref 3.7–5.3)
PROT SERPL-MCNC: 6.9 G/DL (ref 6.6–8.7)
RBC # BLD AUTO: 3.66 M/UL (ref 3.95–5.11)
RBC # BLD: ABNORMAL 10*6/UL
SODIUM SERPL-SCNC: 130 MMOL/L (ref 136–145)
SODIUM SERPL-SCNC: 132 MMOL/L (ref 136–145)
TRIGL SERPL-MCNC: 187 MG/DL
VLDLC SERPL CALC-MCNC: 37 MG/DL (ref 1–30)
WBC OTHER # BLD: 12.3 K/UL (ref 3.5–11.3)

## 2025-02-17 PROCEDURE — 36415 COLL VENOUS BLD VENIPUNCTURE: CPT

## 2025-02-17 PROCEDURE — 2700000000 HC OXYGEN THERAPY PER DAY

## 2025-02-17 PROCEDURE — 2500000003 HC RX 250 WO HCPCS: Performed by: NURSE PRACTITIONER

## 2025-02-17 PROCEDURE — 6370000000 HC RX 637 (ALT 250 FOR IP)

## 2025-02-17 PROCEDURE — 80048 BASIC METABOLIC PNL TOTAL CA: CPT

## 2025-02-17 PROCEDURE — 6360000002 HC RX W HCPCS: Performed by: NURSE PRACTITIONER

## 2025-02-17 PROCEDURE — 94761 N-INVAS EAR/PLS OXIMETRY MLT: CPT

## 2025-02-17 PROCEDURE — 6370000000 HC RX 637 (ALT 250 FOR IP): Performed by: NURSE PRACTITIONER

## 2025-02-17 PROCEDURE — 97166 OT EVAL MOD COMPLEX 45 MIN: CPT

## 2025-02-17 PROCEDURE — 97535 SELF CARE MNGMENT TRAINING: CPT

## 2025-02-17 PROCEDURE — 80053 COMPREHEN METABOLIC PANEL: CPT

## 2025-02-17 PROCEDURE — APPSS45 APP SPLIT SHARED TIME 31-45 MINUTES

## 2025-02-17 PROCEDURE — 94640 AIRWAY INHALATION TREATMENT: CPT

## 2025-02-17 PROCEDURE — 6370000000 HC RX 637 (ALT 250 FOR IP): Performed by: FAMILY MEDICINE

## 2025-02-17 PROCEDURE — 85025 COMPLETE CBC W/AUTO DIFF WBC: CPT

## 2025-02-17 PROCEDURE — 82947 ASSAY GLUCOSE BLOOD QUANT: CPT

## 2025-02-17 PROCEDURE — 80061 LIPID PANEL: CPT

## 2025-02-17 PROCEDURE — 99222 1ST HOSP IP/OBS MODERATE 55: CPT | Performed by: FAMILY MEDICINE

## 2025-02-17 RX ORDER — INSULIN LISPRO 100 [IU]/ML
6 INJECTION, SOLUTION INTRAVENOUS; SUBCUTANEOUS ONCE
Status: COMPLETED | OUTPATIENT
Start: 2025-02-17 | End: 2025-02-17

## 2025-02-17 RX ORDER — VITAMIN B COMPLEX
2000 TABLET ORAL DAILY
Status: DISCONTINUED | OUTPATIENT
Start: 2025-02-17 | End: 2025-02-17 | Stop reason: HOSPADM

## 2025-02-17 RX ORDER — NIFEDIPINE 30 MG/1
30 TABLET, EXTENDED RELEASE ORAL ONCE
Status: COMPLETED | OUTPATIENT
Start: 2025-02-17 | End: 2025-02-17

## 2025-02-17 RX ORDER — MONTELUKAST SODIUM 10 MG/1
10 TABLET ORAL DAILY
Status: DISCONTINUED | OUTPATIENT
Start: 2025-02-17 | End: 2025-02-17 | Stop reason: HOSPADM

## 2025-02-17 RX ORDER — LAMOTRIGINE 25 MG/1
50 TABLET ORAL DAILY
COMMUNITY

## 2025-02-17 RX ORDER — ONDANSETRON 4 MG/1
4 TABLET, ORALLY DISINTEGRATING ORAL EVERY 8 HOURS PRN
Status: DISCONTINUED | OUTPATIENT
Start: 2025-02-17 | End: 2025-02-17 | Stop reason: HOSPADM

## 2025-02-17 RX ORDER — SODIUM CHLORIDE 9 MG/ML
INJECTION, SOLUTION INTRAVENOUS PRN
Status: DISCONTINUED | OUTPATIENT
Start: 2025-02-17 | End: 2025-02-17 | Stop reason: HOSPADM

## 2025-02-17 RX ORDER — BUMETANIDE 1 MG/1
2 TABLET ORAL 2 TIMES DAILY
Status: DISCONTINUED | OUTPATIENT
Start: 2025-02-17 | End: 2025-02-17 | Stop reason: HOSPADM

## 2025-02-17 RX ORDER — LAMOTRIGINE 25 MG/1
50 TABLET ORAL DAILY
Status: DISCONTINUED | OUTPATIENT
Start: 2025-02-17 | End: 2025-02-17 | Stop reason: HOSPADM

## 2025-02-17 RX ORDER — SODIUM CHLORIDE 0.9 % (FLUSH) 0.9 %
5-40 SYRINGE (ML) INJECTION PRN
Status: DISCONTINUED | OUTPATIENT
Start: 2025-02-17 | End: 2025-02-17 | Stop reason: HOSPADM

## 2025-02-17 RX ORDER — CALCIUM ACETATE 667 MG/1
1 CAPSULE ORAL
Status: DISCONTINUED | OUTPATIENT
Start: 2025-02-17 | End: 2025-02-17 | Stop reason: HOSPADM

## 2025-02-17 RX ORDER — HEPARIN SODIUM 5000 [USP'U]/ML
5000 INJECTION, SOLUTION INTRAVENOUS; SUBCUTANEOUS EVERY 8 HOURS SCHEDULED
Status: DISCONTINUED | OUTPATIENT
Start: 2025-02-17 | End: 2025-02-17 | Stop reason: HOSPADM

## 2025-02-17 RX ORDER — INSULIN LISPRO 100 [IU]/ML
10 INJECTION, SOLUTION INTRAVENOUS; SUBCUTANEOUS ONCE
Status: DISCONTINUED | OUTPATIENT
Start: 2025-02-17 | End: 2025-02-17

## 2025-02-17 RX ORDER — CARVEDILOL 25 MG/1
25 TABLET ORAL 2 TIMES DAILY
Status: DISCONTINUED | OUTPATIENT
Start: 2025-02-17 | End: 2025-02-17 | Stop reason: HOSPADM

## 2025-02-17 RX ORDER — CLOPIDOGREL BISULFATE 75 MG/1
75 TABLET ORAL DAILY
COMMUNITY

## 2025-02-17 RX ORDER — ACETAMINOPHEN 325 MG/1
650 TABLET ORAL EVERY 6 HOURS PRN
Status: DISCONTINUED | OUTPATIENT
Start: 2025-02-17 | End: 2025-02-17 | Stop reason: HOSPADM

## 2025-02-17 RX ORDER — BUDESONIDE AND FORMOTEROL FUMARATE DIHYDRATE 160; 4.5 UG/1; UG/1
2 AEROSOL RESPIRATORY (INHALATION)
Status: DISCONTINUED | OUTPATIENT
Start: 2025-02-17 | End: 2025-02-17 | Stop reason: HOSPADM

## 2025-02-17 RX ORDER — INSULIN LISPRO 100 [IU]/ML
0-16 INJECTION, SOLUTION INTRAVENOUS; SUBCUTANEOUS
Status: DISCONTINUED | OUTPATIENT
Start: 2025-02-17 | End: 2025-02-17

## 2025-02-17 RX ORDER — FENOFIBRATE 160 MG/1
160 TABLET ORAL DAILY
Status: DISCONTINUED | OUTPATIENT
Start: 2025-02-17 | End: 2025-02-17

## 2025-02-17 RX ORDER — DULOXETIN HYDROCHLORIDE 60 MG/1
60 CAPSULE, DELAYED RELEASE ORAL DAILY
Status: DISCONTINUED | OUTPATIENT
Start: 2025-02-17 | End: 2025-02-17 | Stop reason: HOSPADM

## 2025-02-17 RX ORDER — GABAPENTIN 300 MG/1
300 CAPSULE ORAL 2 TIMES DAILY
Status: DISCONTINUED | OUTPATIENT
Start: 2025-02-17 | End: 2025-02-17 | Stop reason: HOSPADM

## 2025-02-17 RX ORDER — ASPIRIN 81 MG/1
81 TABLET, CHEWABLE ORAL DAILY
Status: DISCONTINUED | OUTPATIENT
Start: 2025-02-17 | End: 2025-02-17

## 2025-02-17 RX ORDER — POTASSIUM CHLORIDE 1.5 G/1.58G
20 POWDER, FOR SOLUTION ORAL 2 TIMES DAILY
Status: ON HOLD | COMMUNITY
End: 2025-02-17 | Stop reason: HOSPADM

## 2025-02-17 RX ORDER — INSULIN LISPRO 100 [IU]/ML
0-8 INJECTION, SOLUTION INTRAVENOUS; SUBCUTANEOUS
Status: DISCONTINUED | OUTPATIENT
Start: 2025-02-17 | End: 2025-02-17

## 2025-02-17 RX ORDER — IPRATROPIUM BROMIDE AND ALBUTEROL SULFATE 2.5; .5 MG/3ML; MG/3ML
1 SOLUTION RESPIRATORY (INHALATION)
Status: DISCONTINUED | OUTPATIENT
Start: 2025-02-17 | End: 2025-02-17

## 2025-02-17 RX ORDER — CLOPIDOGREL BISULFATE 75 MG/1
75 TABLET ORAL DAILY
Status: DISCONTINUED | OUTPATIENT
Start: 2025-02-17 | End: 2025-02-17 | Stop reason: HOSPADM

## 2025-02-17 RX ORDER — ONDANSETRON 2 MG/ML
4 INJECTION INTRAMUSCULAR; INTRAVENOUS EVERY 6 HOURS PRN
Status: DISCONTINUED | OUTPATIENT
Start: 2025-02-17 | End: 2025-02-17 | Stop reason: HOSPADM

## 2025-02-17 RX ORDER — PREDNISONE 20 MG/1
40 TABLET ORAL DAILY
Status: DISCONTINUED | OUTPATIENT
Start: 2025-02-19 | End: 2025-02-17 | Stop reason: HOSPADM

## 2025-02-17 RX ORDER — IPRATROPIUM BROMIDE AND ALBUTEROL SULFATE 2.5; .5 MG/3ML; MG/3ML
1 SOLUTION RESPIRATORY (INHALATION) EVERY 4 HOURS PRN
Status: DISCONTINUED | OUTPATIENT
Start: 2025-02-17 | End: 2025-02-17 | Stop reason: HOSPADM

## 2025-02-17 RX ORDER — NIFEDIPINE 30 MG
30 TABLET, EXTENDED RELEASE ORAL DAILY
COMMUNITY

## 2025-02-17 RX ORDER — LEVOFLOXACIN 250 MG/1
250 TABLET, FILM COATED ORAL DAILY
Qty: 4 TABLET | Refills: 0 | Status: SHIPPED | OUTPATIENT
Start: 2025-02-17 | End: 2025-02-21

## 2025-02-17 RX ORDER — ROSUVASTATIN CALCIUM 10 MG/1
5 TABLET, COATED ORAL DAILY
Status: DISCONTINUED | OUTPATIENT
Start: 2025-02-17 | End: 2025-02-17 | Stop reason: HOSPADM

## 2025-02-17 RX ORDER — FAMOTIDINE 20 MG/1
20 TABLET, FILM COATED ORAL DAILY
Status: DISCONTINUED | OUTPATIENT
Start: 2025-02-18 | End: 2025-02-17 | Stop reason: HOSPADM

## 2025-02-17 RX ORDER — CALCIUM ACETATE 667 MG/1
1 CAPSULE ORAL
COMMUNITY

## 2025-02-17 RX ORDER — ALBUTEROL SULFATE 0.83 MG/ML
2.5 SOLUTION RESPIRATORY (INHALATION)
Status: DISCONTINUED | OUTPATIENT
Start: 2025-02-17 | End: 2025-02-17 | Stop reason: HOSPADM

## 2025-02-17 RX ORDER — FAMOTIDINE 20 MG/1
20 TABLET, FILM COATED ORAL 2 TIMES DAILY
Status: DISCONTINUED | OUTPATIENT
Start: 2025-02-17 | End: 2025-02-17

## 2025-02-17 RX ORDER — DEXTROSE MONOHYDRATE 100 MG/ML
INJECTION, SOLUTION INTRAVENOUS CONTINUOUS PRN
Status: DISCONTINUED | OUTPATIENT
Start: 2025-02-17 | End: 2025-02-17 | Stop reason: HOSPADM

## 2025-02-17 RX ORDER — SODIUM CHLORIDE 0.9 % (FLUSH) 0.9 %
5-40 SYRINGE (ML) INJECTION EVERY 12 HOURS SCHEDULED
Status: DISCONTINUED | OUTPATIENT
Start: 2025-02-17 | End: 2025-02-17 | Stop reason: HOSPADM

## 2025-02-17 RX ORDER — INSULIN LISPRO 100 [IU]/ML
0-16 INJECTION, SOLUTION INTRAVENOUS; SUBCUTANEOUS
Status: DISCONTINUED | OUTPATIENT
Start: 2025-02-17 | End: 2025-02-17 | Stop reason: HOSPADM

## 2025-02-17 RX ORDER — POLYETHYLENE GLYCOL 3350 17 G/17G
17 POWDER, FOR SOLUTION ORAL DAILY PRN
Status: DISCONTINUED | OUTPATIENT
Start: 2025-02-17 | End: 2025-02-17 | Stop reason: HOSPADM

## 2025-02-17 RX ORDER — ZOLPIDEM TARTRATE 5 MG/1
10 TABLET ORAL NIGHTLY PRN
Status: DISCONTINUED | OUTPATIENT
Start: 2025-02-17 | End: 2025-02-17 | Stop reason: HOSPADM

## 2025-02-17 RX ORDER — PREDNISONE 20 MG/1
20 TABLET ORAL 2 TIMES DAILY
Qty: 10 TABLET | Refills: 0 | Status: SHIPPED | OUTPATIENT
Start: 2025-02-17 | End: 2025-02-22

## 2025-02-17 RX ORDER — NIFEDIPINE 30 MG/1
30 TABLET, EXTENDED RELEASE ORAL DAILY
Status: DISCONTINUED | OUTPATIENT
Start: 2025-02-17 | End: 2025-02-17 | Stop reason: HOSPADM

## 2025-02-17 RX ORDER — ACETAMINOPHEN 650 MG/1
650 SUPPOSITORY RECTAL EVERY 6 HOURS PRN
Status: DISCONTINUED | OUTPATIENT
Start: 2025-02-17 | End: 2025-02-17 | Stop reason: HOSPADM

## 2025-02-17 RX ORDER — FLUTICASONE FUROATE, UMECLIDINIUM BROMIDE AND VILANTEROL TRIFENATATE 100; 62.5; 25 UG/1; UG/1; UG/1
1 POWDER RESPIRATORY (INHALATION) DAILY
COMMUNITY

## 2025-02-17 RX ORDER — ASPIRIN 81 MG/1
81 TABLET, CHEWABLE ORAL DAILY
COMMUNITY

## 2025-02-17 RX ORDER — BUDESONIDE AND FORMOTEROL FUMARATE DIHYDRATE 160; 4.5 UG/1; UG/1
2 AEROSOL RESPIRATORY (INHALATION)
Status: DISCONTINUED | OUTPATIENT
Start: 2025-02-17 | End: 2025-02-17 | Stop reason: SDUPTHER

## 2025-02-17 RX ORDER — AZITHROMYCIN 250 MG/1
500 TABLET, FILM COATED ORAL DAILY
Status: DISCONTINUED | OUTPATIENT
Start: 2025-02-17 | End: 2025-02-17 | Stop reason: HOSPADM

## 2025-02-17 RX ORDER — GABAPENTIN 300 MG/1
300 CAPSULE ORAL 3 TIMES DAILY
Status: DISCONTINUED | OUTPATIENT
Start: 2025-02-17 | End: 2025-02-17

## 2025-02-17 RX ADMIN — BUDESONIDE AND FORMOTEROL FUMARATE DIHYDRATE 2 PUFF: 160; 4.5 AEROSOL RESPIRATORY (INHALATION) at 07:51

## 2025-02-17 RX ADMIN — BUMETANIDE 2 MG: 1 TABLET ORAL at 09:06

## 2025-02-17 RX ADMIN — LAMOTRIGINE 50 MG: 25 TABLET ORAL at 09:05

## 2025-02-17 RX ADMIN — INSULIN LISPRO 16 UNITS: 100 INJECTION, SOLUTION INTRAVENOUS; SUBCUTANEOUS at 11:53

## 2025-02-17 RX ADMIN — WATER 40 MG: 1 INJECTION INTRAMUSCULAR; INTRAVENOUS; SUBCUTANEOUS at 11:53

## 2025-02-17 RX ADMIN — HEPARIN SODIUM 5000 UNITS: 5000 INJECTION INTRAVENOUS; SUBCUTANEOUS at 06:19

## 2025-02-17 RX ADMIN — INSULIN LISPRO 6 UNITS: 100 INJECTION, SOLUTION INTRAVENOUS; SUBCUTANEOUS at 11:53

## 2025-02-17 RX ADMIN — INSULIN LISPRO 16 UNITS: 100 INJECTION, SOLUTION INTRAVENOUS; SUBCUTANEOUS at 09:05

## 2025-02-17 RX ADMIN — CLOPIDOGREL BISULFATE 75 MG: 75 TABLET ORAL at 09:05

## 2025-02-17 RX ADMIN — WATER 40 MG: 1 INJECTION INTRAMUSCULAR; INTRAVENOUS; SUBCUTANEOUS at 01:47

## 2025-02-17 RX ADMIN — NIFEDIPINE 30 MG: 30 TABLET, FILM COATED, EXTENDED RELEASE ORAL at 11:53

## 2025-02-17 RX ADMIN — CARVEDILOL 25 MG: 25 TABLET, FILM COATED ORAL at 09:06

## 2025-02-17 RX ADMIN — WATER 1000 MG: 1 INJECTION INTRAMUSCULAR; INTRAVENOUS; SUBCUTANEOUS at 01:48

## 2025-02-17 RX ADMIN — INSULIN LISPRO 8 UNITS: 100 INJECTION, SOLUTION INTRAVENOUS; SUBCUTANEOUS at 01:48

## 2025-02-17 RX ADMIN — Medication 2000 UNITS: at 09:05

## 2025-02-17 RX ADMIN — GABAPENTIN 300 MG: 300 CAPSULE ORAL at 09:05

## 2025-02-17 RX ADMIN — TIOTROPIUM BROMIDE INHALATION SPRAY 2 PUFF: 3.12 SPRAY, METERED RESPIRATORY (INHALATION) at 07:51

## 2025-02-17 RX ADMIN — WATER 40 MG: 1 INJECTION INTRAMUSCULAR; INTRAVENOUS; SUBCUTANEOUS at 05:17

## 2025-02-17 RX ADMIN — AZITHROMYCIN DIHYDRATE 500 MG: 250 TABLET ORAL at 09:05

## 2025-02-17 RX ADMIN — NIFEDIPINE 30 MG: 30 TABLET, FILM COATED, EXTENDED RELEASE ORAL at 09:05

## 2025-02-17 ASSESSMENT — ENCOUNTER SYMPTOMS
GASTROINTESTINAL NEGATIVE: 1
WHEEZING: 1
SHORTNESS OF BREATH: 1
CHEST TIGHTNESS: 1
COUGH: 1
EYES NEGATIVE: 1

## 2025-02-17 NOTE — FLOWSHEET NOTE
02/17/25 1055   Vital Signs   BP (!) 181/86   MAP (Calculated) 118   MAP (mmHg) 112   BP Location Right upper arm     Dr Mckinney notified. .  + Procardia 30 mg once added.  + 6 units Humalog on top of 16 units sliding scale added.

## 2025-02-17 NOTE — DISCHARGE SUMMARY
[facility]  Office: [phone]  Pedro De Paz DO, Salvador Sharma DO, Rohit Aranda DO, Sven Hunter DO, Romulo Quick MD, Estephanie Cooley MD, Everardo Martinez MD, Antionette Mckinney MD,  Jacinto Magallon MD, Pedro Dorman MD, Romi Fleming MD,  Anderson Lafleur DO, Venancio Johnson MD, Pipe Bacon MD, Daniel De Paz DO, Sissy Sanderson MD,  Marin Holder DO, Sita French MD, Clarita Yip MD, Dalia Sommers MD, Juana Jane MD,  Marcio Dia MD, You Ramirez MD, Marge De Dios MD, Ant Cortez MD, Adalberto Choi MD, Mare Mccoy MD, Mitchell Nunes DO, Anmol Garza MD, Anderson Stacy MD, Mohsin Reza, MD, Shirley Waterhouse, CNP,  Vandana Ko CNP, Mitchell Johnson, CNP,  Ange Fernandez, DNP, Yamilet Dai, CNP, Brenda Pimentel, CNP, Mouna Tee, CNP, Agustina Rao, CNP, FRANCES Covington-C, Naima Montoya, CNP, Keaton Cline, CNP,  Angelica Mansfield, CNP, Luz Gusman, CNP, Stacy Saenz, CNP,  Janna Garvey, CNS, Hilary Duarte CNP, Fely Sandoval CNP,   Frida Guo, CNP         [facility]   IN-PATIENT SERVICE   [facility]    Discharge Summary     Patient ID: Julia Hobbs  :  1980   MRN: [id number]     ACCOUNT:  [id number]   Patient's PCP: Jigna Haley DO  Admit Date: 2025   Discharge Date:  2025    Length of Stay: 1  Code Status:  Prior  Admitting Physician: Antionette Mckinney MD  Discharge Physician: Antionette Mckinney MD     Active Discharge Diagnoses:     Hospital Problem Lists:  Principal Problem:    Acute and chronic respiratory failure with hypoxia (HCC)  Active Problems:    S/P partial resection of colon    Anemia    Morbid obesity    Type 1 diabetes mellitus with stage 3a chronic kidney disease (HCC)    History of Leonel gangrene    History of difficult intubation    Asthma    Bipolar disorder (HCC)    GERD (gastroesophageal reflux disease)    Hyperlipidemia    Hypertension    Hypoxia    COPD exacerbation

## 2025-02-17 NOTE — PROGRESS NOTES
Pt admitted to room 1002 Admission documentation complete, vitals taken and telemetry placed. Pt oriented to room and unit routine, including hourly rounding. Call light in reach and safety maintained. Will continue to provide support and education.

## 2025-02-17 NOTE — CARE COORDINATION
Case Management Assessment  Initial Evaluation    Date/Time of Evaluation: 2/17/2025 9:41 AM  Assessment Completed by: Linda Vernon RN    If patient is discharged prior to next notation, then this note serves as note for discharge by case management.    Patient Name: Julia Hobbs                   YOB: 1980  Diagnosis: Hypoxia [R09.02]  COPD exacerbation (HCC) [J44.1]                   Date / Time: 2/16/2025 11:59 PM    Patient Admission Status: Inpatient   Readmission Risk (Low < 19, Mod (19-27), High > 27): Readmission Risk Score: 12.3    Current PCP: Jigna Haley, DO  PCP verified by CM? Yes    Chart Reviewed: Yes      History Provided by: Patient  Patient Orientation: Alert and Oriented    Patient Cognition: Alert    Hospitalization in the last 30 days (Readmission):  No    If yes, Readmission Assessment in CM Navigator will be completed.    Advance Directives:      Code Status: Full Code   Patient's Primary Decision Maker is: Legal Next of Kin    Primary Decision Maker (Active): Roberta Young [facility] - [phone]    Discharge Planning:    Patient lives with: Children, Spouse/Significant Other Type of Home: Apartment  Primary Care Giver: Self  Patient Support Systems include: Spouse/Significant Other, Children   Current Financial resources: Medicaid, Medicare  Current community resources:    Current services prior to admission: Other (Comment), Oxygen Therapy (nebulizer)            Current DME:              Type of Home Care services:  None    ADLS  Prior functional level: Independent in ADLs/IADLs  Current functional level: Independent in ADLs/IADLs    PT AM-PAC:   /24  OT AM-PAC: 20 /24    Family can provide assistance at DC: Yes  Would you like Case Management to discuss the discharge plan with any other family members/significant others, and if so, who? No  Plans to Return to Present Housing: Yes  Other Identified Issues/Barriers to RETURNING to current housing: H IV ABX, IV

## 2025-02-17 NOTE — H&P
Procalcitonin   Begin IV steroids   H/O ESRD with HD with chronic anemia   Consult Nephrology for inpatient dialysis   Trend daily BMP and weight, monitor I/O  Fluid and salt restricted diet    Continue home dose bumex  DVT PPX with heparin, avoid nephrotoxic agents as appropriate   dialysis MWF and occasional Saturday. She will have to wait until she is off the Plavix in 6 months to be listed on transplant list at [facility]   Chronic systolic (congestive) heart failure, preserved EF (follows with Dr. Tapia)  Atherosclerotic heart disease of native coronary artery without angina pectoris  Atherosclerosis of aorta   Peripheral vascular disease, unspecified   Sleep apnea   H/O HLD, HTN, obesity  Continue home dose carvedilol, fenofibrate,crestor, nifedipine    Had 2 cardiac stents placed June 2023   Patient was educated to make lifestyle changes including dietary restrictions, exercise and lifestyle modifications to attain significant weight loss. She was evaluated 1 year ago for gastric bypass surgery.    Continue home CPAP  Add lipid panel   H/O DM II with neuropathy and Retinopathy   ISS with hypoglycemia protocol   Continue home dose neurontin, uses Ozempic 1 mg weekly    10/2024 HGA1C is 7.4%.   Tandem Tslim control IQ - started nov 2022. Patient uses DEXCOM G6    H/O Bipolar disorder  Continue home dose cymbalta, zyprexa, ambien   H/O OA GERD  Stable, Continue PPI ppx     Consultations:   IP CONSULT TO NEPHROLOGY     Patient is admitted as inpatient status because of co-morbidities listed above, severity of signs and symptoms as outlined, requirement for current medical therapies and most importantly because of direct risk to patient if care not provided in a hospital setting.  Expected length of stay > 48 hours.    Angelica Mansfield, ROMEO - CNP  2/17/2025  4:41 AM    Copy sent to Jigna Hamm DO

## 2025-02-17 NOTE — PROGRESS NOTES
All patient belongings collected. IV and telemetry removed. Discharge paperwork given and explained to patient. See discharge event for further details.

## 2025-02-17 NOTE — PLAN OF CARE
Pt transferred from [facility] with concerns of upper respiratory symptoms, pt was not feeling well and unable to complete hemodialysis at home. She reports her mother helps with treatments and they are done on M, T, Th, Fr with alternating Sat and Sun. Fistual in left arm. She is currently using 4L of oxygen, ambulates with stand by assistance and walker as needed. Blood sugar on admission was 413, provider notified and 8units of insulin administered, pt does have insulin pump but is currently turned off at this time. IV Solumedrol and rocephin given as well as incentive spirometer, pt understood directions for use.     Problem: Chronic Conditions and Co-morbidities  Goal: Patient's chronic conditions and co-morbidity symptoms are monitored and maintained or improved  Outcome: Progressing  Care Plan - Patient's Chronic Conditions and Co-Morbidity Symptoms are Monitored and Maintained or Improved:   Monitor and assess patient's chronic conditions and comorbid symptoms for stability, deterioration, or improvement   Collaborate with multidisciplinary team to address chronic and comorbid conditions and prevent exacerbation or deterioration   Update acute care plan with appropriate goals if chronic or comorbid symptoms are exacerbated and prevent overall improvement and discharge      Problem: Discharge Planning  Goal: Discharge to home or other facility with appropriate resources  Outcome: Progressing  Discharge to home or other facility with appropriate resources:   Identify barriers to discharge with patient and caregiver   Arrange for needed discharge resources and transportation as appropriate      Problem: Pain  Goal: Verbalizes/displays adequate comfort level or baseline comfort level  Outcome: Progressing  Verbalizes/displays adequate comfort level or baseline comfort level:   Administer analgesics based on type and severity of pain and evaluate response   Assess pain using appropriate pain scale

## 2025-02-17 NOTE — PROGRESS NOTES
[] Medication Reconciliation was completed and the patient's home medication list was verified. The Med List Status is marked \"Complete\". The following sources were used to assist with Medication Reconciliation:    [] Med Rec Pharmacy tech has already completed   [] Patient had a list of medications which was transcribed into the EHR and verified for accuracy.  [] Patient provided bottles of their medications for validation  [x] Home medications reviewed and confirmed with patient  Please list name and relationship to patient  [] Contacted patient's pharmacy to confirm home medications  [] Contacted patient's physician office to confirm home medications  [] Medical Records from another facility and/or Care Everywhere were reviewed and validated  []   was contacted by  on 2/17/2025 at the following time:        [x] There are one or more home medications that need clarification before Medication Reconciliation can be completed. The Med List Status has been marked as In Progress.     To assist with Home Medication Reconciliation the following actions have been taken:    [] Family requested to bring medications into the hospital  [] Family requested to call hospital with medication list  [] Message left with physician office to call unit  [] Request for medical records made to   [] MAR from facility requested to be faxed over  [] Unable to complete due to patient condition  [] Unable to validate home medications  [x] Insulin pump needs verified.        *Effective communication is essential throughout this process. It is important for the nurse to document the progress to keep others informed about the status in the reconciliation.  Notes can be recorded in the medication list with every medication, as well as in a general nursing note. If modifications are needed it is the responsibility of the RN to communicate need for modifications to the physician.

## 2025-02-17 NOTE — CONSULTS
2022 - manages heart meds       Past Surgical History:        Procedure Laterality Date    ABDOMEN SURGERY Bilateral 2022    incision and drainage of bilateral necrotizing soft tissue infection of groin and buttocks  performed by Murray Saenz MD at [facility] OR    ABDOMEN SURGERY Left 2022    LEFT GROIN, BUTTOCK  WOUND EXPLORATION AND DEBRIDEMENT,  WOUND VAC EXCHANGE, (MISONIX) performed by Tomasz Bhardwaj MD at [facility] OR    ABDOMEN SURGERY N/A 2022    WOUND EXPOLORATION AND INCISION AND DRAINAGE OF CHRONIC SINUS WOUND performed by Tomasz Bhardwaj MD at [facility] OR    ANKLE SURGERY Bilateral     APPENDECTOMY       SECTION      X2   ,     CHOLECYSTECTOMY      COLONOSCOPY      COLOSTOMY N/A 2022    DEBRIDEMENT BUTTOCK AND GROIN, WOUND VAC EXCHANGE, LAPAROSCOPIC COLOSTOMY CREATION (MISONIX) performed by Tomasz Bhardwaj MD at [facility] OR    COLOSTOMY CLOSURE N/A 2022    COLOSTOMY REVERSAL performed by Tomasz Bhardwaj MD at [facility] OR    IR TUNNELED CVC PLACE WO SQ PORT/PUMP > 5 YEARS  2022    IR TUNNELED CATHETER PLACEMENT GREATER THAN 5 YEARS 2022 Kayden Sabillon MD [facility] SPECIAL PROCEDURES    KNEE ARTHROSCOPY Bilateral     LUMBAR DISC SURGERY      Dr. Dsouza    RECTAL SURGERY N/A 2022    *ADD ON, ASAP* INCISION AND DRAINAGE OF PERINEUM, BRIAN KNIFE PRONE performed by Yan Mota MD at [facility] OR    RECTAL SURGERY Bilateral 02/10/2022    INCISION AND DRAINAGE BUTTOCK, GROIN  (LITHOTOMY POSITION) performed by Murray Saenz MD at [facility] OR    RECTAL SURGERY N/A 2022    INCISION AND DRAINAGE AND DEBRIDEMENT BUTTOCK AND LABIAL ABSCESS , PLACEMENT OF WOUND VAC performed by Gerardo Galvan DO at [facility] OR    ROTATOR CUFF REPAIR Left     SLEEVE GASTRECTOMY  2019    TUBAL LIGATION      ULNAR NERVE TRANSPOSITION Left     WOUND EXPLORATION Left 2022    DEBREIDMENT OF BILATERAL GROINS AND LEFT BUTTOCK WOUND WITH PARTIAL CLOSURE

## 2025-02-17 NOTE — PROGRESS NOTES
Occupational Therapy  Occupational Therapy Initial Evaluation  Facility/Department: [facility]   Patient Name: Julia Hobbs        MRN: [id number]    : 1980    Date of Service: 2025           No chief complaint on file.    Past Medical History:  has a past medical history of Asthma, Astigmatism, Bipolar disorder (Formerly [facility]), Clinical trial participant, COVID-19, Diabetes mellitus (Formerly [facility]), Leonel gangrene, GERD (gastroesophageal reflux disease), H/O cardiovascular stress test, History of echocardiogram, Hyperlipidemia, Hypertension, Insulin pump in place, Kidney disease, Obesity, On mechanically assisted ventilation (Formerly [facility]), Osteoarthritis, Under care of team, Under care of team, Under care of team, Under care of team, and Wellness examination.  Past Surgical History:  has a past surgical history that includes Rectal surgery (N/A, 2022); Abdomen surgery (Bilateral, 2022); Rectal surgery (Bilateral, 02/10/2022); Rectal surgery (N/A, 2022); Wound Exploration (Left, 2022); colostomy (N/A, 2022); Abdomen surgery (Left, 2022); IR TUNNELED CVC PLACE WO SQ PORT/PUMP > 5 YEARS (2022); Sleeve Gastrectomy (2019); Colonoscopy; Tubal ligation; Ankle surgery (Bilateral); Appendectomy; Ulnar nerve transposition (Left); Rotator cuff repair (Left); Knee arthroscopy (Bilateral); Cholecystectomy; Lumbar disc surgery ();  section; Colostomy Closure (N/A, 2022); Wound Exploration (2022); and Abdomen surgery (N/A, 2022).    HPI: Julia Hobbs is a 44 y.o. Non- / non  female who presents with No chief complaint on file.   and is admitted to the hospital for the management of Acute and chronic respiratory failure with hypoxia.     Pt arrived from [facility] due to acute hypoxia, likely secondary to URI. fluid and covid negative. Pt has a COPD hx and wears 2 lpm via NC continuously at home. She is currently requiring 5L. Pt reports

## 2025-04-21 NOTE — CARE COORDINATION
Chief complaint:   No chief complaint on file.      Vitals:  There were no vitals taken for this visit.        HISTORY OF PRESENT ILLNESS     Heart Problem  Pertinent negatives include no chest pain, fatigue, myalgias or numbness.        Problem List:     Aortic Stenosis  S/P AVR 7/3/2019  PVC's  Hypertension     Priyanka Springer is a 71 year old female  was seen in [facility] cardiology clinic for consultation and office visit on 4/21/2025 at 3:07 PM.     Priyanka Springer is a 69 year old female was seen in [facility] cardiology clinic for consultation and office visit on 8/7/2023 at 12:02 PM.      Patient is a former patient of Dr. March.     S/P Aortic valve replacement with a 21 mm Gonzalez Inspiris Resilia tissue valve bioprosthesis 7/3/2019. She had no CAD by pre-operative cardiac catheterization.       Aortic valve replacement with a 21 mm Gonzalez Inspiris Resilia tissue valve bioprosthesis with Dr. Gutierrez 7/3/2019     OV with PCP 1/27/2020, patient had complaints of dizziness and a fall 4 weeks prior. BP was 156/74 and patient was not orthostatic at visit. EKG revealed SR with frequent PVCs - ventricular trigeminy. Her Metoprolol was increased to 37.5mg BID and a service to EP was placed.      Holter monitor 2/14/2020 demonstrated sinus rhythm with an average rate of 71, minimum 48 and maximum 103 beats per minute.  PVC burden was 22.8% with 1 ventricular triplet, which was multiforme.  PVC morphology is otherwise fairly uniform with a right bundle inferior axis PVC.  No symptoms were reported.  She consultated with EP on 3/12/2020 and he recommended no major changes.      She presented to her EP follow up on 04/06/2021 with complaints of chest tightness/discomfort and lightheadedness. Echo was unchanged from previous (valve functioning well). Holter revealed frequent PVCs, majority in isolation and no malignant arrhythmias.      Echo completed 7/13/2023 demonstrated LVEF 63%, LVGLS  Transitional planning    Writer spoke with resident, patient in OR and getting vac change, unable to assess, plan is LTAC, will discuss with parent on choices. 1200 Received call from Lucinda at [facility] and will not be following patient anymore due to several more scheduled surgeries and extended hospital stay. Will need new referral.      1500 Spoke to [facility] and she had received referrals from patients mom for [facility] as first choice, [facility]nd , and [facility] as first SNF choice. -23.0%,  mild TVR, mild MVR, Status post AVR 21 mm Gonzalez Inspira is resilient bioprosthesis, Aortic valve area 1.2 cm2, Prosthetic aortic valve mean gradient 20 mmHg,    At last visit with Dr. March, 8/25/2022, patient reported feeling good.  Denied any cardiac symptoms.  Did report some fatigue at times. No changes to medication regimen or additional testing ordered.     ED visit 2/16/2024 for cough and nasal congestion. No CP or SOB. Prescribed Abx for bronchitis.    Echo 7/22/2024: LVEF 66%. Normal bioprosthetic aortic valve.(AVR 21mm Gonzalez Inspiris Resilient Bioprosthesis, 7/3/2019 Dr Barney) with mean gradient of 22 mmHg. Mild mitral, pulmonic, and tricuspid valve regurgitation; RVSP 32 mmHg.     At last visit 09/30/2024, patient reported fatigue. At that time, we recommended patient stop metoprolol and start crestor 10 mg. We also advised regular exercise.     Today, ***      PAST MEDICAL, FAMILY AND SOCIAL HISTORY     Medications:  Current Outpatient Medications   Medication Sig Dispense Refill    rosuvastatin (CRESTOR) 10 MG tablet TAKE 1 TABLET BY MOUTH DAILY 90 tablet 3    lisinopril (ZESTRIL) 5 MG tablet TAKE 1 TABLET BY MOUTH DAILY 90 tablet 3    Cholecalciferol (Vitamin D) 50 mcg (2,000 units) tablet Take 1 tablet by mouth daily. 30 tablet 0    vitamin B-12 (CYANOCOBALAMIN) 1000 MCG tablet Take 1 tablet by mouth daily. 30 tablet 0     No current facility-administered medications for this visit.       Allergies:  ALLERGIES:   Allergen Reactions    Penicillins RASH     As child       Past Medical  History/Surgeries:  Past Medical History:   Diagnosis Date    AK (actinic keratosis) 2015    Aortic stenosis 04/30/2019    ECHO 4/30/2019:  Severe aortic valve stenosis, mean gradient 39.1 mmHg, KASIA 0.73 cm² by VTI and .09 cm2 by planimetry.    Colon polyp 01/18/2016    Endometrial cancer  (CMD)     Pericarditis (CMD)     Personal history of chemotherapy     Personal history of radiation therapy      Uterine cancer  (CMD) 05/2007       Past Surgical History:   Procedure Laterality Date    Aortic valve replacement  07/03/2019    Gonzalez Christophe & Coiris tissue valve    Biopsy of breast, needle core Right 2009    b9    Colonoscopy remove lesions by snare  04/19/2021    Colon 5yrs,serrated adenoma x1,hemorrhoids,Dr. Weinberg    Coronary angiogram - cv  06/20/2019    Dr. Kwong:  No CAD. Normal hyperdynamic LV function. No RWMA's. No MR. LVEDP - 19 mmHArea estimation 0.69 - 1.0 if CO anywhere between 4 and 6 L/M.g.   AS mean gradient - 27 mmHg    Hysteroscopy,bio endo/polyptmy  05/2007    Grade III adenocarcinoma of the endometrium    Insert tunel cvcath w/port  01/02/2008    Right  AND removed later in 2008    Stereotactic breast biopsy  12/21/2009    Right-benign    Total abdom hysterectomy  06/11/2007    REDD/BSO for uterine cancer       Family History:  Family History   Problem Relation Age of Onset    BRCA Untested Mother     Cancer, Breast Mother 80    Dementia/Alzheimers Mother     Hypertension Mother     Diabetes Mother     Hyperlipidemia Mother     Glaucoma Mother     Heart disease Father     Hypertension Father     Cancer, Lung Half-Sister        Social History:  Social History     Tobacco Use    Smoking status: Never     Passive exposure: Never    Smokeless tobacco: Never   Substance Use Topics    Alcohol use: Yes     Alcohol/week: 2.0 standard drinks of alcohol     Types: 2 Glasses of wine per week       REVIEW OF SYSTEMS     Review of Systems   Constitutional:  Negative for activity change, appetite change, fatigue and unexpected weight change.   Respiratory:  Negative for apnea, chest tightness and shortness of breath.    Cardiovascular:  Negative for chest pain, palpitations and leg swelling.   Musculoskeletal:  Negative for myalgias.   Skin:  Negative for pallor.   Neurological:  Negative for dizziness, syncope, light-headedness and numbness.   Hematological:  Does not bruise/bleed easily.   All other  systems reviewed and are negative.      PHYSICAL EXAM     Physical Exam  Vitals reviewed.   Constitutional:       Appearance: She is well-developed.   HENT:      Head: Normocephalic.      Neck: Neck supple.   Neck:      Thyroid: No thyromegaly.      Vascular: No JVD.      Trachea: No tracheal deviation.   Cardiovascular:      Rate and Rhythm: Normal rate and regular rhythm.      Heart sounds: S1 normal and S2 normal. No murmur heard.     No diastolic murmur is present.      No friction rub. No gallop.   Pulmonary:      Effort: Pulmonary effort is normal. No respiratory distress.      Breath sounds: Normal breath sounds. No decreased breath sounds, wheezing or rales.   Chest:      Chest wall: No tenderness.   Abdominal:      Palpations: Abdomen is soft.   Musculoskeletal:         General: Normal range of motion.   Skin:     General: Skin is warm.   Neurological:      Mental Status: She is alert and oriented to person, place, and time.       Echocardiogram 7/22/2024:   Final Impressions    * Normal left ventricular systolic function with ejection fraction of 66 %.    * No left ventricular diastolic dysfunction.    * Normal bioprosthetic aortic valve.(AVR 21mm Gonzalez Inspiris Resilient  Bioprosthesis, 7/3/2019 Dr Barney).    * Prosthetic aortic valve mean gradient 22 mmHg.    * Mild pulmonic regurgitation.    * Mild mitral valve regurgitation.    * Mild tricuspid valve regurgitation.    * Right ventricular systolic pressure; 32 mmHg.    * Mildly increased left ventricular wall thickness.    * LV Global longitudinal strain -23.2 %.    * Normal inferior vena cava with <50% collapse upon inspiration consistent with normal right atrial pressure, 8 mmHg.    * No pericardial effusion.    * No significant change since the prior study. 7/13/2023.     US KIAH Rest and Stress Bilateral 5/3/2024:  IMPRESSION:    1. Resting ankle-brachial indices of 1.19 on the right and 1.14 on the left.  2. Negative stress test with no evidence  of a hemodynamically significant arterial insufficiency in the bilateral lower extremities.    NM Stress Test 5/1/2024:  IMPRESSION:  1. Normal myocardial perfusion scans during exercise and at rest.  2. Normal resting and post-exercise left ventricular systolic function.  3. Cardiac Risk Assessment: Low Risk  4. Compared to the prior study from 2012, there have been no changes.     Holter Monitor 7/13/2023:  CONCLUSIONS:   1. This was a 48-hour Holter monitor performed for PVCs, with underlying sinus rhythm and average heart rate 66 bpm.   2. No patient-reported symptomatic transmissions.   3. Moderate burden of ventricular ectopy, represented predominantly as isolated PVCs. No significant ventricular arrhythmia, and no significant supraventricular ectopy or arrhythmia was identified.   4. No pauses and no atrial fibrillation were detected.         Echocardiogram 7/13/2023:  Final Impressions    * Normal left ventricular systolic function with ejection fraction of 63 %.    * Mildly increased left ventricular wall thickness.    * No left ventricular diastolic dysfunction.    * LV Global longitudinal strain -23.0 %.    * Status post AVR 21 mm Gonzalez Inspira is resilient bioprosthesis.    * Aortic valve area 1.2 cm2.    * Prosthetic aortic valve mean gradient 20 mmHg.    * Mild tricuspid valve regurgitation.    * Mild mitral valve regurgitation.    * No pericardial effusion.    * No significant change since the prior study 08/16/2022.      Echo 08/16/2022:  Normal left ventricular chamber size, mild increased wall thickness and normal systolic function with no regional wall motion abnormalities. LV EF 69 %.  Normal right ventricular size and systolic function. Normal right ventricular systolic pressure; 26 mmHg.  Bioprosthetic aortic valve in-place, with increased gradient, mean gradient 20 mm Hg, peak velocity 3.2 m/second, calculated KASIA 1.2 cm2. No definite evidence of significant regurgitation.  Mild tricuspid  valve regurgitation suggested. Right ventricular systolic pressure; 26 mmHg.  Compared to prior study from 4/14/2021, bioprosthetic aortic valve mean gradient has increased from 15.1 mm Hg to 20 mm Hg, peak velocity has increased from 2.7 m/sec to 3.2 m/sec, with similar calculated KASIA at 1.2 cm2. Clinical correlation advised and cardiology follow up recommended.     EKG 08/26/2021:  Sinus  Bradycardia   Left atrial enlargement.   Negative precordial T-waves  -Probably normal     48 Hr Holter Monitor 04/14/2021:  SUMMARY:   The patient has frequent premature ventricular contractions,  often in the pattern of trigeminy.  These are unifocal beats seen in isolation with a few ventricular pairs.  No malignant tachy or bradyarrhythmias and no long pauses.      Echo 04/14/2021:  Normal left ventricular systolic function.  Moderate left ventricular hypertrophy.  Left ventricular ejection fraction, 70 %.  Normal right ventricular size and systolic function.  Mildly increased left atrial volume 41.1 ml/m².  Mildly increased right atrial size.  Mild mitral valve regurgitation.  Bioprosthetic aortic valve functioning normally aortic  valve mean gradient is 15.1 mmHg with no regurgitation.  Trace tricuspid valve regurgitation.  Trace to mild pulmonary valve regurgitation.  Normal right ventricular systolic pressure.  Normal pericardium without effusion.  No significant change since the prior study from 7/23/2019.     EKG 04/06/2021:       24 Hr Holter Monitor 02/14/2020:  FINDINGS:   The patient was monitored for 24 hours.  The basic rhythm was sinus rhythm an average rate of 71, minimum 48 and maximum 103 beats per minute.  PVC burden was 22.8% with 1 ventricular triplet, which was multiforme.  PVC morphology is otherwise fairly uniform with a right bundle inferior axis PVC.  No symptoms were reported.      CXR 7/26/2019:  IMPRESSION: Stable small bilateral pleural effusions and mild bibasilar atelectasis.     CV PROCEDURE  7/23/2019:  Emergency Left Anterior Thoracotomy, Pericardial Drainage/Window     ECHO 7/23/2019:  Normal left ventricular systolic function.  Moderate left ventricular hypertrophy.  Left ventricular ejection fraction, 67 %.  Normal right ventricular size and systolic function.  Normal left atrial size.  Mild mitral valve regurgitation.  Bioprosthetic aortic valve functioning normally - aortic valve mean gradient is 12.8 mmHg with no regurgitation.  Trace tricuspid valve regurgitation.  Normal right ventricular systolic pressure.  Moderate pericardial effusion measuring 1.3 cm anteriorly and 1.6 cm posteriorly is NOT hemodynamically significant.  Pleural effusion noted.  Compared to prior study from 4/03/2019, the patient has had an aortic valve replacement and there is no longer severe aortic stenosis. Pericardial and pleural effusions are new.     AVR 7/3/2019:  Aortic valve replacement with a 21 mm Gonzalez Inspiris Resilia tissue valve bioprosthesis.     CATH 6/20/2019:  No CAD. Normal hyperdynamic LV function. No RWMA's. No MR. LVEDP - 19 mmHg.   AS mean gradient - 27 mmHg. Area estimation 0.69 - 1.0 if CO anywhere between 4 and 6 L/M.     48 HR HOLTER MONITOR 3/8/2019:  1.  The patient was monitored for 48 hours and during that time the underlying rhythm was sinus.  Average heart rate 80 beats per minute.  Low rate of 51, upper rate 112 beats per minute.  2.  There were very frequent premature ventricular contractions.  These were unifocal.  They comprised 24% of total QRS complexes, and with this, there were hundreds of couplets and 2 triplets, but no runs of ventricular tachycardia.  Frequent bigeminy and trigeminy.  3.  Very rare premature atrial contractions and one 3-beat run at 124 beats per minute.  4.  No long pauses.  5.  No atrial fibrillation.  6.  The patient had 4 symptomatic transmissions for lightheadedness or face flushed and they were associated with continued premature ventricular contractions,  often in a pattern of bigeminy or trigeminy.  SUMMARY:  The patient has very frequent premature ventricular contractions.  Sometimes they are felt in the pattern of bigeminy or trigeminy.  Most of the time not.  No malignant tachy or bradyarrhythmias and no long pauses.  ASSESSMENT/PLAN     Recent Labs   Lab 04/07/25  1325   HGB 13.0   BUN 11   Creatinine 0.74   Hemoglobin A1C 5.4   C-Reactive Protein <5.0   Potassium 3.9   Cholesterol 172   HDL 86   Cholesterol/ HDL Ratio 2.0   Triglycerides 89   CALCLDL 68   GOT/AST 19   GPT 23   TSH 1.154     Checklist:   Ejection fraction checked :   %. [] Not Done  Last Stress test: [] Negative; [] Positive [] Not done.  BP controlled (as per Guidelines  mmHg) : []Yes / []No   Serum Creatinine checked: []Yes / [] No  LDL controlled per guidelines: []Yes / [] No  PAD screening KIAH done per guidelines: []Yes / []No / [] Not done  Carotid doppler study per guidelines: []Yes / []No / [] Not done        LDL (mg/dL)   Date Value   04/07/2025 68     Creatinine (mg/dL)   Date Value   04/07/2025 0.74   04/04/2024 0.77   02/17/2021 0.84         ASSESSMENT AND PLAN:    Aortic Stenosis:  Echo 4/30/2019: Severe aortic valve stenosis, mean gradient 39.1 mmHg, KASIA 0.73 cm² by VTI and .09 cm2 by planimetry.  Echo 7/23/2019: Bioprosthetic aortic valve functioning normally - aortic valve mean gradient is 12.8 mmHg with no regurgitation.  Echo 4/14/2021: Bioprosthetic aortic valve functioning normally aortic  valve mean gradient is 15.1 mmHg with no regurgitation.  Echo 8/16/2022: Bioprosthetic aortic valve in-place, with increased gradient, mean gradient 20 mm Hg, peak velocity 3.2 m/second, calculated KASIA 1.2  cm2. No definite evidence of significant regurgitation.  Echo 7/13/2023: Status post AVR 21 mm Goznalez Inspira is resilient bioprosthesis. Prosthetic aortic valve mean gradient 20 mmHg. Aortic valve area 1.2 cm2. Aortic valve mean gradient 20 mmHg. Aortic valve dimensionless index,  (VTIs): 0.34.  Previously advised antibiotics prior to any dental procedures.   Echo 7/22/2024: LVEF 66%; Normal bioprosthetic aortic valve.(AVR 21mm Gonzalez Inspiris Resilient Bioprosthesis, 7/3/2019 with mean gradient of 22 mmHg.  ***    PVC's:  Holter Monitor 3/8/2019: SR, frequent PVC's, frequent bigeminy and trigeminy, no arrhythmias   Holter Monitor 2/14/2020: SR, PVC burden 22.8%  Holter monitor 4/14/2021: SR, frequent ventricular ectopics, many runs of trigeminy and bigeminy, rare PAC's  Holter Monitor 7/13/2023: SR, moderate burden of ventricular ectopy   On Metoprolol Succinate 25 MG  Patient did follow with Dr. Tucker, has no established care with new EP MD       Hypertension:  There were no vitals filed for this visit.      Creatinine (mg/dL)   Date Value   04/07/2025 0.74   Blood pressure controlled    FOLLOW UP: Priyanka Springer is expected to follow up in 6 months    Recommendations Summary:    Continue Current Medications  Advised Echocardiogram  Advised NM Stress Test  Advised Stress Echo  Advised Event Monitor  Advised Coronary Angiogram  Encouraged Regular Daily Exercise    ***    Summary:  Priyanka Springer is a 71 year old female presenting with the above listed problems.  Any concerning clinical signs or symptoms were addressed. Priyanka Springer remains clinically stable. Today's exam finding are noted. Reviewed medications, last ejection fraction,( 66 %), low-density lipoprotein,(68 mg/dL) serum creatinine,( 0.74 mg/dL) and other relevant tests. Encouraged compliance with prescribed therapies and lifestyle changes. Priyanka Pettits  health concerns and questions were addressed. Future recommendations and any required tests were explained, and cardiology were appointments scheduled. Suggested follow up listed above, earlier if needed.    ***

## 2025-05-06 ENCOUNTER — HOSPITAL ENCOUNTER (INPATIENT)
Age: 45
LOS: 3 days | Discharge: HOME | End: 2025-05-09
Attending: INTERNAL MEDICINE | Admitting: STUDENT IN AN ORGANIZED HEALTH CARE EDUCATION/TRAINING PROGRAM
Payer: COMMERCIAL

## 2025-05-06 DIAGNOSIS — I50.43 ACUTE ON CHRONIC COMBINED SYSTOLIC AND DIASTOLIC CONGESTIVE HEART FAILURE (HCC): Primary | ICD-10-CM

## 2025-05-06 PROBLEM — N18.6 ESRD (END STAGE RENAL DISEASE) (HCC): Status: ACTIVE | Noted: 2025-05-06

## 2025-05-06 PROBLEM — I50.9 ACUTE EXACERBATION OF CHRONIC HEART FAILURE (HCC): Status: ACTIVE | Noted: 2025-05-06

## 2025-05-06 PROBLEM — B34.8 RHINOVIRUS: Status: ACTIVE | Noted: 2025-05-06

## 2025-05-06 LAB
ALBUMIN SERPL-MCNC: 4.3 G/DL (ref 3.5–5.2)
ALBUMIN/GLOB SERPL: 1.4 {RATIO} (ref 1–2.5)
ALLEN TEST: POSITIVE
ALP SERPL-CCNC: 66 U/L (ref 35–104)
ALT SERPL-CCNC: 10 U/L (ref 10–35)
ANION GAP SERPL CALCULATED.3IONS-SCNC: 16 MMOL/L (ref 9–16)
AST SERPL-CCNC: 16 U/L (ref 10–35)
BASOPHILS # BLD: 0 K/UL (ref 0–0.2)
BASOPHILS NFR BLD: 0 % (ref 0–2)
BILIRUB SERPL-MCNC: 0.4 MG/DL (ref 0–1.2)
BNP SERPL-MCNC: ABNORMAL PG/ML (ref 0–125)
BUN SERPL-MCNC: 83 MG/DL (ref 6–20)
CALCIUM SERPL-MCNC: 9.6 MG/DL (ref 8.6–10.4)
CHLORIDE SERPL-SCNC: 92 MMOL/L (ref 98–107)
CO2 SERPL-SCNC: 23 MMOL/L (ref 20–31)
CREAT SERPL-MCNC: 5 MG/DL (ref 0.6–0.9)
EOSINOPHIL # BLD: 0 K/UL (ref 0–0.4)
EOSINOPHILS RELATIVE PERCENT: 0 % (ref 1–4)
ERYTHROCYTE [DISTWIDTH] IN BLOOD BY AUTOMATED COUNT: 15.1 % (ref 11.8–14.4)
FIO2: 50
GFR, ESTIMATED: 10 ML/MIN/1.73M2
GLUCOSE BLD-MCNC: 252 MG/DL (ref 65–105)
GLUCOSE BLD-MCNC: 390 MG/DL (ref 65–105)
GLUCOSE SERPL-MCNC: 408 MG/DL (ref 74–99)
HCT VFR BLD AUTO: 30.7 % (ref 36.3–47.1)
HGB BLD-MCNC: 9.9 G/DL (ref 11.9–15.1)
IMM GRANULOCYTES # BLD AUTO: 0 K/UL (ref 0–0.3)
IMM GRANULOCYTES NFR BLD: 0 %
LYMPHOCYTES NFR BLD: 0.44 K/UL (ref 1–4.8)
LYMPHOCYTES RELATIVE PERCENT: 5 % (ref 24–44)
MCH RBC QN AUTO: 28.2 PG (ref 25.2–33.5)
MCHC RBC AUTO-ENTMCNC: 32.2 G/DL (ref 28.4–34.8)
MCV RBC AUTO: 87.5 FL (ref 82.6–102.9)
MONOCYTES NFR BLD: 0.18 K/UL (ref 0.1–0.8)
MONOCYTES NFR BLD: 2 % (ref 1–7)
MORPHOLOGY: NORMAL
NEUTROPHILS NFR BLD: 93 % (ref 36–66)
NEUTS SEG NFR BLD: 8.18 K/UL (ref 1.8–7.7)
NRBC BLD-RTO: 0 PER 100 WBC
O2 DELIVERY DEVICE: ABNORMAL
PLATELET # BLD AUTO: 166 K/UL (ref 138–453)
PMV BLD AUTO: 10.9 FL (ref 8.1–13.5)
POC HCO3: 24.9 MMOL/L (ref 21–28)
POC O2 SATURATION: 91.2 % (ref 94–98)
POC PCO2: 40.7 MM HG (ref 35–48)
POC PH: 7.39 (ref 7.35–7.45)
POC PO2: 61.9 MM HG (ref 83–108)
POSITIVE BASE EXCESS, ART: 0 MMOL/L (ref 0–3)
POTASSIUM SERPL-SCNC: 5.2 MMOL/L (ref 3.7–5.3)
PROT SERPL-MCNC: 7.3 G/DL (ref 6.6–8.7)
RBC # BLD AUTO: 3.51 M/UL (ref 3.95–5.11)
SAMPLE SITE: ABNORMAL
SODIUM SERPL-SCNC: 131 MMOL/L (ref 136–145)
WBC OTHER # BLD: 8.8 K/UL (ref 3.5–11.3)

## 2025-05-06 PROCEDURE — 82803 BLOOD GASES ANY COMBINATION: CPT

## 2025-05-06 PROCEDURE — 85025 COMPLETE CBC W/AUTO DIFF WBC: CPT

## 2025-05-06 PROCEDURE — 80053 COMPREHEN METABOLIC PANEL: CPT

## 2025-05-06 PROCEDURE — 99221 1ST HOSP IP/OBS SF/LOW 40: CPT | Performed by: INTERNAL MEDICINE

## 2025-05-06 PROCEDURE — 36415 COLL VENOUS BLD VENIPUNCTURE: CPT

## 2025-05-06 PROCEDURE — 99222 1ST HOSP IP/OBS MODERATE 55: CPT | Performed by: NURSE PRACTITIONER

## 2025-05-06 PROCEDURE — 83880 ASSAY OF NATRIURETIC PEPTIDE: CPT

## 2025-05-06 PROCEDURE — 94660 CPAP INITIATION&MGMT: CPT

## 2025-05-06 PROCEDURE — 2500000003 HC RX 250 WO HCPCS: Performed by: NURSE PRACTITIONER

## 2025-05-06 PROCEDURE — 6360000002 HC RX W HCPCS: Performed by: NURSE PRACTITIONER

## 2025-05-06 PROCEDURE — 90935 HEMODIALYSIS ONE EVALUATION: CPT

## 2025-05-06 PROCEDURE — 6370000000 HC RX 637 (ALT 250 FOR IP): Performed by: NURSE PRACTITIONER

## 2025-05-06 PROCEDURE — 82947 ASSAY GLUCOSE BLOOD QUANT: CPT

## 2025-05-06 PROCEDURE — 2060000000 HC ICU INTERMEDIATE R&B

## 2025-05-06 PROCEDURE — 94761 N-INVAS EAR/PLS OXIMETRY MLT: CPT

## 2025-05-06 PROCEDURE — 5A1D70Z PERFORMANCE OF URINARY FILTRATION, INTERMITTENT, LESS THAN 6 HOURS PER DAY: ICD-10-PCS | Performed by: INTERNAL MEDICINE

## 2025-05-06 PROCEDURE — 36600 WITHDRAWAL OF ARTERIAL BLOOD: CPT

## 2025-05-06 PROCEDURE — 2700000000 HC OXYGEN THERAPY PER DAY

## 2025-05-06 RX ORDER — DULOXETIN HYDROCHLORIDE 30 MG/1
30 CAPSULE, DELAYED RELEASE ORAL DAILY
Status: DISCONTINUED | OUTPATIENT
Start: 2025-05-06 | End: 2025-05-09 | Stop reason: HOSPADM

## 2025-05-06 RX ORDER — VITAMIN B COMPLEX
2000 TABLET ORAL DAILY
Status: DISCONTINUED | OUTPATIENT
Start: 2025-05-06 | End: 2025-05-09 | Stop reason: HOSPADM

## 2025-05-06 RX ORDER — BUDESONIDE AND FORMOTEROL FUMARATE DIHYDRATE 80; 4.5 UG/1; UG/1
2 AEROSOL RESPIRATORY (INHALATION)
Status: DISCONTINUED | OUTPATIENT
Start: 2025-05-06 | End: 2025-05-09 | Stop reason: HOSPADM

## 2025-05-06 RX ORDER — SODIUM CHLORIDE 0.9 % (FLUSH) 0.9 %
5-40 SYRINGE (ML) INJECTION EVERY 12 HOURS SCHEDULED
Status: DISCONTINUED | OUTPATIENT
Start: 2025-05-06 | End: 2025-05-09 | Stop reason: HOSPADM

## 2025-05-06 RX ORDER — DEXTROSE MONOHYDRATE 100 MG/ML
INJECTION, SOLUTION INTRAVENOUS CONTINUOUS PRN
Status: DISCONTINUED | OUTPATIENT
Start: 2025-05-06 | End: 2025-05-09 | Stop reason: HOSPADM

## 2025-05-06 RX ORDER — CALCIUM ACETATE 667 MG/1
1 CAPSULE ORAL
Status: DISCONTINUED | OUTPATIENT
Start: 2025-05-06 | End: 2025-05-09 | Stop reason: HOSPADM

## 2025-05-06 RX ORDER — ASPIRIN 81 MG/1
81 TABLET, CHEWABLE ORAL DAILY
Status: DISCONTINUED | OUTPATIENT
Start: 2025-05-06 | End: 2025-05-09 | Stop reason: HOSPADM

## 2025-05-06 RX ORDER — ONDANSETRON 4 MG/1
4 TABLET, ORALLY DISINTEGRATING ORAL EVERY 8 HOURS PRN
Status: DISCONTINUED | OUTPATIENT
Start: 2025-05-06 | End: 2025-05-09 | Stop reason: HOSPADM

## 2025-05-06 RX ORDER — SODIUM CHLORIDE 0.9 % (FLUSH) 0.9 %
10 SYRINGE (ML) INJECTION PRN
Status: DISCONTINUED | OUTPATIENT
Start: 2025-05-06 | End: 2025-05-09 | Stop reason: HOSPADM

## 2025-05-06 RX ORDER — POTASSIUM CHLORIDE 1500 MG/1
40 TABLET, EXTENDED RELEASE ORAL PRN
Status: DISCONTINUED | OUTPATIENT
Start: 2025-05-06 | End: 2025-05-09 | Stop reason: HOSPADM

## 2025-05-06 RX ORDER — AZITHROMYCIN 250 MG/1
250 TABLET, FILM COATED ORAL DAILY
Status: DISCONTINUED | OUTPATIENT
Start: 2025-05-07 | End: 2025-05-09 | Stop reason: HOSPADM

## 2025-05-06 RX ORDER — ACETAMINOPHEN 650 MG/1
650 SUPPOSITORY RECTAL EVERY 6 HOURS PRN
Status: DISCONTINUED | OUTPATIENT
Start: 2025-05-06 | End: 2025-05-09 | Stop reason: HOSPADM

## 2025-05-06 RX ORDER — GABAPENTIN 100 MG/1
100 CAPSULE ORAL 3 TIMES DAILY
Status: DISCONTINUED | OUTPATIENT
Start: 2025-05-06 | End: 2025-05-09 | Stop reason: HOSPADM

## 2025-05-06 RX ORDER — GLUCAGON 1 MG/ML
1 KIT INJECTION PRN
Status: DISCONTINUED | OUTPATIENT
Start: 2025-05-06 | End: 2025-05-09 | Stop reason: HOSPADM

## 2025-05-06 RX ORDER — SODIUM CHLORIDE 9 MG/ML
INJECTION, SOLUTION INTRAVENOUS PRN
Status: DISCONTINUED | OUTPATIENT
Start: 2025-05-06 | End: 2025-05-09 | Stop reason: HOSPADM

## 2025-05-06 RX ORDER — ROSUVASTATIN CALCIUM 5 MG/1
5 TABLET, COATED ORAL DAILY
Status: DISCONTINUED | OUTPATIENT
Start: 2025-05-06 | End: 2025-05-06

## 2025-05-06 RX ORDER — ONDANSETRON 2 MG/ML
4 INJECTION INTRAMUSCULAR; INTRAVENOUS EVERY 6 HOURS PRN
Status: DISCONTINUED | OUTPATIENT
Start: 2025-05-06 | End: 2025-05-09 | Stop reason: HOSPADM

## 2025-05-06 RX ORDER — MAGNESIUM SULFATE 1 G/100ML
1000 INJECTION INTRAVENOUS PRN
Status: DISCONTINUED | OUTPATIENT
Start: 2025-05-06 | End: 2025-05-09 | Stop reason: HOSPADM

## 2025-05-06 RX ORDER — HEPARIN SODIUM 5000 [USP'U]/ML
5000 INJECTION, SOLUTION INTRAVENOUS; SUBCUTANEOUS EVERY 8 HOURS SCHEDULED
Status: DISCONTINUED | OUTPATIENT
Start: 2025-05-06 | End: 2025-05-09 | Stop reason: HOSPADM

## 2025-05-06 RX ORDER — POLYETHYLENE GLYCOL 3350 17 G/17G
17 POWDER, FOR SOLUTION ORAL DAILY PRN
Status: DISCONTINUED | OUTPATIENT
Start: 2025-05-06 | End: 2025-05-09 | Stop reason: HOSPADM

## 2025-05-06 RX ORDER — BUMETANIDE 1 MG/1
2 TABLET ORAL 2 TIMES DAILY
Status: DISCONTINUED | OUTPATIENT
Start: 2025-05-06 | End: 2025-05-09 | Stop reason: HOSPADM

## 2025-05-06 RX ORDER — ACETAMINOPHEN 325 MG/1
650 TABLET ORAL EVERY 6 HOURS PRN
Status: DISCONTINUED | OUTPATIENT
Start: 2025-05-06 | End: 2025-05-09 | Stop reason: HOSPADM

## 2025-05-06 RX ORDER — POTASSIUM CHLORIDE 7.45 MG/ML
10 INJECTION INTRAVENOUS PRN
Status: DISCONTINUED | OUTPATIENT
Start: 2025-05-06 | End: 2025-05-09 | Stop reason: HOSPADM

## 2025-05-06 RX ORDER — GABAPENTIN 300 MG/1
300 CAPSULE ORAL 3 TIMES DAILY
Status: DISCONTINUED | OUTPATIENT
Start: 2025-05-06 | End: 2025-05-06

## 2025-05-06 RX ORDER — CLOPIDOGREL BISULFATE 75 MG/1
75 TABLET ORAL DAILY
Status: DISCONTINUED | OUTPATIENT
Start: 2025-05-06 | End: 2025-05-09 | Stop reason: HOSPADM

## 2025-05-06 RX ORDER — CARVEDILOL 25 MG/1
25 TABLET ORAL 2 TIMES DAILY
Status: DISCONTINUED | OUTPATIENT
Start: 2025-05-06 | End: 2025-05-09 | Stop reason: HOSPADM

## 2025-05-06 RX ORDER — NIFEDIPINE 30 MG/1
30 TABLET, EXTENDED RELEASE ORAL DAILY
Status: DISCONTINUED | OUTPATIENT
Start: 2025-05-06 | End: 2025-05-08

## 2025-05-06 RX ORDER — DULOXETIN HYDROCHLORIDE 30 MG/1
60 CAPSULE, DELAYED RELEASE ORAL DAILY
Status: DISCONTINUED | OUTPATIENT
Start: 2025-05-06 | End: 2025-05-06

## 2025-05-06 RX ORDER — AZITHROMYCIN 250 MG/1
500 TABLET, FILM COATED ORAL ONCE
Status: COMPLETED | OUTPATIENT
Start: 2025-05-06 | End: 2025-05-06

## 2025-05-06 RX ORDER — MONTELUKAST SODIUM 10 MG/1
10 TABLET ORAL DAILY
Status: DISCONTINUED | OUTPATIENT
Start: 2025-05-06 | End: 2025-05-06

## 2025-05-06 RX ORDER — PANTOPRAZOLE SODIUM 40 MG/1
40 TABLET, DELAYED RELEASE ORAL
Status: DISCONTINUED | OUTPATIENT
Start: 2025-05-07 | End: 2025-05-09 | Stop reason: HOSPADM

## 2025-05-06 RX ORDER — ROSUVASTATIN CALCIUM 20 MG/1
10 TABLET, COATED ORAL NIGHTLY
Status: DISCONTINUED | OUTPATIENT
Start: 2025-05-06 | End: 2025-05-09 | Stop reason: HOSPADM

## 2025-05-06 RX ORDER — LAMOTRIGINE 25 MG/1
50 TABLET ORAL DAILY
Status: DISCONTINUED | OUTPATIENT
Start: 2025-05-06 | End: 2025-05-09 | Stop reason: HOSPADM

## 2025-05-06 RX ADMIN — DULOXETINE HYDROCHLORIDE 30 MG: 30 CAPSULE, DELAYED RELEASE ORAL at 16:37

## 2025-05-06 RX ADMIN — NIFEDIPINE 30 MG: 30 TABLET, FILM COATED, EXTENDED RELEASE ORAL at 16:13

## 2025-05-06 RX ADMIN — CALCIUM ACETATE 667 MG: 667 CAPSULE ORAL at 16:13

## 2025-05-06 RX ADMIN — BUMETANIDE 2 MG: 1 TABLET ORAL at 16:37

## 2025-05-06 RX ADMIN — Medication 2000 UNITS: at 16:13

## 2025-05-06 RX ADMIN — GABAPENTIN 100 MG: 100 CAPSULE ORAL at 21:41

## 2025-05-06 RX ADMIN — AZITHROMYCIN DIHYDRATE 500 MG: 250 TABLET ORAL at 16:13

## 2025-05-06 RX ADMIN — HEPARIN SODIUM 5000 UNITS: 5000 INJECTION INTRAVENOUS; SUBCUTANEOUS at 21:40

## 2025-05-06 RX ADMIN — HEPARIN SODIUM 5000 UNITS: 5000 INJECTION INTRAVENOUS; SUBCUTANEOUS at 15:03

## 2025-05-06 RX ADMIN — CLOPIDOGREL BISULFATE 75 MG: 75 TABLET, FILM COATED ORAL at 16:13

## 2025-05-06 RX ADMIN — ASPIRIN 81 MG: 81 TABLET, CHEWABLE ORAL at 16:13

## 2025-05-06 RX ADMIN — WATER 1000 MG: 1 INJECTION INTRAMUSCULAR; INTRAVENOUS; SUBCUTANEOUS at 16:13

## 2025-05-06 RX ADMIN — GABAPENTIN 100 MG: 100 CAPSULE ORAL at 16:37

## 2025-05-06 RX ADMIN — SODIUM CHLORIDE, PRESERVATIVE FREE 10 ML: 5 INJECTION INTRAVENOUS at 21:46

## 2025-05-06 RX ADMIN — ROSUVASTATIN 10 MG: 20 TABLET, FILM COATED ORAL at 21:40

## 2025-05-06 RX ADMIN — CARVEDILOL 25 MG: 25 TABLET, FILM COATED ORAL at 21:41

## 2025-05-06 ASSESSMENT — ENCOUNTER SYMPTOMS
BACK PAIN: 0
PHOTOPHOBIA: 0
COUGH: 1
NAUSEA: 0
DIARRHEA: 0
RHINORRHEA: 1
VOMITING: 0
CONSTIPATION: 0
SHORTNESS OF BREATH: 1

## 2025-05-06 NOTE — CARE COORDINATION
Case Management Assessment  Initial Evaluation    Date/Time of Evaluation: 5/6/2025 4:43 PM  Assessment Completed by: VAN UPTON    If patient is discharged prior to next notation, then this note serves as note for discharge by case management.    Patient Name: Julia Hobbs                   YOB: 1980  Diagnosis: CHF exacerbation (HCC) [I50.9]  Acute exacerbation of chronic heart failure (HCC) [I50.9]                   Date / Time: 5/6/2025  1:26 PM    Patient Admission Status: Inpatient   Readmission Risk (Low < 19, Mod (19-27), High > 27): Readmission Risk Score: 13.1    Current PCP: Jigna Haley, DO  PCP verified by CM? Yes    Chart Reviewed: Yes      History Provided by: Patient  Patient Orientation: Alert and Oriented    Patient Cognition: Alert    Hospitalization in the last 30 days (Readmission):  No    If yes, Readmission Assessment in CM Navigator will be completed.    Advance Directives:      Code Status: Full Code   Patient's Primary Decision Maker is: Legal Next of Kin    Primary Decision Maker (Active): Roberta Young - Parent - [phone]    Discharge Planning:    Patient lives with: (P) Spouse/Significant Other, Children Type of Home: (P) Apartment  Primary Care Giver: Self  Patient Support Systems include: Parent, Spouse/Significant Other, Children   Current Financial resources: Medicare  Current community resources: None  Current services prior to admission: (P) Durable Medical Equipment, Oxygen Therapy, Other (Comment) (Home HD 5 days a week)            Current DME: (P) Walker, Cane, Shower Chair, Oxygen Therapy (Comment), Other (Comment) (O2 1-3L from [facility] in [place], home HD supplies)            Type of Home Care services:  (P) None    ADLS  Prior functional level: Housework, Mobility  Current functional level: Assistance with the following:, Mobility    PT AM-PAC:   /24  OT AM-PAC:   /24    Family can provide assistance at DC: Yes  Would you like Case Management to discuss  the discharge plan with any other family members/significant others, and if so, who? No  Plans to Return to Present Housing: Yes  Other Identified Issues/Barriers to RETURNING to current housing: medical complications  Potential Assistance needed at discharge: (P) Home Care            Potential DME:    Patient expects to discharge to: (P) Apartment  Plan for transportation at discharge:      Financial    Payor: [employer] / Plan: [employer] / Product Type: *No Product type* /     Does insurance require precert for SNF: Yes    Potential assistance Purchasing Medications: (P) No  Meds-to-Beds request: Yes      [facility] [place], [place] - [address] - P [phone] - F [phone] [address] [facility] [address]  Phone: [phone] Fax: [phone]      Notes:    Factors facilitating achievement of predicted outcomes: Family support, Cooperative, and Pleasant    Barriers to discharge: Medical complications    Additional Case Management Notes: Pt from home with boyfriend and children, has walker and cane- uses for longer distances, has home O2 3L with activity and 1-2 at rest through [facility] in [place].  Pt does home HD with assistance from her mom 5 days a week.  Plan is to return home.      The Plan for Transition of Care is related to the following treatment goals of CHF exacerbation (HCC) [I50.9]  Acute exacerbation of chronic heart failure (HCC) [I50.9]    IF APPLICABLE: The Patient and/or patient representative Julia and her family were provided with a choice of provider and agrees with the discharge plan. Freedom of choice list with basic dialogue that supports the patient's individualized plan of care/goals and shares the quality data associated with the providers was provided to: (P) Patient   Patient Representative Name:       The Patient and/or Patient Representative Agree with the Discharge Plan? (P) Yes    VAN UPTON  Case Management Department  Ph:

## 2025-05-06 NOTE — H&P
[facility]  Office: [phone]  Pedro De Paz DO, Salvador Sharma DO, Rohit Aranda DO, Sven Hunter DO, Romulo Quick MD, Estephanie Cooley MD, Everardo Martinez MD, Antionette Mckinney MD,  Jacinto Magallon MD, Pedro Dorman MD, Romi Fleming MD,  Anderson Lafleur DO, Venancio Johnson MD, Pipe Bacon MD, Daniel De Paz DO, Sissy Sanderson MD,  Marin Holder DO, Clarita Yip MD, Dalia Sommers MD, Juana Jane MD,  Marcio Dia MD, You Ramirez MD, Marge De Dios MD, Ant Cortez MD, Adalberto Choi MD, Mare Mccoy MD, Mitchell Nunes DO, Mireya Mao MD, Anmol Garza MD, Anderson Stacy MD, Mohsin Reza, MD, Evangelist Medina MD, Shirley Waterhouse, CNP,  Vandana Ko, CNP, Mitchell Johnson, CNP,  Ange Fernandez, DNP, Yamilet Dai, CNP, Brenda Pimentel, CNP, Mouna Tee, CNP, Agustina Rao, CNP, Allison Menendez, PA-C, Naima Montoya, CNP, Keaton Cline, CNP,  Angelica Mansfield, CNP, Luz Gusman, CNP, Kike Vergara, PA-C, Stacy Saenz, CNP,  Janna Garvey, CNS, Hilary Duarte, CNP, Fely Sandoval, CNP,   Frida Guo, CNP         [facility]   IN-PATIENT SERVICE   [facility]    HISTORY AND PHYSICAL EXAMINATION            Date:   5/6/2025  Patient name:  Julia Hobbs  Date of admission:  5/6/2025  1:26 PM  MRN:   [id number]  Account:  [id number]  YOB: 1980  PCP:    Jigna Haley DO  Room:   [address] [place] [address]  Code Status:    Full Code    Chief Complaint:     SOB    History Obtained From:     patient, electronic medical record    History of Present Illness:     Julia Hobbs is a 44 y.o. Non- / non  female who presents with No chief complaint on file.   and is admitted to the hospital for the management of Acute exacerbation of chronic heart failure (HCC).    This is a 44 year old female transferred from [facility] where she presented today for evaluation of acute SOB which she reports began around 4 days ago. She does wear supplemental

## 2025-05-06 NOTE — PLAN OF CARE
Problem: Chronic Conditions and Co-morbidities  Goal: Patient's chronic conditions and co-morbidity symptoms are monitored and maintained or improved  Outcome: Progressing     Problem: Safety - Adult  Goal: Free from fall injury  Outcome: Progressing     Problem: Respiratory - Adult  Goal: Achieves optimal ventilation and oxygenation  Outcome: Progressing     Problem: Cardiovascular - Adult  Goal: Maintains optimal cardiac output and hemodynamic stability  Outcome: Progressing  Goal: Absence of cardiac dysrhythmias or at baseline  Outcome: Progressing     Problem: Skin/Tissue Integrity - Adult  Goal: Skin integrity remains intact  Outcome: Progressing     Problem: Infection - Adult  Goal: Absence of infection at discharge  Outcome: Progressing     Problem: Metabolic/Fluid and Electrolytes - Adult  Goal: Electrolytes maintained within normal limits  Outcome: Progressing  Goal: Hemodynamic stability and optimal renal function maintained  Outcome: Progressing  Goal: Glucose maintained within prescribed range  Outcome: Progressing     Problem: Hematologic - Adult  Goal: Maintains hematologic stability  Outcome: Progressing     Problem: Discharge Planning  Goal: Discharge to home or other facility with appropriate resources  Outcome: Progressing

## 2025-05-06 NOTE — CONSULTS
Renal Consult Note    Patient :  Julia Hobbs; 44 y.o. MRN# [id number]  Location:  0448/0448-01  Attending:  Anderson Lafleur DO  Admit Date:  5/6/2025   Hospital Day: 0    Reason for Consult:     Asked by Anderson Pang DO to see for ESRD on home HD.    History Obtained From:     patient, electronic medical record    HD Access:     previous  Left AV fistula    HD Unit:     Home, 4-5 days/week    Nephrologist:     Dr. España.    Dry Weight:     132.0 kg    Admission Weight:     139.1 kg    History of Present Illness:     Julia Hobbs; 44 y.o. female with past medical history as mentioned below presented to the hospital with the chief complaint of shortness of breath.    In speaking with the patient, she states that she initially presented to [facility] ED due to worsening shortness of breath.  Chest x-ray was obtained which demonstrated pulmonary edema with concern for fluid volume overload.  On exam, patient is found to have diminished breath sounds throughout with bibasilar fine crackles, minimal peripheral edema.  Patient states she normally dialyzes for approximately 2 to 2.5 hours 4 to 5 days/week.  States she does not have a CPAP or BiPAP at home and has no history of COPD or pulmonary hypertension.    Labs are drawn and pending.    Home medications reviewed, pertinent meds include Bumex 2 mg twice daily, PhosLo 667 mg 3 times daily, Coreg 25 mg twice daily, vitamin D3 2000 IUs daily, magnesium 400 mg daily, nifedipine 30 mg daily, rosuvastatin 5 mg daily.    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

## 2025-05-07 ENCOUNTER — APPOINTMENT (OUTPATIENT)
Age: 45
End: 2025-05-07
Attending: INTERNAL MEDICINE
Payer: COMMERCIAL

## 2025-05-07 ENCOUNTER — APPOINTMENT (OUTPATIENT)
Dept: DIALYSIS | Age: 45
End: 2025-05-07
Attending: INTERNAL MEDICINE
Payer: COMMERCIAL

## 2025-05-07 LAB
ANION GAP SERPL CALCULATED.3IONS-SCNC: 14 MMOL/L (ref 9–16)
BASOPHILS # BLD: <0.03 K/UL (ref 0–0.2)
BASOPHILS NFR BLD: 0 % (ref 0–2)
BUN SERPL-MCNC: 68 MG/DL (ref 6–20)
CALCIUM SERPL-MCNC: 8.6 MG/DL (ref 8.6–10.4)
CHLORIDE SERPL-SCNC: 94 MMOL/L (ref 98–107)
CO2 SERPL-SCNC: 25 MMOL/L (ref 20–31)
CREAT SERPL-MCNC: 4.4 MG/DL (ref 0.6–0.9)
EOSINOPHIL # BLD: 0.03 K/UL (ref 0–0.44)
EOSINOPHILS RELATIVE PERCENT: 0 % (ref 1–4)
ERYTHROCYTE [DISTWIDTH] IN BLOOD BY AUTOMATED COUNT: 15.1 % (ref 11.8–14.4)
EST. AVERAGE GLUCOSE BLD GHB EST-MCNC: 186 MG/DL
GFR, ESTIMATED: 12 ML/MIN/1.73M2
GLUCOSE BLD-MCNC: 130 MG/DL (ref 65–105)
GLUCOSE BLD-MCNC: 140 MG/DL (ref 65–105)
GLUCOSE BLD-MCNC: 260 MG/DL (ref 65–105)
GLUCOSE BLD-MCNC: 94 MG/DL (ref 65–105)
GLUCOSE SERPL-MCNC: 218 MG/DL (ref 74–99)
HBA1C MFR BLD: 8.1 % (ref 4–6)
HBV SURFACE AG SERPL QL IA: NONREACTIVE
HCT VFR BLD AUTO: 29.3 % (ref 36.3–47.1)
HGB BLD-MCNC: 9 G/DL (ref 11.9–15.1)
IMM GRANULOCYTES # BLD AUTO: 0.05 K/UL (ref 0–0.3)
IMM GRANULOCYTES NFR BLD: 1 %
INR PPP: 1.1
LYMPHOCYTES NFR BLD: 1.05 K/UL (ref 1.1–3.7)
LYMPHOCYTES RELATIVE PERCENT: 13 % (ref 24–43)
MCH RBC QN AUTO: 27.8 PG (ref 25.2–33.5)
MCHC RBC AUTO-ENTMCNC: 30.7 G/DL (ref 28.4–34.8)
MCV RBC AUTO: 90.4 FL (ref 82.6–102.9)
MONOCYTES NFR BLD: 0.58 K/UL (ref 0.1–1.2)
MONOCYTES NFR BLD: 7 % (ref 3–12)
NEUTROPHILS NFR BLD: 79 % (ref 36–65)
NEUTS SEG NFR BLD: 6.59 K/UL (ref 1.5–8.1)
NRBC BLD-RTO: 0 PER 100 WBC
PLATELET # BLD AUTO: 157 K/UL (ref 138–453)
PMV BLD AUTO: 10.7 FL (ref 8.1–13.5)
POTASSIUM SERPL-SCNC: 4.1 MMOL/L (ref 3.7–5.3)
PROTHROMBIN TIME: 15 SEC (ref 11.7–14.9)
RBC # BLD AUTO: 3.24 M/UL (ref 3.95–5.11)
RBC # BLD: ABNORMAL 10*6/UL
SODIUM SERPL-SCNC: 133 MMOL/L (ref 136–145)
WBC OTHER # BLD: 8.3 K/UL (ref 3.5–11.3)

## 2025-05-07 PROCEDURE — 99232 SBSQ HOSP IP/OBS MODERATE 35: CPT | Performed by: NURSE PRACTITIONER

## 2025-05-07 PROCEDURE — 6360000002 HC RX W HCPCS: Performed by: NURSE PRACTITIONER

## 2025-05-07 PROCEDURE — 90935 HEMODIALYSIS ONE EVALUATION: CPT

## 2025-05-07 PROCEDURE — 87340 HEPATITIS B SURFACE AG IA: CPT

## 2025-05-07 PROCEDURE — 85610 PROTHROMBIN TIME: CPT

## 2025-05-07 PROCEDURE — 82947 ASSAY GLUCOSE BLOOD QUANT: CPT

## 2025-05-07 PROCEDURE — 99232 SBSQ HOSP IP/OBS MODERATE 35: CPT | Performed by: STUDENT IN AN ORGANIZED HEALTH CARE EDUCATION/TRAINING PROGRAM

## 2025-05-07 PROCEDURE — 85025 COMPLETE CBC W/AUTO DIFF WBC: CPT

## 2025-05-07 PROCEDURE — 94761 N-INVAS EAR/PLS OXIMETRY MLT: CPT

## 2025-05-07 PROCEDURE — 80048 BASIC METABOLIC PNL TOTAL CA: CPT

## 2025-05-07 PROCEDURE — 6370000000 HC RX 637 (ALT 250 FOR IP): Performed by: NURSE PRACTITIONER

## 2025-05-07 PROCEDURE — 97535 SELF CARE MNGMENT TRAINING: CPT

## 2025-05-07 PROCEDURE — 83036 HEMOGLOBIN GLYCOSYLATED A1C: CPT

## 2025-05-07 PROCEDURE — 97166 OT EVAL MOD COMPLEX 45 MIN: CPT

## 2025-05-07 PROCEDURE — 2700000000 HC OXYGEN THERAPY PER DAY

## 2025-05-07 PROCEDURE — 2500000003 HC RX 250 WO HCPCS: Performed by: NURSE PRACTITIONER

## 2025-05-07 PROCEDURE — 94640 AIRWAY INHALATION TREATMENT: CPT

## 2025-05-07 PROCEDURE — 97530 THERAPEUTIC ACTIVITIES: CPT

## 2025-05-07 PROCEDURE — G0108 DIAB MANAGE TRN  PER INDIV: HCPCS

## 2025-05-07 PROCEDURE — 36415 COLL VENOUS BLD VENIPUNCTURE: CPT

## 2025-05-07 PROCEDURE — 2060000000 HC ICU INTERMEDIATE R&B

## 2025-05-07 PROCEDURE — 94660 CPAP INITIATION&MGMT: CPT

## 2025-05-07 RX ADMIN — DULOXETINE HYDROCHLORIDE 30 MG: 30 CAPSULE, DELAYED RELEASE ORAL at 09:04

## 2025-05-07 RX ADMIN — CALCIUM ACETATE 667 MG: 667 CAPSULE ORAL at 09:05

## 2025-05-07 RX ADMIN — Medication 2000 UNITS: at 09:04

## 2025-05-07 RX ADMIN — SODIUM CHLORIDE, PRESERVATIVE FREE 10 ML: 5 INJECTION INTRAVENOUS at 20:40

## 2025-05-07 RX ADMIN — NIFEDIPINE 30 MG: 30 TABLET, FILM COATED, EXTENDED RELEASE ORAL at 09:05

## 2025-05-07 RX ADMIN — CARVEDILOL 25 MG: 25 TABLET, FILM COATED ORAL at 20:40

## 2025-05-07 RX ADMIN — ASPIRIN 81 MG: 81 TABLET, CHEWABLE ORAL at 09:04

## 2025-05-07 RX ADMIN — TIOTROPIUM BROMIDE INHALATION SPRAY 2 PUFF: 3.12 SPRAY, METERED RESPIRATORY (INHALATION) at 07:52

## 2025-05-07 RX ADMIN — LAMOTRIGINE 50 MG: 25 TABLET ORAL at 09:05

## 2025-05-07 RX ADMIN — HEPARIN SODIUM 5000 UNITS: 5000 INJECTION INTRAVENOUS; SUBCUTANEOUS at 20:40

## 2025-05-07 RX ADMIN — GABAPENTIN 100 MG: 100 CAPSULE ORAL at 20:40

## 2025-05-07 RX ADMIN — CARVEDILOL 25 MG: 25 TABLET, FILM COATED ORAL at 09:04

## 2025-05-07 RX ADMIN — AZITHROMYCIN DIHYDRATE 250 MG: 250 TABLET ORAL at 09:04

## 2025-05-07 RX ADMIN — BUDESONIDE AND FORMOTEROL FUMARATE DIHYDRATE 2 PUFF: 80; 4.5 AEROSOL RESPIRATORY (INHALATION) at 07:52

## 2025-05-07 RX ADMIN — HEPARIN SODIUM 5000 UNITS: 5000 INJECTION INTRAVENOUS; SUBCUTANEOUS at 06:15

## 2025-05-07 RX ADMIN — CALCIUM ACETATE 667 MG: 667 CAPSULE ORAL at 16:39

## 2025-05-07 RX ADMIN — BUDESONIDE AND FORMOTEROL FUMARATE DIHYDRATE 2 PUFF: 80; 4.5 AEROSOL RESPIRATORY (INHALATION) at 21:50

## 2025-05-07 RX ADMIN — GABAPENTIN 100 MG: 100 CAPSULE ORAL at 16:39

## 2025-05-07 RX ADMIN — WATER 1000 MG: 1 INJECTION INTRAMUSCULAR; INTRAVENOUS; SUBCUTANEOUS at 16:40

## 2025-05-07 RX ADMIN — ROSUVASTATIN 10 MG: 20 TABLET, FILM COATED ORAL at 20:40

## 2025-05-07 RX ADMIN — CLOPIDOGREL BISULFATE 75 MG: 75 TABLET, FILM COATED ORAL at 09:04

## 2025-05-07 RX ADMIN — BUMETANIDE 2 MG: 1 TABLET ORAL at 09:04

## 2025-05-07 RX ADMIN — GABAPENTIN 100 MG: 100 CAPSULE ORAL at 09:04

## 2025-05-07 RX ADMIN — HEPARIN SODIUM 5000 UNITS: 5000 INJECTION INTRAVENOUS; SUBCUTANEOUS at 16:40

## 2025-05-07 RX ADMIN — SODIUM CHLORIDE, PRESERVATIVE FREE 10 ML: 5 INJECTION INTRAVENOUS at 09:04

## 2025-05-07 RX ADMIN — PANTOPRAZOLE SODIUM 40 MG: 40 TABLET, DELAYED RELEASE ORAL at 06:15

## 2025-05-07 RX ADMIN — CALCIUM ACETATE 667 MG: 667 CAPSULE ORAL at 12:04

## 2025-05-07 RX ADMIN — BUMETANIDE 2 MG: 1 TABLET ORAL at 16:39

## 2025-05-07 NOTE — PROGRESS NOTES
[facility]  Office: [phone]  Pedro De Paz, DO, Salvador Sharma, DO, Rohit Aranda DO, Sven Hunter, DO, Romulo Quick MD, Estephanie Cooley MD, Everardo Martinez MD, Antionette Mckinney MD,  Jacinto Magallon MD, Pedro Dorman MD, Romi Fleming MD,  Anderson Lafleur DO, Venancio Johnson MD, Pipe Bacon MD, Daniel De Paz DO, Sissy Sanderson MD,  Marin Holder DO, Clarita Yip MD, Dalia Sommers MD, Juana Jane MD,  Marcio Dia MD, You Ramirez MD, Marge De Dios MD, Ant Cortez MD, Adalberto Choi MD, Mare Mccoy MD, Mitchell Nunes DO, Mireya Mao MD, Anmol Garza MD, Anderson Stacy MD, Mohsin Reza, MD, Evangelist Medina MD, Shirley Waterhouse, CNP,  Vandana Ko, CNP, Mitchell Johnson, CNP,  Ange Fernandez, DNP, Yamilet Dai, CNP, Brenda Pimentel, CNP, Mouna Tee, CNP, Agustina Rao, CNP, Allison Menendez, PA-C, Naima Montoya, CNP, Keaton Cline, CNP,  Angelica Mansfield, CNP, Luz Gusman, CNP, Kike Vergara, PA-C, Stacy Saenz, CNP,  Janna Garvey, CNS, Hilary Duarte, CNP, Fely Sandoval, CNP,   Frida Guo, CNP         [facility]   IN-PATIENT SERVICE   [facility]    Progress Note    5/7/2025    2:55 PM    Name:   Julia Hobbs  MRN:     [id number]     Acct:      [id number]   Room:   0448/0448-01  IP Day:  1  Admit Date:  5/6/2025  1:26 PM    PCP:   Jigna Haley DO  Code Status:  Full Code    Subjective:     C/C: SOB    Interval History Status: improved.     Up in room. Looks markedly better this morning. On Salter cannula. States she is feeling much better. Awaiting HD today. VS and labs reviewed.     Brief History:     Julia Hobbs is a 44 y.o. Non- / non  female who presents with No chief complaint on file.   and is admitted to the hospital for the management of Acute exacerbation of chronic heart failure (HCC).     This is a 44 year old female transferred from [facility] where she presented today for evaluation of acute SOB  which she reports began around 4 days ago. She does wear supplemental oxygen at 2 L and was requiring 5L at home with sats in low 80's prior to ED presentation. She did test (+) for rhinovirus.      Addition PMHx significant for ESRD on home hemodialysis 5 days/week, HFpEF, T1DM, HTN, HLD, morbid obesity, asthma.     Review of Systems:     Constitutional:  negative for chills, fevers, sweats  Respiratory:  negative for cough, dyspnea on exertion, shortness of breath, wheezing  Cardiovascular:  negative for chest pain, chest pressure/discomfort, lower extremity edema, palpitations  Gastrointestinal:  negative for abdominal pain, constipation, diarrhea, nausea, vomiting  Neurological:  negative for dizziness, headache    Medications:     Allergies:    Allergies   Allergen Reactions    Amlodipine Swelling     LEGS AND FEET    Aspirin      Other reaction(s): due to kidney function    Ibuprofen      CHRONIC KIDNEY DISEASE STAGE 3   Other reaction(s): due to kidney function         Current Meds:   Scheduled Meds:    [START ON 5/8/2025] Insulin Pump - Basal Dose   SubCUTAneous Daily    sodium chloride flush  5-40 mL IntraVENous 2 times per day    heparin (porcine)  5,000 Units SubCUTAneous 3 times per day    cefTRIAXone (ROCEPHIN) IV  1,000 mg IntraVENous Q24H    azithromycin  250 mg Oral Daily    aspirin  81 mg Oral Daily    bumetanide  2 mg Oral BID    calcium acetate  1 capsule Oral TID WC    carvedilol  25 mg Oral BID    Vitamin D  2,000 Units Oral Daily    clopidogrel  75 mg Oral Daily    budesonide-formoterol  2 puff Inhalation BID RT    lamoTRIgine  50 mg Oral Daily    NIFEdipine  30 mg Oral Daily    pantoprazole  40 mg Oral QAM AC    DULoxetine  30 mg Oral Daily    gabapentin  100 mg Oral TID    rosuvastatin  10 mg Oral Nightly    tiotropium  2 puff Inhalation Daily RT    Insulin Pump - Bolus Dose   SubCUTAneous 4x Daily AC & HS     Continuous Infusions:    sodium chloride      dextrose       PRN Meds: sodium

## 2025-05-07 NOTE — PROGRESS NOTES
Dialysis Post Treatment Note  Vitals:    05/06/25 1958   BP: 164/77   Pulse: 78   Resp: 15   Temp: 97.7   SpO2: 92%     Pre-Weight = 141.5kg  Post-weight = Weight - Scale: (!) 139.7 kg (307 lb 15.7 oz)  Total Liters Processed = Blood Volume Processed (Liters): 46.14 L  Rinseback Volume (mL) = Rinseback Volume (ml): 300 ml  Net Removal (mL) =  2350  Patient's dry weight= TBD  Type of access used= LAVF  Length of treatment= 125 min  Date of last dressing change = 05/06/2025  Outpatient dialysis unit/days= [facility]  Outpatient nephrologist= TBD      PT tolerated tx well w/o issue. VSS pre/intra/post tx. Pt maintained on Bipap and monitor.

## 2025-05-07 NOTE — PROGRESS NOTES
Occupational Therapy  Occupational Therapy Initial Evaluation  Facility/Department: [address] ONC/MED SURG   Patient Name: Julia Hobbs        MRN: [id number]    : 1980    Date of Service: 2025    No chief complaint on file.    Past Medical History:  has a past medical history of Asthma, Astigmatism, Bipolar disorder (Former[facility]), Clinical trial participant, COVID-19, Diabetes mellitus ([facility]), Leonel gangrene ([facility]), GERD (gastroesophageal reflux disease), H/O cardiovascular stress test, History of echocardiogram, Hyperlipidemia, Hypertension, Insulin pump in place, Kidney disease, Obesity, On mechanically assisted ventilation ([facility]ly [facility]), Osteoarthritis, Under care of team, Under care of team, Under care of team, Under care of team, and Wellness examination.  Past Surgical History:  has a past surgical history that includes Rectal surgery (N/A, 2022); Abdomen surgery (Bilateral, 2022); Rectal surgery (Bilateral, 02/10/2022); Rectal surgery (N/A, 2022); Wound Exploration (Left, 2022); colostomy (N/A, 2022); Abdomen surgery (Left, 2022); IR TUNNELED CVC PLACE WO SQ PORT/PUMP > 5 YEARS (2022); Sleeve Gastrectomy (2019); Colonoscopy; Tubal ligation; Ankle surgery (Bilateral); Appendectomy; Ulnar nerve transposition (Left); Rotator cuff repair (Left); Knee arthroscopy (Bilateral); Cholecystectomy; Lumbar disc surgery ();  section; Colostomy Closure (N/A, 2022); Wound Exploration (2022); and Abdomen surgery (N/A, 2022).    Discharge Recommendations  Discharge Recommendations: Patient would benefit from continued therapy after discharge  OT Equipment Recommendations  Equipment Needed: No    Assessment  Performance deficits / Impairments: Decreased functional mobility ;Decreased endurance;Decreased ADL status;Decreased balance;Decreased high-level IADLs  Prognosis: Good  Decision Making: Medium Complexity  REQUIRES OT FOLLOW-UP: Yes  Activity  at rest)  Prior Level of Assist for ADLs: Independent  Prior Level of Assist for Homemaking: Independent  Homemaking Responsibilities: Yes (Shares with family)  Active : Yes  Occupation: On disability  Leisure & Hobbies: tv shows, board game  IADL Comments: [employer]  Additional Comments: [employer] other works 3rd shift    Vision/Hearing  Vision  Vision: Impaired  Vision Exceptions: Wears glasses at all times  Hearing  Hearing: Within functional limits    BUE Assessment  Gross Assessment  AROM: Within functional limits  PROM: Within functional limits  Strength: Within functional limits (5/5 grossly)  Coordination: Within functional limits  Tone: Normal  Sensation: Intact  Hand Dominance: Right     Objective  Orientation  Overall Orientation Status: Within Functional Limits  Orientation Level: Oriented X4  Cognition  Overall Cognitive Status: WFL    Activities of Daily Living  Feeding: Independent  Grooming: Independent  UE Bathing: Modified independent   LE Bathing: Supervision  UE Dressing: Modified independent   LE Dressing: Supervision  Toileting: Supervision;Increased time to complete  Additional Comments: Pt supine in bed upon arrival, pt transferred to L side of bed and doffed/donnned R sck by bringing leg to EOB, pt not noted to be extremely SOB during session however pt on 12L O2, pt stood and transferred to standing chairside for placing sheet over recliner, pt retired sitting in recliner and used RUE to lift leg-extension up    Balance  Balance  Sitting:  (Pt sat on EOB for 15 min at (I))  Standing:  (Pt stood for 2 min total at SUP)    Transfers/Mobility  Bed mobility  Supine to Sit: Modified independent  Sit to Supine: Unable to assess  Scooting: Independent      Transfers  Sit to stand: Modified independent  Stand to sit: Modified independent     Functional Mobility: Supervision  Functional Mobility Skilled Clinical Factors: Pt demo'd func mob around room briefly and pt stood to place sheets on

## 2025-05-07 NOTE — PROGRESS NOTES
Inpatient Diabetes Education    Referral on Julia Hobbs because \"Insulin Pump Patient\"    Lab Results   Component Value Date    LABA1C 8.1 (H) 05/07/2025    LABA1C 11.3 (H) 11/14/2022     Per chart review, noticed that the nursing staff is doing a good job noting the insulin boluses on the Glucose Accord.        Per chart review, it appears that patient now goes to [facility].  See below for a few helpful screenshots from her last office visit there.          I was not able to locate insulin pump settings in the media tab (as described above).  I also phoned [facility] office ([phone]) to ask for assistance finding the insulin pump settings - left message there.     Plan: will try instead to query the pump and ask patient upon rounding. (Plan to do that this afternoon once office time is completed in the Diabetes Education Department)      Rounded on patient at 1:20pm. Patient was in dialysis. Spoke with patient's nurse ,Tessie. She endorsed that insulin pump site appeared acceptable upon her assessment this morning. Copy of Insulin pump policy given.  Insulin communication tool is at bedside and is being utilized.    I saw patient in the Hemodialysis Unit.    Julia states that she has new tubing/infusion set  and aspart insulin with her in the hospital. She reports that she is due to change her site tomorrow. She reports that she typically wears Dexcom G7 CGM but her sensor needed to be taken off earlier during her hospitalization. Julia states that currently, she is entering the fingerstick BG values into her insulin pump. (Addendum to this note at 2:30pm - I put a Dexcom G7 sensor sample at patient's bedside so patient can put on this new sensor. I informed patient and nurse.)       Per pump query (patient is skilled at navigating all the screens in her controller)    Current is    Basal 3.3 units per hour, patient states that basal rate decreases to 2.3 units per hour over

## 2025-05-07 NOTE — PROGRESS NOTES
05/07/25 0752   Care Plan - Respiratory Goals   Achieves optimal ventilation and oxygenation Assess for changes in respiratory status;Assess for changes in mentation and behavior;Position to facilitate oxygenation and minimize respiratory effort;Oxygen supplementation based on oxygen saturation or arterial blood gases;Encourage broncho-pulmonary hygiene including cough, deep breathe, incentive spirometry;Assess the need for suctioning and aspirate as needed;Assess and instruct to report shortness of breath or any respiratory difficulty;Respiratory therapy support as indicated        Patient information on fall and injury prevention

## 2025-05-07 NOTE — PROGRESS NOTES
Dialysis Time Out  To be done by RN and tech or 2 RNs  Staff Names Vinod CORBETT [facility]    [x]  Identity of the patient using 2 patient identifiers  [x]  Consent for treatment  [x]  Equipment-proper machine and dialyzer  [x]  B-Hep B status (05/07/2025) neg.   [x]  Orders- to include bath, blood flow, dialyzer, time and fluid removal  [x]  Access-Correct site and in working order  [x]  Time for patient to ask questions.

## 2025-05-07 NOTE — PROGRESS NOTES
Renal Progress Note    Patient :  Julia Hobbs; 44 y.o. MRN# [id number]  Location:  0448/0448-01  Attending:  Venancio Johnson MD  Admit Date:  5/6/2025   Hospital Day: 1    Subjective:       -Patient awake, alert, oriented X4  - Hemodynamically stable with blood pressure 160/80 mmHg with MAP of 100  - On high flow nasal cannula oxygen alternating with BiPAP  - Latest labs show sodium 133, potassium 4.1, BUN 68 decreased from 83, creatinine 4.4 decreased from 5.0, GFR 12 increased from 10, anion gap 14 glucose 260, BNP 10,000, WBC count 8.3, hemoglobin 9.0, platelet count 157    HPI:  44-year-old female with past medical history of ESRD on home hemodialysis 5 5 days/week, heart failure with preserved ejection fraction with last EF 55 to 60% in 10/2023, type 1 diabetes mellitus, essential hypertension, hyperlipidemia, morbid obesity, asthma presented to [facility] ED with increased shortness of breath.    Nephrology consulted for dialysis management.  Chest x-ray at [facility] revealed pulmonary edema with fluid overload.     Home medications include aspirin, Plavix, Bumex 2 mg twice daily, Crestor 5 mg, Coreg 25 mg twice daily, nifedipine 30 mg daily.    Outpatient Medications:     Medications Prior to Admission: fluticasone-umeclidin-vilant (TRELEGY ELLIPTA) 100-62.5-25 MCG/ACT AEPB inhaler, Inhale 1 puff into the lungs daily  lamoTRIgine (LAMICTAL) 25 MG tablet, Take 2 tablets by mouth daily  aspirin 81 MG chewable tablet, Take 1 tablet by mouth daily  clopidogrel (PLAVIX) 75 MG tablet, Take 1 tablet by mouth daily  calcium acetate (PHOSLO) 667 MG CAPS capsule, Take 1 capsule by mouth 3 times daily (with meals)  NIFEdipine (ADALAT CC) 30 MG extended release tablet, Take 1 tablet by mouth daily  Insulin Pump - Insulin regular, Inject 1 Units/hr into the skin continuous Insulin-to-Carb Ratio (ICR): 0  Insulin Sensitivity Factor (ISF):  mg/dL per unit of insulin Target Blood Glucose:  mg/dL Bolus      Assessment:     1.  ESRD secondary to biopsy-proven diabetic nephropathy on home dialysis 5 days/week depending on daily weight.  Follows Dr. España.  Dry weight 132 kg.  Also has left AV fistula.  2.  Acute hypoxic respiratory failure secondary to acute on chronic HF exacerbation leading to volume overload versus ineffective hemodialysis  3.  Type 1 diabetes mellitus  4.  Anemia of chronic disease  6.  Essential hypertension  7.  Hyperlipidemia  8.  History of nephrolithiasis  9 morbid obesity.  10.  Secondary hyperparathyroidism    Plan:   1.  Status post hemodialysis on 5/6/2025 with 2.3 L fluid removal.  Postweight 139.7 kg.  Patient will be requiring hemodialysis session again considering still having considerable shortness of breath.  Improved from yesterday.  2.  Strict input and output  3.  Daily weights  4.  Fluid restricted renal diet  5.  BMP in a.m.    Nutrition   Please ensure that patient is on a renal diet/TF. Avoid nephrotoxic drugs/contrast exposure.    We will continue to follow along with you.     Eagle Bañuelos MD  Internal Medicine Resident, PGY-3  [facility]; [place], [place]  5/7/2025,10:34 AM     Attending Physician Statement  I have discussed the care of Julia Hobbs, including pertinent history and exam findings with the resident. I have reviewed the key elements of all parts of the encounter with the resident. I have seen and examined the patient with the resident.  I agree with the assessment, plan and orders as documented by the resident.      Carmen Mims MD

## 2025-05-07 NOTE — PROGRESS NOTES
Dialysis Time Out  To be done by RN and tech or 2 RNs  Staff Names Anca RODRIGUEZ RN    [x]  Identity of the patient using 2 patient identifiers  [x]  Consent for treatment  [x]  Equipment-proper machine and dialyzer  [x]  B-Hep B status: Hep B Ag lad draw 5/7/25  [x]  Orders- to include bath, blood flow, dialyzer, time and fluid removal  [x]  Access-Correct site and in working order  [x]  Time for patient to ask questions.

## 2025-05-07 NOTE — PROGRESS NOTES
Dialysis Post Treatment Note  Vitals:    05/07/25 1520   BP: (!) 160/80   Pulse: 68   Resp: 22   Temp: 98.8 °F (37.1 °C)   SpO2: 97%     Pre-Weight = 135.3 kg  Post-weight = Weight - Scale: 132.7 kg (292 lb 8.8 oz)  Total Liters Processed = Blood Volume Processed (Liters): 45.55 L  Rinseback Volume (mL) = Rinseback Volume (ml): 290 ml  Net Removal (mL) =  2300 ml  Patient's dry weight=132 kg  Type of access used=L AVF  Length of treatment=210 minutes  Date of last dressing change =5/7/25  Outpatient dialysis unit/days=Home HD / 5 times per week  Outpatient nephrologist=Dr. España    Pt tolerated treatment well, vital stable, no issues with AVF cannulation. Report given to primary RN Tessie ADAMS

## 2025-05-08 ENCOUNTER — APPOINTMENT (OUTPATIENT)
Age: 45
End: 2025-05-08
Attending: INTERNAL MEDICINE
Payer: COMMERCIAL

## 2025-05-08 ENCOUNTER — APPOINTMENT (OUTPATIENT)
Dept: DIALYSIS | Age: 45
End: 2025-05-08
Attending: INTERNAL MEDICINE
Payer: COMMERCIAL

## 2025-05-08 LAB
ANION GAP SERPL CALCULATED.3IONS-SCNC: 14 MMOL/L (ref 9–16)
BASOPHILS # BLD: 0.04 K/UL (ref 0–0.2)
BASOPHILS NFR BLD: 1 % (ref 0–2)
BUN SERPL-MCNC: 62 MG/DL (ref 6–20)
CALCIUM SERPL-MCNC: 8.3 MG/DL (ref 8.6–10.4)
CHLORIDE SERPL-SCNC: 98 MMOL/L (ref 98–107)
CO2 SERPL-SCNC: 23 MMOL/L (ref 20–31)
CREAT SERPL-MCNC: 4.1 MG/DL (ref 0.6–0.9)
ECHO AO ROOT DIAM: 3.2 CM
ECHO AO ROOT INDEX: 1.39 CM/M2
ECHO AV AREA PEAK VELOCITY: 2.2 CM2
ECHO AV AREA VTI: 2.4 CM2
ECHO AV AREA/BSA PEAK VELOCITY: 1 CM2/M2
ECHO AV AREA/BSA VTI: 1 CM2/M2
ECHO AV MEAN GRADIENT: 2 MMHG
ECHO AV MEAN VELOCITY: 0.7 M/S
ECHO AV PEAK GRADIENT: 5 MMHG
ECHO AV PEAK VELOCITY: 1.1 M/S
ECHO AV VELOCITY RATIO: 0.55
ECHO AV VTI: 27 CM
ECHO BSA: 2.46 M2
ECHO EST RA PRESSURE: 8 MMHG
ECHO LA AREA 2C: 23.5 CM2
ECHO LA AREA 4C: 33.7 CM2
ECHO LA DIAMETER INDEX: 2.04 CM/M2
ECHO LA DIAMETER: 4.7 CM
ECHO LA MAJOR AXIS: 6.8 CM
ECHO LA MINOR AXIS: 5.7 CM
ECHO LA TO AORTIC ROOT RATIO: 1.47
ECHO LA VOL BP: 111 ML (ref 22–52)
ECHO LA VOL MOD A2C: 78 ML (ref 22–52)
ECHO LA VOL MOD A4C: 136 ML (ref 22–52)
ECHO LA VOL/BSA BIPLANE: 48 ML/M2 (ref 16–34)
ECHO LA VOLUME INDEX MOD A2C: 34 ML/M2 (ref 16–34)
ECHO LA VOLUME INDEX MOD A4C: 59 ML/M2 (ref 16–34)
ECHO LV E' LATERAL VELOCITY: 7.83 CM/S
ECHO LV E' SEPTAL VELOCITY: 5.44 CM/S
ECHO LV EDV A2C: 143 ML
ECHO LV EDV A4C: 150 ML
ECHO LV EDV INDEX A4C: 65 ML/M2
ECHO LV EDV NDEX A2C: 62 ML/M2
ECHO LV EF PHYSICIAN: 40 %
ECHO LV EJECTION FRACTION A2C: 54 %
ECHO LV EJECTION FRACTION A4C: 53 %
ECHO LV EJECTION FRACTION BIPLANE: 53 % (ref 55–100)
ECHO LV ESV A2C: 65 ML
ECHO LV ESV A4C: 71 ML
ECHO LV ESV INDEX A2C: 28 ML/M2
ECHO LV ESV INDEX A4C: 31 ML/M2
ECHO LV FRACTIONAL SHORTENING: 27 % (ref 28–44)
ECHO LV INTERNAL DIMENSION DIASTOLE INDEX: 2.22 CM/M2
ECHO LV INTERNAL DIMENSION DIASTOLIC: 5.1 CM (ref 3.9–5.3)
ECHO LV INTERNAL DIMENSION SYSTOLIC INDEX: 1.61 CM/M2
ECHO LV INTERNAL DIMENSION SYSTOLIC: 3.7 CM
ECHO LV IVSD: 1 CM (ref 0.6–0.9)
ECHO LV MASS 2D: 188 G (ref 67–162)
ECHO LV MASS INDEX 2D: 81.7 G/M2 (ref 43–95)
ECHO LV POSTERIOR WALL DIASTOLIC: 1 CM (ref 0.6–0.9)
ECHO LV RELATIVE WALL THICKNESS RATIO: 0.39
ECHO LVOT AREA: 3.8 CM2
ECHO LVOT AV VTI INDEX: 0.63
ECHO LVOT DIAM: 2.2 CM
ECHO LVOT MEAN GRADIENT: 1 MMHG
ECHO LVOT PEAK GRADIENT: 2 MMHG
ECHO LVOT PEAK VELOCITY: 0.6 M/S
ECHO LVOT STROKE VOLUME INDEX: 27.9 ML/M2
ECHO LVOT SV: 64.2 ML
ECHO LVOT VTI: 16.9 CM
ECHO MV A VELOCITY: 0.74 M/S
ECHO MV E DECELERATION TIME (DT): 185 MS
ECHO MV E VELOCITY: 1.14 M/S
ECHO MV E/A RATIO: 1.54
ECHO MV E/E' LATERAL: 14.56
ECHO MV E/E' RATIO (AVERAGED): 17.76
ECHO MV E/E' SEPTAL: 20.96
ECHO PV MAX VELOCITY: 1.1 M/S
ECHO PV PEAK GRADIENT: 5 MMHG
ECHO RV BASAL DIMENSION: 3.6 CM
ECHO RV FREE WALL PEAK S': 11.5 CM/S
EKG ATRIAL RATE: 63 BPM
EKG P AXIS: 21 DEGREES
EKG P-R INTERVAL: 160 MS
EKG Q-T INTERVAL: 432 MS
EKG QRS DURATION: 98 MS
EKG QTC CALCULATION (BAZETT): 442 MS
EKG R AXIS: -4 DEGREES
EKG T AXIS: 62 DEGREES
EKG VENTRICULAR RATE: 63 BPM
EOSINOPHIL # BLD: 0.35 K/UL (ref 0–0.44)
EOSINOPHILS RELATIVE PERCENT: 5 % (ref 1–4)
ERYTHROCYTE [DISTWIDTH] IN BLOOD BY AUTOMATED COUNT: 15.2 % (ref 11.8–14.4)
GFR, ESTIMATED: 13 ML/MIN/1.73M2
GLUCOSE SERPL-MCNC: 105 MG/DL (ref 74–99)
HCT VFR BLD AUTO: 31 % (ref 36.3–47.1)
HGB BLD-MCNC: 9.4 G/DL (ref 11.9–15.1)
IMM GRANULOCYTES # BLD AUTO: 0.04 K/UL (ref 0–0.3)
IMM GRANULOCYTES NFR BLD: 1 %
LYMPHOCYTES NFR BLD: 1.77 K/UL (ref 1.1–3.7)
LYMPHOCYTES RELATIVE PERCENT: 24 % (ref 24–43)
MAGNESIUM SERPL-MCNC: 2 MG/DL (ref 1.6–2.6)
MCH RBC QN AUTO: 28.7 PG (ref 25.2–33.5)
MCHC RBC AUTO-ENTMCNC: 30.3 G/DL (ref 28.4–34.8)
MCV RBC AUTO: 94.5 FL (ref 82.6–102.9)
MONOCYTES NFR BLD: 0.72 K/UL (ref 0.1–1.2)
MONOCYTES NFR BLD: 10 % (ref 3–12)
NEUTROPHILS NFR BLD: 59 % (ref 36–65)
NEUTS SEG NFR BLD: 4.45 K/UL (ref 1.5–8.1)
NRBC BLD-RTO: 0 PER 100 WBC
PLATELET # BLD AUTO: 169 K/UL (ref 138–453)
PMV BLD AUTO: 10 FL (ref 8.1–13.5)
POTASSIUM SERPL-SCNC: 3.8 MMOL/L (ref 3.7–5.3)
POTASSIUM SERPL-SCNC: 3.9 MMOL/L (ref 3.7–5.3)
RBC # BLD AUTO: 3.28 M/UL (ref 3.95–5.11)
RBC # BLD: ABNORMAL 10*6/UL
SODIUM SERPL-SCNC: 135 MMOL/L (ref 136–145)
TROPONIN I SERPL HS-MCNC: 35 NG/L (ref 0–14)
WBC OTHER # BLD: 7.4 K/UL (ref 3.5–11.3)

## 2025-05-08 PROCEDURE — 94660 CPAP INITIATION&MGMT: CPT

## 2025-05-08 PROCEDURE — 6370000000 HC RX 637 (ALT 250 FOR IP): Performed by: NURSE PRACTITIONER

## 2025-05-08 PROCEDURE — 80048 BASIC METABOLIC PNL TOTAL CA: CPT

## 2025-05-08 PROCEDURE — 6360000002 HC RX W HCPCS: Performed by: NURSE PRACTITIONER

## 2025-05-08 PROCEDURE — 93005 ELECTROCARDIOGRAM TRACING: CPT | Performed by: NURSE PRACTITIONER

## 2025-05-08 PROCEDURE — 85025 COMPLETE CBC W/AUTO DIFF WBC: CPT

## 2025-05-08 PROCEDURE — 84132 ASSAY OF SERUM POTASSIUM: CPT

## 2025-05-08 PROCEDURE — 2700000000 HC OXYGEN THERAPY PER DAY

## 2025-05-08 PROCEDURE — 94640 AIRWAY INHALATION TREATMENT: CPT

## 2025-05-08 PROCEDURE — 2500000003 HC RX 250 WO HCPCS: Performed by: NURSE PRACTITIONER

## 2025-05-08 PROCEDURE — 99232 SBSQ HOSP IP/OBS MODERATE 35: CPT | Performed by: NURSE PRACTITIONER

## 2025-05-08 PROCEDURE — 6360000004 HC RX CONTRAST MEDICATION: Performed by: NURSE PRACTITIONER

## 2025-05-08 PROCEDURE — 2060000000 HC ICU INTERMEDIATE R&B

## 2025-05-08 PROCEDURE — 93306 TTE W/DOPPLER COMPLETE: CPT | Performed by: INTERNAL MEDICINE

## 2025-05-08 PROCEDURE — C8929 TTE W OR WO FOL WCON,DOPPLER: HCPCS

## 2025-05-08 PROCEDURE — 94761 N-INVAS EAR/PLS OXIMETRY MLT: CPT

## 2025-05-08 PROCEDURE — 93010 ELECTROCARDIOGRAM REPORT: CPT | Performed by: INTERNAL MEDICINE

## 2025-05-08 PROCEDURE — 36415 COLL VENOUS BLD VENIPUNCTURE: CPT

## 2025-05-08 PROCEDURE — 84484 ASSAY OF TROPONIN QUANT: CPT

## 2025-05-08 PROCEDURE — 83735 ASSAY OF MAGNESIUM: CPT

## 2025-05-08 PROCEDURE — 90935 HEMODIALYSIS ONE EVALUATION: CPT

## 2025-05-08 PROCEDURE — 97110 THERAPEUTIC EXERCISES: CPT

## 2025-05-08 RX ORDER — NIFEDIPINE 30 MG/1
60 TABLET, EXTENDED RELEASE ORAL DAILY
Status: DISCONTINUED | OUTPATIENT
Start: 2025-05-09 | End: 2025-05-09 | Stop reason: HOSPADM

## 2025-05-08 RX ADMIN — HEPARIN SODIUM 5000 UNITS: 5000 INJECTION INTRAVENOUS; SUBCUTANEOUS at 21:27

## 2025-05-08 RX ADMIN — BUMETANIDE 2 MG: 1 TABLET ORAL at 17:30

## 2025-05-08 RX ADMIN — CLOPIDOGREL BISULFATE 75 MG: 75 TABLET, FILM COATED ORAL at 09:03

## 2025-05-08 RX ADMIN — GABAPENTIN 100 MG: 100 CAPSULE ORAL at 09:03

## 2025-05-08 RX ADMIN — SODIUM CHLORIDE, PRESERVATIVE FREE 10 ML: 5 INJECTION INTRAVENOUS at 21:40

## 2025-05-08 RX ADMIN — CARVEDILOL 25 MG: 25 TABLET, FILM COATED ORAL at 21:27

## 2025-05-08 RX ADMIN — ASPIRIN 81 MG: 81 TABLET, CHEWABLE ORAL at 09:03

## 2025-05-08 RX ADMIN — ACETAMINOPHEN 650 MG: 325 TABLET ORAL at 21:31

## 2025-05-08 RX ADMIN — SODIUM CHLORIDE, PRESERVATIVE FREE 10 ML: 5 INJECTION INTRAVENOUS at 09:02

## 2025-05-08 RX ADMIN — HEPARIN SODIUM 5000 UNITS: 5000 INJECTION INTRAVENOUS; SUBCUTANEOUS at 05:37

## 2025-05-08 RX ADMIN — BUDESONIDE AND FORMOTEROL FUMARATE DIHYDRATE 2 PUFF: 80; 4.5 AEROSOL RESPIRATORY (INHALATION) at 19:38

## 2025-05-08 RX ADMIN — WATER 1000 MG: 1 INJECTION INTRAMUSCULAR; INTRAVENOUS; SUBCUTANEOUS at 16:31

## 2025-05-08 RX ADMIN — CALCIUM ACETATE 667 MG: 667 CAPSULE ORAL at 09:03

## 2025-05-08 RX ADMIN — AZITHROMYCIN DIHYDRATE 250 MG: 250 TABLET ORAL at 09:03

## 2025-05-08 RX ADMIN — HEPARIN SODIUM 5000 UNITS: 5000 INJECTION INTRAVENOUS; SUBCUTANEOUS at 14:16

## 2025-05-08 RX ADMIN — SULFUR HEXAFLUORIDE 3 ML: KIT at 08:14

## 2025-05-08 RX ADMIN — Medication 2000 UNITS: at 09:03

## 2025-05-08 RX ADMIN — PANTOPRAZOLE SODIUM 40 MG: 40 TABLET, DELAYED RELEASE ORAL at 05:37

## 2025-05-08 RX ADMIN — DULOXETINE HYDROCHLORIDE 30 MG: 30 CAPSULE, DELAYED RELEASE ORAL at 09:03

## 2025-05-08 RX ADMIN — CARVEDILOL 25 MG: 25 TABLET, FILM COATED ORAL at 09:03

## 2025-05-08 RX ADMIN — CALCIUM ACETATE 667 MG: 667 CAPSULE ORAL at 17:30

## 2025-05-08 RX ADMIN — LAMOTRIGINE 50 MG: 25 TABLET ORAL at 09:03

## 2025-05-08 RX ADMIN — NIFEDIPINE 30 MG: 30 TABLET, FILM COATED, EXTENDED RELEASE ORAL at 09:03

## 2025-05-08 RX ADMIN — BUMETANIDE 2 MG: 1 TABLET ORAL at 09:03

## 2025-05-08 RX ADMIN — GABAPENTIN 100 MG: 100 CAPSULE ORAL at 14:16

## 2025-05-08 RX ADMIN — ROSUVASTATIN 10 MG: 20 TABLET, FILM COATED ORAL at 21:26

## 2025-05-08 RX ADMIN — GABAPENTIN 100 MG: 100 CAPSULE ORAL at 21:27

## 2025-05-08 ASSESSMENT — PAIN SCALES - GENERAL
PAINLEVEL_OUTOF10: 0
PAINLEVEL_OUTOF10: 6

## 2025-05-08 ASSESSMENT — PAIN DESCRIPTION - LOCATION: LOCATION: HEAD

## 2025-05-08 ASSESSMENT — PAIN DESCRIPTION - DESCRIPTORS: DESCRIPTORS: ACHING

## 2025-05-08 ASSESSMENT — PAIN - FUNCTIONAL ASSESSMENT: PAIN_FUNCTIONAL_ASSESSMENT: ACTIVITIES ARE NOT PREVENTED

## 2025-05-08 NOTE — PLAN OF CARE
Problem: Chronic Conditions and Co-morbidities  Goal: Patient's chronic conditions and co-morbidity symptoms are monitored and maintained or improved  5/8/2025 1620 by Stephanie Willis RN  Outcome: Progressing  5/8/2025 0411 by Allison Gregory RN  Outcome: Progressing     Problem: Safety - Adult  Goal: Free from fall injury  5/8/2025 1620 by Stephanie Willis RN  Outcome: Progressing  5/8/2025 0411 by Allison Gregory RN  Outcome: Progressing     Problem: Respiratory - Adult  Goal: Achieves optimal ventilation and oxygenation  5/8/2025 1620 by Stephanie Willis RN  Outcome: Progressing  5/8/2025 0411 by Allison Gregory RN  Outcome: Progressing     Problem: Cardiovascular - Adult  Goal: Maintains optimal cardiac output and hemodynamic stability  5/8/2025 1620 by Stephanie Willis RN  Outcome: Progressing  5/8/2025 0411 by Allison Gregory RN  Outcome: Progressing  Goal: Absence of cardiac dysrhythmias or at baseline  5/8/2025 1620 by Stephanie Willis RN  Outcome: Progressing  5/8/2025 0411 by Allison Gregory RN  Outcome: Progressing     Problem: Skin/Tissue Integrity - Adult  Goal: Skin integrity remains intact  5/8/2025 1620 by Stephanie Willis RN  Outcome: Progressing  5/8/2025 0411 by Allison Gregory RN  Outcome: Progressing     Problem: Infection - Adult  Goal: Absence of infection at discharge  5/8/2025 1620 by Stephanie Willis RN  Outcome: Progressing  5/8/2025 0411 by Allison Gregory RN  Outcome: Progressing     Problem: Metabolic/Fluid and Electrolytes - Adult  Goal: Electrolytes maintained within normal limits  5/8/2025 1620 by Stephanie Willis RN  Outcome: Progressing  5/8/2025 0411 by Allison Gregory RN  Outcome: Progressing  Goal: Hemodynamic stability and optimal renal function maintained  5/8/2025 1620 by Stephanie Willis RN  Outcome: Progressing  5/8/2025 0411 by Allison Gregory RN  Outcome: Progressing  Goal: Glucose maintained within prescribed range  5/8/2025 1620 by Stephanie Willis RN  Outcome:  Progressing  5/8/2025 0411 by Allison Gregory, RN  Outcome: Progressing     Problem: Hematologic - Adult  Goal: Maintains hematologic stability  5/8/2025 1620 by Stephanie Willis RN  Outcome: Progressing  5/8/2025 0411 by Allison Gregory RN  Outcome: Progressing     Problem: Discharge Planning  Goal: Discharge to home or other facility with appropriate resources  5/8/2025 1620 by Stephanie Willis RN  Outcome: Progressing  5/8/2025 0411 by Allison Gregory RN  Outcome: Progressing     Problem: ABCDS Injury Assessment  Goal: Absence of physical injury  5/8/2025 1620 by Stephanie Willis RN  Outcome: Progressing  5/8/2025 0411 by Allison Gregory RN  Outcome: Progressing

## 2025-05-08 NOTE — PROGRESS NOTES
Physical Therapy        Physical Therapy Cancel Note      DATE: 2025    NAME: Julia Hobbs  MRN: [id number]   : 1980      Patient not seen this date for Physical Therapy due to:    Hemodialysis:      Electronically signed by CARMEN Claros on 2025 at 12:30 PM    This treatment/evaluation completed by signing SPT. Signing PT agrees with treatment and documentation.

## 2025-05-08 NOTE — PROGRESS NOTES
Occupational Therapy    Occupational Therapy Daily Treatment Note  Facility/Department: [address] ONC/MED SURG   Patient Name: Julia Hobbs        MRN: [id number]    : 1980    Date of Service: 2025    Past Medical History:  has a past medical history of Asthma, Astigmatism, Bipolar disorder (formerly [facility]), Clinical trial participant, COVID-19, Diabetes mellitus (HCC), Leonel gangrene (HCC), GERD (gastroesophageal reflux disease), H/O cardiovascular stress test, History of echocardiogram, Hyperlipidemia, Hypertension, Insulin pump in place, Kidney disease, Obesity, On mechanically assisted ventilation (formerly [facility]), Osteoarthritis, Under care of team, Under care of team, Under care of team, Under care of team, and Wellness examination.  Past Surgical History:  has a past surgical history that includes Rectal surgery (N/A, 2022); Abdomen surgery (Bilateral, 2022); Rectal surgery (Bilateral, 02/10/2022); Rectal surgery (N/A, 2022); Wound Exploration (Left, 2022); colostomy (N/A, 2022); Abdomen surgery (Left, 2022); IR TUNNELED CVC PLACE WO SQ PORT/PUMP > 5 YEARS (2022); Sleeve Gastrectomy (2019); Colonoscopy; Tubal ligation; Ankle surgery (Bilateral); Appendectomy; Ulnar nerve transposition (Left); Rotator cuff repair (Left); Knee arthroscopy (Bilateral); Cholecystectomy; Lumbar disc surgery ();  section; Colostomy Closure (N/A, 2022); Wound Exploration (2022); and Abdomen surgery (N/A, 2022).    Discharge Recommendations  Discharge Recommendations: Patient would benefit from continued therapy after discharge  OT Equipment Recommendations  Equipment Needed: No    Assessment  Performance deficits / Impairments: Decreased functional mobility ;Decreased endurance;Decreased ADL status;Decreased balance;Decreased high-level IADLs  Assessment: Patient continues to demonstrate limitations affecting ADL perfromance that is less then prior level of function.  Recomend continued occupational therapy intervention during acute stay.  Prognosis: Good  REQUIRES OT FOLLOW-UP: Yes  Activity Tolerance  Activity Tolerance: Patient limited by fatigue;Patient Tolerated treatment well  Safety Devices  Type of Devices: Call light within reach;Gait belt;Nurse notified;Left in chair  Restraints  Restraints Initially in Place: No    AM-PAC  AM-[facility] Daily Activity - Inpatient   How much help is needed for putting on and taking off regular lower body clothing?: A Little  How much help is needed for bathing (which includes washing, rinsing, drying)?: A Little  How much help is needed for toileting (which includes using toilet, bedpan, or urinal)?: A Little  How much help is needed for putting on and taking off regular upper body clothing?: None  How much help is needed for taking care of personal grooming?: None  How much help for eating meals?: None  AM-[facility] Inpatient Daily Activity Raw Score: 21  AM-PAC Inpatient ADL T-Scale Score : 44.27  ADL Inpatient CMS 0-100% Score: 32.79  ADL Inpatient CMS G-Code Modifier : CJ    Restrictions/Precautions  Restrictions/Precautions  Restrictions/Precautions: General Precautions;Isolation  Activity Level: Up with Assist  Required Braces or Orthoses?: No  Position Activity Restriction  Other Position/Activity Restrictions: 12L O2, droplet prec.       Subjective  General  Patient assessed for rehabilitation services?: Yes  Family / Caregiver Present: No  Diagnosis: Rhinovirus, ESRD, HTN  Subjective  Subjective: Patient agreeable to OT interevention/treatment  General Comment  Comments: RN cleared for OT interevention/treatment  Pain  Pre-Pain: 0  Post-Pain: 0       Objective  Orientation  Overall Orientation Status: Within Functional Limits  Orientation Level: Oriented X4    Transfers/Mobility  Bed mobility  Supine to Sit: Modified independent  Scooting: Independent  Bed Mobility Comments: Left seated at edge of bed at end of session    Exercises  OT

## 2025-05-08 NOTE — PROGRESS NOTES
Pt in dialysis, note bs 105 @ 6:40am, 24 hr basal recorded 53.08 units/hour and bolus at 8.1 units/hour. Pt doing well managing pump. Has supplies to change site which she needs to do today per RN, CDCES note. Will follow for questions/needs. Vaishnavi Hewitt, RD, LD, CDE

## 2025-05-08 NOTE — PROGRESS NOTES
Dialysis Post Treatment Note  Vitals:    05/08/25 1421   BP: (!) 163/69   Pulse: 72   Resp: 26   Temp: 98.4 °F (36.9 °C)   SpO2: 97%     Pre-Weight = 133.4  Post-weight = Weight - Scale: 131.4 kg (289 lb 11 oz)  Total Liters Processed = Blood Volume Processed (Liters): 76.51 L  Rinseback Volume (mL) = Rinseback Volume (ml): 300 ml  Net Removal (mL) = 2000ml   Patient's dry weight=132.0kg  Type of access used= LEFT UPPER ARM ENDO AVF  Length of treatment=182 MINUTES  PT DOES HOME DIALYSIS  Outpatient nephrologist=GERARD    Patient tolerated treatment well. Cannulated access with 2 butthole needles without issues and ran without complication. BP remained elevated throughout treatment, however, pt remained asymptomatic with no complaints. Report called to Efren SHIPMAN.

## 2025-05-08 NOTE — PROGRESS NOTES
NEPHROLOGY PROGRESS NOTE      ASSESSMENT     ***    PLAN     ***      SUBJECTIVE       OBJECTIVE     Vitals:    05/08/25 1320 05/08/25 1335 05/08/25 1338 05/08/25 1421   BP: (!) 170/75 (!) 164/74 (!) 175/74 (!) 163/69   Pulse: 67 68 69 72   Resp:   15 26   Temp:   98.7 °F (37.1 °C) 98.4 °F (36.9 °C)   TempSrc:    Oral   SpO2:   97% 97%   Weight:   131.4 kg (289 lb 11 oz)    Height:         24HR INTAKE/OUTPUT:    Intake/Output Summary (Last 24 hours) at 5/8/2025 1507  Last data filed at 5/8/2025 1426  Gross per 24 hour   Intake 1660 ml   Output 5000 ml   Net -3340 ml       General appearance:Awake, alert, in no acute distress  HEENT: PERRLA  Respiratory::vesicular breath sounds,no wheeze/crackles  Cardiovascular:S1 S2 normal,no gallop or organic murmur.  Abdomen:Non tender/non distended.Bowel sounds present  Extremities: No Cyanosis or Clubbing,Lower extremity edema  Neurological:Alert and oriented.No abnormalities of mood, affect, memory, mentation, or behavior are noted      MEDICATIONS     Scheduled Meds:    [START ON 5/9/2025] NIFEdipine  60 mg Oral Daily    Insulin Pump - Basal Dose   SubCUTAneous Daily    sodium chloride flush  5-40 mL IntraVENous 2 times per day    heparin (porcine)  5,000 Units SubCUTAneous 3 times per day    cefTRIAXone (ROCEPHIN) IV  1,000 mg IntraVENous Q24H    azithromycin  250 mg Oral Daily    aspirin  81 mg Oral Daily    bumetanide  2 mg Oral BID    calcium acetate  1 capsule Oral TID WC    carvedilol  25 mg Oral BID    Vitamin D  2,000 Units Oral Daily    clopidogrel  75 mg Oral Daily    budesonide-formoterol  2 puff Inhalation BID RT    lamoTRIgine  50 mg Oral Daily    pantoprazole  40 mg Oral QAM AC    DULoxetine  30 mg Oral Daily    gabapentin  100 mg Oral TID    rosuvastatin  10 mg Oral Nightly    tiotropium  2 puff Inhalation Daily RT    Insulin Pump - Bolus Dose   SubCUTAneous 4x Daily AC & HS     Continuous Infusions:    sodium chloride      dextrose       PRN Meds:  sodium  10.7 10.0       Please do not hesitate to call with questions    This note is created with the assistance of a speech-recognition program. While intending to generate a document that actually reflects the content of the visit, no guarantees can be provided that every mistake has been identified and corrected by editing    Estuardo Jaime MD MD, MRCP ([place]), FACP   5/8/2025 3:07 PM  [facility]

## 2025-05-08 NOTE — PROGRESS NOTES
Dialysis Time Out  To be done by RN and tech or 2 RNs  Staff Names : Agnes LANDA RN & Beverly GONZALEZ RN    [x]  Identity of the patient using 2 patient identifiers  [x]  Consent for treatment  [x]  Equipment-proper machine and dialyzer  /[x]  B-Hep B status HEPATITIS B ANTIGEN NON-REACTIVE 5/7/2025  [x]  Orders- to include bath, blood flow, dialyzer, time and fluid removal  [x]  Access-Correct site and in working order  [x]  Time for patient to ask questions.

## 2025-05-08 NOTE — PLAN OF CARE
Problem: Chronic Conditions and Co-morbidities  Goal: Patient's chronic conditions and co-morbidity symptoms are monitored and maintained or improved  Outcome: Progressing     Problem: Safety - Adult  Goal: Free from fall injury  Outcome: Progressing     Problem: Respiratory - Adult  Goal: Achieves optimal ventilation and oxygenation  Outcome: Progressing     Problem: Cardiovascular - Adult  Goal: Maintains optimal cardiac output and hemodynamic stability  Outcome: Progressing  Goal: Absence of cardiac dysrhythmias or at baseline  Outcome: Progressing     Problem: Skin/Tissue Integrity - Adult  Goal: Skin integrity remains intact  Outcome: Progressing     Problem: Infection - Adult  Goal: Absence of infection at discharge  Outcome: Progressing     Problem: Metabolic/Fluid and Electrolytes - Adult  Goal: Electrolytes maintained within normal limits  Outcome: Progressing  Goal: Hemodynamic stability and optimal renal function maintained  Outcome: Progressing  Goal: Glucose maintained within prescribed range  Outcome: Progressing     Problem: Hematologic - Adult  Goal: Maintains hematologic stability  Outcome: Progressing     Problem: Discharge Planning  Goal: Discharge to home or other facility with appropriate resources  Outcome: Progressing     Problem: ABCDS Injury Assessment  Goal: Absence of physical injury  Outcome: Progressing

## 2025-05-08 NOTE — PROGRESS NOTES
[facility]  Office: [phone]  Pedro De Paz, DO, Salvador Sharma, DO, Rohit Aranda DO, Svne Hunter, DO, Romulo Quick MD, Estephanie Cooley MD, Everardo Martinez MD, Antionette Mckinney MD,  Jacinto Mgaallon MD, Pedro Dorman MD, Romi Fleming MD,  Anderson Lafleur DO, Venancio Johnson MD, Pipe Bacon MD, Daniel De Paz DO, Sissy Sanderson MD,  Marin Holder DO, Clarita Yip MD, Dalia Sommers MD, Juana Jane MD,  Marcio Dia MD, You Ramirez MD, Marge De Dios MD, Ant Cortez MD, Adalberto Choi MD, Mare Mccoy MD, Mitchell Nunes DO, Mireya Mao MD, Anmol Garza MD, Anderson Stacy MD, Mohsin Reza, MD, Evangelist Medina MD, Shirley Waterhouse, CNP,  Vandana Ko, CNP, Mitchell Johnson, CNP,  Ange Fernandez, DNP, Yamilet Dai, CNP, Brenda Pimentel, CNP, Mouna Tee, CNP, Agustina Rao, CNP, Allison Menendez, PA-C, Naima Montoya, CNP, Keaton Cline, CNP,  Angelica Mansfield, CNP, Luz Gusman, CNP, Kike Vergara, PA-C, Stacy Saenz, CNP,  Janna Garvey, CNS, Hilary Duarte, CNP, Fely Sandoval, CNP,   Frida Guo, CNP         [facility]   IN-PATIENT SERVICE   [facility]    Progress Note    5/8/2025    2:15 PM    Name:   Julia Hobbs  MRN:     [id number]     Acct:      [id number]   Room:   0448/0448-01  IP Day:  2  Admit Date:  5/6/2025  1:26 PM    PCP:   Jigna Haley DO  Code Status:  Full Code    Subjective:     C/C: SOB     Interval History Status: significantly improved.     Seen and examined at bedside. In good spirits--reports she is feeling much better today. Hoping for discharge home today. Has been weaning from high-flow NC--currently on NC 4L. Pending HD today. Patient was hoping to go home and do dialysis today. On hypertensive side persistently despite last 4 days of HD. Increase of Nifedipine. Afebrile.     Echo:     Left Ventricle: Reduced left ventricular systolic function with a visually estimated EF of 40 - 45%. EF by visual  controlled diet. Diabetic education.   HTN: Continue Coreg, Bumex, Procardia  Morbid obesity: Lifestyle modifications    TYREE SCOTT, APRN - NP  5/8/2025  2:15 PM

## 2025-05-09 VITALS
HEART RATE: 74 BPM | DIASTOLIC BLOOD PRESSURE: 61 MMHG | RESPIRATION RATE: 16 BRPM | BODY MASS INDEX: 51.8 KG/M2 | HEIGHT: 63 IN | TEMPERATURE: 97.5 F | OXYGEN SATURATION: 100 % | SYSTOLIC BLOOD PRESSURE: 143 MMHG | WEIGHT: 292.33 LBS

## 2025-05-09 PROBLEM — I25.10 CORONARY ARTERY DISEASE INVOLVING NATIVE CORONARY ARTERY OF NATIVE HEART WITHOUT ANGINA PECTORIS: Status: ACTIVE | Noted: 2025-05-09

## 2025-05-09 PROBLEM — I25.5 ISCHEMIC CARDIOMYOPATHY: Status: ACTIVE | Noted: 2025-05-09

## 2025-05-09 LAB
ANION GAP SERPL CALCULATED.3IONS-SCNC: 11 MMOL/L (ref 9–16)
BUN SERPL-MCNC: 43 MG/DL (ref 6–20)
CALCIUM SERPL-MCNC: 8.6 MG/DL (ref 8.6–10.4)
CHLORIDE SERPL-SCNC: 99 MMOL/L (ref 98–107)
CO2 SERPL-SCNC: 29 MMOL/L (ref 20–31)
CREAT SERPL-MCNC: 3.5 MG/DL (ref 0.6–0.9)
GFR, ESTIMATED: 16 ML/MIN/1.73M2
GLUCOSE BLD-MCNC: 115 MG/DL (ref 65–105)
GLUCOSE SERPL-MCNC: 89 MG/DL (ref 74–99)
POTASSIUM SERPL-SCNC: 3.9 MMOL/L (ref 3.7–5.3)
SODIUM SERPL-SCNC: 139 MMOL/L (ref 136–145)

## 2025-05-09 PROCEDURE — 6370000000 HC RX 637 (ALT 250 FOR IP): Performed by: NURSE PRACTITIONER

## 2025-05-09 PROCEDURE — 6360000002 HC RX W HCPCS: Performed by: NURSE PRACTITIONER

## 2025-05-09 PROCEDURE — 2700000000 HC OXYGEN THERAPY PER DAY

## 2025-05-09 PROCEDURE — 82947 ASSAY GLUCOSE BLOOD QUANT: CPT

## 2025-05-09 PROCEDURE — 97161 PT EVAL LOW COMPLEX 20 MIN: CPT

## 2025-05-09 PROCEDURE — 94761 N-INVAS EAR/PLS OXIMETRY MLT: CPT

## 2025-05-09 PROCEDURE — 97530 THERAPEUTIC ACTIVITIES: CPT

## 2025-05-09 PROCEDURE — 36415 COLL VENOUS BLD VENIPUNCTURE: CPT

## 2025-05-09 PROCEDURE — 99223 1ST HOSP IP/OBS HIGH 75: CPT | Performed by: INTERNAL MEDICINE

## 2025-05-09 PROCEDURE — 80048 BASIC METABOLIC PNL TOTAL CA: CPT

## 2025-05-09 PROCEDURE — 94640 AIRWAY INHALATION TREATMENT: CPT

## 2025-05-09 PROCEDURE — 99239 HOSP IP/OBS DSCHRG MGMT >30: CPT | Performed by: STUDENT IN AN ORGANIZED HEALTH CARE EDUCATION/TRAINING PROGRAM

## 2025-05-09 PROCEDURE — 2500000003 HC RX 250 WO HCPCS: Performed by: NURSE PRACTITIONER

## 2025-05-09 RX ORDER — AZITHROMYCIN 250 MG/1
250 TABLET, FILM COATED ORAL DAILY
Qty: 1 TABLET | Refills: 0 | Status: SHIPPED | OUTPATIENT
Start: 2025-05-10 | End: 2025-05-11

## 2025-05-09 RX ORDER — CEFDINIR 300 MG/1
300 CAPSULE ORAL DAILY
Qty: 3 CAPSULE | Refills: 0 | Status: SHIPPED | OUTPATIENT
Start: 2025-05-09 | End: 2025-05-12

## 2025-05-09 RX ORDER — NIFEDIPINE 30 MG/1
60 TABLET, EXTENDED RELEASE ORAL DAILY
Qty: 30 TABLET | Refills: 3 | Status: SHIPPED | OUTPATIENT
Start: 2025-05-10

## 2025-05-09 RX ADMIN — CARVEDILOL 25 MG: 25 TABLET, FILM COATED ORAL at 08:15

## 2025-05-09 RX ADMIN — NIFEDIPINE 60 MG: 30 TABLET, FILM COATED, EXTENDED RELEASE ORAL at 09:33

## 2025-05-09 RX ADMIN — BUMETANIDE 2 MG: 1 TABLET ORAL at 08:15

## 2025-05-09 RX ADMIN — PANTOPRAZOLE SODIUM 40 MG: 40 TABLET, DELAYED RELEASE ORAL at 03:53

## 2025-05-09 RX ADMIN — TIOTROPIUM BROMIDE INHALATION SPRAY 2 PUFF: 3.12 SPRAY, METERED RESPIRATORY (INHALATION) at 08:15

## 2025-05-09 RX ADMIN — AZITHROMYCIN DIHYDRATE 250 MG: 250 TABLET ORAL at 08:16

## 2025-05-09 RX ADMIN — SODIUM CHLORIDE, PRESERVATIVE FREE 10 ML: 5 INJECTION INTRAVENOUS at 08:16

## 2025-05-09 RX ADMIN — CALCIUM ACETATE 667 MG: 667 CAPSULE ORAL at 08:26

## 2025-05-09 RX ADMIN — ASPIRIN 81 MG: 81 TABLET, CHEWABLE ORAL at 08:15

## 2025-05-09 RX ADMIN — DULOXETINE HYDROCHLORIDE 30 MG: 30 CAPSULE, DELAYED RELEASE ORAL at 08:15

## 2025-05-09 RX ADMIN — Medication 2000 UNITS: at 08:15

## 2025-05-09 RX ADMIN — CLOPIDOGREL BISULFATE 75 MG: 75 TABLET, FILM COATED ORAL at 08:15

## 2025-05-09 RX ADMIN — CALCIUM ACETATE 667 MG: 667 CAPSULE ORAL at 12:05

## 2025-05-09 RX ADMIN — LAMOTRIGINE 50 MG: 25 TABLET ORAL at 09:33

## 2025-05-09 RX ADMIN — GABAPENTIN 100 MG: 100 CAPSULE ORAL at 08:16

## 2025-05-09 RX ADMIN — HEPARIN SODIUM 5000 UNITS: 5000 INJECTION INTRAVENOUS; SUBCUTANEOUS at 03:54

## 2025-05-09 RX ADMIN — BUDESONIDE AND FORMOTEROL FUMARATE DIHYDRATE 2 PUFF: 80; 4.5 AEROSOL RESPIRATORY (INHALATION) at 08:15

## 2025-05-09 NOTE — CONSULTS
[facility]   Consult Note         Today's Date: 5/9/2025  Patient Name: Julia Hobbs  Date of admission: 5/6/2025  1:26 PM  Patient's age: 44 y.o., 1980  Admission Dx: CHF exacerbation (HCC) [I50.9]  Acute exacerbation of chronic heart failure (HCC) [I50.9]    Reason for Consult:  Cardiac evaluation    Requesting Physician: Venancio Johnson MD    REASON FOR CONSULT:  Heart failure with EF reduced from previous echo. Optimize GDMT.     History Obtained From:  Patient, chart, staff, records    HISTORY OF PRESENT ILLNESS:      The patient is a 44 y.o. female who is admitted to the hospital for COPD exacerbation with rhinovirus.  Cardiology consulted because of reduced ejection fraction from 55 to 60% on 10/2023 to EF of 40 to 45% showing mild global hypokinesis and abnormal diastolic dysfunction.  Patient has a past medical history of CAD last heart cath 8/24 showed RCA and LCx MARY KATE ESRD on hemodialysis, diabetes mellitus on insulin pump.  Hypertension and morbid obesity.  Hyperlipidemia.  Pertinent labs showed troponin 35 BNP 10,175.  EKG showed NSR.  Otherwise normal    Past Medical History:   has a past medical history of Asthma, Astigmatism, Bipolar disorder ([facility]), Clinical trial participant, COVID-19, Diabetes mellitus ([facility]), Leonel gangrene ([facility]), GERD (gastroesophageal reflux disease), H/O cardiovascular stress test, History of echocardiogram, Hyperlipidemia, Hypertension, Insulin pump in place, Kidney disease, Obesity, On mechanically assisted ventilation ([facility]), Osteoarthritis, Under care of team, Under care of team, Under care of team, Under care of team, and Wellness examination.    Past Surgical History:   has a past surgical history that includes Rectal surgery (N/A, 02/08/2022); Abdomen surgery (Bilateral, 02/09/2022); Rectal surgery (Bilateral, 02/10/2022); Rectal surgery (N/A, 02/11/2022); Wound Exploration (Left, 02/14/2022); colostomy (N/A, 02/16/2022); Abdomen surgery (Left,  numbness or tingling. No change in gait, balance, coordination, mood, affect, memory, mentation, behavior.  Psychiatric: No anxiety, or depression.  Endocrine: No temperature intolerance. No excessive thirst, fluid intake, or urination. No tremor.  Hematologic/Lymphatic: No abnormal bruising or bleeding, blood clots or swollen lymph nodes.  Allergic/Immunologic: No nasal congestion or hives.      PHYSICAL EXAM:      BP (!) 143/61   Pulse 69   Temp 97.2 °F (36.2 °C) (Temporal)   Resp 22   Ht 1.6 m (5' 3\")   Wt 132.6 kg (292 lb 5.3 oz)   SpO2 93%   BMI 51.78 kg/m²    Constitutional and General Appearance: alert, cooperative, no distress and appears stated age  HEENT: PERRL, no cervical lymphadenopathy. No masses palpable. Normal oral mucosa  Respiratory:  Normal excursion and expansion without use of accessory muscles  Resp Auscultation: Good respiratory effort. No for increased work of breathing. On auscultation: clear to auscultation bilaterally  Cardiovascular:  The apical impulse is not displaced  Heart tones are crisp and normal. regular S1 and S2.  Jugular venous pulsation Normal  The carotid upstroke is normal in amplitude and contour without delay or bruit  Peripheral pulses are symmetrical and full   Abdomen:   No masses or tenderness  Bowel sounds present  Extremities:   No Cyanosis or Clubbing   Lower extremity edema: No   Skin: Warm and dry  Neurological:  Alert and oriented.  Moves all extremities well  No abnormalities of mood, affect, memory, mentation, or behavior are noted        EKG:    Date: 05/09/25  Reading: No acute ischemia      LAST ECHO:  Date:  Findings Summary:      LAST Stress Test:   Date of last ST:  Major Findings:    LAST Cardiac Angiography:.  Date:  Findings:      Labs:     CBC:   Recent Labs     05/07/25  0600 05/08/25  0640   WBC 8.3 7.4   HGB 9.0* 9.4*   HCT 29.3* 31.0*    169     BMP:   Recent Labs     05/08/25  0640 05/09/25  0337   * 139   K 3.8 3.9   CO2 23  29   BUN 62* 43*   CREATININE 4.1* 3.5*   LABGLOM 13* 16*   GLUCOSE 105* 89     BNP: No results for input(s): \"BNP\" in the last 72 hours.  PT/INR:   Recent Labs     05/07/25  0600   PROTIME 15.0*   INR 1.1     APTT:No results for input(s): \"APTT\" in the last 72 hours.  CARDIAC ENZYMES:No results for input(s): \"CKTOTAL\", \"CKMB\", \"CKMBINDEX\", \"TROPONINI\" in the last 72 hours.  FASTING LIPID PANEL:  Lab Results   Component Value Date/Time    HDL 46 02/17/2025 05:37 AM    TRIG 187 02/17/2025 05:37 AM     LIVER PROFILE:  Recent Labs     05/06/25  1551   AST 16   ALT 10     Troponins: Invalid input(s): \"TROPONIN\"     Other Current Problems  Patient Active Problem List   Diagnosis    Leonel gangrene (HCC)    Uncontrolled type 2 diabetes mellitus with hyperglycemia (HCC)    MEREDITH (acute kidney injury)    Hypokalemia    Hyponatremia    Anemia    Morbid obesity (HCC)    Type 1 diabetes mellitus with diabetic chronic kidney disease (HCC)    Metabolic acidosis    Hypervolemia    S/P partial resection of colon    History of Leonel gangrene    History of difficult intubation    Asthma    Bipolar disorder (HCC)    GERD (gastroesophageal reflux disease)    Hyperlipidemia    Hypertension    Acute and chronic respiratory failure with hypoxia (HCC)    Hypoxia    COPD exacerbation (HCC)    Acute exacerbation of chronic heart failure (HCC)    ESRD (end stage renal disease) (HCC)    Rhinovirus    Acute on chronic combined systolic and diastolic congestive heart failure (HCC)           IMPRESSION:  HfmdEF from 55 to 60% on 10/2023 to EF of 40 to 45%  CAD status post MARY KATE to LAD and LCx 2024  ESRD on hemodialysis,  Diabetes mellitus on insulin pump.    Hypertension .    Hyperlipidemia  morbid obesity          Recommendations:    Continue with aspirin, and Plavix, Coreg 25 mg, Crestor 5 mg  Add Aldactone 25 mg if okay with nephrology,  SGLT 2 not recommended with GFR, ESRD.  Outpatient follow-up with cardiology.  Rest of management per

## 2025-05-09 NOTE — PROGRESS NOTES
Physical Therapy  Facility/Department: [address] ONC/MED SURG   Physical Therapy Initial Evaluation    Patient Name: Julia Hobbs        MRN: [id number]    : 1980    Date of Service: 2025    No chief complaint on file.    Past Medical History:  has a past medical history of Asthma, Astigmatism, Bipolar disorder ([facility]), Clinical trial participant, COVID-19, Diabetes mellitus ([facility]), Leonel gangrene ([facility]), GERD (gastroesophageal reflux disease), H/O cardiovascular stress test, History of echocardiogram, Hyperlipidemia, Hypertension, Insulin pump in place, Kidney disease, Obesity, On mechanically assisted ventilation ([facility]), Osteoarthritis, Under care of team, Under care of team, Under care of team, Under care of team, and Wellness examination.  Past Surgical History:  has a past surgical history that includes Rectal surgery (N/A, 2022); Abdomen surgery (Bilateral, 2022); Rectal surgery (Bilateral, 02/10/2022); Rectal surgery (N/A, 2022); Wound Exploration (Left, 2022); colostomy (N/A, 2022); Abdomen surgery (Left, 2022); IR TUNNELED CVC PLACE WO SQ PORT/PUMP > 5 YEARS (2022); Sleeve Gastrectomy (2019); Colonoscopy; Tubal ligation; Ankle surgery (Bilateral); Appendectomy; Ulnar nerve transposition (Left); Rotator cuff repair (Left); Knee arthroscopy (Bilateral); Cholecystectomy; Lumbar disc surgery ();  section; Colostomy Closure (N/A, 2022); Wound Exploration (2022); and Abdomen surgery (N/A, 2022).    Discharge Recommendations   No further therapy required at discharge.    PT Equipment Recommendations  Equipment Needed: No    Assessment  Body Structures, Functions, Activity Limitations Requiring Skilled Therapeutic Intervention: Decreased functional mobility , Decreased endurance, Decreased safe awareness, Decreased coordination  Assessment: pt completed STS transfer mod (i) with RW and CGA. pt ambulated 250' with RW and CGA progressing  assistance  Stand to Sit: Stand by assistance  Comment: Pt stood EOB w/ RW and CGA. No c/o of dizziness.    Ambulation  Surface: Level tile  Device: Rolling Walker  Assistance: Contact guard assistance  Quality of Gait: decreased step height, decreased step length, \"wild\" rosamaria.  Distance: 250'  Comments: Pt ambulated w/ RW and ' progressed to SBA. No c/o of dizziness or light headedness. 2L O2  More Ambulation?: No    Stairs/Curb  Stairs?: No    Wheelchair Activities  Wheelchair Parts Management: No  Propulsion: No     Balance   Balance  Posture: Good  Sitting - Static: Good  Sitting - Dynamic: Good  Standing - Static: +;Fair  Standing - Dynamic: Fair;+  Comments: RW used to assess balance.      Patient Education  Patient Education  Education Given To: Patient  Education Provided: Role of Therapy  Education Method: Demonstration;Verbal  Barriers to Learning: None  Education Outcome: Verbalized understanding;Demonstrated understanding    Plan  Physical Therapy Plan  General Plan: 2-3 times per week    Goals  Short Term Goals  Time Frame for Short Term Goals: 14 visits  Short Term Goal 1: Pt will ambulate 300ft mod (i) and RW or least restrictive AD  Short Term Goal 2: Pt will complete all bed mobility independently  Short Term Goal 3: Pt will complete all transfers mod (i) and RW or least restrictive AD  Short Term Goal 4: Pt will nagivate 2 steps mod (i) to navigate curbs in the community.    Minutes  PT Individual Minutes  Time In: 0829  Time Out: 0845  Minutes: 16  Time Code Minutes  Timed Code Treatment Minutes: 8 Minutes    Electronically signed by CARMEN Claros on 5/9/25 at 9:41 AM EDT    This treatment/evaluation completed by signing SPT. Signing PT agrees with treatment and documentation.

## 2025-05-09 NOTE — DISCHARGE SUMMARY
[facility]  Office: [phone]  Pedro De Paz DO, Salvador Sharma DO, Rohit Aranda DO, Sven Hunter DO, Romulo Quick MD, Estephanie Cooley MD, Everardo Martinez MD, Antionette Mckinney MD,  Jacinto Magallon MD, Pedro Dorman MD, Romi Fleming MD,  Anderson Lafleur DO, Venancio Johnson MD, Pipe Bacon MD, Daniel De Paz DO, Sissy Sanderson MD,  Marin Holder DO, Clarita Yip MD, Dalia Sommers MD, Juana Jane MD,  Marcio Dia MD, You Ramirez MD, Marge De Dios MD, Ant Cortez MD, Adalberto Choi MD, Mare Mccoy MD, Mitchell Nunes DO, Mireya Mao MD, Anmol Garza MD, Anderson Stacy MD, Mohsin Reza, MD, Evangelist Medina MD, Shirley Waterhouse, CNP,  Vandana Ko, CNP, Mitchell Johnson, CNP,  Ange Fernandez, DNP, Yamilet Dai, CNP, Brenda Pimentel, CNP, Mouna Tee, CNP, Agustina Rao, CNP, Allison Menendez, PA-C, Naima Montoya, CNP, Keaton Cline, CNP,  Angelica Mansfield, CNP, Luz Gusman, CNP, Kike Vergara, PA-C, Stacy Saenz, CNP,  Janna Garvey, CNS, Hilary Duarte, CNP, Fely Sandoval, CNP,   Frida Guo, CNP         [facility]   IN-PATIENT SERVICE   [facility]    Discharge Summary     Patient ID: Julia Hobbs  :  1980   MRN: [id number]     ACCOUNT:  [id number]   Patient's PCP: Jigna Haley DO  Admit Date: 2025   Discharge Date: 2025     Length of Stay: 3  Code Status:  Full Code  Admitting Physician: Venancio Johnson MD  Discharge Physician: Venancio Johnson MD     Active Discharge Diagnoses:     Hospital Problem Lists:  Principal Problem:    Acute exacerbation of chronic heart failure (HCC)  Active Problems:    Coronary artery disease involving native coronary artery of native heart without angina pectoris    Ischemic cardiomyopathy    Morbid obesity (HCC)    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 *      Admission Condition:  poor     Discharged Condition: fair    Hospital Stay:     Hospital Course:  Julia Hobbs is a 44 y.o. female who was admitted for the management of   Acute exacerbation of chronic heart failure (HCC) , presented to ER with sob    44-year-old female with past medical history of morbid obesity, type 1 diabetes mellitus on insulin pump, ESRD on home dialysis, hyperlipidemia, asthma, JOVANNA on CPAP presented to the hospital with acute shortness of breath going on for 4 days.  Patient uses 2 L at home and was requiring 5 L with shortness of breath.  Patient was noted to have rhinovirus infection.  She was admitted for asthma vs copd with acute exacerbation. She received breathing treatments and antibiotics. She showed significant improvement.  Patient continued dialysis in the hospital.  Patient had echo with EF of 40 to 45% which had dropped from previous 50%.  Patient has history of coronary artery disease and had stenting 2024, repeat echo and cath in march was normal.  Patient was seen by cardio and was advised to follow up outpatient.      Significant therapeutic interventions: as above    Significant Diagnostic Studies:   Labs / Micro:  CBC:   Lab Results   Component Value Date/Time    WBC 7.4 05/08/2025 06:40 AM    RBC 3.28 05/08/2025 06:40 AM    RBC 3.35 08/11/2022 06:01 AM    HGB 9.4 05/08/2025 06:40 AM    HCT 31.0 05/08/2025 06:40 AM    MCV 94.5 05/08/2025 06:40 AM    MCH 28.7 05/08/2025 06:40 AM    MCHC 30.3 05/08/2025 06:40 AM    RDW 15.2 05/08/2025 06:40 AM     05/08/2025 06:40 AM     BMP:    Lab Results   Component Value Date/Time    GLUCOSE 89 05/09/2025 03:37 AM    GLUCOSE 178 09/24/2024 08:14 AM     05/09/2025 03:37 AM    K 3.9 05/09/2025 03:37 AM    CL 99 05/09/2025 03:37 AM    CO2 29 05/09/2025 03:37 AM    ANIONGAP 11 05/09/2025 03:37 AM

## 2025-05-09 NOTE — PROGRESS NOTES
Physician Progress Note      PATIENT:               DON DELGADO  CSN #:                  [id number]  :                       1980  ADMIT DATE:       2025 1:26 PM  DISCH DATE:  RESPONDING  PROVIDER #:        Venancio Johnson MD          QUERY TEXT:    Acute hypoxic Respiratory Failure is documented Nephrology Consult . H/P   states She does wear supplemental oxygen at 2 L and was requiring 5L at home   with sats in low 80's prior to ED presentation. . Please specify the type and   acuity:    The clinical indicators include:  RR 18-30 PAP>5L NC> HFNC 13L Sats 89-97%  2L NC at home  Gas  7.395/40.7/61.9/24.8/91.2  BMI 82.38  H/P  presented today for evaluation of acute SOB which she reports began around 4   days ago. She does wear supplemental oxygen at 2 L and was requiring 5L at   home with sats in low 80's prior to ED presentation  .She did test (+) for rhinovirus.    COPD with acute exacerbation 2/2 viral illness.  HFpEF: last echocardiogram 10/2023 EF 55-60%. No peripheral edema. Continue   home GDMT. Update echo. Maintain accurate I/O.  Pending pBNP. 10,175 No diuretics documented in MAR    5/6 Nephrology Consult  Chest x-ray was obtained which demonstrated pulmonary edema with concern for   fluid volume overload.  On exam, patient is found to have diminished breath   sounds throughout with bibasilar fine crackles, minimal peripheral edema.    Patient states she normally dialyzes for approximately 2 to 2.5 hours 4 to 5   days/week.  States she does not have a CPAP or BiPAP at home and has no   history of COPD or pulmonary hypertension.  Acute hypoxic respiratory failure secondary to fluid volume overload,   maintained on Bipap.  Acute fluid volume overload, with concern for ineffective dialysis as patient   states she has been consistent with her 4 to 5-day a week routine and last   dialyzed yesterday.    Treatment Oxygen  labs monitor Nephrology Consult  Options provided:  -- Acute respiratory  failure with hypoxia  -- Acute on chronic respiratory failure with hypoxia  -- Other - I will add my own diagnosis  -- Disagree - Not applicable / Not valid  -- Disagree - Clinically unable to determine / Unknown  -- Refer to Clinical Documentation Reviewer    PROVIDER RESPONSE TEXT:    This patient is in acute on chronic respiratory failure with hypoxia.    Query created by: Fabiana Parikh on 5/8/2025 10:00 AM      Electronically signed by:  Venancio Johnson MD 5/9/2025 8:52 AM

## 2025-05-09 NOTE — PLAN OF CARE
Problem: Chronic Conditions and Co-morbidities  Goal: Patient's chronic conditions and co-morbidity symptoms are monitored and maintained or improved  5/8/2025 2242 by Nico Weaver RN  Outcome: Progressing  5/8/2025 1620 by Stephanie Willis RN  Outcome: Progressing     Problem: Safety - Adult  Goal: Free from fall injury  5/8/2025 2242 by Nico Weaver RN  Outcome: Progressing  5/8/2025 1620 by Stephanie Willis RN  Outcome: Progressing     Problem: Respiratory - Adult  Goal: Achieves optimal ventilation and oxygenation  5/8/2025 2242 by Nico Weaver RN  Outcome: Progressing  5/8/2025 1620 by Stephanie Willis RN  Outcome: Progressing     Problem: Cardiovascular - Adult  Goal: Maintains optimal cardiac output and hemodynamic stability  5/8/2025 2242 by Nico Weaver RN  Outcome: Progressing  5/8/2025 1620 by Stephanie Willis RN  Outcome: Progressing  Goal: Absence of cardiac dysrhythmias or at baseline  5/8/2025 2242 by Nico Weaver RN  Outcome: Progressing  5/8/2025 1620 by Stephanie Willis RN  Outcome: Progressing     Problem: Skin/Tissue Integrity - Adult  Goal: Skin integrity remains intact  5/8/2025 2242 by Nico Weaver RN  Outcome: Progressing  5/8/2025 1620 by Stephanie Willis RN  Outcome: Progressing     Problem: Infection - Adult  Goal: Absence of infection at discharge  5/8/2025 2242 by Nico Weaver RN  Outcome: Progressing  5/8/2025 1620 by Stephanie Willis RN  Outcome: Progressing     Problem: Metabolic/Fluid and Electrolytes - Adult  Goal: Electrolytes maintained within normal limits  5/8/2025 2242 by Nico Weaver RN  Outcome: Progressing  5/8/2025 1620 by Stephanie Willis RN  Outcome: Progressing  Goal: Hemodynamic stability and optimal renal function maintained  5/8/2025 2242 by Nico Weaver RN  Outcome: Progressing  5/8/2025 1620 by Stephanie Willis RN  Outcome: Progressing  Goal: Glucose maintained within prescribed range  5/8/2025 1620 by Stephanie Willis RN  Outcome: Progressing      Problem: Hematologic - Adult  Goal: Maintains hematologic stability  5/8/2025 1620 by Stephanie Willis, RN  Outcome: Progressing     Problem: Discharge Planning  Goal: Discharge to home or other facility with appropriate resources  5/8/2025 1620 by Stephanie Willis, RN  Outcome: Progressing     Problem: ABCDS Injury Assessment  Goal: Absence of physical injury  5/8/2025 1620 by Stephanie Willis, RN  Outcome: Progressing

## 2025-05-09 NOTE — PLAN OF CARE
Problem: Chronic Conditions and Co-morbidities  Goal: Patient's chronic conditions and co-morbidity symptoms are monitored and maintained or improved  5/8/2025 2242 by Nico Weaver RN  Outcome: Progressing     Problem: Safety - Adult  Goal: Free from fall injury  5/9/2025 1230 by Ole Stewart RN  Outcome: Progressing  5/8/2025 2242 by Nico Weaver RN  Outcome: Progressing     Problem: Respiratory - Adult  Goal: Achieves optimal ventilation and oxygenation  Recent Flowsheet Documentation  Taken 5/8/2025 2243 by Trixie Macdonald RCP  Achieves optimal ventilation and oxygenation:   Assess for changes in respiratory status   Assess for changes in mentation and behavior   Position to facilitate oxygenation and minimize respiratory effort   Oxygen supplementation based on oxygen saturation or arterial blood gases   Encourage broncho-pulmonary hygiene including cough, deep breathe, incentive spirometry   Assess the need for suctioning and aspirate as needed   Assess and instruct to report shortness of breath or any respiratory difficulty   Respiratory therapy support as indicated  5/8/2025 2242 by Nico Weaver RN  Outcome: Progressing     Problem: Cardiovascular - Adult  Goal: Maintains optimal cardiac output and hemodynamic stability  5/8/2025 2242 by Nico Weaver RN  Outcome: Progressing  Goal: Absence of cardiac dysrhythmias or at baseline  5/8/2025 2242 by Nico Weaver RN  Outcome: Progressing     Problem: Skin/Tissue Integrity - Adult  Goal: Skin integrity remains intact  5/9/2025 1230 by Ole Stewart RN  Outcome: Progressing  5/8/2025 2242 by Nico Weaver RN  Outcome: Progressing     Problem: Infection - Adult  Goal: Absence of infection at discharge  5/8/2025 2242 by Nico Weaver RN  Outcome: Progressing     Problem: Metabolic/Fluid and Electrolytes - Adult  Goal: Electrolytes maintained within normal limits  5/8/2025 2242 by Nico Weaver RN  Outcome: Progressing  Goal: Hemodynamic stability and  optimal renal function maintained  5/8/2025 2242 by Nico Weaver, RN  Outcome: Progressing

## 2025-05-09 NOTE — PROGRESS NOTES
05/08/25 3205   Care Plan - Respiratory Goals   Achieves optimal ventilation and oxygenation Assess for changes in respiratory status;Assess for changes in mentation and behavior;Position to facilitate oxygenation and minimize respiratory effort;Oxygen supplementation based on oxygen saturation or arterial blood gases;Encourage broncho-pulmonary hygiene including cough, deep breathe, incentive spirometry;Assess the need for suctioning and aspirate as needed;Assess and instruct to report shortness of breath or any respiratory difficulty;Respiratory therapy support as indicated

## (undated) DEVICE — DRESSING NEG PRSS L W15XH3.3XL26CM BLK POLYUR DRP PD

## (undated) DEVICE — GAUZE,PACKING STRIP,IODOFORM,1"X5YD,STRL: Brand: CURAD

## (undated) DEVICE — GLOVE SURG SZ 8 L11.77IN FNGR THK9.8MIL STRW LTX POLYMER

## (undated) DEVICE — BINDER ABD XL W15IN 62 74IN CIRC UNISX 5 PNL E CNTCT CLSR

## (undated) DEVICE — PAD,ABDOMINAL,5"X9",ST,LF,25/BX: Brand: MEDLINE INDUSTRIES, INC.

## (undated) DEVICE — SUTURE VCRL SZ 0 L27IN ABSRB UD L26MM CT-2 1/2 CIR J270H

## (undated) DEVICE — GLOVE SURG BIOGEL PI ULTRATCH PF 8

## (undated) DEVICE — Z DISCONTINUED USE 2272114 DRAPE SURG UTIL 26X15 IN W/ TAPE N INVASIVE MULTLYR DISP

## (undated) DEVICE — TUBING, SUCTION, 9/32" X 20', STRAIGHT: Brand: MEDLINE INDUSTRIES, INC.

## (undated) DEVICE — GOWN,AURORA,NONREINFORCED,LARGE: Brand: MEDLINE

## (undated) DEVICE — SOLUTION ANTIFOG VIS SYS CLEARIFY LAPSCP

## (undated) DEVICE — GLOVE SURG SZ 65 THK91MIL LTX FREE SYN POLYISOPRENE

## (undated) DEVICE — CANISTER NEG PRSS 500ML WND THER W/ TBNG NO PRSS RANG W/

## (undated) DEVICE — GOWN,SIRUS,NONRNF,SETINSLV,2XL,18/CS: Brand: MEDLINE

## (undated) DEVICE — SVMMC PEDS/UROLOGY MINOR PACK: Brand: MEDLINE INDUSTRIES, INC.

## (undated) DEVICE — DRAPE,UNDRBUT,WHT GRAD PCH,CAPPORT,20/CS: Brand: MEDLINE

## (undated) DEVICE — TOWEL,OR,DSP,ST,NATURAL,DLX,4/PK,20PK/CS: Brand: MEDLINE

## (undated) DEVICE — SOLUTION PREP POVIDONE IOD FOR SKIN MUCOUS MEM PRIOR TO

## (undated) DEVICE — DRAIN SURG 19FR 100% SIL RADPQ RND CHN FULL FLUT

## (undated) DEVICE — NEPTUNE E-SEP 165MM SUCTION SLEEVE: Brand: NEPTUNE E-SEP

## (undated) DEVICE — COLLECTOR SPEC RAYON LIQ STUART DBL FOR THRT VAG SKIN WND

## (undated) DEVICE — PACK SURG ABD SVMMC

## (undated) DEVICE — STAPLER INT L75MM CUT LN L73MM STPL LN L77MM BLU B FRM 8

## (undated) DEVICE — SOLUTION SCRB 4OZ 10% POVIDONE IOD ANTIMIC BTL

## (undated) DEVICE — GLOVE ORANGE PI 8   MSG9080

## (undated) DEVICE — SUTURE PDS II SZ 0 L60IN ABSRB VLT L65MM TP-1 1/2 CIR Z991G

## (undated) DEVICE — BRAVA STRIP PASTE. OSTOMY CARE.: Brand: BRAVA

## (undated) DEVICE — LEGGINGS, PAIR, 31X48, STERILE: Brand: MEDLINE

## (undated) DEVICE — CONNECTOR DSG Y FOR 1 VAC THER UNIT TRAC

## (undated) DEVICE — BANDAGE,GAUZE,BULKEE II,4.5"X4.1YD,STRL: Brand: MEDLINE

## (undated) DEVICE — SPONGE LAP W18XL18IN WHT COT 4 PLY FLD STRUNG RADPQ DISP ST

## (undated) DEVICE — SUTURE VCRL + SZ 0 L27IN ABSRB VLT L26MM UR-6 5/8 CIR VCP603H

## (undated) DEVICE — CONTAINER,SPECIMEN,4OZ,OR STRL: Brand: MEDLINE

## (undated) DEVICE — DRAPE,REIN 53X77,STERILE: Brand: MEDLINE

## (undated) DEVICE — COVER OR TBL W40XL90IN ABSRB STD AND GRIPPY BK SAHARA

## (undated) DEVICE — PACK LAP BASIC

## (undated) DEVICE — GLOVE ORANGE PI 7   MSG9070

## (undated) DEVICE — DRAPE SLUSH DISC W44XL66IN ST FOR RND BSIN HUSH SLUSH SYS

## (undated) DEVICE — SOLUTION SCRB 4OZ 4% CHG H2O AIDED FOR PREOPERATIVE SKIN

## (undated) DEVICE — SUTURE VCRL SZ 3-0 L18IN ABSRB UD L26MM SH 1/2 CIR J864D

## (undated) DEVICE — ELECTRODE PT RET AD L9FT HI MOIST COND ADH HYDRGEL CORDED

## (undated) DEVICE — SUTURE PROL SZ 2-0 L30IN NONABSORBABLE BLU L26MM SH 1/2 CIR 8833H

## (undated) DEVICE — DRESSING NEG PRESSURE WND VAC

## (undated) DEVICE — RESERVOIR,SUCTION,100CC,SILICONE: Brand: MEDLINE

## (undated) DEVICE — V.A.C. VERAFLO CLEANSE CHOICE™ DRESSING LARGE: Brand: V.A.C. VERAFLO CLEANSE CHOICE™

## (undated) DEVICE — 3M™ IOBAN™ 2 ANTIMICROBIAL INCISE DRAPE 6650EZ: Brand: IOBAN™ 2

## (undated) DEVICE — GAUZE,SPONGE,FLUFF,6"X6.75",STRL,5/TRAY: Brand: MEDLINE

## (undated) DEVICE — GLOVE SURG SZ 6 THK91MIL LTX FREE SYN POLYISOPRENE ANTI

## (undated) DEVICE — GLOVE ORANGE PI 7 1/2   MSG9075

## (undated) DEVICE — TROCAR: Brand: KII® SLEEVE

## (undated) DEVICE — TROCARS: Brand: KII® BALLOON BLUNT TIP SYSTEM

## (undated) DEVICE — KIT NEG PRSS DSG M W4.92XH1.3XL7.09IN SIL POLYUR FOAM

## (undated) DEVICE — INTENDED FOR TISSUE SEPARATION, AND OTHER PROCEDURES THAT REQUIRE A SHARP SURGICAL BLADE TO PUNCTURE OR CUT.: Brand: BARD-PARKER ® CARBON RIB-BACK BLADES

## (undated) DEVICE — PROTECTOR ULN NRV PUR FOAM HK LOOP STRP ANATOMICALLY

## (undated) DEVICE — UNDERPANTS MAT L XL SEAMLESS CLR CODE WAISTBAND KNIT

## (undated) DEVICE — SYRINGE IRRIG 60ML SFT PLIABLE BLB EZ TO GRP 1 HND USE W/

## (undated) DEVICE — GAUZE,PACKING STRIP,IODOFORM,1/2"X5YD,ST: Brand: CURAD

## (undated) DEVICE — KIT NEG PRSS L W10XH1XL15CM 1 POLYVI ALC DSG 1 STD DRP 1

## (undated) DEVICE — CASSETTE THER 38 MM W/ INSTILLATION TBNG VAC VERALINK

## (undated) DEVICE — TROCAR: Brand: KII FIOS FIRST ENTRY

## (undated) DEVICE — SUTURE VCRL + SZ 2-0 L18IN ABSRB CLR TIE POLYGLACTIN 910 VCP111G

## (undated) DEVICE — Device

## (undated) DEVICE — APPLICATOR MEDICATED 26 CC SOLUTION HI LT ORNG CHLORAPREP

## (undated) DEVICE — SYRINGE, LUER LOCK, 10ML: Brand: MEDLINE

## (undated) DEVICE — CANISTER NEG PRSS 1000ML W/ GEL INFOVAC

## (undated) DEVICE — ADHESIVE SKIN CLOSURE TOP 36 CC HI VISC DERMBND MINI

## (undated) DEVICE — GOWN,SIRUS,NONRNF,SETINSLV,XL,20/CS: Brand: MEDLINE

## (undated) DEVICE — PAD PT POS 36 IN SURGYPAD DISP

## (undated) DEVICE — SHEET, T, LAPAROTOMY, STERILE: Brand: MEDLINE

## (undated) DEVICE — STAPLER INT L60MM REG TISS BLU B FRM 8 FIRING 2 ROW AUTO

## (undated) DEVICE — SWAB CULT CLR BLU PLAS RAYON LIQ AMIES AERB ANAERB FRIC CAP

## (undated) DEVICE — GAUZE,PACKING STRIP,PLAIN,1/4"X5YD,STRL: Brand: CURAD

## (undated) DEVICE — PLUMEPORT SEO LAPAROSCOPIC SMOKE FILTRATION DEVICE: Brand: PLUMEPORT

## (undated) DEVICE — GAUZE,PACKING STRIP,IODOFORM,2"X5YD,STRL: Brand: CURAD

## (undated) DEVICE — KIT NEG PRSS M DSG FOAM VAC VERAFLO

## (undated) DEVICE — PACK PROCEDURE SURG GEN MIN SVMMC

## (undated) DEVICE — NEEDLE HYPO 25GA L1.5IN BLU POLYPR HUB S STL REG BVL STR

## (undated) DEVICE — STAPLE REMOVER TRAY: Brand: MEDLINE INDUSTRIES, INC.

## (undated) DEVICE — SUTURE ABSORBABLE BRAIDED 3-0 12X18 IN COAT UD VICRYL + VCP110G

## (undated) DEVICE — SUTURE PROL SZ 2-0 L18IN NONABSORBABLE BLU FS L26MM 3/8 CIR 8685H

## (undated) DEVICE — DRESSING NEG PRSS SM 10X7.5X3.3CM POLYUR FOR WND THER VAC

## (undated) DEVICE — NEPTUNE E-SEP SMOKE EVACUATION PENCIL, COATED, 70MM BLADE, PUSH BUTTON SWITCH: Brand: NEPTUNE E-SEP

## (undated) DEVICE — COVER LT HNDL BLU PLAS

## (undated) DEVICE — HANDPIECE SET WITH COAXIAL HIGH FLOW TIP AND SUCTION TUBE: Brand: INTERPULSE

## (undated) DEVICE — SUTURE PROL SZ 2-0 L30IN NONABSORBABLE BLU L26MM CT-1 1/2 8423H

## (undated) DEVICE — ULTRASONIC SET TUBE SONIC 1 SONICVAC DISP

## (undated) DEVICE — Z DISCONTINUED BY MEDLINE USE 2711682 TRAY SKIN PREP DRY W/ PREM GLV

## (undated) DEVICE — 3M™ STERI-STRIP™ COMPOUND BENZOIN TINCTURE 40 BAGS/CARTON 4 CARTONS/CASE C1544: Brand: 3M™ STERI-STRIP™

## (undated) DEVICE — TOTAL TRAY, 16FR 10ML SIL FOLEY, URN: Brand: MEDLINE

## (undated) DEVICE — YANKAUER,POOLE TIP,STERILE,50/CS: Brand: MEDLINE